# Patient Record
Sex: FEMALE | Race: WHITE | NOT HISPANIC OR LATINO | URBAN - METROPOLITAN AREA
[De-identification: names, ages, dates, MRNs, and addresses within clinical notes are randomized per-mention and may not be internally consistent; named-entity substitution may affect disease eponyms.]

---

## 2017-06-05 ENCOUNTER — OUTPATIENT (OUTPATIENT)
Dept: OUTPATIENT SERVICES | Facility: HOSPITAL | Age: 62
LOS: 1 days | End: 2017-06-05
Payer: MEDICARE

## 2017-06-05 VITALS
DIASTOLIC BLOOD PRESSURE: 75 MMHG | WEIGHT: 151.02 LBS | HEIGHT: 63 IN | TEMPERATURE: 98 F | RESPIRATION RATE: 18 BRPM | OXYGEN SATURATION: 96 % | HEART RATE: 71 BPM | SYSTOLIC BLOOD PRESSURE: 137 MMHG

## 2017-06-05 DIAGNOSIS — Z90.49 ACQUIRED ABSENCE OF OTHER SPECIFIED PARTS OF DIGESTIVE TRACT: Chronic | ICD-10-CM

## 2017-06-05 DIAGNOSIS — K43.9 VENTRAL HERNIA WITHOUT OBSTRUCTION OR GANGRENE: ICD-10-CM

## 2017-06-05 DIAGNOSIS — Z01.818 ENCOUNTER FOR OTHER PREPROCEDURAL EXAMINATION: ICD-10-CM

## 2017-06-05 DIAGNOSIS — Z98.890 OTHER SPECIFIED POSTPROCEDURAL STATES: Chronic | ICD-10-CM

## 2017-06-05 DIAGNOSIS — K43.2 INCISIONAL HERNIA WITHOUT OBSTRUCTION OR GANGRENE: Chronic | ICD-10-CM

## 2017-06-05 DIAGNOSIS — S43.006A UNSPECIFIED DISLOCATION OF UNSPECIFIED SHOULDER JOINT, INITIAL ENCOUNTER: ICD-10-CM

## 2017-06-05 PROCEDURE — G0463: CPT

## 2017-06-05 PROCEDURE — 85027 COMPLETE CBC AUTOMATED: CPT

## 2017-06-05 PROCEDURE — 87640 STAPH A DNA AMP PROBE: CPT

## 2017-06-05 PROCEDURE — 87641 MR-STAPH DNA AMP PROBE: CPT

## 2017-06-05 PROCEDURE — 80048 BASIC METABOLIC PNL TOTAL CA: CPT

## 2017-06-05 RX ORDER — SODIUM CHLORIDE 9 MG/ML
3 INJECTION INTRAMUSCULAR; INTRAVENOUS; SUBCUTANEOUS EVERY 8 HOURS
Qty: 0 | Refills: 0 | Status: DISCONTINUED | OUTPATIENT
Start: 2017-06-19 | End: 2017-06-22

## 2017-06-05 RX ORDER — CEFAZOLIN SODIUM 1 G
2000 VIAL (EA) INJECTION ONCE
Qty: 0 | Refills: 0 | Status: DISCONTINUED | OUTPATIENT
Start: 2017-06-07 | End: 2017-06-22

## 2017-06-05 RX ORDER — LIDOCAINE HCL 20 MG/ML
0.2 VIAL (ML) INJECTION ONCE
Qty: 0 | Refills: 0 | Status: DISCONTINUED | OUTPATIENT
Start: 2017-06-07 | End: 2017-06-22

## 2017-06-05 NOTE — H&P PST ADULT - PMH
Anxiety    Constipation    Depression    Hepatitis C  treated.  Pt states it was successful  History of ETOH abuse  pt states last drink 20 years ago attends AA meetings  Lumbar herniated disc  s/p 2 epidural injections, last one 2-2017  Migraine  pt states last 5-04-17.  Imitrex prn  Shoulder dislocation  history of, returned by pt. Pt states she can not stretch out arms fully  Ventral hernia  current

## 2017-06-05 NOTE — H&P PST ADULT - NSANTHOSAYNRD_GEN_A_CORE
No. JAMIE screening performed.  STOP BANG Legend: 0-2 = LOW Risk; 3-4 = INTERMEDIATE Risk; 5-8 = HIGH Risk

## 2017-06-05 NOTE — H&P PST ADULT - HISTORY OF PRESENT ILLNESS
This is a 62 year old female who noticed a mass" popping" out from her stomach.  Pt was able to reduce it, but over the past 3 weeks it has become larger and pt is distended and has abdominal pain.  Now scheduled  for laparoscopic ventral hernia repair on 6-07-17

## 2017-06-05 NOTE — H&P PST ADULT - PSH
Incisional hernia  s//p surgical repair with mesh 2016  S/P cholecystectomy  laparoscopic 2015  S/P colonoscopy  2016

## 2017-06-06 LAB
ANION GAP SERPL CALC-SCNC: 12 MMOL/L — SIGNIFICANT CHANGE UP (ref 5–17)
BUN SERPL-MCNC: 12 MG/DL — SIGNIFICANT CHANGE UP (ref 7–23)
CALCIUM SERPL-MCNC: 9.4 MG/DL — SIGNIFICANT CHANGE UP (ref 8.4–10.5)
CHLORIDE SERPL-SCNC: 106 MMOL/L — SIGNIFICANT CHANGE UP (ref 96–108)
CO2 SERPL-SCNC: 25 MMOL/L — SIGNIFICANT CHANGE UP (ref 22–31)
CREAT SERPL-MCNC: 0.67 MG/DL — SIGNIFICANT CHANGE UP (ref 0.5–1.3)
GLUCOSE SERPL-MCNC: 88 MG/DL — SIGNIFICANT CHANGE UP (ref 70–99)
HCT VFR BLD CALC: 35.4 % — SIGNIFICANT CHANGE UP (ref 34.5–45)
HGB BLD-MCNC: 11.7 G/DL — SIGNIFICANT CHANGE UP (ref 11.5–15.5)
MCHC RBC-ENTMCNC: 29.9 PG — SIGNIFICANT CHANGE UP (ref 27–34)
MCHC RBC-ENTMCNC: 33.1 GM/DL — SIGNIFICANT CHANGE UP (ref 32–36)
MCV RBC AUTO: 90.5 FL — SIGNIFICANT CHANGE UP (ref 80–100)
MRSA PCR RESULT.: SIGNIFICANT CHANGE UP
PLATELET # BLD AUTO: 82 K/UL — LOW (ref 150–400)
POTASSIUM SERPL-MCNC: 4 MMOL/L — SIGNIFICANT CHANGE UP (ref 3.5–5.3)
POTASSIUM SERPL-SCNC: 4 MMOL/L — SIGNIFICANT CHANGE UP (ref 3.5–5.3)
RBC # BLD: 3.91 M/UL — SIGNIFICANT CHANGE UP (ref 3.8–5.2)
RBC # FLD: 14.1 % — SIGNIFICANT CHANGE UP (ref 10.3–14.5)
S AUREUS DNA NOSE QL NAA+PROBE: SIGNIFICANT CHANGE UP
SODIUM SERPL-SCNC: 143 MMOL/L — SIGNIFICANT CHANGE UP (ref 135–145)
WBC # BLD: 4.21 K/UL — SIGNIFICANT CHANGE UP (ref 3.8–10.5)
WBC # FLD AUTO: 4.21 K/UL — SIGNIFICANT CHANGE UP (ref 3.8–10.5)

## 2017-06-07 ENCOUNTER — RESULT REVIEW (OUTPATIENT)
Age: 62
End: 2017-06-07

## 2017-06-07 ENCOUNTER — OUTPATIENT (OUTPATIENT)
Dept: OUTPATIENT SERVICES | Facility: HOSPITAL | Age: 62
LOS: 1 days | End: 2017-06-07
Payer: MEDICARE

## 2017-06-07 VITALS
DIASTOLIC BLOOD PRESSURE: 82 MMHG | OXYGEN SATURATION: 99 % | RESPIRATION RATE: 20 BRPM | SYSTOLIC BLOOD PRESSURE: 148 MMHG | WEIGHT: 151.02 LBS | HEART RATE: 71 BPM | HEIGHT: 63 IN | TEMPERATURE: 98 F

## 2017-06-07 VITALS
DIASTOLIC BLOOD PRESSURE: 86 MMHG | RESPIRATION RATE: 15 BRPM | OXYGEN SATURATION: 96 % | SYSTOLIC BLOOD PRESSURE: 152 MMHG | HEART RATE: 85 BPM | TEMPERATURE: 99 F

## 2017-06-07 DIAGNOSIS — K43.2 INCISIONAL HERNIA WITHOUT OBSTRUCTION OR GANGRENE: Chronic | ICD-10-CM

## 2017-06-07 DIAGNOSIS — Z98.890 OTHER SPECIFIED POSTPROCEDURAL STATES: Chronic | ICD-10-CM

## 2017-06-07 DIAGNOSIS — Z01.818 ENCOUNTER FOR OTHER PREPROCEDURAL EXAMINATION: ICD-10-CM

## 2017-06-07 DIAGNOSIS — K43.9 VENTRAL HERNIA WITHOUT OBSTRUCTION OR GANGRENE: ICD-10-CM

## 2017-06-07 DIAGNOSIS — Z90.49 ACQUIRED ABSENCE OF OTHER SPECIFIED PARTS OF DIGESTIVE TRACT: Chronic | ICD-10-CM

## 2017-06-07 PROCEDURE — 88313 SPECIAL STAINS GROUP 2: CPT

## 2017-06-07 PROCEDURE — 85049 AUTOMATED PLATELET COUNT: CPT

## 2017-06-07 PROCEDURE — 88313 SPECIAL STAINS GROUP 2: CPT | Mod: 26

## 2017-06-07 PROCEDURE — 47379 UNLISTED LAPS PX LIVER: CPT

## 2017-06-07 PROCEDURE — C1781: CPT

## 2017-06-07 PROCEDURE — 88307 TISSUE EXAM BY PATHOLOGIST: CPT | Mod: 26

## 2017-06-07 PROCEDURE — 49653: CPT

## 2017-06-07 PROCEDURE — 88307 TISSUE EXAM BY PATHOLOGIST: CPT

## 2017-06-07 RX ORDER — SODIUM CHLORIDE 9 MG/ML
1000 INJECTION, SOLUTION INTRAVENOUS
Qty: 0 | Refills: 0 | Status: DISCONTINUED | OUTPATIENT
Start: 2017-06-07 | End: 2017-06-22

## 2017-06-07 RX ORDER — ONDANSETRON 8 MG/1
4 TABLET, FILM COATED ORAL ONCE
Qty: 0 | Refills: 0 | Status: COMPLETED | OUTPATIENT
Start: 2017-06-07 | End: 2017-06-07

## 2017-06-07 RX ORDER — ACETAMINOPHEN 500 MG
975 TABLET ORAL ONCE
Qty: 0 | Refills: 0 | Status: COMPLETED | OUTPATIENT
Start: 2017-06-07 | End: 2017-06-07

## 2017-06-07 RX ORDER — OXYCODONE HYDROCHLORIDE 5 MG/1
5 TABLET ORAL ONCE
Qty: 0 | Refills: 0 | Status: DISCONTINUED | OUTPATIENT
Start: 2017-06-07 | End: 2017-06-07

## 2017-06-07 RX ORDER — CHOLECALCIFEROL (VITAMIN D3) 125 MCG
1 CAPSULE ORAL
Qty: 0 | Refills: 0 | COMMUNITY

## 2017-06-07 RX ORDER — CELECOXIB 200 MG/1
200 CAPSULE ORAL ONCE
Qty: 0 | Refills: 0 | Status: COMPLETED | OUTPATIENT
Start: 2017-06-07 | End: 2017-06-07

## 2017-06-07 RX ORDER — OXYCODONE HYDROCHLORIDE 5 MG/1
1 TABLET ORAL
Qty: 0 | Refills: 0 | COMMUNITY
Start: 2017-06-07

## 2017-06-07 RX ORDER — SERTRALINE 25 MG/1
1 TABLET, FILM COATED ORAL
Qty: 0 | Refills: 0 | COMMUNITY

## 2017-06-07 RX ORDER — SUMATRIPTAN SUCCINATE 4 MG/.5ML
1 INJECTION, SOLUTION SUBCUTANEOUS
Qty: 0 | Refills: 0 | COMMUNITY

## 2017-06-07 RX ORDER — ONDANSETRON 8 MG/1
4 TABLET, FILM COATED ORAL ONCE
Qty: 0 | Refills: 0 | Status: DISCONTINUED | OUTPATIENT
Start: 2017-06-07 | End: 2017-06-22

## 2017-06-07 RX ORDER — TRAZODONE HCL 50 MG
1 TABLET ORAL
Qty: 0 | Refills: 0 | COMMUNITY

## 2017-06-07 RX ORDER — FENTANYL CITRATE 50 UG/ML
25 INJECTION INTRAVENOUS
Qty: 0 | Refills: 0 | Status: DISCONTINUED | OUTPATIENT
Start: 2017-06-07 | End: 2017-06-07

## 2017-06-07 RX ORDER — OXYCODONE HYDROCHLORIDE 5 MG/1
1 TABLET ORAL
Qty: 0 | Refills: 0 | COMMUNITY

## 2017-06-07 RX ORDER — FAMOTIDINE 10 MG/ML
1 INJECTION INTRAVENOUS
Qty: 0 | Refills: 0 | COMMUNITY

## 2017-06-07 RX ORDER — HYDROMORPHONE HYDROCHLORIDE 2 MG/ML
0.5 INJECTION INTRAMUSCULAR; INTRAVENOUS; SUBCUTANEOUS
Qty: 0 | Refills: 0 | Status: DISCONTINUED | OUTPATIENT
Start: 2017-06-07 | End: 2017-06-07

## 2017-06-07 RX ADMIN — ONDANSETRON 4 MILLIGRAM(S): 8 TABLET, FILM COATED ORAL at 13:15

## 2017-06-07 RX ADMIN — HYDROMORPHONE HYDROCHLORIDE 0.5 MILLIGRAM(S): 2 INJECTION INTRAMUSCULAR; INTRAVENOUS; SUBCUTANEOUS at 13:40

## 2017-06-07 RX ADMIN — OXYCODONE HYDROCHLORIDE 5 MILLIGRAM(S): 5 TABLET ORAL at 13:15

## 2017-06-07 RX ADMIN — CELECOXIB 200 MILLIGRAM(S): 200 CAPSULE ORAL at 13:15

## 2017-06-07 NOTE — ASU PREOP CHECKLIST - TO WHOM
OR SARAH Cross ([id number])/ OR SARAH Cross ([id number])/Per surgeon, pt may go to OR without PLT results being ready. He will check results post-op OR SARAH Cross ([id number])/Per surgeon, pt may go to OR without PLT results being ready. He will check results post-op/report received from GIGI Obregon RN

## 2017-06-08 ENCOUNTER — TRANSCRIPTION ENCOUNTER (OUTPATIENT)
Age: 62
End: 2017-06-08

## 2017-06-13 LAB — SURGICAL PATHOLOGY STUDY: SIGNIFICANT CHANGE UP

## 2017-11-08 ENCOUNTER — APPOINTMENT (OUTPATIENT)
Dept: GASTROENTEROLOGY | Facility: CLINIC | Age: 62
End: 2017-11-08

## 2018-01-30 ENCOUNTER — APPOINTMENT (OUTPATIENT)
Dept: GASTROENTEROLOGY | Facility: CLINIC | Age: 63
End: 2018-01-30
Payer: MEDICARE

## 2018-01-30 ENCOUNTER — LABORATORY RESULT (OUTPATIENT)
Age: 63
End: 2018-01-30

## 2018-01-30 VITALS
RESPIRATION RATE: 16 BRPM | SYSTOLIC BLOOD PRESSURE: 122 MMHG | BODY MASS INDEX: 23.39 KG/M2 | TEMPERATURE: 97.5 F | WEIGHT: 137 LBS | HEIGHT: 64 IN | OXYGEN SATURATION: 96 % | DIASTOLIC BLOOD PRESSURE: 64 MMHG | HEART RATE: 87 BPM

## 2018-01-30 DIAGNOSIS — K43.9 VENTRAL HERNIA W/OUT OBSTRUCTION OR GANGRENE: ICD-10-CM

## 2018-01-30 PROCEDURE — 99215 OFFICE O/P EST HI 40 MIN: CPT | Mod: 25

## 2018-01-30 PROCEDURE — 36415 COLL VENOUS BLD VENIPUNCTURE: CPT

## 2018-02-01 LAB
AFP-TM SERPL-MCNC: 4.5 NG/ML
ALBUMIN SERPL ELPH-MCNC: 3.8 G/DL
ALP BLD-CCNC: 127 U/L
ALT SERPL-CCNC: 26 U/L
ANION GAP SERPL CALC-SCNC: 14 MMOL/L
AST SERPL-CCNC: 46 U/L
BASOPHILS # BLD AUTO: 0.02 K/UL
BASOPHILS NFR BLD AUTO: 0.4 %
BILIRUB SERPL-MCNC: 0.9 MG/DL
BUN SERPL-MCNC: 12 MG/DL
CALCIUM SERPL-MCNC: 9.9 MG/DL
CHLORIDE SERPL-SCNC: 105 MMOL/L
CO2 SERPL-SCNC: 24 MMOL/L
CREAT SERPL-MCNC: 0.81 MG/DL
EOSINOPHIL # BLD AUTO: 0.14 K/UL
EOSINOPHIL NFR BLD AUTO: 3.1 %
GLUCOSE SERPL-MCNC: 90 MG/DL
HCT VFR BLD CALC: 37.5 %
HCV RNA SERPL NAA DL=5-ACNC: NOT DETECTED
HCV RNA SERPL NAA+PROBE-LOG IU: NOT DETECTED LOGIU/ML
HGB BLD-MCNC: 12.6 G/DL
IMM GRANULOCYTES NFR BLD AUTO: 0.2 %
INR PPP: 1.11 RATIO
LYMPHOCYTES # BLD AUTO: 0.91 K/UL
LYMPHOCYTES NFR BLD AUTO: 20 %
MAN DIFF?: NORMAL
MCHC RBC-ENTMCNC: 30.7 PG
MCHC RBC-ENTMCNC: 33.6 GM/DL
MCV RBC AUTO: 91.5 FL
MONOCYTES # BLD AUTO: 0.36 K/UL
MONOCYTES NFR BLD AUTO: 7.9 %
NEUTROPHILS # BLD AUTO: 3.1 K/UL
NEUTROPHILS NFR BLD AUTO: 68.4 %
PLATELET # BLD AUTO: 68 K/UL
POTASSIUM SERPL-SCNC: 3.9 MMOL/L
PROT SERPL-MCNC: 7.7 G/DL
PT BLD: 12.6 SEC
RBC # BLD: 4.1 M/UL
RBC # FLD: 14.5 %
SODIUM SERPL-SCNC: 143 MMOL/L
WBC # FLD AUTO: 4.54 K/UL

## 2018-07-19 PROBLEM — F41.9 ANXIETY DISORDER, UNSPECIFIED: Chronic | Status: ACTIVE | Noted: 2017-06-05

## 2018-07-19 PROBLEM — Z87.898 PERSONAL HISTORY OF OTHER SPECIFIED CONDITIONS: Chronic | Status: ACTIVE | Noted: 2017-06-05

## 2018-07-19 PROBLEM — S43.006A UNSPECIFIED DISLOCATION OF UNSPECIFIED SHOULDER JOINT, INITIAL ENCOUNTER: Chronic | Status: ACTIVE | Noted: 2017-06-05

## 2018-07-19 PROBLEM — M51.26 OTHER INTERVERTEBRAL DISC DISPLACEMENT, LUMBAR REGION: Chronic | Status: ACTIVE | Noted: 2017-06-05

## 2018-07-19 PROBLEM — G43.909 MIGRAINE, UNSPECIFIED, NOT INTRACTABLE, WITHOUT STATUS MIGRAINOSUS: Chronic | Status: ACTIVE | Noted: 2017-06-05

## 2018-07-19 PROBLEM — B19.20 UNSPECIFIED VIRAL HEPATITIS C WITHOUT HEPATIC COMA: Chronic | Status: ACTIVE | Noted: 2017-06-05

## 2018-07-19 PROBLEM — K59.00 CONSTIPATION, UNSPECIFIED: Chronic | Status: ACTIVE | Noted: 2017-06-05

## 2018-07-19 PROBLEM — F32.9 MAJOR DEPRESSIVE DISORDER, SINGLE EPISODE, UNSPECIFIED: Chronic | Status: ACTIVE | Noted: 2017-06-05

## 2018-07-19 PROBLEM — K43.9 VENTRAL HERNIA WITHOUT OBSTRUCTION OR GANGRENE: Chronic | Status: ACTIVE | Noted: 2017-06-05

## 2018-09-05 ENCOUNTER — APPOINTMENT (OUTPATIENT)
Dept: GASTROENTEROLOGY | Facility: CLINIC | Age: 63
End: 2018-09-05
Payer: MEDICARE

## 2018-09-05 ENCOUNTER — LABORATORY RESULT (OUTPATIENT)
Age: 63
End: 2018-09-05

## 2018-09-05 VITALS
WEIGHT: 135 LBS | BODY MASS INDEX: 23.05 KG/M2 | RESPIRATION RATE: 15 BRPM | HEIGHT: 64 IN | SYSTOLIC BLOOD PRESSURE: 120 MMHG | DIASTOLIC BLOOD PRESSURE: 70 MMHG | TEMPERATURE: 98 F | HEART RATE: 70 BPM | OXYGEN SATURATION: 98 %

## 2018-09-05 PROCEDURE — 99214 OFFICE O/P EST MOD 30 MIN: CPT | Mod: 25

## 2018-09-05 PROCEDURE — 36415 COLL VENOUS BLD VENIPUNCTURE: CPT

## 2018-09-09 LAB
AFP-TM SERPL-MCNC: 3.7 NG/ML
ALBUMIN SERPL ELPH-MCNC: 3.7 G/DL
ALP BLD-CCNC: 118 U/L
ALT SERPL-CCNC: 15 U/L
AST SERPL-CCNC: 35 U/L
BASOPHILS # BLD AUTO: 0.01 K/UL
BASOPHILS NFR BLD AUTO: 0.2 %
BILIRUB DIRECT SERPL-MCNC: 0.3 MG/DL
BILIRUB INDIRECT SERPL-MCNC: 0.7 MG/DL
BILIRUB SERPL-MCNC: 1 MG/DL
CREAT SERPL-MCNC: 0.56 MG/DL
EOSINOPHIL # BLD AUTO: 0.16 K/UL
EOSINOPHIL NFR BLD AUTO: 3.1 %
HCT VFR BLD CALC: 35.3 %
HCV RNA SERPL NAA DL=5-ACNC: NOT DETECTED IU/ML
HCV RNA SERPL NAA+PROBE-LOG IU: NOT DETECTED LOGIU/ML
HGB BLD-MCNC: 11.9 G/DL
IMM GRANULOCYTES NFR BLD AUTO: 0.2 %
INR PPP: 1.14 RATIO
LYMPHOCYTES # BLD AUTO: 0.9 K/UL
LYMPHOCYTES NFR BLD AUTO: 17.4 %
MAN DIFF?: NORMAL
MCHC RBC-ENTMCNC: 31.2 PG
MCHC RBC-ENTMCNC: 33.7 GM/DL
MCV RBC AUTO: 92.7 FL
MONOCYTES # BLD AUTO: 0.42 K/UL
MONOCYTES NFR BLD AUTO: 8.1 %
NEUTROPHILS # BLD AUTO: 3.67 K/UL
NEUTROPHILS NFR BLD AUTO: 71 %
PLATELET # BLD AUTO: 63 K/UL
PROT SERPL-MCNC: 6.9 G/DL
PT BLD: 12.9 SEC
RBC # BLD: 3.81 M/UL
RBC # FLD: 14.6 %
SODIUM SERPL-SCNC: 143 MMOL/L
WBC # FLD AUTO: 5.17 K/UL

## 2018-09-10 ENCOUNTER — OTHER (OUTPATIENT)
Age: 63
End: 2018-09-10

## 2018-09-10 DIAGNOSIS — Z12.11 ENCOUNTER FOR SCREENING FOR MALIGNANT NEOPLASM OF COLON: ICD-10-CM

## 2018-09-10 RX ORDER — POLYETHYLENE GLYCOL 3350, SODIUM SULFATE, SODIUM CHLORIDE, POTASSIUM CHLORIDE, ASCORBIC ACID, SODIUM ASCORBATE 7.5-2.691G
100 KIT ORAL
Qty: 1 | Refills: 0 | Status: ACTIVE | COMMUNITY
Start: 2018-09-10 | End: 1900-01-01

## 2018-09-18 ENCOUNTER — APPOINTMENT (OUTPATIENT)
Dept: ULTRASOUND IMAGING | Facility: IMAGING CENTER | Age: 63
End: 2018-09-18
Payer: MEDICARE

## 2018-09-18 ENCOUNTER — OUTPATIENT (OUTPATIENT)
Dept: OUTPATIENT SERVICES | Facility: HOSPITAL | Age: 63
LOS: 1 days | End: 2018-09-18
Payer: MEDICARE

## 2018-09-18 DIAGNOSIS — K74.60 UNSPECIFIED CIRRHOSIS OF LIVER: ICD-10-CM

## 2018-09-18 DIAGNOSIS — K43.2 INCISIONAL HERNIA WITHOUT OBSTRUCTION OR GANGRENE: Chronic | ICD-10-CM

## 2018-09-18 DIAGNOSIS — Z90.49 ACQUIRED ABSENCE OF OTHER SPECIFIED PARTS OF DIGESTIVE TRACT: Chronic | ICD-10-CM

## 2018-09-18 DIAGNOSIS — Z98.890 OTHER SPECIFIED POSTPROCEDURAL STATES: Chronic | ICD-10-CM

## 2018-09-18 PROCEDURE — 76700 US EXAM ABDOM COMPLETE: CPT | Mod: 26

## 2018-09-18 PROCEDURE — 76700 US EXAM ABDOM COMPLETE: CPT

## 2018-11-29 ENCOUNTER — APPOINTMENT (OUTPATIENT)
Dept: GASTROENTEROLOGY | Facility: HOSPITAL | Age: 63
End: 2018-11-29

## 2018-11-29 ENCOUNTER — OUTPATIENT (OUTPATIENT)
Dept: OUTPATIENT SERVICES | Facility: HOSPITAL | Age: 63
LOS: 1 days | Discharge: HOME | End: 2018-11-29
Payer: MEDICARE

## 2018-11-29 DIAGNOSIS — K43.2 INCISIONAL HERNIA WITHOUT OBSTRUCTION OR GANGRENE: Chronic | ICD-10-CM

## 2018-11-29 DIAGNOSIS — Z98.890 OTHER SPECIFIED POSTPROCEDURAL STATES: Chronic | ICD-10-CM

## 2018-11-29 DIAGNOSIS — Z90.49 ACQUIRED ABSENCE OF OTHER SPECIFIED PARTS OF DIGESTIVE TRACT: Chronic | ICD-10-CM

## 2018-11-29 DIAGNOSIS — K74.60 UNSPECIFIED CIRRHOSIS OF LIVER: ICD-10-CM

## 2018-11-29 PROCEDURE — 45330 DIAGNOSTIC SIGMOIDOSCOPY: CPT | Mod: GC

## 2019-01-25 ENCOUNTER — MESSAGE (OUTPATIENT)
Age: 64
End: 2019-01-25

## 2019-07-24 ENCOUNTER — APPOINTMENT (OUTPATIENT)
Dept: GASTROENTEROLOGY | Facility: CLINIC | Age: 64
End: 2019-07-24

## 2019-07-30 ENCOUNTER — APPOINTMENT (OUTPATIENT)
Dept: GASTROENTEROLOGY | Facility: CLINIC | Age: 64
End: 2019-07-30
Payer: MEDICARE

## 2019-07-30 VITALS
WEIGHT: 128 LBS | SYSTOLIC BLOOD PRESSURE: 122 MMHG | RESPIRATION RATE: 16 BRPM | TEMPERATURE: 98.4 F | DIASTOLIC BLOOD PRESSURE: 70 MMHG | BODY MASS INDEX: 22.68 KG/M2 | HEIGHT: 63 IN | OXYGEN SATURATION: 96 % | HEART RATE: 87 BPM

## 2019-07-30 PROCEDURE — 99214 OFFICE O/P EST MOD 30 MIN: CPT

## 2019-07-30 NOTE — HISTORY OF PRESENT ILLNESS
[FreeTextEntry1] : Office revisit on 7/30/19.\par \par The patient presents for followup regarding history of cirrhosis attributed to chronic hepatitis C and alcohol use. \par \par PREVIOUS HISTORY:\par \par ORV 5/26/15:    -Patient commenced therapy with Sovaldi and Ribavirin approximately 17 weeks ago (initiated Jan 19th). \par \par The patient is a 59-year-old woman with a history of chronic hepatitis C genotype 3a. The patient has previous history of parenterall drug use and alcohol use. She has been known to have chronic hepatitis C for many years. The patient had previously been evaluated by me in the past at which time she had declined antiviral therapy. \par \par The patient underwent a laparoscopic cholecystectomy on 3/18/14. Her liver appeared cirrhotic at the time of the cholecystectomy. A biopsy revealed features of cirrhosis. Of note, a CT scan on 3/17/14 revealed a fatty liver. The liver was not described as being cirrhotic although splenomegaly was noted and varices at the splenic hilum was reported. A 7 mm nodule was noted at the right lung base. In addition, on 3/8/14 the platelet count was 73,000.\par \par The patient is currently asymptomatic from a gastrointestinal standpoint. She has one formed bowel movement daily without any complaints of diarrhea, constipation, change in bowel habit or rectal bleeding. Her appetite and weight are stable. She reports no complaints of dysphagia, heartburn, regurgitation, nausea, vomiting or abdominal pain. \par \par As noted, the patient has a prior history of significant alcohol intake as well as drug intake. However, since 1999 she has been totally abstinent from alcohol and drugs.\par \par The patient indicates that she underwent a colonoscopy 2 years ago. She reports that a small benign polyp was removed.\par \par Testing for hepatitis A and hepatitis B revealed immunity.\par \par The patient underwent an upper endoscopy on 7/24/14 and esophageal varices were not detected.\par \par  When assessed on 4/14/15 the ALT was 26 and AST was 41. The HCV RNA by PCR assay was detectable but < 12.\par \par CURRENT HISTORY:\par \par ORV 1/30/18:    -Patient presents for followup regarding plans for surgical repair of recurrent ventral hernia. History per patient as well as a friend was present during the interview and examination.\par              -History of established cirrhosis. Patient has history of alcohol abuse. History of chronic hepatitis C genotype 3 which was treated in 2015 with a 6 month regimen of ribavirin 1000 mg in conjunction with Solvadi 400 mg. Treatment completed early 9/2015. Laboratory assessment 12/2015 noted for negative HCV RNA by PCR assay consistent with SVR. Liver enzymes were normal including ALT 19. Thrombocytopenia was noted with platelet count 76,000 consistent with component of portal hypertension.\par                -Records provided by patient were reviewed. Patient had undergone repair of an incarcerated umbilical hernia by Dr. Friedman on 8/16/16. Reoperation for recurrent ventral incisional hernia was performed on 6/7/17. Postop diagnosis was listed as incarcerated recurrent ventral incisional hernia. Cirrhosis was also noted. Operative note indicates a 6.6 cm Ventralex mesh was inserted. Liver biopsy was performed confirming cirrhosis.\par               -The patient indicates experiencing chronic symptoms of upper abdominal discomfort attributed to a recurrent hernia. Patient is vague as to the duration of the symptoms. However, approximately 10 days ago the patient experienced a more severe sharp throbbing pain at the site of the recurrent hernia in association with vomiting prompting a 2 day hospitalization at [facility]. The patient was evaluated by Dr. William Blackmon of surgery. Elective surgical repair is being planned. The patient indicates that she had a CT scan at that hospitalization-I await this report for review. The patient indicates that the vomiting prompting hospitalization has resolved.\par                 -The patient continues to experience abdominal discomfort attributed to the recurrent hernia. She describes a constant throbbing discomfort and pain. Coughing exacerbates this. She is able to reduce the hernia. At present, the patient denies any fever. Appetite and weight are stable. Reports no complaints of dysphagia or any recurrence of vomiting since her hospitalization. Can experience some nausea. His daily bowel movement without any complaints of diarrhea, constipation or rectal bleeding. Patient utilizes Advil or naproxen p.r.n. for pain approximately 3-4 times weekly. On occasion utilize oxycodone for pain.\par \par ORV 9/5/18:    -Presents for routine followup regarding history of cirrhosis and previous history of chronic hepatitis C. Previous history of alcohol abuse reported but has been abstinent for past 20 years.\par                  -Patient underwent complex abdominal hernia repair 2/2018. Hospitalized for 6 days. Reports 15 pound weight loss at time of surgery but since stable.\par                   -Denies complaints of dysphagia, heartburn, nausea, vomiting or abdominal pain. Bowel movements are irregular and she is prone to constipation. Patient not willing to have bowel movements at work. No complaint of diarrhea, change in bowel habit or rectal bleeding. She can experience some complaints of gas.\par                  -Patient has chronic back pain. Reports history of spinal stenosis, scoliosis and arthritis. Gets epidural injections but not effective. Complains of migraine. Currently on regimen of Zoloft, trazodone and p.r.n. oxycodone but reports only infrequently utilizing oxycodone.\par \par PARTIAL COLONOSCOPY 11/29/18:     -Patient underwent screening colonoscopy on 11/29/18. Patient is average risk. Despite propofol sedation the patient could not be adequately sedated. Throughout the exam she was restless, tense and with apparent myoclonus. A few sigmoid diverticula were noted. The colonoscopy examination was incomplete. Examination was performed to the sigmoid colon.\par                                      -Limited colonoscopy results discussed. Alternative colon cancer screening measures reviewed. Options of CT colonography versus Cologuard were discussed. The patient is leaning towards the Cologuard  test. She wishes to think about this and will contact me as to how she wishes to proceed. \par \par \par ORV 7/30/19:    -Patient presents for follow up regarding history of cirrhosis attributed to chronic hepatitis C. Patient last evaluated by me on 9/5/18. Of note, the patient indicates that several months ago she had a prolonged hospitalization at [facility]. She indicates that she went to the emergency room with complaint of back pain. She reports being diagnosed with pneumonia. She describes being "in a coma" for 2 weeks. She was hospitalized at [facility] for 5 weeks and subsequently was in rehabilitation for one month. Of note, this is per the patient. No records provided for review. Patient very vague about hospitalization. She may have required upper endoscopy for upper GI bleeding based on patient's vague description.\par                 -Current main complaint is back pain. The patient has chronic back pain. This continues and she experiences ongoing back pain requiring epidural treatments.\par                   -Denies fever. Appetite is stable. Indicates no significant weight loss. Denies any current complaints of dysphagia, nausea, vomiting, abdominal pain or abdominal distention. Has daily formed bowel movements without any current complaints of diarrhea, constipation, change in bowel habit or rectal bleeding.

## 2019-07-30 NOTE — PHYSICAL EXAM
[General Appearance - Alert] : alert [General Appearance - In No Acute Distress] : in no acute distress [General Appearance - Well Nourished] : well nourished [General Appearance - Well Developed] : well developed [General Appearance - Well-Appearing] : healthy appearing [Sclera] : the sclera and conjunctiva were normal [Oropharynx] : the oropharynx was normal [Neck Appearance] : the appearance of the neck was normal [Neck Cervical Mass (___cm)] : no neck mass was observed [Respiration, Rhythm And Depth] : normal respiratory rhythm and effort [Exaggerated Use Of Accessory Muscles For Inspiration] : no accessory muscle use [Auscultation Breath Sounds / Voice Sounds] : lungs were clear to auscultation bilaterally [Heart Rate And Rhythm] : heart rate was normal and rhythm regular [Heart Sounds] : normal S1 and S2 [Heart Sounds Gallop] : no gallops [Heart Sounds Pericardial Friction Rub] : no pericardial rub [Arterial Pulses Femoral] : femoral pulses were normal without bruits [Edema] : there was no peripheral edema [Abdomen Soft] : soft [] : no hepato-splenomegaly [Abdomen Tenderness] : non-tender [Abdomen Mass (___ Cm)] : no abdominal mass palpated [Abdomen Hernia] : no hernia was discovered [Cervical Lymph Nodes Enlarged Posterior Bilaterally] : posterior cervical [Supraclavicular Lymph Nodes Enlarged Bilaterally] : supraclavicular [Cervical Lymph Nodes Enlarged Anterior Bilaterally] : anterior cervical [Abnormal Walk] : normal gait [No CVA Tenderness] : no ~M costovertebral angle tenderness [Nail Clubbing] : no clubbing  or cyanosis of the fingernails [Skin Color & Pigmentation] : normal skin color and pigmentation [Oriented To Time, Place, And Person] : oriented to person, place, and time [Impaired Insight] : insight and judgment were intact [Affect] : the affect was normal [Mood] : the mood was normal [FreeTextEntry1] : The abdomen is soft, nontender, nondistended and without mass. The left hepatic lobe is somewhat prominent. The spleen is not palpated. There is no ascites.

## 2019-07-30 NOTE — REASON FOR VISIT
[Follow-Up: _____] : a [unfilled] follow-up visit [FreeTextEntry1] : for followup of history of cirrhosis and chronic hepatitis C.

## 2019-07-30 NOTE — ASSESSMENT
[FreeTextEntry1] : Impression:\par \par #1 cirrhosis-  cirrhosis attributed to history of chronic hepatitis C and alcohol intake. Stable and compensated.\par \par #2 history of chronic hepatitis C-genotype 3a. Status post antiviral therapy with 6 months of ribavirin 1000 mg and Solvadi 400 mg (completed 9/2015)-achieved SVR.\par \par #3 recurrent ventral/incisional hernia-status post umbilical hernia repair 2016 and subsequent laparoscopic repair of recurrent ventral/incisional hernia 2017 with mesh insertion. Status post repair of recurrent ventral hernia 2/2018.\par \par #4 status post laparoscopic cholecystectomy 3/18/14.\par \par #5 chronic anxiety disorder.\par \par #6 migraine headaches.\par \par #7 chronic back pain-lumbar disc herniation.\par \par #8 thrombocytopenia-consistent with cirrhosis, portal hypertension and hypersplenism.\par \par #9 pneumonia-describes a prolonged hospitalization for severe pneumonia.

## 2019-07-30 NOTE — CONSULT LETTER
[Dear  ___] : Dear  [unfilled], [Please see my note below.] : Please see my note below. [Consult Closing:] : Thank you very much for allowing me to participate in the care of this patient.  If you have any questions, please do not hesitate to contact me. [Sincerely,] : Sincerely, [DrConnie  ___] : Dr. LEACH [Hardeep Cain M.D.] : Hardeep Cain M.D. [Division of Gastroenterology] : Division of Gastroenterology [[facility]] : [facility] [address] : [address] [[place] [address]] : [place] [address] [FreeTextEntry2] : Dr. Mcbride [FreeTextEntry3] : Hardeep Cain M.D.

## 2019-08-05 ENCOUNTER — APPOINTMENT (OUTPATIENT)
Dept: PULMONOLOGY | Facility: CLINIC | Age: 64
End: 2019-08-05
Payer: MEDICARE

## 2019-08-05 VITALS
SYSTOLIC BLOOD PRESSURE: 132 MMHG | WEIGHT: 128 LBS | RESPIRATION RATE: 12 BRPM | BODY MASS INDEX: 22.68 KG/M2 | HEART RATE: 76 BPM | DIASTOLIC BLOOD PRESSURE: 71 MMHG | OXYGEN SATURATION: 90 % | HEIGHT: 63 IN

## 2019-08-05 LAB
AFP-TM SERPL-MCNC: 2.9 NG/ML
ALBUMIN SERPL ELPH-MCNC: 2.9 G/DL
ALP BLD-CCNC: 109 U/L
ALT SERPL-CCNC: 15 U/L
ANION GAP SERPL CALC-SCNC: 8 MMOL/L
AST SERPL-CCNC: 32 U/L
BASOPHILS # BLD AUTO: 0.03 K/UL
BASOPHILS NFR BLD AUTO: 0.7 %
BILIRUB SERPL-MCNC: 1.3 MG/DL
BUN SERPL-MCNC: 9 MG/DL
CALCIUM SERPL-MCNC: 8.9 MG/DL
CHLORIDE SERPL-SCNC: 109 MMOL/L
CO2 SERPL-SCNC: 24 MMOL/L
CREAT SERPL-MCNC: 0.39 MG/DL
EOSINOPHIL # BLD AUTO: 0.48 K/UL
EOSINOPHIL NFR BLD AUTO: 10.8 %
GLUCOSE SERPL-MCNC: 93 MG/DL
HCT VFR BLD CALC: 30.8 %
HCV RNA SERPL NAA DL=5-ACNC: NOT DETECTED
HCV RNA SERPL NAA+PROBE-LOG IU: NOT DETECTED LOGIU/ML
HGB BLD-MCNC: 9.6 G/DL
IMM GRANULOCYTES NFR BLD AUTO: 0.2 %
INR PPP: 1.24 RATIO
LYMPHOCYTES # BLD AUTO: 0.92 K/UL
LYMPHOCYTES NFR BLD AUTO: 20.6 %
MAN DIFF?: NORMAL
MCHC RBC-ENTMCNC: 31.2 GM/DL
MCHC RBC-ENTMCNC: 32.5 PG
MCV RBC AUTO: 104.4 FL
MONOCYTES # BLD AUTO: 0.36 K/UL
MONOCYTES NFR BLD AUTO: 8.1 %
NEUTROPHILS # BLD AUTO: 2.66 K/UL
NEUTROPHILS NFR BLD AUTO: 59.6 %
PLATELET # BLD AUTO: 63 K/UL
POTASSIUM SERPL-SCNC: 4.2 MMOL/L
PROT SERPL-MCNC: 6.7 G/DL
PT BLD: 14.3 SEC
RBC # BLD: 2.95 M/UL
RBC # FLD: 16.2 %
SODIUM SERPL-SCNC: 141 MMOL/L
WBC # FLD AUTO: 4.46 K/UL

## 2019-08-05 PROCEDURE — 94060 EVALUATION OF WHEEZING: CPT

## 2019-08-05 PROCEDURE — 99204 OFFICE O/P NEW MOD 45 MIN: CPT | Mod: 25

## 2019-08-05 PROCEDURE — 94727 GAS DIL/WSHOT DETER LNG VOL: CPT

## 2019-08-05 PROCEDURE — 71046 X-RAY EXAM CHEST 2 VIEWS: CPT

## 2019-08-05 PROCEDURE — 88738 HGB QUANT TRANSCUTANEOUS: CPT

## 2019-08-05 PROCEDURE — 94729 DIFFUSING CAPACITY: CPT

## 2019-08-05 NOTE — ASSESSMENT
[FreeTextEntry1] : I am wondering a noncontrast chest CT scan to further evaluate her underlying lung parenchyma. I advised her to return to office for followup visit after chest CT scan has been performed

## 2019-08-05 NOTE — DISCUSSION/SUMMARY
[FreeTextEntry1] : Seda is a patient with accentuated bilateral upper lobe bronchial markings on chest x-ray, rule out bronchiectasis. Secondly, she is a patient with history of hepatitis C/cirrhosis

## 2019-08-05 NOTE — HISTORY OF PRESENT ILLNESS
[FreeTextEntry1] : Seda is a pleasant 64-year-old female with history of hepatitis C  infection, cirrhosis from history of ETOH abuse, depression/anxiety, lumbar radiculopathy, she reports that she was recently admitted to [facility] for severe sepsis/pneumonia, requiring intubation, she received IV antibiotics, and was later extubated, and sent to a rehabilitation facility. She came in for pulmonary evaluation today, she currently appears comfortable, has no chest pain, shortness breath, cough, fever or chills

## 2019-08-05 NOTE — PROCEDURE
[FreeTextEntry1] : Chest x-ray PA and lateral views performed in my office today showed accentuated bilateral upper lobe bronchial markings, suggestive of bronchiectasis,, no evidence of infiltrates or pleural effusions.\par \par Pulmonary Function Test: Lung Volume: Within normal limits; Spirometry: Within normal limits with no improvement post bronchodilator; Diffusion: moderate impairment.\par \par \par

## 2019-08-05 NOTE — PHYSICAL EXAM
[General Appearance - Well Developed] : well developed [Normal Appearance] : normal appearance [Well Groomed] : well groomed [No Deformities] : no deformities [General Appearance - Well Nourished] : well nourished [General Appearance - In No Acute Distress] : no acute distress [Normal Oropharynx] : normal oropharynx [Heart Rate And Rhythm] : heart rate and rhythm were normal [Murmurs] : no murmurs present [Heart Sounds] : normal S1 and S2 [Respiration, Rhythm And Depth] : normal respiratory rhythm and effort [Exaggerated Use Of Accessory Muscles For Inspiration] : no accessory muscle use [Auscultation Breath Sounds / Voice Sounds] : lungs were clear to auscultation bilaterally [Abdomen Tenderness] : non-tender [Abdomen Soft] : soft [Abdomen Mass (___ Cm)] : no abdominal mass palpated [Nail Clubbing] : no clubbing of the fingernails [Cyanosis, Localized] : no localized cyanosis [Petechial Hemorrhages (___cm)] : no petechial hemorrhages [] : no ischemic changes

## 2019-08-06 ENCOUNTER — FORM ENCOUNTER (OUTPATIENT)
Age: 64
End: 2019-08-06

## 2019-08-07 ENCOUNTER — APPOINTMENT (OUTPATIENT)
Dept: CT IMAGING | Facility: CLINIC | Age: 64
End: 2019-08-07
Payer: MEDICARE

## 2019-08-07 ENCOUNTER — OUTPATIENT (OUTPATIENT)
Dept: OUTPATIENT SERVICES | Facility: HOSPITAL | Age: 64
LOS: 1 days | End: 2019-08-07
Payer: MEDICARE

## 2019-08-07 DIAGNOSIS — J18.9 PNEUMONIA, UNSPECIFIED ORGANISM: ICD-10-CM

## 2019-08-07 DIAGNOSIS — K43.2 INCISIONAL HERNIA WITHOUT OBSTRUCTION OR GANGRENE: Chronic | ICD-10-CM

## 2019-08-07 DIAGNOSIS — Z98.890 OTHER SPECIFIED POSTPROCEDURAL STATES: Chronic | ICD-10-CM

## 2019-08-07 DIAGNOSIS — Z90.49 ACQUIRED ABSENCE OF OTHER SPECIFIED PARTS OF DIGESTIVE TRACT: Chronic | ICD-10-CM

## 2019-08-07 PROCEDURE — 71250 CT THORAX DX C-: CPT | Mod: 26

## 2019-08-07 PROCEDURE — 71250 CT THORAX DX C-: CPT

## 2019-08-12 ENCOUNTER — APPOINTMENT (OUTPATIENT)
Dept: PULMONOLOGY | Facility: CLINIC | Age: 64
End: 2019-08-12
Payer: MEDICARE

## 2019-08-14 ENCOUNTER — LABORATORY RESULT (OUTPATIENT)
Age: 64
End: 2019-08-14

## 2019-08-14 ENCOUNTER — APPOINTMENT (OUTPATIENT)
Dept: PULMONOLOGY | Facility: CLINIC | Age: 64
End: 2019-08-14
Payer: MEDICARE

## 2019-08-14 VITALS
WEIGHT: 128 LBS | OXYGEN SATURATION: 93 % | BODY MASS INDEX: 22.68 KG/M2 | HEIGHT: 63 IN | RESPIRATION RATE: 14 BRPM | HEART RATE: 81 BPM | SYSTOLIC BLOOD PRESSURE: 122 MMHG | DIASTOLIC BLOOD PRESSURE: 70 MMHG | TEMPERATURE: 98.5 F

## 2019-08-14 PROCEDURE — 99213 OFFICE O/P EST LOW 20 MIN: CPT | Mod: 25

## 2019-08-14 PROCEDURE — 36415 COLL VENOUS BLD VENIPUNCTURE: CPT

## 2019-08-15 LAB — DEPRECATED D DIMER PPP IA-ACNC: 328 NG/ML DDU

## 2019-08-15 NOTE — DISCUSSION/SUMMARY
[FreeTextEntry1] : Mild bilateral internal lobular septal thickening possibly secondary to CHF, rule out MI/PE

## 2019-08-15 NOTE — PHYSICAL EXAM
[General Appearance - Well Developed] : well developed [Well Groomed] : well groomed [Normal Appearance] : normal appearance [General Appearance - Well Nourished] : well nourished [No Deformities] : no deformities [Normal Oropharynx] : normal oropharynx [General Appearance - In No Acute Distress] : no acute distress [Heart Sounds] : normal S1 and S2 [Heart Rate And Rhythm] : heart rate and rhythm were normal [Murmurs] : no murmurs present [Respiration, Rhythm And Depth] : normal respiratory rhythm and effort [Exaggerated Use Of Accessory Muscles For Inspiration] : no accessory muscle use [Auscultation Breath Sounds / Voice Sounds] : lungs were clear to auscultation bilaterally [Abdomen Soft] : soft [Abdomen Tenderness] : non-tender [Nail Clubbing] : no clubbing of the fingernails [Abdomen Mass (___ Cm)] : no abdominal mass palpated [Cyanosis, Localized] : no localized cyanosis [Petechial Hemorrhages (___cm)] : no petechial hemorrhages [] : no ischemic changes

## 2019-08-15 NOTE — PROCEDURE
[FreeTextEntry1] : Chest CT scan on August 7, 2019 reviewed at length with patient in the office today

## 2019-08-16 ENCOUNTER — FORM ENCOUNTER (OUTPATIENT)
Age: 64
End: 2019-08-16

## 2019-08-16 LAB
ALBUMIN SERPL ELPH-MCNC: 3.1 G/DL
ALP BLD-CCNC: 119 U/L
ALT SERPL-CCNC: 17 U/L
ANION GAP SERPL CALC-SCNC: 12 MMOL/L
AST SERPL-CCNC: 35 U/L
BASOPHILS # BLD AUTO: 0.02 K/UL
BASOPHILS NFR BLD AUTO: 0.5 %
BILIRUB DIRECT SERPL-MCNC: 0.5 MG/DL
BILIRUB INDIRECT SERPL-MCNC: 0.8 MG/DL
BILIRUB SERPL-MCNC: 1.3 MG/DL
BUN SERPL-MCNC: 8 MG/DL
CALCIUM SERPL-MCNC: 8.7 MG/DL
CHLORIDE SERPL-SCNC: 109 MMOL/L
CK MB BLD-MCNC: 2 NG/ML
CK SERPL-CCNC: 52 U/L
CO2 SERPL-SCNC: 20 MMOL/L
CREAT SERPL-MCNC: 0.44 MG/DL
EOSINOPHIL # BLD AUTO: 0.25 K/UL
EOSINOPHIL NFR BLD AUTO: 6.2 %
ERYTHROCYTE [SEDIMENTATION RATE] IN BLOOD BY WESTERGREN METHOD: 24 MM/HR
GLUCOSE SERPL-MCNC: 82 MG/DL
HCT VFR BLD CALC: 31.7 %
HGB BLD-MCNC: 10.1 G/DL
IMM GRANULOCYTES NFR BLD AUTO: 0.2 %
LYMPHOCYTES # BLD AUTO: 0.93 K/UL
LYMPHOCYTES NFR BLD AUTO: 23 %
MAN DIFF?: NORMAL
MCHC RBC-ENTMCNC: 31.9 GM/DL
MCHC RBC-ENTMCNC: 32.3 PG
MCV RBC AUTO: 101.3 FL
MONOCYTES # BLD AUTO: 0.35 K/UL
MONOCYTES NFR BLD AUTO: 8.7 %
NEUTROPHILS # BLD AUTO: 2.48 K/UL
NEUTROPHILS NFR BLD AUTO: 61.4 %
NT-PROBNP SERPL-MCNC: 139 PG/ML
PLATELET # BLD AUTO: 54 K/UL
POTASSIUM SERPL-SCNC: 3.8 MMOL/L
PROT SERPL-MCNC: 6.7 G/DL
RBC # BLD: 3.13 M/UL
RBC # FLD: 15.2 %
SODIUM SERPL-SCNC: 141 MMOL/L
TROPONIN I SERPL-MCNC: 0.01 NG/ML
WBC # FLD AUTO: 4.04 K/UL

## 2019-08-17 ENCOUNTER — OUTPATIENT (OUTPATIENT)
Dept: OUTPATIENT SERVICES | Facility: HOSPITAL | Age: 64
LOS: 1 days | End: 2019-08-17
Payer: MEDICARE

## 2019-08-17 ENCOUNTER — APPOINTMENT (OUTPATIENT)
Dept: CT IMAGING | Facility: IMAGING CENTER | Age: 64
End: 2019-08-17
Payer: MEDICARE

## 2019-08-17 DIAGNOSIS — K43.2 INCISIONAL HERNIA WITHOUT OBSTRUCTION OR GANGRENE: Chronic | ICD-10-CM

## 2019-08-17 DIAGNOSIS — Z90.49 ACQUIRED ABSENCE OF OTHER SPECIFIED PARTS OF DIGESTIVE TRACT: Chronic | ICD-10-CM

## 2019-08-17 DIAGNOSIS — J18.9 PNEUMONIA, UNSPECIFIED ORGANISM: ICD-10-CM

## 2019-08-17 DIAGNOSIS — Z98.890 OTHER SPECIFIED POSTPROCEDURAL STATES: Chronic | ICD-10-CM

## 2019-08-17 PROCEDURE — 71275 CT ANGIOGRAPHY CHEST: CPT | Mod: 26

## 2019-08-17 PROCEDURE — 71275 CT ANGIOGRAPHY CHEST: CPT

## 2019-09-04 RX ORDER — ALBUTEROL SULFATE 90 UG/1
108 (90 BASE) AEROSOL, METERED RESPIRATORY (INHALATION) EVERY 6 HOURS
Qty: 1 | Refills: 2 | Status: ACTIVE | COMMUNITY
Start: 2019-09-04 | End: 1900-01-01

## 2019-09-11 ENCOUNTER — APPOINTMENT (OUTPATIENT)
Dept: PULMONOLOGY | Facility: CLINIC | Age: 64
End: 2019-09-11
Payer: MEDICARE

## 2019-09-11 VITALS — OXYGEN SATURATION: 93 % | DIASTOLIC BLOOD PRESSURE: 74 MMHG | HEART RATE: 69 BPM | SYSTOLIC BLOOD PRESSURE: 119 MMHG

## 2019-09-11 DIAGNOSIS — Z23 ENCOUNTER FOR IMMUNIZATION: ICD-10-CM

## 2019-09-11 DIAGNOSIS — J18.9 PNEUMONIA, UNSPECIFIED ORGANISM: ICD-10-CM

## 2019-09-11 PROCEDURE — G0009: CPT

## 2019-09-11 PROCEDURE — 99214 OFFICE O/P EST MOD 30 MIN: CPT | Mod: 25

## 2019-09-11 PROCEDURE — 90732 PPSV23 VACC 2 YRS+ SUBQ/IM: CPT

## 2019-09-13 PROBLEM — Z23 ENCOUNTER FOR VACCINATION: Status: ACTIVE | Noted: 2019-09-13

## 2019-09-13 NOTE — PHYSICAL EXAM
[General Appearance - Well Developed] : well developed [Normal Appearance] : normal appearance [Well Groomed] : well groomed [General Appearance - Well Nourished] : well nourished [No Deformities] : no deformities [General Appearance - In No Acute Distress] : no acute distress [Heart Rate And Rhythm] : heart rate and rhythm were normal [Normal Oropharynx] : normal oropharynx [Heart Sounds] : normal S1 and S2 [Murmurs] : no murmurs present [Exaggerated Use Of Accessory Muscles For Inspiration] : no accessory muscle use [Respiration, Rhythm And Depth] : normal respiratory rhythm and effort [Auscultation Breath Sounds / Voice Sounds] : lungs were clear to auscultation bilaterally [Abdomen Tenderness] : non-tender [Abdomen Soft] : soft [Abdomen Mass (___ Cm)] : no abdominal mass palpated [Nail Clubbing] : no clubbing of the fingernails [Cyanosis, Localized] : no localized cyanosis [Petechial Hemorrhages (___cm)] : no petechial hemorrhages [] : no ischemic changes

## 2019-11-06 ENCOUNTER — APPOINTMENT (OUTPATIENT)
Dept: PULMONOLOGY | Facility: CLINIC | Age: 64
End: 2019-11-06
Payer: MEDICARE

## 2019-11-06 VITALS
OXYGEN SATURATION: 89 % | RESPIRATION RATE: 16 BRPM | HEART RATE: 68 BPM | TEMPERATURE: 97.7 F | SYSTOLIC BLOOD PRESSURE: 135 MMHG | DIASTOLIC BLOOD PRESSURE: 76 MMHG

## 2019-11-06 PROCEDURE — 99213 OFFICE O/P EST LOW 20 MIN: CPT | Mod: 25

## 2019-11-06 PROCEDURE — 94060 EVALUATION OF WHEEZING: CPT

## 2019-11-06 RX ORDER — AZITHROMYCIN 250 MG/1
250 TABLET, FILM COATED ORAL
Qty: 6 | Refills: 0 | Status: DISCONTINUED | COMMUNITY
Start: 2019-08-30 | End: 2019-11-06

## 2019-11-06 NOTE — PHYSICAL EXAM
[General Appearance - Well Developed] : well developed [Normal Appearance] : normal appearance [Well Groomed] : well groomed [General Appearance - Well Nourished] : well nourished [No Deformities] : no deformities [General Appearance - In No Acute Distress] : no acute distress [Normal Conjunctiva] : the conjunctiva exhibited no abnormalities [Eyelids - No Xanthelasma] : the eyelids demonstrated no xanthelasmas [Normal Oropharynx] : normal oropharynx [Neck Appearance] : the appearance of the neck was normal [Neck Cervical Mass (___cm)] : no neck mass was observed [Jugular Venous Distention Increased] : there was no jugular-venous distention [Thyroid Diffuse Enlargement] : the thyroid was not enlarged [Thyroid Nodule] : there were no palpable thyroid nodules [Respiration, Rhythm And Depth] : normal respiratory rhythm and effort [Exaggerated Use Of Accessory Muscles For Inspiration] : no accessory muscle use [Auscultation Breath Sounds / Voice Sounds] : lungs were clear to auscultation bilaterally [Abnormal Walk] : normal gait [Gait - Sufficient For Exercise Testing] : the gait was sufficient for exercise testing [Nail Clubbing] : no clubbing of the fingernails [Cyanosis, Localized] : no localized cyanosis [Petechial Hemorrhages (___cm)] : no petechial hemorrhages [Skin Color & Pigmentation] : normal skin color and pigmentation [] : no rash [No Venous Stasis] : no venous stasis [Skin Lesions] : no skin lesions [No Skin Ulcers] : no skin ulcer [No Xanthoma] : no  xanthoma was observed [Deep Tendon Reflexes (DTR)] : deep tendon reflexes were 2+ and symmetric [Sensation] : the sensory exam was normal to light touch and pinprick [No Focal Deficits] : no focal deficits [Oriented To Time, Place, And Person] : oriented to person, place, and time [Impaired Insight] : insight and judgment were intact [Affect] : the affect was normal

## 2019-11-07 NOTE — ASSESSMENT
[FreeTextEntry1] : Start singular 10 mg p.o. q.h.s.\par Return to the office for followup visit in one month

## 2019-11-07 NOTE — HISTORY OF PRESENT ILLNESS
[FreeTextEntry1] : States she had the flu 3 weeks ago while in rehab, was treated with abx and feeling better. Still with dry hacking cough. Denies any SOB at this time.

## 2019-11-18 ENCOUNTER — RX RENEWAL (OUTPATIENT)
Age: 64
End: 2019-11-18

## 2019-11-18 RX ORDER — ALBUTEROL SULFATE 90 UG/1
108 (90 BASE) AEROSOL, METERED RESPIRATORY (INHALATION)
Qty: 1 | Refills: 2 | Status: ACTIVE | COMMUNITY
Start: 2019-11-18 | End: 1900-01-01

## 2019-12-03 RX ORDER — MONTELUKAST 10 MG/1
10 TABLET, FILM COATED ORAL
Qty: 90 | Refills: 2 | Status: ACTIVE | COMMUNITY
Start: 2019-11-06 | End: 1900-01-01

## 2019-12-04 ENCOUNTER — APPOINTMENT (OUTPATIENT)
Dept: PULMONOLOGY | Facility: CLINIC | Age: 64
End: 2019-12-04

## 2019-12-12 ENCOUNTER — APPOINTMENT (OUTPATIENT)
Dept: PULMONOLOGY | Facility: CLINIC | Age: 64
End: 2019-12-12
Payer: MEDICARE

## 2019-12-12 VITALS
BODY MASS INDEX: 22.68 KG/M2 | SYSTOLIC BLOOD PRESSURE: 134 MMHG | HEIGHT: 63 IN | HEART RATE: 64 BPM | TEMPERATURE: 98.3 F | DIASTOLIC BLOOD PRESSURE: 81 MMHG | OXYGEN SATURATION: 90 % | WEIGHT: 128 LBS | RESPIRATION RATE: 16 BRPM

## 2019-12-12 DIAGNOSIS — F41.9 ANXIETY DISORDER, UNSPECIFIED: ICD-10-CM

## 2019-12-12 PROCEDURE — 94060 EVALUATION OF WHEEZING: CPT

## 2019-12-12 PROCEDURE — 99214 OFFICE O/P EST MOD 30 MIN: CPT | Mod: 25

## 2019-12-12 RX ORDER — ALBUTEROL SULFATE 90 UG/1
108 (90 BASE) AEROSOL, METERED RESPIRATORY (INHALATION) EVERY 6 HOURS
Qty: 1 | Refills: 2 | Status: ACTIVE | COMMUNITY
Start: 2019-12-12 | End: 1900-01-01

## 2019-12-13 PROBLEM — F41.9 CHRONIC ANXIETY: Status: ACTIVE | Noted: 2018-01-30

## 2019-12-13 NOTE — PHYSICAL EXAM
[Normal Appearance] : normal appearance [General Appearance - Well Developed] : well developed [No Deformities] : no deformities [Well Groomed] : well groomed [General Appearance - Well Nourished] : well nourished [General Appearance - In No Acute Distress] : no acute distress [Normal Oropharynx] : normal oropharynx [Heart Rate And Rhythm] : heart rate and rhythm were normal [Murmurs] : no murmurs present [Heart Sounds] : normal S1 and S2 [Respiration, Rhythm And Depth] : normal respiratory rhythm and effort [Exaggerated Use Of Accessory Muscles For Inspiration] : no accessory muscle use [Abdomen Tenderness] : non-tender [Auscultation Breath Sounds / Voice Sounds] : lungs were clear to auscultation bilaterally [Abdomen Soft] : soft [Abdomen Mass (___ Cm)] : no abdominal mass palpated [Nail Clubbing] : no clubbing of the fingernails [Petechial Hemorrhages (___cm)] : no petechial hemorrhages [Cyanosis, Localized] : no localized cyanosis [] : no ischemic changes

## 2020-01-29 ENCOUNTER — APPOINTMENT (OUTPATIENT)
Dept: GASTROENTEROLOGY | Facility: CLINIC | Age: 65
End: 2020-01-29
Payer: MEDICARE

## 2020-01-29 VITALS
BODY MASS INDEX: 22.68 KG/M2 | WEIGHT: 128 LBS | SYSTOLIC BLOOD PRESSURE: 130 MMHG | DIASTOLIC BLOOD PRESSURE: 78 MMHG | HEIGHT: 63 IN | OXYGEN SATURATION: 93 % | HEART RATE: 68 BPM

## 2020-01-29 PROCEDURE — 36415 COLL VENOUS BLD VENIPUNCTURE: CPT

## 2020-01-29 PROCEDURE — 99213 OFFICE O/P EST LOW 20 MIN: CPT | Mod: 25

## 2020-01-29 NOTE — PHYSICAL EXAM
[General Appearance - Alert] : alert [General Appearance - In No Acute Distress] : in no acute distress [General Appearance - Well Nourished] : well nourished [General Appearance - Well Developed] : well developed [General Appearance - Well-Appearing] : healthy appearing [Sclera] : the sclera and conjunctiva were normal [Oropharynx] : the oropharynx was normal [Neck Appearance] : the appearance of the neck was normal [Neck Cervical Mass (___cm)] : no neck mass was observed [] : no respiratory distress [Respiration, Rhythm And Depth] : normal respiratory rhythm and effort [Exaggerated Use Of Accessory Muscles For Inspiration] : no accessory muscle use [Auscultation Breath Sounds / Voice Sounds] : lungs were clear to auscultation bilaterally [Heart Sounds] : normal S1 and S2 [Heart Rate And Rhythm] : heart rate was normal and rhythm regular [Heart Sounds Gallop] : no gallops [Heart Sounds Pericardial Friction Rub] : no pericardial rub [Arterial Pulses Femoral] : femoral pulses were normal without bruits [Edema] : there was no peripheral edema [Abdomen Soft] : soft [Bowel Sounds] : normal bowel sounds [Abdomen Tenderness] : non-tender [Abdomen Mass (___ Cm)] : no abdominal mass palpated [Cervical Lymph Nodes Enlarged Posterior Bilaterally] : posterior cervical [Abdomen Hernia] : no hernia was discovered [Cervical Lymph Nodes Enlarged Anterior Bilaterally] : anterior cervical [Supraclavicular Lymph Nodes Enlarged Bilaterally] : supraclavicular [Abnormal Walk] : normal gait [No CVA Tenderness] : no ~M costovertebral angle tenderness [Skin Color & Pigmentation] : normal skin color and pigmentation [Nail Clubbing] : no clubbing  or cyanosis of the fingernails [Oriented To Time, Place, And Person] : oriented to person, place, and time [Impaired Insight] : insight and judgment were intact [Mood] : the mood was normal [Affect] : the affect was normal [FreeTextEntry1] : The abdomen is soft, nontender, nondistended and without mass. The left hepatic lobe is somewhat prominent. The spleen tip is palpated. There is no ascites.

## 2020-01-29 NOTE — CONSULT LETTER
[Please see my note below.] : Please see my note below. [Dear  ___] : Dear  [unfilled], [Consult Closing:] : Thank you very much for allowing me to participate in the care of this patient.  If you have any questions, please do not hesitate to contact me. [Sincerely,] : Sincerely, [FreeTextEntry2] : Dr. Mcbride [FreeTextEntry3] : Hardeep Cain M.D.

## 2020-01-29 NOTE — ASSESSMENT
[FreeTextEntry1] : Impression:\par \par #1 cirrhosis-  cirrhosis attributed to history of chronic hepatitis C and alcohol intake. Stable and compensated.\par \par #2 history of chronic hepatitis C-genotype 3a. Status post antiviral therapy with 6 months of ribavirin 1000 mg and Solvadi 400 mg (completed 9/2015)-achieved SVR.\par \par #3 recurrent ventral/incisional hernia-status post umbilical hernia repair 2016 and subsequent laparoscopic repair of recurrent ventral/incisional hernia 2017 with mesh insertion. Status post repair of recurrent ventral hernia 2/2018.\par \par #4 status post laparoscopic cholecystectomy 3/18/14.\par \par #5 chronic anxiety disorder.\par \par #6 migraine headaches.\par \par #7 chronic back pain-lumbar disc herniation.\par \par #8 thrombocytopenia-consistent with cirrhosis, portal hypertension and hypersplenism.\par \par #9 pneumonia-describes a prolonged hospitalization for severe pneumonia.\par \par #10 normocytic anemia.

## 2020-01-29 NOTE — HISTORY OF PRESENT ILLNESS
[FreeTextEntry1] : Office revisit on 1/29/2020.\par \par The patient presents for followup regarding history of cirrhosis attributed to chronic hepatitis C and alcohol use. \par \par PREVIOUS HISTORY:\par \par ORV 5/26/15:    -Patient commenced therapy with Sovaldi and Ribavirin approximately 17 weeks ago (initiated Jan 19th). \par \par The patient is a 59-year-old woman with a history of chronic hepatitis C genotype 3a. The patient has previous history of parenterall drug use and alcohol use. She has been known to have chronic hepatitis C for many years. The patient had previously been evaluated by me in the past at which time she had declined antiviral therapy. \par \par The patient underwent a laparoscopic cholecystectomy on 3/18/14. Her liver appeared cirrhotic at the time of the cholecystectomy. A biopsy revealed features of cirrhosis. Of note, a CT scan on 3/17/14 revealed a fatty liver. The liver was not described as being cirrhotic although splenomegaly was noted and varices at the splenic hilum was reported. A 7 mm nodule was noted at the right lung base. In addition, on 3/8/14 the platelet count was 73,000.\par \par The patient is currently asymptomatic from a gastrointestinal standpoint. She has one formed bowel movement daily without any complaints of diarrhea, constipation, change in bowel habit or rectal bleeding. Her appetite and weight are stable. She reports no complaints of dysphagia, heartburn, regurgitation, nausea, vomiting or abdominal pain. \par \par As noted, the patient has a prior history of significant alcohol intake as well as drug intake. However, since 1999 she has been totally abstinent from alcohol and drugs.\par \par The patient indicates that she underwent a colonoscopy 2 years ago. She reports that a small benign polyp was removed.\par \par Testing for hepatitis A and hepatitis B revealed immunity.\par \par The patient underwent an upper endoscopy on 7/24/14 and esophageal varices were not detected.\par \par  When assessed on 4/14/15 the ALT was 26 and AST was 41. The HCV RNA by PCR assay was detectable but < 12.\par \par CURRENT HISTORY:\par \par ORV 1/30/18:    -Patient presents for followup regarding plans for surgical repair of recurrent ventral hernia. History per patient as well as a friend was present during the interview and examination.\par              -History of established cirrhosis. Patient has history of alcohol abuse. History of chronic hepatitis C genotype 3 which was treated in 2015 with a 6 month regimen of ribavirin 1000 mg in conjunction with Solvadi 400 mg. Treatment completed early 9/2015. Laboratory assessment 12/2015 noted for negative HCV RNA by PCR assay consistent with SVR. Liver enzymes were normal including ALT 19. Thrombocytopenia was noted with platelet count 76,000 consistent with component of portal hypertension.\par                -Records provided by patient were reviewed. Patient had undergone repair of an incarcerated umbilical hernia by Dr. Friedman on 8/16/16. Reoperation for recurrent ventral incisional hernia was performed on 6/7/17. Postop diagnosis was listed as incarcerated recurrent ventral incisional hernia. Cirrhosis was also noted. Operative note indicates a 6.6 cm Ventralex mesh was inserted. Liver biopsy was performed confirming cirrhosis.\par               -The patient indicates experiencing chronic symptoms of upper abdominal discomfort attributed to a recurrent hernia. Patient is vague as to the duration of the symptoms. However, approximately 10 days ago the patient experienced a more severe sharp throbbing pain at the site of the recurrent hernia in association with vomiting prompting a 2 day hospitalization at [facility]. The patient was evaluated by Dr. William Blackmon of surgery. Elective surgical repair is being planned. The patient indicates that she had a CT scan at that hospitalization-I await this report for review. The patient indicates that the vomiting prompting hospitalization has resolved.\par                 -The patient continues to experience abdominal discomfort attributed to the recurrent hernia. She describes a constant throbbing discomfort and pain. Coughing exacerbates this. She is able to reduce the hernia. At present, the patient denies any fever. Appetite and weight are stable. Reports no complaints of dysphagia or any recurrence of vomiting since her hospitalization. Can experience some nausea. His daily bowel movement without any complaints of diarrhea, constipation or rectal bleeding. Patient utilizes Advil or naproxen p.r.n. for pain approximately 3-4 times weekly. On occasion utilize oxycodone for pain.\par \par ORV 9/5/18:    -Presents for routine followup regarding history of cirrhosis and previous history of chronic hepatitis C. Previous history of alcohol abuse reported but has been abstinent for past 20 years.\par                  -Patient underwent complex abdominal hernia repair 2/2018. Hospitalized for 6 days. Reports 15 pound weight loss at time of surgery but since stable.\par                   -Denies complaints of dysphagia, heartburn, nausea, vomiting or abdominal pain. Bowel movements are irregular and she is prone to constipation. Patient not willing to have bowel movements at work. No complaint of diarrhea, change in bowel habit or rectal bleeding. She can experience some complaints of gas.\par                  -Patient has chronic back pain. Reports history of spinal stenosis, scoliosis and arthritis. Gets epidural injections but not effective. Complains of migraine. Currently on regimen of Zoloft, trazodone and p.r.n. oxycodone but reports only infrequently utilizing oxycodone.\par \par PARTIAL COLONOSCOPY 11/29/18:     -Patient underwent screening colonoscopy on 11/29/18. Patient is average risk. Despite propofol sedation the patient could not be adequately sedated. Throughout the exam she was restless, tense and with apparent myoclonus. A few sigmoid diverticula were noted. The colonoscopy examination was incomplete. Examination was performed to the sigmoid colon.\par                                      -Limited colonoscopy results discussed. Alternative colon cancer screening measures reviewed. Options of CT colonography versus Cologuard were discussed. The patient is leaning towards the Cologuard  test. She wishes to think about this and will contact me as to how she wishes to proceed. \par \par \par ORV 7/30/19:    -Patient presents for follow up regarding history of cirrhosis attributed to chronic hepatitis C. Patient last evaluated by me on 9/5/18. Of note, the patient indicates that several months ago she had a prolonged hospitalization at [facility]. She indicates that she went to the emergency room with complaint of back pain. She reports being diagnosed with pneumonia. She describes being "in a coma" for 2 weeks. She was hospitalized at [facility] for 5 weeks and subsequently was in rehabilitation for one month. Of note, this is per the patient. No records provided for review. Patient very vague about hospitalization. She may have required upper endoscopy for upper GI bleeding based on patient's vague description.\par                 -Current main complaint is back pain. The patient has chronic back pain. This continues and she experiences ongoing back pain requiring epidural treatments.\par                   -Denies fever. Appetite is stable. Indicates no significant weight loss. Denies any current complaints of dysphagia, nausea, vomiting, abdominal pain or abdominal distention. Has daily formed bowel movements without any current complaints of diarrhea, constipation, change in bowel habit or rectal bleeding.\par \par ORV 1/29/2020:     -Presents for followup regarding history of chronic hepatitis C and cirrhosis. Patient feels well at current time. Appetite and weight are stable. Reports no fever. Denies dysphagia, nausea, vomiting or abdominal pain. Has regular bowel movements without report of diarrhea, constipation or rectal bleeding.\par                   -Followed by hematology regarding anemia. Reports recent improvement. Indicate extensive laboratory testing 2 weeks ago. Await receipt of reports for review.\par                    -Last assessment on 7/30/19 was noted to have hemoglobin 9.6, hematocrit 30.8 , .4 and with platelet count of 63,000. Liver enzymes and alpha-fetoprotein were normal. HCV RNA by PCR assay was not detected. MELD score was 9.

## 2020-02-02 LAB
AFP-TM SERPL-MCNC: 3.3 NG/ML
ALBUMIN SERPL ELPH-MCNC: 3.6 G/DL
ALP BLD-CCNC: 119 U/L
ALT SERPL-CCNC: 21 U/L
ANION GAP SERPL CALC-SCNC: 11 MMOL/L
AST SERPL-CCNC: 43 U/L
BASOPHILS # BLD AUTO: 0.03 K/UL
BASOPHILS NFR BLD AUTO: 0.8 %
BILIRUB SERPL-MCNC: 1.3 MG/DL
BUN SERPL-MCNC: 8 MG/DL
CALCIUM SERPL-MCNC: 9.2 MG/DL
CHLORIDE SERPL-SCNC: 109 MMOL/L
CO2 SERPL-SCNC: 25 MMOL/L
CREAT SERPL-MCNC: 0.55 MG/DL
EOSINOPHIL # BLD AUTO: 0.2 K/UL
EOSINOPHIL NFR BLD AUTO: 5.4 %
HCT VFR BLD CALC: 32.5 %
HCV RNA SERPL NAA DL=5-ACNC: NOT DETECTED IU/ML
HCV RNA SERPL NAA+PROBE-LOG IU: NOT DETECTED LOG10IU/ML
HGB BLD-MCNC: 10.5 G/DL
IMM GRANULOCYTES NFR BLD AUTO: 0.3 %
INR PPP: 1.19 RATIO
LYMPHOCYTES # BLD AUTO: 0.9 K/UL
LYMPHOCYTES NFR BLD AUTO: 24.4 %
MAN DIFF?: NORMAL
MCHC RBC-ENTMCNC: 31.4 PG
MCHC RBC-ENTMCNC: 32.3 GM/DL
MCV RBC AUTO: 97.3 FL
MONOCYTES # BLD AUTO: 0.24 K/UL
MONOCYTES NFR BLD AUTO: 6.5 %
NEUTROPHILS # BLD AUTO: 2.31 K/UL
NEUTROPHILS NFR BLD AUTO: 62.6 %
PLATELET # BLD AUTO: 47 K/UL
POTASSIUM SERPL-SCNC: 3.7 MMOL/L
PROT SERPL-MCNC: 6.6 G/DL
PT BLD: 13.8 SEC
RBC # BLD: 3.34 M/UL
RBC # FLD: 15.1 %
SODIUM SERPL-SCNC: 145 MMOL/L
WBC # FLD AUTO: 3.69 K/UL

## 2020-02-02 RX ORDER — POLYETHYLENE GLYCOL 3350, SODIUM SULFATE, SODIUM CHLORIDE, POTASSIUM CHLORIDE, ASCORBIC ACID, SODIUM ASCORBATE 7.5-2.691G
100 KIT ORAL
Qty: 1 | Refills: 0 | Status: ACTIVE | COMMUNITY
Start: 2020-02-02 | End: 1900-01-01

## 2020-02-12 ENCOUNTER — APPOINTMENT (OUTPATIENT)
Dept: PULMONOLOGY | Facility: CLINIC | Age: 65
End: 2020-02-12

## 2020-02-13 ENCOUNTER — OUTPATIENT (OUTPATIENT)
Dept: OUTPATIENT SERVICES | Facility: HOSPITAL | Age: 65
LOS: 1 days | End: 2020-02-13
Payer: MEDICARE

## 2020-02-13 ENCOUNTER — APPOINTMENT (OUTPATIENT)
Dept: ULTRASOUND IMAGING | Facility: IMAGING CENTER | Age: 65
End: 2020-02-13
Payer: MEDICARE

## 2020-02-13 DIAGNOSIS — Z90.49 ACQUIRED ABSENCE OF OTHER SPECIFIED PARTS OF DIGESTIVE TRACT: Chronic | ICD-10-CM

## 2020-02-13 DIAGNOSIS — K74.60 UNSPECIFIED CIRRHOSIS OF LIVER: ICD-10-CM

## 2020-02-13 DIAGNOSIS — K43.2 INCISIONAL HERNIA WITHOUT OBSTRUCTION OR GANGRENE: Chronic | ICD-10-CM

## 2020-02-13 DIAGNOSIS — Z98.890 OTHER SPECIFIED POSTPROCEDURAL STATES: Chronic | ICD-10-CM

## 2020-02-13 PROCEDURE — 76700 US EXAM ABDOM COMPLETE: CPT

## 2020-02-14 PROCEDURE — 76700 US EXAM ABDOM COMPLETE: CPT | Mod: 26

## 2020-03-03 RX ORDER — SODIUM SULFATE, POTASSIUM SULFATE, MAGNESIUM SULFATE 17.5; 3.13; 1.6 G/ML; G/ML; G/ML
17.5-3.13-1.6 SOLUTION, CONCENTRATE ORAL
Qty: 1 | Refills: 0 | Status: ACTIVE | COMMUNITY
Start: 2020-03-03 | End: 1900-01-01

## 2020-03-20 RX ORDER — FLUTICASONE PROPIONATE 50 UG/1
50 SPRAY, METERED NASAL
Qty: 1 | Refills: 3 | Status: ACTIVE | COMMUNITY
Start: 2020-03-20 | End: 1900-01-01

## 2020-03-20 RX ORDER — ALBUTEROL SULFATE 90 UG/1
108 (90 BASE) INHALANT RESPIRATORY (INHALATION)
Qty: 1 | Refills: 1 | Status: ACTIVE | COMMUNITY
Start: 2020-03-20 | End: 1900-01-01

## 2020-03-26 ENCOUNTER — APPOINTMENT (OUTPATIENT)
Dept: GASTROENTEROLOGY | Facility: HOSPITAL | Age: 65
End: 2020-03-26

## 2020-07-21 ENCOUNTER — APPOINTMENT (OUTPATIENT)
Dept: GASTROENTEROLOGY | Facility: CLINIC | Age: 65
End: 2020-07-21

## 2020-08-03 NOTE — H&P PST ADULT - MUSCULOSKELETAL
Assessment/Plan:       Diagnoses and all orders for this visit:    Hypothyroidism due to Hashimoto's thyroiditis  -     Comprehensive metabolic panel; Future  -     TSH, 3rd generation; Future  -     Thyroid Antibodies Panel; Future  -     T3, free; Future  -     Vitamin D 25 hydroxy; Future    Heart palpitations  -     Vitamin D 25 hydroxy; Future  -     XR chest pa & lateral; Future    Other chest pain  -     Vitamin D 25 hydroxy; Future  -     XR chest pa & lateral; Future    Abnormal bleeding in menstrual cycle  -     Estrogens, total; Future  -     Progesterone; Future  -     Prolactin; Future  -     Vitamin D 25 hydroxy; Future    Morbid obesity with BMI of 50 0-59 9, adult (HCC)  -     Vitamin D 25 hydroxy; Future  -     Ambulatory referral to Weight Management; Future  -     Ambulatory referral to Plastic Surgery; Future    Lipid screening  -     Lipid panel; Future  -     Vitamin D 25 hydroxy; Future    Iron deficiency anemia secondary to inadequate dietary iron intake  -     CBC and differential; Future  -     Iron Panel (Includes Ferritin, Iron Sat%, Iron, and TIBC); Future  -     Vitamin D 25 hydroxy; Future    H/O fungal skin infection    Pannus, abdominal  -     Ambulatory referral to Plastic Surgery; Future        No problem-specific Assessment & Plan notes found for this encounter  Subjective:      Patient ID: Pérez Grier is a 22 y o  female  Patient is here to Establish care  She is with her mother  Hypothyroid-dx at age 12  This past October, medication was to be increased  This was managed by Dr Henrry Villegas  She was dx with anemia, iron def- she was to take supplements but she did not do this  She also follows vegan diet  Exercise- no exercise  For the past two months, periods are heavy and prolonged  She is currently bleeding for three weeks  She does follow with GYN  She denies cramps  Heart palpitations-a few days ago, she woke up and felt like her heart beat was irregular   She has felt chest pain, at rest  Pain is anterior left chest and radiates to her back  Pain is sharp  Rash- frequent yeats infections under abdominal folds          The following portions of the patient's history were reviewed and updated as appropriate:   She has a past medical history of Anxiety and Disease of thyroid gland  ,  does not have any pertinent problems on file  ,   has a past surgical history that includes PILONIDAL CYST EXCISION  ,  family history includes Breast cancer in her maternal grandmother; Depression in her brother and father; Liver cancer in her maternal grandmother; No Known Problems in her mother; Thyroid disease in her father  ,   reports that she has never smoked  She has never used smokeless tobacco  She reports current alcohol use  She reports current drug use  Drug: Marijuana  ,  is allergic to metformin and related     Current Outpatient Medications   Medication Sig Dispense Refill    levothyroxine 125 mcg tablet Take by mouth       No current facility-administered medications for this visit  Review of Systems   Constitutional: Negative  Negative for fatigue and fever  HENT: Negative  Negative for congestion  Eyes: Negative  Negative for visual disturbance  Respiratory: Negative for cough, chest tightness, shortness of breath and wheezing  Cardiovascular: Positive for chest pain and palpitations  Negative for leg swelling  Gastrointestinal: Negative  Negative for abdominal pain, blood in stool, diarrhea and nausea  Endocrine: Negative for polydipsia, polyphagia and polyuria  Genitourinary: Positive for vaginal bleeding  Negative for difficulty urinating and flank pain  Musculoskeletal: Negative  Negative for arthralgias, back pain and myalgias  Skin: Positive for rash  Negative for color change and pallor  Frequent yeast infections within skin folds   Allergic/Immunologic: Negative for immunocompromised state  Neurological: Negative    Negative for dizziness, weakness, light-headedness, numbness and headaches  Hematological: Negative for adenopathy  Does not bruise/bleed easily  Psychiatric/Behavioral: Negative for confusion, decreased concentration and sleep disturbance  The patient is nervous/anxious  All other systems reviewed and are negative  Objective:  Vitals:    08/04/20 1524   BP: 112/90   BP Location: Left arm   Patient Position: Sitting   Pulse: 67   Resp: 18   Temp: (!) 97 2 °F (36 2 °C)   SpO2: 97%   Weight: (!) 159 kg (351 lb)   Height: 5' 5"     Body mass index is 58 41 kg/m²  Physical Exam   Constitutional: She is oriented to person, place, and time  She appears well-developed  Non-toxic appearance  She does not appear ill  No distress  HENT:   Head: Normocephalic and atraumatic  Right Ear: Tympanic membrane, external ear and ear canal normal    Left Ear: Tympanic membrane, external ear and ear canal normal    Nose: Nose normal  No rhinorrhea or congestion  Mouth/Throat: No oropharyngeal exudate or posterior oropharyngeal erythema  Eyes: Pupils are equal, round, and reactive to light  Conjunctivae are normal  No scleral icterus  Neck: Normal range of motion  Neck supple  No JVD present  No muscular tenderness present  Carotid bruit is not present  No neck rigidity  Cardiovascular: Normal rate, regular rhythm, normal heart sounds and normal pulses  Exam reveals no gallop and no friction rub  No murmur heard  Pulmonary/Chest: Effort normal and breath sounds normal  No stridor  No respiratory distress  She has no wheezes  She has no rhonchi  She has no rales  Abdominal: Soft  Normal appearance and bowel sounds are normal  She exhibits no distension  There is no abdominal tenderness  Large pannus   Musculoskeletal: Normal range of motion  General: No swelling, tenderness or deformity  Right lower leg: No edema  Left lower leg: No edema  Lymphadenopathy:     She has no cervical adenopathy  Neurological: She is alert and oriented to person, place, and time  No cranial nerve deficit or sensory deficit  Coordination and gait normal    Skin: Skin is warm and dry  Capillary refill takes less than 2 seconds  No bruising and no rash noted  She is not diaphoretic  No jaundice  Psychiatric: Her behavior is normal  Mood, judgment and thought content normal    Nursing note and vitals reviewed  BMI Counseling: Body mass index is 58 41 kg/m²  The BMI is above normal  Nutrition recommendations include reducing portion sizes, decreasing overall calorie intake, 3-5 servings of fruits/vegetables daily, reducing fast food intake, consuming healthier snacks, decreasing soda and/or juice intake, moderation in carbohydrate intake, increasing intake of lean protein, reducing intake of saturated fat and trans fat and reducing intake of cholesterol  Exercise recommendations include exercising 3-5 times per week and strength training exercises  details… detailed exam decreased ROM due to pain

## 2020-08-11 NOTE — H&P PST ADULT - LAST ECHOCARDIOGRAM
According to [facility] note from 7/22/2020, Ms. Woods is still on amlodipine. Refill sent. Patient notified. 2014

## 2020-11-04 ENCOUNTER — APPOINTMENT (OUTPATIENT)
Dept: GASTROENTEROLOGY | Facility: CLINIC | Age: 65
End: 2020-11-04
Payer: MEDICARE

## 2020-11-04 VITALS
HEIGHT: 62 IN | WEIGHT: 132 LBS | SYSTOLIC BLOOD PRESSURE: 140 MMHG | DIASTOLIC BLOOD PRESSURE: 80 MMHG | BODY MASS INDEX: 24.29 KG/M2 | HEART RATE: 74 BPM | OXYGEN SATURATION: 93 % | TEMPERATURE: 97.8 F

## 2020-11-04 PROCEDURE — 99214 OFFICE O/P EST MOD 30 MIN: CPT | Mod: 25

## 2020-11-04 PROCEDURE — 36415 COLL VENOUS BLD VENIPUNCTURE: CPT

## 2020-11-04 NOTE — ASSESSMENT
[FreeTextEntry1] : Impression:\par \par #1 cirrhosis-  cirrhosis attributed to history of chronic hepatitis C and alcohol intake. Stable and compensated.\par \par #2 history of chronic hepatitis C-genotype 3a. Status post antiviral therapy with 6 months of ribavirin 1000 mg and Solvadi 400 mg (completed 9/2015)-achieved SVR.\par \par #3 recurrent ventral/incisional hernia-status post umbilical hernia repair 2016 and subsequent laparoscopic repair of recurrent ventral/incisional hernia 2017 with mesh insertion. Status post repair of recurrent ventral hernia 2/2018.\par \par #4 status post laparoscopic cholecystectomy 3/18/14.\par \par #5 colon cancer screening-patient reports negative Cologuard.\par \par General medical problems:\par \par # chronic anxiety disorder.\par \par # migraine headaches.\par \par # chronic back pain-lumbar disc herniation.\par \par # thrombocytopenia-consistent with cirrhosis, portal hypertension and hypersplenism.\par \par # pneumonia-describes a prolonged hospitalization for severe pneumonia.\par \par # normocytic anemia.

## 2020-11-04 NOTE — HISTORY OF PRESENT ILLNESS
[FreeTextEntry1] : Office revisit on 11/4/2020.\par \par The patient presents for followup regarding history of cirrhosis attributed to chronic hepatitis C and alcohol use. \par \par PREVIOUS HISTORY:\par \par ORV 5/26/15:    -Patient commenced therapy with Sovaldi and Ribavirin approximately 17 weeks ago (initiated Jan 19th). \par \par The patient is a 59-year-old woman with a history of chronic hepatitis C genotype 3a. The patient has previous history of parenterall drug use and alcohol use. She has been known to have chronic hepatitis C for many years. The patient had previously been evaluated by me in the past at which time she had declined antiviral therapy. \par \par The patient underwent a laparoscopic cholecystectomy on 3/18/14. Her liver appeared cirrhotic at the time of the cholecystectomy. A biopsy revealed features of cirrhosis. Of note, a CT scan on 3/17/14 revealed a fatty liver. The liver was not described as being cirrhotic although splenomegaly was noted and varices at the splenic hilum was reported. A 7 mm nodule was noted at the right lung base. In addition, on 3/8/14 the platelet count was 73,000.\par \par The patient is currently asymptomatic from a gastrointestinal standpoint. She has one formed bowel movement daily without any complaints of diarrhea, constipation, change in bowel habit or rectal bleeding. Her appetite and weight are stable. She reports no complaints of dysphagia, heartburn, regurgitation, nausea, vomiting or abdominal pain. \par \par As noted, the patient has a prior history of significant alcohol intake as well as drug intake. However, since 1999 she has been totally abstinent from alcohol and drugs.\par \par The patient indicates that she underwent a colonoscopy 2 years ago. She reports that a small benign polyp was removed.\par \par Testing for hepatitis A and hepatitis B revealed immunity.\par \par The patient underwent an upper endoscopy on 7/24/14 and esophageal varices were not detected.\par \par  When assessed on 4/14/15 the ALT was 26 and AST was 41. The HCV RNA by PCR assay was detectable but < 12.\par \par CURRENT HISTORY:\par \par ORV 1/30/18:    -Patient presents for followup regarding plans for surgical repair of recurrent ventral hernia. History per patient as well as a friend was present during the interview and examination.\par              -History of established cirrhosis. Patient has history of alcohol abuse. History of chronic hepatitis C genotype 3 which was treated in 2015 with a 6 month regimen of ribavirin 1000 mg in conjunction with Solvadi 400 mg. Treatment completed early 9/2015. Laboratory assessment 12/2015 noted for negative HCV RNA by PCR assay consistent with SVR. Liver enzymes were normal including ALT 19. Thrombocytopenia was noted with platelet count 76,000 consistent with component of portal hypertension.\par                -Records provided by patient were reviewed. Patient had undergone repair of an incarcerated umbilical hernia by Dr. Friedman on 8/16/16. Reoperation for recurrent ventral incisional hernia was performed on 6/7/17. Postop diagnosis was listed as incarcerated recurrent ventral incisional hernia. Cirrhosis was also noted. Operative note indicates a 6.6 cm Ventralex mesh was inserted. Liver biopsy was performed confirming cirrhosis.\par               -The patient indicates experiencing chronic symptoms of upper abdominal discomfort attributed to a recurrent hernia. Patient is vague as to the duration of the symptoms. However, approximately 10 days ago the patient experienced a more severe sharp throbbing pain at the site of the recurrent hernia in association with vomiting prompting a 2 day hospitalization at [facility]. The patient was evaluated by Dr. William Blackmon of surgery. Elective surgical repair is being planned. The patient indicates that she had a CT scan at that hospitalization-I await this report for review. The patient indicates that the vomiting prompting hospitalization has resolved.\par                 -The patient continues to experience abdominal discomfort attributed to the recurrent hernia. She describes a constant throbbing discomfort and pain. Coughing exacerbates this. She is able to reduce the hernia. At present, the patient denies any fever. Appetite and weight are stable. Reports no complaints of dysphagia or any recurrence of vomiting since her hospitalization. Can experience some nausea. His daily bowel movement without any complaints of diarrhea, constipation or rectal bleeding. Patient utilizes Advil or naproxen p.r.n. for pain approximately 3-4 times weekly. On occasion utilize oxycodone for pain.\par \par ORV 9/5/18:    -Presents for routine followup regarding history of cirrhosis and previous history of chronic hepatitis C. Previous history of alcohol abuse reported but has been abstinent for past 20 years.\par                  -Patient underwent complex abdominal hernia repair 2/2018. Hospitalized for 6 days. Reports 15 pound weight loss at time of surgery but since stable.\par                   -Denies complaints of dysphagia, heartburn, nausea, vomiting or abdominal pain. Bowel movements are irregular and she is prone to constipation. Patient not willing to have bowel movements at work. No complaint of diarrhea, change in bowel habit or rectal bleeding. She can experience some complaints of gas.\par                  -Patient has chronic back pain. Reports history of spinal stenosis, scoliosis and arthritis. Gets epidural injections but not effective. Complains of migraine. Currently on regimen of Zoloft, trazodone and p.r.n. oxycodone but reports only infrequently utilizing oxycodone.\par \par PARTIAL COLONOSCOPY 11/29/18:     -Patient underwent screening colonoscopy on 11/29/18. Patient is average risk. Despite propofol sedation the patient could not be adequately sedated. Throughout the exam she was restless, tense and with apparent myoclonus. A few sigmoid diverticula were noted. The colonoscopy examination was incomplete. Examination was performed to the sigmoid colon.\par                                      -Limited colonoscopy results discussed. Alternative colon cancer screening measures reviewed. Options of CT colonography versus Cologuard were discussed. The patient is leaning towards the Cologuard  test. She wishes to think about this and will contact me as to how she wishes to proceed. \par \par \par ORV 7/30/19:    -Patient presents for follow up regarding history of cirrhosis attributed to chronic hepatitis C. Patient last evaluated by me on 9/5/18. Of note, the patient indicates that several months ago she had a prolonged hospitalization at [facility]. She indicates that she went to the emergency room with complaint of back pain. She reports being diagnosed with pneumonia. She describes being "in a coma" for 2 weeks. She was hospitalized at [facility] for 5 weeks and subsequently was in rehabilitation for one month. Of note, this is per the patient. No records provided for review. Patient very vague about hospitalization. She may have required upper endoscopy for upper GI bleeding based on patient's vague description.\par                 -Current main complaint is back pain. The patient has chronic back pain. This continues and she experiences ongoing back pain requiring epidural treatments.\par                   -Denies fever. Appetite is stable. Indicates no significant weight loss. Denies any current complaints of dysphagia, nausea, vomiting, abdominal pain or abdominal distention. Has daily formed bowel movements without any current complaints of diarrhea, constipation, change in bowel habit or rectal bleeding.\par \par ORV 1/29/2020:     -Presents for followup regarding history of chronic hepatitis C and cirrhosis. Patient feels well at current time. Appetite and weight are stable. Reports no fever. Denies dysphagia, nausea, vomiting or abdominal pain. Has regular bowel movements without report of diarrhea, constipation or rectal bleeding.\par                   -Followed by hematology regarding anemia. Reports recent improvement. Indicate extensive laboratory testing 2 weeks ago. Await receipt of reports for review.\par                    -Last assessment on 7/30/19 was noted to have hemoglobin 9.6, hematocrit 30.8 , .4 and with platelet count of 63,000. Liver enzymes and alpha-fetoprotein were normal. HCV RNA by PCR assay was not detected. MELD score was 9.\par \par ORV 11/4/2020:     -Patient presents with followup regarding history of cirrhosis attributed to chronic hepatitis C. Currently feels well. Asymptomatic from a GI standpoint. Appetite is stable and patient reports a 6 pound weight increase. Denies any complaints of dysphagia, heartburn, nausea, vomiting or abdominal pain. Patient has one formed bowel movement daily without any complaints of diarrhea, constipation or rectal bleeding.\par                -Screening measures were discussed with patient including upper endoscopy for surveillance assessment regarding esophageal varices. In addition, screening colonoscopy was discussed. Patient has been reluctant to proceed. Of note, patient indicates that she had negative Cologuard testing from her PMD.

## 2020-11-04 NOTE — PHYSICAL EXAM
[General Appearance - Alert] : alert [General Appearance - In No Acute Distress] : in no acute distress [General Appearance - Well Nourished] : well nourished [General Appearance - Well Developed] : well developed [General Appearance - Well-Appearing] : healthy appearing [Sclera] : the sclera and conjunctiva were normal [Neck Appearance] : the appearance of the neck was normal [Neck Cervical Mass (___cm)] : no neck mass was observed [] : no respiratory distress [Respiration, Rhythm And Depth] : normal respiratory rhythm and effort [Exaggerated Use Of Accessory Muscles For Inspiration] : no accessory muscle use [Auscultation Breath Sounds / Voice Sounds] : lungs were clear to auscultation bilaterally [Heart Rate And Rhythm] : heart rate was normal and rhythm regular [Heart Sounds] : normal S1 and S2 [Heart Sounds Gallop] : no gallops [Heart Sounds Pericardial Friction Rub] : no pericardial rub [Arterial Pulses Femoral] : femoral pulses were normal without bruits [Edema] : there was no peripheral edema [Bowel Sounds] : normal bowel sounds [Abdomen Soft] : soft [Abdomen Tenderness] : non-tender [Abdomen Mass (___ Cm)] : no abdominal mass palpated [Abdomen Hernia] : no hernia was discovered [Cervical Lymph Nodes Enlarged Posterior Bilaterally] : posterior cervical [Cervical Lymph Nodes Enlarged Anterior Bilaterally] : anterior cervical [Supraclavicular Lymph Nodes Enlarged Bilaterally] : supraclavicular [No CVA Tenderness] : no ~M costovertebral angle tenderness [Abnormal Walk] : normal gait [Nail Clubbing] : no clubbing  or cyanosis of the fingernails [Skin Color & Pigmentation] : normal skin color and pigmentation [Oriented To Time, Place, And Person] : oriented to person, place, and time [Impaired Insight] : insight and judgment were intact [Affect] : the affect was normal [Mood] : the mood was normal [FreeTextEntry1] : The abdomen is soft, nontender, nondistended and without mass. The left hepatic lobe is somewhat prominent. The spleen tip is palpated. There is no ascites.

## 2020-11-04 NOTE — CONSULT LETTER
[Please see my note below.] : Please see my note below. [Consult Closing:] : Thank you very much for allowing me to participate in the care of this patient.  If you have any questions, please do not hesitate to contact me. [Sincerely,] : Sincerely, [Dear  ___] : Dear  [unfilled], [Consult Letter:] : I had the pleasure of evaluating your patient, [unfilled]. [FreeTextEntry2] : Dr. Kathleen Gallegos [FreeTextEntry3] : Hardeep Cain M.D.

## 2020-11-05 ENCOUNTER — NON-APPOINTMENT (OUTPATIENT)
Age: 65
End: 2020-11-05

## 2020-11-05 LAB
AFP-TM SERPL-MCNC: 4 NG/ML
ALBUMIN SERPL ELPH-MCNC: 3.9 G/DL
ALP BLD-CCNC: 123 U/L
ALT SERPL-CCNC: 23 U/L
ANION GAP SERPL CALC-SCNC: 13 MMOL/L
AST SERPL-CCNC: 45 U/L
BASOPHILS # BLD AUTO: 0.03 K/UL
BASOPHILS NFR BLD AUTO: 0.6 %
BILIRUB SERPL-MCNC: 1.4 MG/DL
BUN SERPL-MCNC: 10 MG/DL
CALCIUM SERPL-MCNC: 9.3 MG/DL
CHLORIDE SERPL-SCNC: 109 MMOL/L
CO2 SERPL-SCNC: 21 MMOL/L
CREAT SERPL-MCNC: 0.56 MG/DL
EOSINOPHIL # BLD AUTO: 0.21 K/UL
EOSINOPHIL NFR BLD AUTO: 4.4 %
HCT VFR BLD CALC: 38.6 %
HGB BLD-MCNC: 12.6 G/DL
IMM GRANULOCYTES NFR BLD AUTO: 0.2 %
LYMPHOCYTES # BLD AUTO: 1.32 K/UL
LYMPHOCYTES NFR BLD AUTO: 27.8 %
MAN DIFF?: NORMAL
MCHC RBC-ENTMCNC: 30.9 PG
MCHC RBC-ENTMCNC: 32.6 GM/DL
MCV RBC AUTO: 94.6 FL
MONOCYTES # BLD AUTO: 0.3 K/UL
MONOCYTES NFR BLD AUTO: 6.3 %
NEUTROPHILS # BLD AUTO: 2.88 K/UL
NEUTROPHILS NFR BLD AUTO: 60.7 %
PLATELET # BLD AUTO: 54 K/UL
POTASSIUM SERPL-SCNC: 4.3 MMOL/L
PROT SERPL-MCNC: 6.8 G/DL
RBC # BLD: 4.08 M/UL
RBC # FLD: 14.6 %
SODIUM SERPL-SCNC: 143 MMOL/L
WBC # FLD AUTO: 4.75 K/UL

## 2020-11-06 ENCOUNTER — NON-APPOINTMENT (OUTPATIENT)
Age: 65
End: 2020-11-06

## 2020-11-08 ENCOUNTER — NON-APPOINTMENT (OUTPATIENT)
Age: 65
End: 2020-11-08

## 2020-11-13 ENCOUNTER — NON-APPOINTMENT (OUTPATIENT)
Age: 65
End: 2020-11-13

## 2020-11-24 ENCOUNTER — NON-APPOINTMENT (OUTPATIENT)
Age: 65
End: 2020-11-24

## 2020-12-03 ENCOUNTER — APPOINTMENT (OUTPATIENT)
Dept: MAMMOGRAPHY | Facility: IMAGING CENTER | Age: 65
End: 2020-12-03

## 2020-12-15 ENCOUNTER — APPOINTMENT (OUTPATIENT)
Dept: GASTROENTEROLOGY | Facility: CLINIC | Age: 65
End: 2020-12-15

## 2020-12-16 PROBLEM — Z12.11 ENCOUNTER FOR SCREENING COLONOSCOPY: Status: RESOLVED | Noted: 2018-09-10 | Resolved: 2020-12-16

## 2021-01-12 ENCOUNTER — NON-APPOINTMENT (OUTPATIENT)
Age: 66
End: 2021-01-12

## 2021-01-28 ENCOUNTER — APPOINTMENT (OUTPATIENT)
Dept: PULMONOLOGY | Facility: CLINIC | Age: 66
End: 2021-01-28
Payer: COMMERCIAL

## 2021-01-28 VITALS
SYSTOLIC BLOOD PRESSURE: 134 MMHG | TEMPERATURE: 97.2 F | OXYGEN SATURATION: 94 % | BODY MASS INDEX: 24.11 KG/M2 | HEIGHT: 62 IN | HEART RATE: 66 BPM | DIASTOLIC BLOOD PRESSURE: 79 MMHG | WEIGHT: 131 LBS

## 2021-01-28 DIAGNOSIS — R09.82 POSTNASAL DRIP: ICD-10-CM

## 2021-01-28 PROCEDURE — 99072 ADDL SUPL MATRL&STAF TM PHE: CPT

## 2021-01-28 PROCEDURE — 99214 OFFICE O/P EST MOD 30 MIN: CPT

## 2021-01-28 RX ORDER — LEVOCETIRIZINE DIHYDROCHLORIDE 5 MG/1
5 TABLET ORAL DAILY
Qty: 90 | Refills: 3 | Status: ACTIVE | COMMUNITY
Start: 2019-12-12 | End: 1900-01-01

## 2021-03-18 ENCOUNTER — NON-APPOINTMENT (OUTPATIENT)
Age: 66
End: 2021-03-18

## 2021-05-04 ENCOUNTER — NON-APPOINTMENT (OUTPATIENT)
Age: 66
End: 2021-05-04

## 2021-05-04 ENCOUNTER — APPOINTMENT (OUTPATIENT)
Dept: GASTROENTEROLOGY | Facility: CLINIC | Age: 66
End: 2021-05-04

## 2021-06-08 ENCOUNTER — APPOINTMENT (OUTPATIENT)
Dept: GASTROENTEROLOGY | Facility: CLINIC | Age: 66
End: 2021-06-08

## 2021-06-18 ENCOUNTER — NON-APPOINTMENT (OUTPATIENT)
Age: 66
End: 2021-06-18

## 2021-06-23 ENCOUNTER — APPOINTMENT (OUTPATIENT)
Dept: GASTROENTEROLOGY | Facility: CLINIC | Age: 66
End: 2021-06-23
Payer: MEDICARE

## 2021-06-23 VITALS
SYSTOLIC BLOOD PRESSURE: 120 MMHG | OXYGEN SATURATION: 89 % | HEIGHT: 63 IN | DIASTOLIC BLOOD PRESSURE: 85 MMHG | RESPIRATION RATE: 16 BRPM | TEMPERATURE: 98.5 F | WEIGHT: 135 LBS | HEART RATE: 76 BPM | BODY MASS INDEX: 23.92 KG/M2

## 2021-06-23 PROCEDURE — 99213 OFFICE O/P EST LOW 20 MIN: CPT

## 2021-06-23 NOTE — PHYSICAL EXAM
[General Appearance - Alert] : alert [General Appearance - In No Acute Distress] : in no acute distress [General Appearance - Well Nourished] : well nourished [General Appearance - Well Developed] : well developed [General Appearance - Well-Appearing] : healthy appearing [Sclera] : the sclera and conjunctiva were normal [Neck Appearance] : the appearance of the neck was normal [Neck Cervical Mass (___cm)] : no neck mass was observed [Respiration, Rhythm And Depth] : normal respiratory rhythm and effort [] : no respiratory distress [Exaggerated Use Of Accessory Muscles For Inspiration] : no accessory muscle use [Auscultation Breath Sounds / Voice Sounds] : lungs were clear to auscultation bilaterally [Heart Rate And Rhythm] : heart rate was normal and rhythm regular [Heart Sounds] : normal S1 and S2 [Heart Sounds Gallop] : no gallops [Heart Sounds Pericardial Friction Rub] : no pericardial rub [Arterial Pulses Femoral] : femoral pulses were normal without bruits [Edema] : there was no peripheral edema [Bowel Sounds] : normal bowel sounds [Abdomen Soft] : soft [Abdomen Tenderness] : non-tender [Abdomen Mass (___ Cm)] : no abdominal mass palpated [Abdomen Hernia] : no hernia was discovered [Cervical Lymph Nodes Enlarged Posterior Bilaterally] : posterior cervical [Cervical Lymph Nodes Enlarged Anterior Bilaterally] : anterior cervical [Supraclavicular Lymph Nodes Enlarged Bilaterally] : supraclavicular [No CVA Tenderness] : no ~M costovertebral angle tenderness [Abnormal Walk] : normal gait [Nail Clubbing] : no clubbing  or cyanosis of the fingernails [Skin Color & Pigmentation] : normal skin color and pigmentation [Oriented To Time, Place, And Person] : oriented to person, place, and time [Impaired Insight] : insight and judgment were intact [Affect] : the affect was normal [Mood] : the mood was normal [FreeTextEntry1] : The abdomen is soft, nontender, nondistended and without mass. The left hepatic lobe is somewhat prominent. There is no ascites.

## 2021-06-23 NOTE — ASSESSMENT
[FreeTextEntry1] : Impression:\par \par #1 cirrhosis-  cirrhosis attributed to history of chronic hepatitis C and alcohol intake. Stable and compensated.  Thrombocytopenia noted attributed to cirrhosis, portal hypertension and hypersplenism.  Compensated–no history of hepatic encephalopathy, ascites, variceal bleeding, HCC.\par \par #2 history of chronic hepatitis C-genotype 3a. Status post antiviral therapy with 6 months of ribavirin 1000 mg and Solvadi 400 mg (completed 9/2015)-achieved SVR.\par \par #3 recurrent ventral/incisional hernia-status post umbilical hernia repair 2016 and subsequent laparoscopic repair of recurrent ventral/incisional hernia 2017 with mesh insertion. Status post repair of recurrent ventral hernia 2/2018.  No indication of recurrent hernia.\par \par #4 status post laparoscopic cholecystectomy 3/18/14.\par \par #5 colon cancer screening-attempted Cologuard X 2 but not able to be processed.\par \par General medical problems:\par \par # chronic anxiety disorder.\par \par # migraine headaches.\par \par # chronic back pain-lumbar disc herniation.\par \par # thrombocytopenia-consistent with cirrhosis, portal hypertension and hypersplenism.\par \par # pneumonia-describes a prolonged hospitalization for severe pneumonia.\par \par # normocytic anemia.

## 2021-06-23 NOTE — HISTORY OF PRESENT ILLNESS
[FreeTextEntry1] : Office revisit on 6/23/2021.\par \par The patient presents for followup regarding history of cirrhosis attributed to chronic hepatitis C and alcohol use. \par \par PREVIOUS HISTORY:\par \par ORV 5/26/15:    -Patient commenced therapy with Sovaldi and Ribavirin approximately 17 weeks ago (initiated Jan 19th). \par \par The patient is a 59-year-old woman with a history of chronic hepatitis C genotype 3a. The patient has previous history of parenterall drug use and alcohol use. She has been known to have chronic hepatitis C for many years. The patient had previously been evaluated by me in the past at which time she had declined antiviral therapy. \par \par The patient underwent a laparoscopic cholecystectomy on 3/18/14. Her liver appeared cirrhotic at the time of the cholecystectomy. A biopsy revealed features of cirrhosis. Of note, a CT scan on 3/17/14 revealed a fatty liver. The liver was not described as being cirrhotic although splenomegaly was noted and varices at the splenic hilum was reported. A 7 mm nodule was noted at the right lung base. In addition, on 3/8/14 the platelet count was 73,000.\par \par The patient is currently asymptomatic from a gastrointestinal standpoint. She has one formed bowel movement daily without any complaints of diarrhea, constipation, change in bowel habit or rectal bleeding. Her appetite and weight are stable. She reports no complaints of dysphagia, heartburn, regurgitation, nausea, vomiting or abdominal pain. \par \par As noted, the patient has a prior history of significant alcohol intake as well as drug intake. However, since 1999 she has been totally abstinent from alcohol and drugs.\par \par The patient indicates that she underwent a colonoscopy 2 years ago. She reports that a small benign polyp was removed.\par \par Testing for hepatitis A and hepatitis B revealed immunity.\par \par The patient underwent an upper endoscopy on 7/24/14 and esophageal varices were not detected.\par \par  When assessed on 4/14/15 the ALT was 26 and AST was 41. The HCV RNA by PCR assay was detectable but < 12.\par \par CURRENT HISTORY:\par \par ORV 1/30/18:    -Patient presents for followup regarding plans for surgical repair of recurrent ventral hernia. History per patient as well as a friend was present during the interview and examination.\par              -History of established cirrhosis. Patient has history of alcohol abuse. History of chronic hepatitis C genotype 3 which was treated in 2015 with a 6 month regimen of ribavirin 1000 mg in conjunction with Solvadi 400 mg. Treatment completed early 9/2015. Laboratory assessment 12/2015 noted for negative HCV RNA by PCR assay consistent with SVR. Liver enzymes were normal including ALT 19. Thrombocytopenia was noted with platelet count 76,000 consistent with component of portal hypertension.\par                -Records provided by patient were reviewed. Patient had undergone repair of an incarcerated umbilical hernia by Dr. Friedman on 8/16/16. Reoperation for recurrent ventral incisional hernia was performed on 6/7/17. Postop diagnosis was listed as incarcerated recurrent ventral incisional hernia. Cirrhosis was also noted. Operative note indicates a 6.6 cm Ventralex mesh was inserted. Liver biopsy was performed confirming cirrhosis.\par               -The patient indicates experiencing chronic symptoms of upper abdominal discomfort attributed to a recurrent hernia. Patient is vague as to the duration of the symptoms. However, approximately 10 days ago the patient experienced a more severe sharp throbbing pain at the site of the recurrent hernia in association with vomiting prompting a 2 day hospitalization at [facility]. The patient was evaluated by Dr. William Blackmon of surgery. Elective surgical repair is being planned. The patient indicates that she had a CT scan at that hospitalization-I await this report for review. The patient indicates that the vomiting prompting hospitalization has resolved.\par                 -The patient continues to experience abdominal discomfort attributed to the recurrent hernia. She describes a constant throbbing discomfort and pain. Coughing exacerbates this. She is able to reduce the hernia. At present, the patient denies any fever. Appetite and weight are stable. Reports no complaints of dysphagia or any recurrence of vomiting since her hospitalization. Can experience some nausea. His daily bowel movement without any complaints of diarrhea, constipation or rectal bleeding. Patient utilizes Advil or naproxen p.r.n. for pain approximately 3-4 times weekly. On occasion utilize oxycodone for pain.\par \par ORV 9/5/18:    -Presents for routine followup regarding history of cirrhosis and previous history of chronic hepatitis C. Previous history of alcohol abuse reported but has been abstinent for past 20 years.\par                  -Patient underwent complex abdominal hernia repair 2/2018. Hospitalized for 6 days. Reports 15 pound weight loss at time of surgery but since stable.\par                   -Denies complaints of dysphagia, heartburn, nausea, vomiting or abdominal pain. Bowel movements are irregular and she is prone to constipation. Patient not willing to have bowel movements at work. No complaint of diarrhea, change in bowel habit or rectal bleeding. She can experience some complaints of gas.\par                  -Patient has chronic back pain. Reports history of spinal stenosis, scoliosis and arthritis. Gets epidural injections but not effective. Complains of migraine. Currently on regimen of Zoloft, trazodone and p.r.n. oxycodone but reports only infrequently utilizing oxycodone.\par \par PARTIAL COLONOSCOPY 11/29/18:     -Patient underwent screening colonoscopy on 11/29/18. Patient is average risk. Despite propofol sedation the patient could not be adequately sedated. Throughout the exam she was restless, tense and with apparent myoclonus. A few sigmoid diverticula were noted. The colonoscopy examination was incomplete. Examination was performed to the sigmoid colon.\par                                      -Limited colonoscopy results discussed. Alternative colon cancer screening measures reviewed. Options of CT colonography versus Cologuard were discussed. The patient is leaning towards the Cologuard  test. She wishes to think about this and will contact me as to how she wishes to proceed. \par \par \par ORV 7/30/19:    -Patient presents for follow up regarding history of cirrhosis attributed to chronic hepatitis C. Patient last evaluated by me on 9/5/18. Of note, the patient indicates that several months ago she had a prolonged hospitalization at [facility]. She indicates that she went to the emergency room with complaint of back pain. She reports being diagnosed with pneumonia. She describes being "in a coma" for 2 weeks. She was hospitalized at [facility] for 5 weeks and subsequently was in rehabilitation for one month. Of note, this is per the patient. No records provided for review. Patient very vague about hospitalization. She may have required upper endoscopy for upper GI bleeding based on patient's vague description.\par                 -Current main complaint is back pain. The patient has chronic back pain. This continues and she experiences ongoing back pain requiring epidural treatments.\par                   -Denies fever. Appetite is stable. Indicates no significant weight loss. Denies any current complaints of dysphagia, nausea, vomiting, abdominal pain or abdominal distention. Has daily formed bowel movements without any current complaints of diarrhea, constipation, change in bowel habit or rectal bleeding.\par \par ORV 1/29/2020:     -Presents for followup regarding history of chronic hepatitis C and cirrhosis. Patient feels well at current time. Appetite and weight are stable. Reports no fever. Denies dysphagia, nausea, vomiting or abdominal pain. Has regular bowel movements without report of diarrhea, constipation or rectal bleeding.\par                   -Followed by hematology regarding anemia. Reports recent improvement. Indicate extensive laboratory testing 2 weeks ago. Await receipt of reports for review.\par                    -Last assessment on 7/30/19 was noted to have hemoglobin 9.6, hematocrit 30.8 , .4 and with platelet count of 63,000. Liver enzymes and alpha-fetoprotein were normal. HCV RNA by PCR assay was not detected. MELD score was 9.\par \par ORV 11/4/2020:     -Patient presents with followup regarding history of cirrhosis attributed to chronic hepatitis C. Currently feels well. Asymptomatic from a GI standpoint. Appetite is stable and patient reports a 6 pound weight increase. Denies any complaints of dysphagia, heartburn, nausea, vomiting or abdominal pain. Patient has one formed bowel movement daily without any complaints of diarrhea, constipation or rectal bleeding.\par                -Screening measures were discussed with patient including upper endoscopy for surveillance assessment regarding esophageal varices. In addition, screening colonoscopy was discussed. Patient has been reluctant to proceed. Of note, patient indicates that she had negative Cologuard testing from her PMD.\par \par ORV 6/23/2021:     -Patient presents for follow-up regarding history of cirrhosis attributed to prior chronic hepatitis C and previous history alcohol abuse.  Fortunately, remains abstinent from alcohol.  Previously had undergone antiviral therapy and achieved an SVR.  Overall, feels well at current time.  Reports stable appetite indicates 5 pound weight increase.  Denies complaints of dysphagia, nausea, vomiting or abdominal pain.  Has daily formed bowel movements without any complaints of diarrhea, constipation, change in bowel habit or rectal bleeding.\par                                -Has attempted to do Cologuard test on 2 occasions without success.  Received report that specimen improperly submitted.\par                                 -Currently on regimen of nasal spray and antibiotic for treatment of sinusitis.\par                                 -Recently had labs submitted by PMD on 6/9/2021 noted for creatinine 0.62 and liver tests noted for total protein 6.6, albumin 3.9, alkaline phosphatase 123, AST 39 and ALT 21.  CBC revealed WBC 4530, hemoglobin 12.4, hematocrit 35.5 and platelet count 56,000.\par                                 -Ultrasound examination performed on 6/21/2021 was noted for cirrhotic appearing liver.  Focal hepatic lesions not detected.  Portal vein was patent with normal direction of flow.  Status post cholecystectomy.  CBD measured 7 mm.  Mild splenomegaly noted measuring 13.1 cm.  Visualized pancreas was normal.  Abdominal wall defects not detected.

## 2021-06-23 NOTE — CONSULT LETTER
[Dear  ___] : Dear  [unfilled], [Consult Letter:] : I had the pleasure of evaluating your patient, [unfilled]. [Please see my note below.] : Please see my note below. [Consult Closing:] : Thank you very much for allowing me to participate in the care of this patient.  If you have any questions, please do not hesitate to contact me. [Sincerely,] : Sincerely, [FreeTextEntry2] : Dr. Kathleen Gallegos [FreeTextEntry3] : Hardeep Cain M.D.

## 2021-07-07 ENCOUNTER — APPOINTMENT (OUTPATIENT)
Dept: PULMONOLOGY | Facility: CLINIC | Age: 66
End: 2021-07-07

## 2021-07-22 ENCOUNTER — RESULT REVIEW (OUTPATIENT)
Age: 66
End: 2021-07-22

## 2021-07-22 ENCOUNTER — OUTPATIENT (OUTPATIENT)
Dept: OUTPATIENT SERVICES | Facility: HOSPITAL | Age: 66
LOS: 1 days | Discharge: HOME | End: 2021-07-22
Payer: MEDICARE

## 2021-07-22 ENCOUNTER — APPOINTMENT (OUTPATIENT)
Dept: GASTROENTEROLOGY | Facility: HOSPITAL | Age: 66
End: 2021-07-22

## 2021-07-22 VITALS
WEIGHT: 136.03 LBS | DIASTOLIC BLOOD PRESSURE: 68 MMHG | HEIGHT: 63 IN | HEART RATE: 62 BPM | SYSTOLIC BLOOD PRESSURE: 134 MMHG | RESPIRATION RATE: 17 BRPM | OXYGEN SATURATION: 93 % | TEMPERATURE: 98 F

## 2021-07-22 VITALS
OXYGEN SATURATION: 94 % | SYSTOLIC BLOOD PRESSURE: 115 MMHG | DIASTOLIC BLOOD PRESSURE: 70 MMHG | RESPIRATION RATE: 20 BRPM | HEART RATE: 69 BPM

## 2021-07-22 DIAGNOSIS — K74.60 UNSPECIFIED CIRRHOSIS OF LIVER: ICD-10-CM

## 2021-07-22 DIAGNOSIS — K43.2 INCISIONAL HERNIA WITHOUT OBSTRUCTION OR GANGRENE: Chronic | ICD-10-CM

## 2021-07-22 DIAGNOSIS — Z90.49 ACQUIRED ABSENCE OF OTHER SPECIFIED PARTS OF DIGESTIVE TRACT: Chronic | ICD-10-CM

## 2021-07-22 DIAGNOSIS — Z98.890 OTHER SPECIFIED POSTPROCEDURAL STATES: Chronic | ICD-10-CM

## 2021-07-22 PROCEDURE — 88305 TISSUE EXAM BY PATHOLOGIST: CPT | Mod: 26

## 2021-07-22 PROCEDURE — 43239 EGD BIOPSY SINGLE/MULTIPLE: CPT

## 2021-07-26 ENCOUNTER — NON-APPOINTMENT (OUTPATIENT)
Age: 66
End: 2021-07-26

## 2021-07-26 LAB — SURGICAL PATHOLOGY STUDY: SIGNIFICANT CHANGE UP

## 2021-10-08 ENCOUNTER — APPOINTMENT (OUTPATIENT)
Dept: PULMONOLOGY | Facility: CLINIC | Age: 66
End: 2021-10-08

## 2021-10-15 ENCOUNTER — APPOINTMENT (OUTPATIENT)
Dept: PULMONOLOGY | Facility: CLINIC | Age: 66
End: 2021-10-15
Payer: MEDICARE

## 2021-10-15 VITALS
TEMPERATURE: 97.3 F | DIASTOLIC BLOOD PRESSURE: 80 MMHG | WEIGHT: 139 LBS | HEIGHT: 63 IN | HEART RATE: 81 BPM | OXYGEN SATURATION: 87 % | BODY MASS INDEX: 24.63 KG/M2 | SYSTOLIC BLOOD PRESSURE: 137 MMHG

## 2021-10-15 DIAGNOSIS — J96.11 CHRONIC RESPIRATORY FAILURE WITH HYPOXIA: ICD-10-CM

## 2021-10-15 DIAGNOSIS — R05.9 COUGH, UNSPECIFIED: ICD-10-CM

## 2021-10-15 PROCEDURE — 71046 X-RAY EXAM CHEST 2 VIEWS: CPT

## 2021-10-15 PROCEDURE — 94618 PULMONARY STRESS TESTING: CPT

## 2021-10-15 PROCEDURE — 99214 OFFICE O/P EST MOD 30 MIN: CPT | Mod: 25

## 2021-10-17 LAB
ANION GAP SERPL CALC-SCNC: 11 MMOL/L
BASOPHILS # BLD AUTO: 0.03 K/UL
BASOPHILS NFR BLD AUTO: 0.6 %
BUN SERPL-MCNC: 9 MG/DL
CALCIUM SERPL-MCNC: 9.5 MG/DL
CHLORIDE SERPL-SCNC: 110 MMOL/L
CO2 SERPL-SCNC: 25 MMOL/L
CREAT SERPL-MCNC: 0.6 MG/DL
DEPRECATED D DIMER PPP IA-ACNC: 221 NG/ML DDU
EOSINOPHIL # BLD AUTO: 0.16 K/UL
EOSINOPHIL NFR BLD AUTO: 3.2 %
GLUCOSE SERPL-MCNC: 70 MG/DL
HCT VFR BLD CALC: 38.5 %
HGB BLD-MCNC: 12.7 G/DL
IMM GRANULOCYTES NFR BLD AUTO: 0.2 %
LYMPHOCYTES # BLD AUTO: 1.43 K/UL
LYMPHOCYTES NFR BLD AUTO: 28.9 %
MAN DIFF?: NORMAL
MCHC RBC-ENTMCNC: 32.5 PG
MCHC RBC-ENTMCNC: 33 GM/DL
MCV RBC AUTO: 98.5 FL
MONOCYTES # BLD AUTO: 0.42 K/UL
MONOCYTES NFR BLD AUTO: 8.5 %
NEUTROPHILS # BLD AUTO: 2.9 K/UL
NEUTROPHILS NFR BLD AUTO: 58.6 %
PLATELET # BLD AUTO: 53 K/UL
POTASSIUM SERPL-SCNC: 4.2 MMOL/L
RBC # BLD: 3.91 M/UL
RBC # FLD: 14.7 %
SODIUM SERPL-SCNC: 145 MMOL/L
WBC # FLD AUTO: 4.95 K/UL

## 2021-10-17 NOTE — ASSESSMENT
[FreeTextEntry1] : Venipuncture with labs drawn in office\par Start home O2 at 2 L/min for hypoxia, with pulse oximeter of 85% on ambulation on room air.\par Check PFT on next office visit.\par Hepatology follow-up regarding cirrhosis

## 2021-10-17 NOTE — PROCEDURE
[FreeTextEntry1] : 6-minute walk shows O2 saturation goes down to 85% on the second minute of walking.\par \par \par  Xray Chest 2 Views PA/Lat             Final\par   \par Chest x-ray PA and lateral views performed in my office today showed clear lungs, no evidence of infiltrates or pleural effusions. \par \par \par  Ordered by: ESAU FIGUEROA       Collected/Examined: 15Oct2021 04:03PM       \par Verified by: ESAU FIGUEROA 15Oct2021 06:12PM       \par  Result Communication: No patient communication needed at this time;\par Stage: Final       \par  Performed at: In Office       Performed by: ESAU FIGUEROA       Resulted: 15Oct2021 04:03PM       Last Updated: 15Oct2021 06:12PM       Accession: [id number]

## 2021-11-16 ENCOUNTER — APPOINTMENT (OUTPATIENT)
Dept: PULMONOLOGY | Facility: CLINIC | Age: 66
End: 2021-11-16

## 2022-02-07 ENCOUNTER — APPOINTMENT (OUTPATIENT)
Dept: ORTHOPEDIC SURGERY | Facility: CLINIC | Age: 67
End: 2022-02-07

## 2022-02-12 ENCOUNTER — NON-APPOINTMENT (OUTPATIENT)
Age: 67
End: 2022-02-12

## 2022-07-07 ENCOUNTER — EMERGENCY (EMERGENCY)
Facility: HOSPITAL | Age: 67
LOS: 0 days | Discharge: LEFT BEFORE DISCHARGE | End: 2022-07-07
Attending: STUDENT IN AN ORGANIZED HEALTH CARE EDUCATION/TRAINING PROGRAM

## 2022-07-07 VITALS
OXYGEN SATURATION: 94 % | WEIGHT: 138.89 LBS | HEART RATE: 79 BPM | HEIGHT: 63 IN | SYSTOLIC BLOOD PRESSURE: 167 MMHG | RESPIRATION RATE: 19 BRPM | DIASTOLIC BLOOD PRESSURE: 73 MMHG | TEMPERATURE: 101 F

## 2022-07-07 DIAGNOSIS — F10.10 ALCOHOL ABUSE, UNCOMPLICATED: ICD-10-CM

## 2022-07-07 DIAGNOSIS — Z90.49 ACQUIRED ABSENCE OF OTHER SPECIFIED PARTS OF DIGESTIVE TRACT: Chronic | ICD-10-CM

## 2022-07-07 DIAGNOSIS — Z98.890 OTHER SPECIFIED POSTPROCEDURAL STATES: Chronic | ICD-10-CM

## 2022-07-07 DIAGNOSIS — R41.0 DISORIENTATION, UNSPECIFIED: ICD-10-CM

## 2022-07-07 DIAGNOSIS — F32.A DEPRESSION, UNSPECIFIED: ICD-10-CM

## 2022-07-07 DIAGNOSIS — Z53.29 PROCEDURE AND TREATMENT NOT CARRIED OUT BECAUSE OF PATIENT'S DECISION FOR OTHER REASONS: ICD-10-CM

## 2022-07-07 DIAGNOSIS — F41.9 ANXIETY DISORDER, UNSPECIFIED: ICD-10-CM

## 2022-07-07 DIAGNOSIS — G43.909 MIGRAINE, UNSPECIFIED, NOT INTRACTABLE, WITHOUT STATUS MIGRAINOSUS: ICD-10-CM

## 2022-07-07 DIAGNOSIS — Z88.5 ALLERGY STATUS TO NARCOTIC AGENT: ICD-10-CM

## 2022-07-07 DIAGNOSIS — Z20.822 CONTACT WITH AND (SUSPECTED) EXPOSURE TO COVID-19: ICD-10-CM

## 2022-07-07 DIAGNOSIS — Z91.018 ALLERGY TO OTHER FOODS: ICD-10-CM

## 2022-07-07 DIAGNOSIS — K43.2 INCISIONAL HERNIA WITHOUT OBSTRUCTION OR GANGRENE: Chronic | ICD-10-CM

## 2022-07-07 LAB
ALBUMIN SERPL ELPH-MCNC: 3.3 G/DL — SIGNIFICANT CHANGE UP (ref 3.3–5)
ALP SERPL-CCNC: 147 U/L — HIGH (ref 40–120)
ALT FLD-CCNC: 35 U/L — SIGNIFICANT CHANGE UP (ref 12–78)
AMPHET UR-MCNC: NEGATIVE — SIGNIFICANT CHANGE UP
ANION GAP SERPL CALC-SCNC: 3 MMOL/L — LOW (ref 5–17)
APPEARANCE UR: CLEAR — SIGNIFICANT CHANGE UP
APTT BLD: 35.4 SEC — SIGNIFICANT CHANGE UP (ref 27.5–35.5)
AST SERPL-CCNC: 52 U/L — HIGH (ref 15–37)
BACTERIA # UR AUTO: ABNORMAL
BARBITURATES UR SCN-MCNC: NEGATIVE — SIGNIFICANT CHANGE UP
BASOPHILS # BLD AUTO: 0.03 K/UL — SIGNIFICANT CHANGE UP (ref 0–0.2)
BASOPHILS NFR BLD AUTO: 0.5 % — SIGNIFICANT CHANGE UP (ref 0–2)
BENZODIAZ UR-MCNC: NEGATIVE — SIGNIFICANT CHANGE UP
BILIRUB SERPL-MCNC: 1.8 MG/DL — HIGH (ref 0.2–1.2)
BILIRUB UR-MCNC: NEGATIVE — SIGNIFICANT CHANGE UP
BUN SERPL-MCNC: 7 MG/DL — SIGNIFICANT CHANGE UP (ref 7–23)
CALCIUM SERPL-MCNC: 8.9 MG/DL — SIGNIFICANT CHANGE UP (ref 8.5–10.1)
CHLORIDE SERPL-SCNC: 105 MMOL/L — SIGNIFICANT CHANGE UP (ref 96–108)
CO2 SERPL-SCNC: 30 MMOL/L — SIGNIFICANT CHANGE UP (ref 22–31)
COCAINE METAB.OTHER UR-MCNC: NEGATIVE — SIGNIFICANT CHANGE UP
COLOR SPEC: YELLOW — SIGNIFICANT CHANGE UP
CREAT SERPL-MCNC: 0.5 MG/DL — SIGNIFICANT CHANGE UP (ref 0.5–1.3)
DIFF PNL FLD: ABNORMAL
EGFR: 103 ML/MIN/1.73M2 — SIGNIFICANT CHANGE UP
EOSINOPHIL # BLD AUTO: 0.09 K/UL — SIGNIFICANT CHANGE UP (ref 0–0.5)
EOSINOPHIL NFR BLD AUTO: 1.5 % — SIGNIFICANT CHANGE UP (ref 0–6)
ETHANOL SERPL-MCNC: <10 MG/DL — SIGNIFICANT CHANGE UP (ref 0–10)
GLUCOSE BLDC GLUCOMTR-MCNC: 80 MG/DL — SIGNIFICANT CHANGE UP (ref 70–99)
GLUCOSE SERPL-MCNC: 75 MG/DL — SIGNIFICANT CHANGE UP (ref 70–99)
GLUCOSE UR QL: NEGATIVE MG/DL — SIGNIFICANT CHANGE UP
HCT VFR BLD CALC: 37.3 % — SIGNIFICANT CHANGE UP (ref 34.5–45)
HGB BLD-MCNC: 12.8 G/DL — SIGNIFICANT CHANGE UP (ref 11.5–15.5)
IMM GRANULOCYTES NFR BLD AUTO: 0.3 % — SIGNIFICANT CHANGE UP (ref 0–1.5)
INR BLD: 1.27 RATIO — HIGH (ref 0.88–1.16)
KETONES UR-MCNC: NEGATIVE — SIGNIFICANT CHANGE UP
LEUKOCYTE ESTERASE UR-ACNC: NEGATIVE — SIGNIFICANT CHANGE UP
LYMPHOCYTES # BLD AUTO: 0.77 K/UL — LOW (ref 1–3.3)
LYMPHOCYTES # BLD AUTO: 13.2 % — SIGNIFICANT CHANGE UP (ref 13–44)
MCHC RBC-ENTMCNC: 32.2 PG — SIGNIFICANT CHANGE UP (ref 27–34)
MCHC RBC-ENTMCNC: 34.3 G/DL — SIGNIFICANT CHANGE UP (ref 32–36)
MCV RBC AUTO: 93.7 FL — SIGNIFICANT CHANGE UP (ref 80–100)
METHADONE UR-MCNC: NEGATIVE — SIGNIFICANT CHANGE UP
MONOCYTES # BLD AUTO: 0.39 K/UL — SIGNIFICANT CHANGE UP (ref 0–0.9)
MONOCYTES NFR BLD AUTO: 6.7 % — SIGNIFICANT CHANGE UP (ref 2–14)
NEUTROPHILS # BLD AUTO: 4.55 K/UL — SIGNIFICANT CHANGE UP (ref 1.8–7.4)
NEUTROPHILS NFR BLD AUTO: 77.8 % — HIGH (ref 43–77)
NITRITE UR-MCNC: NEGATIVE — SIGNIFICANT CHANGE UP
NRBC # BLD: 0 /100 WBCS — SIGNIFICANT CHANGE UP (ref 0–0)
OPIATES UR-MCNC: NEGATIVE — SIGNIFICANT CHANGE UP
PCP SPEC-MCNC: SIGNIFICANT CHANGE UP
PCP UR-MCNC: NEGATIVE — SIGNIFICANT CHANGE UP
PH UR: 9 — HIGH (ref 5–8)
PLATELET # BLD AUTO: 52 K/UL — LOW (ref 150–400)
POTASSIUM SERPL-MCNC: 3.9 MMOL/L — SIGNIFICANT CHANGE UP (ref 3.5–5.3)
POTASSIUM SERPL-SCNC: 3.9 MMOL/L — SIGNIFICANT CHANGE UP (ref 3.5–5.3)
PROT SERPL-MCNC: 7.1 GM/DL — SIGNIFICANT CHANGE UP (ref 6–8.3)
PROT UR-MCNC: NEGATIVE MG/DL — SIGNIFICANT CHANGE UP
PROTHROM AB SERPL-ACNC: 15.3 SEC — HIGH (ref 10.5–13.4)
RAPID RVP RESULT: SIGNIFICANT CHANGE UP
RBC # BLD: 3.98 M/UL — SIGNIFICANT CHANGE UP (ref 3.8–5.2)
RBC # FLD: 14.1 % — SIGNIFICANT CHANGE UP (ref 10.3–14.5)
RBC CASTS # UR COMP ASSIST: SIGNIFICANT CHANGE UP /HPF (ref 0–4)
SARS-COV-2 RNA SPEC QL NAA+PROBE: SIGNIFICANT CHANGE UP
SODIUM SERPL-SCNC: 138 MMOL/L — SIGNIFICANT CHANGE UP (ref 135–145)
SP GR SPEC: 1.01 — SIGNIFICANT CHANGE UP (ref 1.01–1.02)
THC UR QL: NEGATIVE — SIGNIFICANT CHANGE UP
TROPONIN I, HIGH SENSITIVITY RESULT: 10.4 NG/L — SIGNIFICANT CHANGE UP
UROBILINOGEN FLD QL: 4 MG/DL
WBC # BLD: 5.85 K/UL — SIGNIFICANT CHANGE UP (ref 3.8–10.5)
WBC # FLD AUTO: 5.85 K/UL — SIGNIFICANT CHANGE UP (ref 3.8–10.5)
WBC UR QL: NEGATIVE — SIGNIFICANT CHANGE UP

## 2022-07-07 PROCEDURE — 99285 EMERGENCY DEPT VISIT HI MDM: CPT

## 2022-07-07 PROCEDURE — 70450 CT HEAD/BRAIN W/O DYE: CPT | Mod: 26,59,MA

## 2022-07-07 PROCEDURE — G0426: CPT | Mod: 95

## 2022-07-07 PROCEDURE — 0042T: CPT | Mod: MA

## 2022-07-07 PROCEDURE — 70496 CT ANGIOGRAPHY HEAD: CPT | Mod: 26,MA

## 2022-07-07 PROCEDURE — 70498 CT ANGIOGRAPHY NECK: CPT | Mod: 26,MA

## 2022-07-07 PROCEDURE — 93010 ELECTROCARDIOGRAM REPORT: CPT

## 2022-07-07 PROCEDURE — 71045 X-RAY EXAM CHEST 1 VIEW: CPT | Mod: 26

## 2022-07-07 RX ORDER — METOCLOPRAMIDE HCL 10 MG
10 TABLET ORAL ONCE
Refills: 0 | Status: COMPLETED | OUTPATIENT
Start: 2022-07-07 | End: 2022-07-07

## 2022-07-07 RX ORDER — SODIUM CHLORIDE 9 MG/ML
1000 INJECTION INTRAMUSCULAR; INTRAVENOUS; SUBCUTANEOUS ONCE
Refills: 0 | Status: COMPLETED | OUTPATIENT
Start: 2022-07-07 | End: 2022-07-07

## 2022-07-07 RX ADMIN — SODIUM CHLORIDE 1000 MILLILITER(S): 9 INJECTION INTRAMUSCULAR; INTRAVENOUS; SUBCUTANEOUS at 18:51

## 2022-07-07 RX ADMIN — Medication 10 MILLIGRAM(S): at 18:51

## 2022-07-07 NOTE — ED PROVIDER NOTE - NSFOLLOWUPINSTRUCTIONS_ED_ALL_ED_FT
Please follow up with neurology outpatient.    A headache is pain or discomfort felt around the head or neck area. The specific cause of a headache may not be found as there are many types including tension headaches, migraine headaches, and cluster headaches. Watch your condition for any changes. Things you can do to manage your pain include taking over the counter and prescription medications as instructed by your health care provider, lying down in a dark quiet room, limiting stress, getting regular sleep, and refraining from alcohol and tobacco products.    SEEK IMMEDIATE MEDICAL CARE IF YOU HAVE ANY OF THE FOLLOWING SYMPTOMS: fever, vomiting, stiff neck, loss of vision, problems with speech, muscle weakness, loss of balance, trouble walking, passing out, or confusion.

## 2022-07-07 NOTE — ED ADULT NURSE REASSESSMENT NOTE - NS ED NURSE REASSESS COMMENT FT1
pt stated she wants to leave and that her friend is here to pick her up, despite numerous encouragements. IV 20G R. lower arm removed. steady gait. no distress noted. safety maintained, will continue to monitor

## 2022-07-07 NOTE — ED ADULT TRIAGE NOTE - CHIEF COMPLAINT QUOTE
patient BIBA as per EMS patient had an episode of confusion , slurred speech and headache around an hour ago , patient A&Ox3 at the time of triage , moving all extremities no facial droop clear speech at this time

## 2022-07-07 NOTE — ED ADULT NURSE NOTE - OBJECTIVE STATEMENT
pt presents to the ED with c/o confusion, HA and as per those who witness slurred speech , at this time post CT no focal neuro deficits noted

## 2022-07-07 NOTE — ED ADULT NURSE NOTE - NSICDXPASTMEDICALHX_GEN_ALL_CORE_FT
PAST MEDICAL HISTORY:  Anxiety     Constipation     Depression     Hepatitis C treated.  Pt states it was successful    History of ETOH abuse pt states last drink 20 years ago attends AA meetings    Lumbar herniated disc s/p 2 epidural injections, last one 2-2017    Migraine pt states last 5-04-17.  Imitrex prn    Shoulder dislocation history of, returned by pt. Pt states she can not stretch out arms fully    Ventral hernia current

## 2022-07-07 NOTE — ED ADULT NURSE NOTE - NSICDXPASTSURGICALHX_GEN_ALL_CORE_FT
PAST SURGICAL HISTORY:  Incisional hernia s//p surgical repair with mesh 2016    S/P cholecystectomy laparoscopic 2015    S/P colonoscopy 2016

## 2022-07-07 NOTE — ED ADULT NURSE NOTE - NSIMPLEMENTINTERV_GEN_ALL_ED
Implemented All Universal Safety Interventions:  [place] to call system. Call bell, personal items and telephone within reach. Instruct patient to call for assistance. Room bathroom lighting operational. Non-slip footwear when patient is off stretcher. Physically safe environment: no spills, clutter or unnecessary equipment. Stretcher in lowest position, wheels locked, appropriate side rails in place.

## 2022-07-07 NOTE — STROKE CODE NOTE - ASSESSMENT/PLAN
67-year-old left-handed lady first evaluated by telestroke at [facility] on 7/7/2022 with a transient change in mental status.  She fractured her left arm in 11/21 and since then has had decreased range of motion.  ROS otherwise negative.  Exam.  NIHSS = 2.  Alert and attentive; left arm-some effort versus gravity (baseline); gait not tested; remainder of neurologic exam was nonfocal.  CT head (7/7/2022) to my eye was unremarkable.  CTA neck (7/7/2022) to my eye was unremarkable.  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    Impression.  She has a history of migraine with headache and nausea.  Today, at around 3:30 PM (last known normal) she was at [facility].  She had a headache and nausea identical to her usual migraine and then by report was "confused" having difficulty ordering at the counter and her speech might have been "slurred".  Her presentation may be consistent with aphasia consistent with left hemispheric dysfunction, versus perhaps some type of mild encephalopathy perhaps with slightly decreased level of consciousness, possibly suggesting brainstem dysfunction.  Etiology is uncertain but highly doubt ischemic stroke or TIA.  Suspect rather that she had migraine with an aphasic aura versus migraine with brainstem features.  Excluded from IV tPA because diagnosis is probably not stroke and she has resolved completely (baseline left arm decreased range of motion); excluded from endovascular thrombectomy for the same reasons as well as no LVO.  Suggest.  Consider MRI brain; further management as per [place] neurology.

## 2022-07-07 NOTE — ED PROVIDER NOTE - OBJECTIVE STATEMENT
66 y/o F with PMH anxiety, depression, migraine presents to the ED s/p episode of confusion. Patient was at a [facility] 1 hour PTA and was found to be acting abnormally. She had parked her car in an abnormal position and was confused when paying for her food. She was noted to have slurred speech by staff at that time. Upon EMS arrival, her speech seemed clear although she appeared to be off balance. Now patient states she is back at her baseline. She denies any CP, SOB, N/V, fever, or chills. She does state she had a slight headache prior to arrival.

## 2022-07-07 NOTE — ED PROVIDER NOTE - CLINICAL SUMMARY MEDICAL DECISION MAKING FREE TEXT BOX
66 y/o F with PMH anxiety, depression, migraine presents to the ED s/p episode of confusion. Patient back at her baseline upon arrival. Given episode of slurred speech and confusion, code stroke called upon arrival. Patient NIH scale 0. No TPA Indicated. Discussed with tele stroke, low suspicion for CVA/TIA at this time. Patient is back at her baseline. Suspect sx secondary to using oxycodone. Plan for outpatient neuro follow up in setting of negative workup.

## 2022-07-07 NOTE — ED PROVIDER NOTE - PROGRESS NOTE DETAILS
Patient eloped prior to receiving her paperwork. Observed by nursing staff to be steady and A&OX3. Patient evaluated by stroke neurology, think sx likely secondary to complex migraine. Low suspicion for stroke at this time. Patient is at her baseline currently. States she gets migraines when her "allergies act up". She is requesting discharge at this time. She is neuro intact. Will provide outpatient neurology follow up. Labs WNL. She does endorse taking oxycodone earlier today and that perhaps it was contributing to her symptoms.

## 2022-07-07 NOTE — ED PROVIDER NOTE - PHYSICAL EXAMINATION
GENERAL: Awake, alert, NAD  HEENT: NC/AT, moist mucous membranes, PERRL, EOMI  NECK: no neck pain  LUNGS: CTAB, no wheezes or crackles   CARDIAC: RRR, no m/r/g  ABDOMEN: Soft, normal BS, non tender, non distended, no rebound, no guarding  BACK: No midline spinal tenderness, no CVA tenderness  EXT: No edema, no calf tenderness, 2+ DP pulses bilaterally, no deformities.  NEURO: A&Ox3. CN 2-12 intact. 5/5 strengths all extremities. No meningismal signs.  SKIN: Warm and dry. No rash.  PSYCH: Normal affect.

## 2022-10-28 ENCOUNTER — APPOINTMENT (OUTPATIENT)
Dept: GASTROENTEROLOGY | Facility: CLINIC | Age: 67
End: 2022-10-28

## 2022-10-28 VITALS
TEMPERATURE: 97.9 F | DIASTOLIC BLOOD PRESSURE: 78 MMHG | OXYGEN SATURATION: 92 % | HEIGHT: 63 IN | HEART RATE: 67 BPM | SYSTOLIC BLOOD PRESSURE: 151 MMHG | BODY MASS INDEX: 23.21 KG/M2 | WEIGHT: 131 LBS

## 2022-10-28 PROCEDURE — 99213 OFFICE O/P EST LOW 20 MIN: CPT

## 2022-10-28 NOTE — OB HISTORY
HPI:   Jolanta Sandra is a 43year old female who presents for upper respiratory symptoms for  2  months.  Patient reports congestion, postnasal drip and rhinorrhea but describes clear watery fluid from nose vs. thick nasal discharge, denies fever, denies cough Sulfate (VENTOLIN) (2.5 MG/3ML) 0.083% Inhalation Nebu Soln Take  by nebulization every 6 (six) hours as needed for Wheezing. Disp:  Rfl:    Levonorgestrel (MIRENA IU) by Intrauterine route.  Disp:  Rfl:       Past Medical History:   Diagnosis Date   •  Abhijeet Diallo is a 43year old female who presents with Sinusitis. PLAN: Doxycycline 100mg BID x 7 days. Concern for possible CSF leak given symptoms - check beta 2 transferrin and refer to ENT for further evaluation and treatment  Saline Rinse.   Tylenol o [Currently In Menopause] : currently in menopause

## 2022-10-28 NOTE — PHYSICAL EXAM
[Alert] : alert [Normal Voice/Communication] : normal voice/communication [Healthy Appearing] : healthy appearing [No Acute Distress] : no acute distress [Sclera] : the sclera and conjunctiva were normal [Normal Appearance] : the appearance of the neck was normal [No Neck Mass] : no neck mass was observed [No Respiratory Distress] : no respiratory distress [No Acc Muscle Use] : no accessory muscle use [Respiration, Rhythm And Depth] : normal respiratory rhythm and effort [Auscultation Breath Sounds / Voice Sounds] : lungs were clear to auscultation bilaterally [Heart Rate And Rhythm] : heart rate was normal and rhythm regular [Normal S1, S2] : normal S1 and S2 [Bowel Sounds] : normal bowel sounds [Abdomen Tenderness] : non-tender [No Masses] : no abdominal mass palpated [Abdomen Soft] : soft [Cervical Lymph Nodes Enlarged Posterior Bilaterally] : no posterior cervical lymphadenopathy [Supraclavicular Lymph Nodes Enlarged Bilaterally] : no supraclavicular lymphadenopathy [Cervical Lymph Nodes Enlarged Anterior Bilaterally] : no anterior cervical lymphadenopathy [No CVA Tenderness] : no CVA  tenderness [Abnormal Walk] : normal gait [No Clubbing, Cyanosis] : no clubbing or cyanosis of the fingernails [Oriented To Time, Place, And Person] : oriented to person, place, and time [Normal Affect] : the affect was normal [Normal Insight/Judgment] : insight and judgment were intact [Normal Mood] : the mood was normal [de-identified] : Palpable spleen tip. [de-identified] : Stasis changes lower extremity.  Spider angiomas upper chest.  Palmar erythema.

## 2022-10-28 NOTE — HISTORY OF PRESENT ILLNESS
[FreeTextEntry1] : Office revisit on 10/28/2022.\par \par The patient presents for followup regarding history of cirrhosis attributed to chronic hepatitis C and alcohol use. \par \par PREVIOUS HISTORY:\par \par ORV 5/26/15:    -Patient commenced therapy with Sovaldi and Ribavirin approximately 17 weeks ago (initiated Jan 19th). \par \par The patient is a 59-year-old woman with a history of chronic hepatitis C genotype 3a. The patient has previous history of parenterall drug use and alcohol use. She has been known to have chronic hepatitis C for many years. The patient had previously been evaluated by me in the past at which time she had declined antiviral therapy. \par \par The patient underwent a laparoscopic cholecystectomy on 3/18/14. Her liver appeared cirrhotic at the time of the cholecystectomy. A biopsy revealed features of cirrhosis. Of note, a CT scan on 3/17/14 revealed a fatty liver. The liver was not described as being cirrhotic although splenomegaly was noted and varices at the splenic hilum was reported. A 7 mm nodule was noted at the right lung base. In addition, on 3/8/14 the platelet count was 73,000.\par \par The patient is currently asymptomatic from a gastrointestinal standpoint. She has one formed bowel movement daily without any complaints of diarrhea, constipation, change in bowel habit or rectal bleeding. Her appetite and weight are stable. She reports no complaints of dysphagia, heartburn, regurgitation, nausea, vomiting or abdominal pain. \par \par As noted, the patient has a prior history of significant alcohol intake as well as drug intake. However, since 1999 she has been totally abstinent from alcohol and drugs.\par \par The patient indicates that she underwent a colonoscopy 2 years ago. She reports that a small benign polyp was removed.\par \par Testing for hepatitis A and hepatitis B revealed immunity.\par \par The patient underwent an upper endoscopy on 7/24/14 and esophageal varices were not detected.\par \par  When assessed on 4/14/15 the ALT was 26 and AST was 41. The HCV RNA by PCR assay was detectable but < 12.\par \par CURRENT HISTORY:\par \par ORV 1/30/18:    -Patient presents for followup regarding plans for surgical repair of recurrent ventral hernia. History per patient as well as a friend was present during the interview and examination.\par              -History of established cirrhosis. Patient has history of alcohol abuse. History of chronic hepatitis C genotype 3 which was treated in 2015 with a 6 month regimen of ribavirin 1000 mg in conjunction with Solvadi 400 mg. Treatment completed early 9/2015. Laboratory assessment 12/2015 noted for negative HCV RNA by PCR assay consistent with SVR. Liver enzymes were normal including ALT 19. Thrombocytopenia was noted with platelet count 76,000 consistent with component of portal hypertension.\par                -Records provided by patient were reviewed. Patient had undergone repair of an incarcerated umbilical hernia by Dr. Friedman on 8/16/16. Reoperation for recurrent ventral incisional hernia was performed on 6/7/17. Postop diagnosis was listed as incarcerated recurrent ventral incisional hernia. Cirrhosis was also noted. Operative note indicates a 6.6 cm Ventralex mesh was inserted. Liver biopsy was performed confirming cirrhosis.\par               -The patient indicates experiencing chronic symptoms of upper abdominal discomfort attributed to a recurrent hernia. Patient is vague as to the duration of the symptoms. However, approximately 10 days ago the patient experienced a more severe sharp throbbing pain at the site of the recurrent hernia in association with vomiting prompting a 2 day hospitalization at [facility]. The patient was evaluated by Dr. William Blackmon of surgery. Elective surgical repair is being planned. The patient indicates that she had a CT scan at that hospitalization-I await this report for review. The patient indicates that the vomiting prompting hospitalization has resolved.\par                 -The patient continues to experience abdominal discomfort attributed to the recurrent hernia. She describes a constant throbbing discomfort and pain. Coughing exacerbates this. She is able to reduce the hernia. At present, the patient denies any fever. Appetite and weight are stable. Reports no complaints of dysphagia or any recurrence of vomiting since her hospitalization. Can experience some nausea. His daily bowel movement without any complaints of diarrhea, constipation or rectal bleeding. Patient utilizes Advil or naproxen p.r.n. for pain approximately 3-4 times weekly. On occasion utilize oxycodone for pain.\par \par ORV 9/5/18:    -Presents for routine followup regarding history of cirrhosis and previous history of chronic hepatitis C. Previous history of alcohol abuse reported but has been abstinent for past 20 years.\par                  -Patient underwent complex abdominal hernia repair 2/2018. Hospitalized for 6 days. Reports 15 pound weight loss at time of surgery but since stable.\par                   -Denies complaints of dysphagia, heartburn, nausea, vomiting or abdominal pain. Bowel movements are irregular and she is prone to constipation. Patient not willing to have bowel movements at work. No complaint of diarrhea, change in bowel habit or rectal bleeding. She can experience some complaints of gas.\par                  -Patient has chronic back pain. Reports history of spinal stenosis, scoliosis and arthritis. Gets epidural injections but not effective. Complains of migraine. Currently on regimen of Zoloft, trazodone and p.r.n. oxycodone but reports only infrequently utilizing oxycodone.\par \par PARTIAL COLONOSCOPY 11/29/18:     -Patient underwent screening colonoscopy on 11/29/18. Patient is average risk. Despite propofol sedation the patient could not be adequately sedated. Throughout the exam she was restless, tense and with apparent myoclonus. A few sigmoid diverticula were noted. The colonoscopy examination was incomplete. Examination was performed to the sigmoid colon.\par                                      -Limited colonoscopy results discussed. Alternative colon cancer screening measures reviewed. Options of CT colonography versus Cologuard were discussed. The patient is leaning towards the Cologuard  test. She wishes to think about this and will contact me as to how she wishes to proceed. \par \par \par ORV 7/30/19:    -Patient presents for follow up regarding history of cirrhosis attributed to chronic hepatitis C. Patient last evaluated by me on 9/5/18. Of note, the patient indicates that several months ago she had a prolonged hospitalization at [facility]. She indicates that she went to the emergency room with complaint of back pain. She reports being diagnosed with pneumonia. She describes being "in a coma" for 2 weeks. She was hospitalized at [facility] for 5 weeks and subsequently was in rehabilitation for one month. Of note, this is per the patient. No records provided for review. Patient very vague about hospitalization. She may have required upper endoscopy for upper GI bleeding based on patient's vague description.\par                 -Current main complaint is back pain. The patient has chronic back pain. This continues and she experiences ongoing back pain requiring epidural treatments.\par                   -Denies fever. Appetite is stable. Indicates no significant weight loss. Denies any current complaints of dysphagia, nausea, vomiting, abdominal pain or abdominal distention. Has daily formed bowel movements without any current complaints of diarrhea, constipation, change in bowel habit or rectal bleeding.\par \par ORV 1/29/2020:     -Presents for followup regarding history of chronic hepatitis C and cirrhosis. Patient feels well at current time. Appetite and weight are stable. Reports no fever. Denies dysphagia, nausea, vomiting or abdominal pain. Has regular bowel movements without report of diarrhea, constipation or rectal bleeding.\par                   -Followed by hematology regarding anemia. Reports recent improvement. Indicate extensive laboratory testing 2 weeks ago. Await receipt of reports for review.\par                    -Last assessment on 7/30/19 was noted to have hemoglobin 9.6, hematocrit 30.8 , .4 and with platelet count of 63,000. Liver enzymes and alpha-fetoprotein were normal. HCV RNA by PCR assay was not detected. MELD score was 9.\par \par ORV 11/4/2020:     -Patient presents with followup regarding history of cirrhosis attributed to chronic hepatitis C. Currently feels well. Asymptomatic from a GI standpoint. Appetite is stable and patient reports a 6 pound weight increase. Denies any complaints of dysphagia, heartburn, nausea, vomiting or abdominal pain. Patient has one formed bowel movement daily without any complaints of diarrhea, constipation or rectal bleeding.\par                -Screening measures were discussed with patient including upper endoscopy for surveillance assessment regarding esophageal varices. In addition, screening colonoscopy was discussed. Patient has been reluctant to proceed. Of note, patient indicates that she had negative Cologuard testing from her PMD.\par \par ORV 6/23/2021:     -Patient presents for follow-up regarding history of cirrhosis attributed to prior chronic hepatitis C and previous history alcohol abuse.  Fortunately, remains abstinent from alcohol.  Previously had undergone antiviral therapy and achieved an SVR.  Overall, feels well at current time.  Reports stable appetite indicates 5 pound weight increase.  Denies complaints of dysphagia, nausea, vomiting or abdominal pain.  Has daily formed bowel movements without any complaints of diarrhea, constipation, change in bowel habit or rectal bleeding.\par                                -Has attempted to do Cologuard test on 2 occasions without success.  Received report that specimen improperly submitted.\par                                 -Currently on regimen of nasal spray and antibiotic for treatment of sinusitis.\par                                 -Recently had labs submitted by PMD on 6/9/2021 noted for creatinine 0.62 and liver tests noted for total protein 6.6, albumin 3.9, alkaline phosphatase 123, AST 39 and ALT 21.  CBC revealed WBC 4530, hemoglobin 12.4, hematocrit 35.5 and platelet count 56,000.\par                                 -Ultrasound examination performed on 6/21/2021 was noted for cirrhotic appearing liver.  Focal hepatic lesions not detected.  Portal vein was patent with normal direction of flow.  Status post cholecystectomy.  CBD measured 7 mm.  Mild splenomegaly noted measuring 13.1 cm.  Visualized pancreas was normal.  Abdominal wall defects not detected.\par \par EGD 7/22/2021:     -Screening EGD was performed on 7/22/2021 for assessment regarding esophageal varices.  History of cirrhosis.  Examination of the esophagus was noted for a single small distal grade 1 varix which collapsed with insufflation.  No red color signs noted.  Exam of esophagus otherwise normal.  Moderate gastric antrum erythema noted and mild gastric body erythema detected.  Gastric varices not detected.  Gastric antrum biopsy revealed mild reactive epithelial change.  Helicobacter pylori not detected.  Gastric intestinal metaplasia not detected.  Visualized duodenum to the second portion normal.  Recommend follow-up surveillance upper endoscopy in 2 years. \par \par ORV 10/28/2022:    -Evaluated for follow-up regarding history of cirrhosis attributed to chronic hepatitis C.  Reports doing well from GI standpoint.  Appetite and weight stable.  Denies complaints of dysphagia, heartburn, nausea, vomiting or abdominal pain.  Has daily formed bowel movements without any complaints of diarrhea, constipation, change in bowel habit or rectal bleeding.  Has left shoulder pain attributed to her fracture from 1 year ago.

## 2022-10-28 NOTE — REASON FOR VISIT
[Consultation] : a consultation visit [FreeTextEntry1] : for follow-up of cirrhosis attributed to chronic hepatitis C.

## 2022-10-29 LAB
AFP-TM SERPL-MCNC: 2.7 NG/ML
ALBUMIN SERPL ELPH-MCNC: 3.5 G/DL
ALP BLD-CCNC: 126 U/L
ALT SERPL-CCNC: 27 U/L
ANION GAP SERPL CALC-SCNC: 10 MMOL/L
AST SERPL-CCNC: 47 U/L
BASOPHILS # BLD AUTO: 0.02 K/UL
BASOPHILS NFR BLD AUTO: 0.5 %
BILIRUB SERPL-MCNC: 1.8 MG/DL
BUN SERPL-MCNC: 8 MG/DL
CALCIUM SERPL-MCNC: 9.2 MG/DL
CHLORIDE SERPL-SCNC: 107 MMOL/L
CO2 SERPL-SCNC: 26 MMOL/L
CREAT SERPL-MCNC: 0.54 MG/DL
EGFR: 101 ML/MIN/1.73M2
EOSINOPHIL # BLD AUTO: 0.21 K/UL
EOSINOPHIL NFR BLD AUTO: 5.2 %
GLUCOSE SERPL-MCNC: 92 MG/DL
HCT VFR BLD CALC: 36 %
HGB BLD-MCNC: 12.2 G/DL
IMM GRANULOCYTES NFR BLD AUTO: 0.2 %
INR PPP: 1.26 RATIO
LYMPHOCYTES # BLD AUTO: 1.04 K/UL
LYMPHOCYTES NFR BLD AUTO: 25.9 %
MAN DIFF?: NORMAL
MCHC RBC-ENTMCNC: 32.5 PG
MCHC RBC-ENTMCNC: 33.9 GM/DL
MCV RBC AUTO: 96 FL
MONOCYTES # BLD AUTO: 0.31 K/UL
MONOCYTES NFR BLD AUTO: 7.7 %
NEUTROPHILS # BLD AUTO: 2.43 K/UL
NEUTROPHILS NFR BLD AUTO: 60.5 %
PLATELET # BLD AUTO: 47 K/UL
POTASSIUM SERPL-SCNC: 4.7 MMOL/L
PROT SERPL-MCNC: 6 G/DL
PT BLD: 14.6 SEC
RBC # BLD: 3.75 M/UL
RBC # FLD: 14.5 %
SODIUM SERPL-SCNC: 143 MMOL/L
WBC # FLD AUTO: 4.02 K/UL

## 2022-12-01 ENCOUNTER — OUTPATIENT (OUTPATIENT)
Dept: OUTPATIENT SERVICES | Facility: HOSPITAL | Age: 67
LOS: 1 days | End: 2022-12-01
Payer: COMMERCIAL

## 2022-12-01 ENCOUNTER — APPOINTMENT (OUTPATIENT)
Dept: ULTRASOUND IMAGING | Facility: CLINIC | Age: 67
End: 2022-12-01

## 2022-12-01 DIAGNOSIS — Z90.49 ACQUIRED ABSENCE OF OTHER SPECIFIED PARTS OF DIGESTIVE TRACT: Chronic | ICD-10-CM

## 2022-12-01 DIAGNOSIS — K43.2 INCISIONAL HERNIA WITHOUT OBSTRUCTION OR GANGRENE: Chronic | ICD-10-CM

## 2022-12-01 DIAGNOSIS — Z98.890 OTHER SPECIFIED POSTPROCEDURAL STATES: Chronic | ICD-10-CM

## 2022-12-01 DIAGNOSIS — K74.60 UNSPECIFIED CIRRHOSIS OF LIVER: ICD-10-CM

## 2022-12-01 PROCEDURE — 76700 US EXAM ABDOM COMPLETE: CPT | Mod: 26

## 2022-12-01 PROCEDURE — 76700 US EXAM ABDOM COMPLETE: CPT

## 2022-12-04 ENCOUNTER — APPOINTMENT (OUTPATIENT)
Dept: MRI IMAGING | Facility: CLINIC | Age: 67
End: 2022-12-04

## 2022-12-05 ENCOUNTER — APPOINTMENT (OUTPATIENT)
Dept: MRI IMAGING | Facility: CLINIC | Age: 67
End: 2022-12-05

## 2022-12-05 ENCOUNTER — OUTPATIENT (OUTPATIENT)
Dept: OUTPATIENT SERVICES | Facility: HOSPITAL | Age: 67
LOS: 1 days | End: 2022-12-05
Payer: COMMERCIAL

## 2022-12-05 DIAGNOSIS — M54.41 LUMBAGO WITH SCIATICA, RIGHT SIDE: ICD-10-CM

## 2022-12-05 DIAGNOSIS — M54.42 LUMBAGO WITH SCIATICA, LEFT SIDE: ICD-10-CM

## 2022-12-05 DIAGNOSIS — Z90.49 ACQUIRED ABSENCE OF OTHER SPECIFIED PARTS OF DIGESTIVE TRACT: Chronic | ICD-10-CM

## 2022-12-05 DIAGNOSIS — K43.2 INCISIONAL HERNIA WITHOUT OBSTRUCTION OR GANGRENE: Chronic | ICD-10-CM

## 2022-12-05 DIAGNOSIS — G89.29 OTHER CHRONIC PAIN: ICD-10-CM

## 2022-12-05 DIAGNOSIS — Z98.890 OTHER SPECIFIED POSTPROCEDURAL STATES: Chronic | ICD-10-CM

## 2022-12-05 PROCEDURE — 72148 MRI LUMBAR SPINE W/O DYE: CPT

## 2022-12-05 PROCEDURE — 72148 MRI LUMBAR SPINE W/O DYE: CPT | Mod: 26

## 2023-03-18 ENCOUNTER — NON-APPOINTMENT (OUTPATIENT)
Age: 68
End: 2023-03-18

## 2023-04-02 NOTE — ASU PREOP CHECKLIST - 1.
88 yo male ,[facility] resident with PMHx - COPD, DM, HTN, CAD, HLD, H/O TIA, dementia,GERD, BPH and depression sent ot ER for evaluation of left foot 1metatarsal wound ,suggestive of osteomyelitis .Patient was seen by ID consult and transfer to the hospital recommended for wound cx/bone biopsy /podiatry evaluation ,likely will require 4-6 weeks of iv abx . Patient was admitted to [facility] with b/l feet wounds and was followed by wound care team ,recently completed 7 days of doxycycline for foot wound cellulitis . (30 Mar 2023 05:21)      hypernatremia   d5w iv fluid     hypokalemia potassium chloride  10 mEq/100 mL IVPB 10 milliEquivalent(s) IV Intermittent every 1 hour    ACUTE RENAL FAILURE: sodium chloride 0.45%. 1000 milliLiter(s) (50 mL/Hr) IV Continuous  Serum creatinine is improving    There is no progression . No uremic symptoms  No evidence of anemia .  Fluid status stable.  Will continue to avoid nephrotoxic drugs.  Patient remains asymptomatic.   Continue current therapy.  hold  diuretic.        BP monitoring,continue current antihypertensive meds, low salt diet,followup with PMD in 1-2 weeks  losartan 50 milliGRAM(s) Oral daily    f/u  blood and urine cx,serial lactate levels,monitor vitals clement saenz hydration,monitor urine output and renal profile,iv abx   piperacillin/tazobactam IVPB.. 3.375 Gram(s) IV Intermittent every 8 hours 88 yo male ,[facility] resident with PMHx - COPD, DM, HTN, CAD, HLD, H/O TIA, dementia,GERD, BPH and depression sent ot ER for evaluation of left foot 1metatarsal wound ,suggestive of osteomyelitis .Patient was seen by ID consult and transfer to the hospital recommended for wound cx/bone biopsy /podiatry evaluation ,likely will require 4-6 weeks of iv abx . Patient was admitted to [facility] with b/l feet wounds and was followed by wound care team ,recently completed 7 days of doxycycline for foot wound cellulitis . (30 Mar 2023 05:21)      hypernatremia   d5w iv fluid     hypokalemia potassium chloride  10 mEq/100 mL IVPB 10 milliEquivalent(s) IV Intermittent every 1 hour    ACUTE RENAL FAILURE: sodium chloride 0.45%. 1000 milliLiter(s) (50 mL/Hr) IV Continuous  Serum creatinine is improving    There is no progression . No uremic symptoms  No evidence of anemia .  Fluid status stable.  Will continue to avoid nephrotoxic drugs.  Patient remains asymptomatic.   Continue current therapy.  hold  diuretic.        BP monitoring,continue current antihypertensive meds, low salt diet,followup with PMD in 1-2 weeks  losartan 50 milliGRAM(s) Oral daily    f/u  blood and urine cx,serial lactate levels,monitor vitals clement saenz hydration,monitor urine output and renal profile,iv abx   piperacillin/tazobactam IVPB.. 3.375 Gram(s) IV Intermittent every 8 hours 86 yo male ,[facility] resident with PMHx - COPD, DM, HTN, CAD, HLD, H/O TIA, dementia,GERD, BPH and depression sent ot ER for evaluation of left foot 1metatarsal wound ,suggestive of osteomyelitis .Patient was seen by ID consult and transfer to the hospital recommended for wound cx/bone biopsy /podiatry evaluation ,likely will require 4-6 weeks of iv abx . Patient was admitted to [facility] with b/l feet wounds and was followed by wound care team ,recently completed 7 days of doxycycline for foot wound cellulitis . (30 Mar 2023 05:21)      hypernatremia   d5w iv fluid     hypokalemia potassium chloride  10 mEq/100 mL IVPB 10 milliEquivalent(s) IV Intermittent every 1 hour    ACUTE RENAL FAILURE: sodium chloride 0.45%. 1000 milliLiter(s) (50 mL/Hr) IV Continuous  Serum creatinine is improving    There is no progression . No uremic symptoms  No evidence of anemia .  Fluid status stable.  Will continue to avoid nephrotoxic drugs.  Patient remains asymptomatic.   Continue current therapy.  hold  diuretic.        BP monitoring,continue current antihypertensive meds, low salt diet,followup with PMD in 1-2 weeks  losartan 50 milliGRAM(s) Oral daily    f/u  blood and urine cx,serial lactate levels,monitor vitals clement saenz hydration,monitor urine output and renal profile,iv abx   piperacillin/tazobactam IVPB.. 3.375 Gram(s) IV Intermittent every 8 hours STAT PLT sent at 0940 STAT PLT sent at 0940. Spoke with lab personnel "Ty". Tube should be a BLUE top tube, not leavendar.

## 2023-04-10 NOTE — REVIEW OF SYSTEMS
Rituxan Counseling:  I discussed with the patient the risks of Rituxan infusions. Side effects can include infusion reactions, severe drug rashes including mucocutaneous reactions, reactivation of latent hepatitis and other infections and rarely progressive multifocal leukoencephalopathy.  All of the patient's questions and concerns were addressed. [Cough] : cough [Negative] : Sleep Disorder fair balance

## 2023-05-24 ENCOUNTER — APPOINTMENT (OUTPATIENT)
Dept: GASTROENTEROLOGY | Facility: CLINIC | Age: 68
End: 2023-05-24
Payer: MEDICARE

## 2023-05-24 VITALS
SYSTOLIC BLOOD PRESSURE: 126 MMHG | OXYGEN SATURATION: 89 % | HEIGHT: 63 IN | DIASTOLIC BLOOD PRESSURE: 81 MMHG | TEMPERATURE: 98 F | BODY MASS INDEX: 23.04 KG/M2 | WEIGHT: 130 LBS | HEART RATE: 75 BPM

## 2023-05-24 DIAGNOSIS — B18.2 CHRONIC VIRAL HEPATITIS C: ICD-10-CM

## 2023-05-24 PROCEDURE — 99213 OFFICE O/P EST LOW 20 MIN: CPT | Mod: 25

## 2023-05-24 PROCEDURE — 36415 COLL VENOUS BLD VENIPUNCTURE: CPT

## 2023-05-24 NOTE — PHYSICAL EXAM
[Alert] : alert [Normal Voice/Communication] : normal voice/communication [Healthy Appearing] : healthy appearing [No Acute Distress] : no acute distress [Sclera] : the sclera and conjunctiva were normal [Normal Appearance] : the appearance of the neck was normal [No Neck Mass] : no neck mass was observed [No Respiratory Distress] : no respiratory distress [No Acc Muscle Use] : no accessory muscle use [Respiration, Rhythm And Depth] : normal respiratory rhythm and effort [Auscultation Breath Sounds / Voice Sounds] : lungs were clear to auscultation bilaterally [Heart Rate And Rhythm] : heart rate was normal and rhythm regular [Normal S1, S2] : normal S1 and S2 [Bowel Sounds] : normal bowel sounds [Abdomen Tenderness] : non-tender [No Masses] : no abdominal mass palpated [Abdomen Soft] : soft [Cervical Lymph Nodes Enlarged Posterior Bilaterally] : no posterior cervical lymphadenopathy [Supraclavicular Lymph Nodes Enlarged Bilaterally] : no supraclavicular lymphadenopathy [Cervical Lymph Nodes Enlarged Anterior Bilaterally] : no anterior cervical lymphadenopathy [No CVA Tenderness] : no CVA  tenderness [Abnormal Walk] : normal gait [No Clubbing, Cyanosis] : no clubbing or cyanosis of the fingernails [Oriented To Time, Place, And Person] : oriented to person, place, and time [Normal Affect] : the affect was normal [Normal Insight/Judgment] : insight and judgment were intact [Normal Mood] : the mood was normal [de-identified] : S1-S2 regular with a 2/6 systolic murmur. [de-identified] : Enlarged left hepatic lobe.  Palpable spleen tip.  No ascites. [de-identified] : Decreased range of motion of left shoulder. [de-identified] : Stasis changes lower extremity.  Spider angiomas upper chest.   [de-identified] : No asterixis.

## 2023-05-24 NOTE — REVIEW OF SYSTEMS
[As Noted in HPI] : as noted in HPI [Arthralgias (joint pain)] : arthralgias [Joint Stiffness] : joint stiffness

## 2023-05-24 NOTE — HISTORY OF PRESENT ILLNESS
[FreeTextEntry1] : Office revisit on 5/24/2023.\par \par The patient presents for followup regarding history of cirrhosis attributed to chronic hepatitis C and alcohol use. \par \par PREVIOUS HISTORY:\par \par ORV 5/26/15:    -Patient commenced therapy with Sovaldi and Ribavirin approximately 17 weeks ago (initiated Jan 19th). \par \par The patient is a 59-year-old woman with a history of chronic hepatitis C genotype 3a. The patient has previous history of parenterall drug use and alcohol use. She has been known to have chronic hepatitis C for many years. The patient had previously been evaluated by me in the past at which time she had declined antiviral therapy. \par \par The patient underwent a laparoscopic cholecystectomy on 3/18/14. Her liver appeared cirrhotic at the time of the cholecystectomy. A biopsy revealed features of cirrhosis. Of note, a CT scan on 3/17/14 revealed a fatty liver. The liver was not described as being cirrhotic although splenomegaly was noted and varices at the splenic hilum was reported. A 7 mm nodule was noted at the right lung base. In addition, on 3/8/14 the platelet count was 73,000.\par \par The patient is currently asymptomatic from a gastrointestinal standpoint. She has one formed bowel movement daily without any complaints of diarrhea, constipation, change in bowel habit or rectal bleeding. Her appetite and weight are stable. She reports no complaints of dysphagia, heartburn, regurgitation, nausea, vomiting or abdominal pain. \par \par As noted, the patient has a prior history of significant alcohol intake as well as drug intake. However, since 1999 she has been totally abstinent from alcohol and drugs.\par \par The patient indicates that she underwent a colonoscopy 2 years ago. She reports that a small benign polyp was removed.\par \par Testing for hepatitis A and hepatitis B revealed immunity.\par \par The patient underwent an upper endoscopy on 7/24/14 and esophageal varices were not detected.\par \par  When assessed on 4/14/15 the ALT was 26 and AST was 41. The HCV RNA by PCR assay was detectable but < 12.\par \par CURRENT HISTORY:\par \par ORV 1/30/18:    -Patient presents for followup regarding plans for surgical repair of recurrent ventral hernia. History per patient as well as a friend was present during the interview and examination.\par              -History of established cirrhosis. Patient has history of alcohol abuse. History of chronic hepatitis C genotype 3 which was treated in 2015 with a 6 month regimen of ribavirin 1000 mg in conjunction with Solvadi 400 mg. Treatment completed early 9/2015. Laboratory assessment 12/2015 noted for negative HCV RNA by PCR assay consistent with SVR. Liver enzymes were normal including ALT 19. Thrombocytopenia was noted with platelet count 76,000 consistent with component of portal hypertension.\par                -Records provided by patient were reviewed. Patient had undergone repair of an incarcerated umbilical hernia by Dr. Friedman on 8/16/16. Reoperation for recurrent ventral incisional hernia was performed on 6/7/17. Postop diagnosis was listed as incarcerated recurrent ventral incisional hernia. Cirrhosis was also noted. Operative note indicates a 6.6 cm Ventralex mesh was inserted. Liver biopsy was performed confirming cirrhosis.\par               -The patient indicates experiencing chronic symptoms of upper abdominal discomfort attributed to a recurrent hernia. Patient is vague as to the duration of the symptoms. However, approximately 10 days ago the patient experienced a more severe sharp throbbing pain at the site of the recurrent hernia in association with vomiting prompting a 2 day hospitalization at [facility]. The patient was evaluated by Dr. William Blackmon of surgery. Elective surgical repair is being planned. The patient indicates that she had a CT scan at that hospitalization-I await this report for review. The patient indicates that the vomiting prompting hospitalization has resolved.\par                 -The patient continues to experience abdominal discomfort attributed to the recurrent hernia. She describes a constant throbbing discomfort and pain. Coughing exacerbates this. She is able to reduce the hernia. At present, the patient denies any fever. Appetite and weight are stable. Reports no complaints of dysphagia or any recurrence of vomiting since her hospitalization. Can experience some nausea. His daily bowel movement without any complaints of diarrhea, constipation or rectal bleeding. Patient utilizes Advil or naproxen p.r.n. for pain approximately 3-4 times weekly. On occasion utilize oxycodone for pain.\par \par ORV 9/5/18:    -Presents for routine followup regarding history of cirrhosis and previous history of chronic hepatitis C. Previous history of alcohol abuse reported but has been abstinent for past 20 years.\par                  -Patient underwent complex abdominal hernia repair 2/2018. Hospitalized for 6 days. Reports 15 pound weight loss at time of surgery but since stable.\par                   -Denies complaints of dysphagia, heartburn, nausea, vomiting or abdominal pain. Bowel movements are irregular and she is prone to constipation. Patient not willing to have bowel movements at work. No complaint of diarrhea, change in bowel habit or rectal bleeding. She can experience some complaints of gas.\par                  -Patient has chronic back pain. Reports history of spinal stenosis, scoliosis and arthritis. Gets epidural injections but not effective. Complains of migraine. Currently on regimen of Zoloft, trazodone and p.r.n. oxycodone but reports only infrequently utilizing oxycodone.\par \par PARTIAL COLONOSCOPY 11/29/18:     -Patient underwent screening colonoscopy on 11/29/18. Patient is average risk. Despite propofol sedation the patient could not be adequately sedated. Throughout the exam she was restless, tense and with apparent myoclonus. A few sigmoid diverticula were noted. The colonoscopy examination was incomplete. Examination was performed to the sigmoid colon.\par                                      -Limited colonoscopy results discussed. Alternative colon cancer screening measures reviewed. Options of CT colonography versus Cologuard were discussed. The patient is leaning towards the Cologuard  test. She wishes to think about this and will contact me as to how she wishes to proceed. \par \par \par ORV 7/30/19:    -Patient presents for follow up regarding history of cirrhosis attributed to chronic hepatitis C. Patient last evaluated by me on 9/5/18. Of note, the patient indicates that several months ago she had a prolonged hospitalization at [facility]. She indicates that she went to the emergency room with complaint of back pain. She reports being diagnosed with pneumonia. She describes being "in a coma" for 2 weeks. She was hospitalized at [facility] for 5 weeks and subsequently was in rehabilitation for one month. Of note, this is per the patient. No records provided for review. Patient very vague about hospitalization. She may have required upper endoscopy for upper GI bleeding based on patient's vague description.\par                 -Current main complaint is back pain. The patient has chronic back pain. This continues and she experiences ongoing back pain requiring epidural treatments.\par                   -Denies fever. Appetite is stable. Indicates no significant weight loss. Denies any current complaints of dysphagia, nausea, vomiting, abdominal pain or abdominal distention. Has daily formed bowel movements without any current complaints of diarrhea, constipation, change in bowel habit or rectal bleeding.\par \par ORV 1/29/2020:     -Presents for followup regarding history of chronic hepatitis C and cirrhosis. Patient feels well at current time. Appetite and weight are stable. Reports no fever. Denies dysphagia, nausea, vomiting or abdominal pain. Has regular bowel movements without report of diarrhea, constipation or rectal bleeding.\par                   -Followed by hematology regarding anemia. Reports recent improvement. Indicate extensive laboratory testing 2 weeks ago. Await receipt of reports for review.\par                    -Last assessment on 7/30/19 was noted to have hemoglobin 9.6, hematocrit 30.8 , .4 and with platelet count of 63,000. Liver enzymes and alpha-fetoprotein were normal. HCV RNA by PCR assay was not detected. MELD score was 9.\par \par ORV 11/4/2020:     -Patient presents with followup regarding history of cirrhosis attributed to chronic hepatitis C. Currently feels well. Asymptomatic from a GI standpoint. Appetite is stable and patient reports a 6 pound weight increase. Denies any complaints of dysphagia, heartburn, nausea, vomiting or abdominal pain. Patient has one formed bowel movement daily without any complaints of diarrhea, constipation or rectal bleeding.\par                -Screening measures were discussed with patient including upper endoscopy for surveillance assessment regarding esophageal varices. In addition, screening colonoscopy was discussed. Patient has been reluctant to proceed. Of note, patient indicates that she had negative Cologuard testing from her PMD.\par \par ORV 6/23/2021:     -Patient presents for follow-up regarding history of cirrhosis attributed to prior chronic hepatitis C and previous history alcohol abuse.  Fortunately, remains abstinent from alcohol.  Previously had undergone antiviral therapy and achieved an SVR.  Overall, feels well at current time.  Reports stable appetite indicates 5 pound weight increase.  Denies complaints of dysphagia, nausea, vomiting or abdominal pain.  Has daily formed bowel movements without any complaints of diarrhea, constipation, change in bowel habit or rectal bleeding.\par                                -Has attempted to do Cologuard test on 2 occasions without success.  Received report that specimen improperly submitted.\par                                 -Currently on regimen of nasal spray and antibiotic for treatment of sinusitis.\par                                 -Recently had labs submitted by PMD on 6/9/2021 noted for creatinine 0.62 and liver tests noted for total protein 6.6, albumin 3.9, alkaline phosphatase 123, AST 39 and ALT 21.  CBC revealed WBC 4530, hemoglobin 12.4, hematocrit 35.5 and platelet count 56,000.\par                                 -Ultrasound examination performed on 6/21/2021 was noted for cirrhotic appearing liver.  Focal hepatic lesions not detected.  Portal vein was patent with normal direction of flow.  Status post cholecystectomy.  CBD measured 7 mm.  Mild splenomegaly noted measuring 13.1 cm.  Visualized pancreas was normal.  Abdominal wall defects not detected.\par \par EGD 7/22/2021:     -Screening EGD was performed on 7/22/2021 for assessment regarding esophageal varices.  History of cirrhosis.  Examination of the esophagus was noted for a single small distal grade 1 varix which collapsed with insufflation.  No red color signs noted.  Exam of esophagus otherwise normal.  Moderate gastric antrum erythema noted and mild gastric body erythema detected.  Gastric varices not detected.  Gastric antrum biopsy revealed mild reactive epithelial change.  Helicobacter pylori not detected.  Gastric intestinal metaplasia not detected.  Visualized duodenum to the second portion normal.  Recommend follow-up surveillance upper endoscopy in 2 years. \par \par ORV 10/28/2022:    -Evaluated for follow-up regarding history of cirrhosis attributed to chronic hepatitis C.  Reports doing well from GI standpoint.  Appetite and weight stable.  Denies complaints of dysphagia, heartburn, nausea, vomiting or abdominal pain.  Has daily formed bowel movements without any complaints of diarrhea, constipation, change in bowel habit or rectal bleeding.  Has left shoulder pain attributed to her fracture from 1 year ago.\par \par ORV 5/20/2023:    -Presents for follow-up regarding history of cirrhosis attributed to chronic hepatitis C.  Doing well from GI standpoint.  Dominant complaint is left shoulder pain and decreased range of motion of her left shoulder.  Taking as needed oxycodone 5 mg.  Appetite fair.  Indicates 5 pound weight loss (weight only 1 pound less compared to 10/28/2022 visit).  No complaints of dysphagia, nausea, vomiting or abdominal pain.  Has occasional episodes of heartburn.  Has 1-2 formed bowel movements daily without any report of diarrhea or rectal bleeding.\par                               -At assessment 10/27/2022 noted to have platelet count 47,000.  Surveillance hepatic ultrasound performed 12/1/2022 noted for features of cirrhosis without focal hepatic lesions.  Status postcholecystectomy.

## 2023-05-31 ENCOUNTER — NON-APPOINTMENT (OUTPATIENT)
Age: 68
End: 2023-05-31

## 2023-06-12 ENCOUNTER — NON-APPOINTMENT (OUTPATIENT)
Age: 68
End: 2023-06-12

## 2023-06-19 ENCOUNTER — NON-APPOINTMENT (OUTPATIENT)
Age: 68
End: 2023-06-19

## 2023-06-25 ENCOUNTER — NON-APPOINTMENT (OUTPATIENT)
Age: 68
End: 2023-06-25

## 2023-11-28 ENCOUNTER — NON-APPOINTMENT (OUTPATIENT)
Age: 68
End: 2023-11-28

## 2023-11-29 ENCOUNTER — APPOINTMENT (OUTPATIENT)
Dept: GASTROENTEROLOGY | Facility: CLINIC | Age: 68
End: 2023-11-29

## 2024-01-01 NOTE — ASU PATIENT PROFILE, ADULT - NS SC CAGE ALCOHOL GUILTY ABOUT
ED Resident Physician Note      Patient : Vania Anderson Age: 68 year old Sex: female   MRN: [id number] Encounter Date: 1/1/2024    Chief Complaint   Patient presents with    Fall       History     68 F PMH perforated diverticulitis sp hemicolectomy, gerd, anxiety/depression presents for eval after fall.  Patient states that she had a few alcoholic beverages tonight, she was sitting on a chair next and she knew she was on the floor.  She does not know how she got there.  She does not think she hit her head.  Denies LOC.  Endorsing right shoulder and collarbone pain.    MEDICATION LIST, ALLERGIES, ARE ALL COPIED BELOW AT BOTTOM OF CHART.    Physical Exam     ED Triage Vitals   BP    Pulse    Resp    Temp    SpO2        Physical Exam  Vitals reviewed.   Constitutional:       General: She is not in acute distress.  Cardiovascular:      Rate and Rhythm: Normal rate.      Comments: R radial pulse 2+  Pulmonary:      Effort: Pulmonary effort is normal.   Musculoskeletal:         General: Deformity present.      Comments: Right collarbone deformity and swelling without tenting   Neurological:      Mental Status: She is alert.      Comments: Rocky R hand,  strength 5/5         Procedures     Procedures    LABS AND RADIOLOGY ARE COPIED BELOW AT BOTTOM OF CHART.    Clinical Impression     [facility]    Patient : Vania Anderson Age: 68 year old Sex: female   MRN: [id number] Encounter Date: 1/1/2024    Ddx R clavicle frx vs dislocation vs shoulder frx or dislocation, will get XR    ED Course as of 01/01/24 0704   Mon Jan 01, 2024   0243 R clavicular fracture on XR, sling already in place. R shoulder no frx or dislocation by my interpretation [KK]   0254 Informed by RN pt now endorsing hitting her head, hematoma on exam, will get ct head/c spine [KK]      ED Course User Index  [KK] Annabel Clancy     7:04 AM  CT head shows scalp hematoma, naicp. No cspine frx or dislocation. Will dc.     Diagnosis: R clavicle fracture    Discharge  2024  6:45 AM  Felicia Anderson discharge to home/self care.      ------------------------------------------------------------------------------------------------------------------------------  There are no discharge medications for this patient.      Allergies   Allergen Reactions    Penicillins HIVES     Unknown    Bactrim Ds Other (See Comments)    Ciprofloxacin SWELLING     Swelling all over, pt states facial swelling    Codeine HIVES    Iodine   (Environmental Or Med) ERYTHEMA     Skin was flushed    Latex Other (See Comments)     Fungai, sensitive skin    Loracarbef SWELLING     LORABID    Sulfamethoxazole-Trimethoprim SWELLING     Unknown       Past Medical History:   Diagnosis Date    Actinic keratosis     Anxiety     Back pain     Colon polyps     Depressive disorder     Gastroesophageal reflux disease     Seborrheic keratosis     R lower eyelid shave       Past Surgical History:   Procedure Laterality Date     SECTION, LOW TRANSVERSE      HYSTERECTOMY      SUBTOTAL COLECTOMY         Lab Results     No results found for this visit on 24.    Radiology Results     Imaging Results              CT HEAD WO CONTRAST (Final result)  Result time 24 06:39:01      Final result                   Impression:    1.  No acute intracranial abnormality.  2.  Scalp hematoma.        Electronically signed by Yaya Naylor MD on 24 at 06:39               Narrative:      Patient: FELICIA ANDERSON  Time Out: 06:39  Exam(s): CT HEAD Without Contrast     EXAM:    CT Head Without Intravenous Contrast    CLINICAL HISTORY:     Reason for exam: fall, head trauma. Displaced fracture of shaft of   right clavicle, initial encounter for closed fracture.    TECHNIQUE:    Axial computed tomography images of the head/brain without intravenous   contrast.  Dose reduction techniques were utilized.    COMPARISON:    No relevant prior studies available.    FINDINGS:    Brain:  Unremarkable.  No acute intracranial  hemorrhage or mass effect.    Ventricles:  No hydrocephalus.    Bones/joints:  No acute abnormality.    Soft tissues:  Scalp hematoma.    Sinuses:  Unremarkable.    Mastoid air cells:  Unremarkable.                                       CT CERVICAL SPINE WO CONTRAST (Final result)  Result time 01/01/24 06:39:53      Final result                   Impression:    1.  No acute cervical spine abnormality.  2.  Right clavicle fracture.        Electronically signed by Yyaa Naylor MD on 01 01 24 at 06:39               Narrative:      Patient: FELICIA CARBAJAL  Time Out: 06:39  Exam(s): CT C SPINE     EXAM:    CT Cervical Spine Without Intravenous Contrast    CLINICAL HISTORY:     Reason for exam: fall, age >65. Displaced fracture of shaft of right   clavicle, initial encounter for closed fracture.    TECHNIQUE:    Axial computed tomography images of the cervical spine without   intravenous contrastwith coronal and sagittal reformats.  Dose reduction   techniques were utilized.    COMPARISON:    No relevant prior studies available.    FINDINGS:    Vertebrae:  Vertebrae intact, no acute fracture seen.  Degenerative   disc and facet findings.    Discs/spinal canal/neural foramina:  Multilevel foraminal stenosis.  No   high-grade canal stenosis.    Other bones/joints:  Right clavicle fracture.    Soft tissues:  Soft tissue edema around right clavicle.                                       XR SHOULDER 3 VIEWS RIGHT (Final result)  Result time 01/01/24 06:09:20      Final result                   Impression:      Clavicular midshaft fracture.        Electronically signed by Yaya Naylor MD on 01 01 24 at 06:09               Narrative:      Patient: FELICIA CARBAJAL  Time Out: 06:09  Exam(s): XR RIGHT SHOULDER     EXAM:    XR Right Shoulder Complete, 2 or More Views    CLINICAL HISTORY:     Reason for exam: Pain or Swelling.    TECHNIQUE:    Two or more views of the right shoulder.    COMPARISON:    No relevant prior studies  available.    FINDINGS:    Bones/joints:  Clavicular midshaft fracture.  Otherwise grossly intact   bones.    Soft tissues:  Edema.                                       XR CLAVICLE RIGHT (Final result)  Result time 01/01/24 06:21:34      Final result                   Impression:      Midclavicular fracture.        Electronically signed by Yaya Naylor MD on 01 01 24 at 06:21               Narrative:      Patient: FELICIA CARBAJAL  Time Out: 06:21  Exam(s): XR RIGHT CLAVICLE     EXAM:    XR Right Clavicle Complete, 2 or More Views    CLINICAL HISTORY:     Reason for exam: clavicle pain and swelling.    TECHNIQUE:    Frontal and lordotic views of the right clavicle.    COMPARISON:    No relevant prior studies available.    FINDINGS:    Bones/joints:  Midclavicular fracture.    Soft tissues:  Edema.                                      ED Medication Orders (From admission, onward)      Ordered Start     Status Ordering Provider    01/01/24 0208 01/01/24 0209  acetaminophen (TYLENOL) tablet 1,000 mg  ONCE         Last MAR action: Given PARDEEP WILKERSON    01/01/24 0026 01/01/24 0027  ibuprofen (MOTRIN) tablet 600 mg  ONCE         Last MAR action: Given PARDEEP WILKERSON            There are no discharge medications for this patient.      This note may have been written in part using dictation software. As such, there may be inadvertent spelling and grammatical errors.    Pardeep Wilkerson MD  1/1/2024 12:17 AM   Emergency Medicine Resident                   Pardeep Wilkerson  Resident  01/01/24 0704     no

## 2024-03-27 ENCOUNTER — APPOINTMENT (OUTPATIENT)
Dept: GASTROENTEROLOGY | Facility: CLINIC | Age: 69
End: 2024-03-27
Payer: MEDICARE

## 2024-03-27 VITALS
RESPIRATION RATE: 16 BRPM | WEIGHT: 130 LBS | SYSTOLIC BLOOD PRESSURE: 129 MMHG | TEMPERATURE: 98 F | HEART RATE: 61 BPM | DIASTOLIC BLOOD PRESSURE: 80 MMHG | HEIGHT: 63 IN | BODY MASS INDEX: 23.04 KG/M2

## 2024-03-27 VITALS
BODY MASS INDEX: 23.04 KG/M2 | OXYGEN SATURATION: 94 % | DIASTOLIC BLOOD PRESSURE: 80 MMHG | HEIGHT: 63 IN | WEIGHT: 130 LBS | SYSTOLIC BLOOD PRESSURE: 129 MMHG | HEART RATE: 61 BPM

## 2024-03-27 DIAGNOSIS — K74.60 UNSPECIFIED CIRRHOSIS OF LIVER: ICD-10-CM

## 2024-03-27 DIAGNOSIS — B18.2 CHRONIC VIRAL HEPATITIS C: ICD-10-CM

## 2024-03-27 PROCEDURE — 99213 OFFICE O/P EST LOW 20 MIN: CPT

## 2024-04-04 ENCOUNTER — NON-APPOINTMENT (OUTPATIENT)
Age: 69
End: 2024-04-04

## 2024-04-06 ENCOUNTER — NON-APPOINTMENT (OUTPATIENT)
Age: 69
End: 2024-04-06

## 2024-05-01 ENCOUNTER — APPOINTMENT (OUTPATIENT)
Dept: ULTRASOUND IMAGING | Facility: CLINIC | Age: 69
End: 2024-05-01

## 2024-05-22 ENCOUNTER — APPOINTMENT (OUTPATIENT)
Dept: ULTRASOUND IMAGING | Facility: CLINIC | Age: 69
End: 2024-05-22
Payer: COMMERCIAL

## 2024-05-22 ENCOUNTER — OUTPATIENT (OUTPATIENT)
Dept: OUTPATIENT SERVICES | Facility: HOSPITAL | Age: 69
LOS: 1 days | End: 2024-05-22
Payer: COMMERCIAL

## 2024-05-22 ENCOUNTER — RESULT REVIEW (OUTPATIENT)
Age: 69
End: 2024-05-22

## 2024-05-22 DIAGNOSIS — Z90.49 ACQUIRED ABSENCE OF OTHER SPECIFIED PARTS OF DIGESTIVE TRACT: Chronic | ICD-10-CM

## 2024-05-22 DIAGNOSIS — K43.2 INCISIONAL HERNIA WITHOUT OBSTRUCTION OR GANGRENE: Chronic | ICD-10-CM

## 2024-05-22 DIAGNOSIS — K74.60 UNSPECIFIED CIRRHOSIS OF LIVER: ICD-10-CM

## 2024-05-22 DIAGNOSIS — Z98.890 OTHER SPECIFIED POSTPROCEDURAL STATES: Chronic | ICD-10-CM

## 2024-05-22 PROCEDURE — 76705 ECHO EXAM OF ABDOMEN: CPT | Mod: 26

## 2024-05-22 PROCEDURE — 76705 ECHO EXAM OF ABDOMEN: CPT

## 2024-05-24 ENCOUNTER — NON-APPOINTMENT (OUTPATIENT)
Age: 69
End: 2024-05-24

## 2024-05-24 DIAGNOSIS — R93.2 ABNORMAL FINDINGS ON DIAGNOSTIC IMAGING OF LIVER AND BILIARY TRACT: ICD-10-CM

## 2024-07-27 ENCOUNTER — APPOINTMENT (OUTPATIENT)
Dept: CT IMAGING | Facility: CLINIC | Age: 69
End: 2024-07-27

## 2024-08-16 ENCOUNTER — APPOINTMENT (OUTPATIENT)
Dept: CT IMAGING | Facility: CLINIC | Age: 69
End: 2024-08-16

## 2024-08-16 PROCEDURE — 74177 CT ABD & PELVIS W/CONTRAST: CPT | Mod: 26

## 2024-08-22 ENCOUNTER — NON-APPOINTMENT (OUTPATIENT)
Age: 69
End: 2024-08-22

## 2024-09-04 ENCOUNTER — APPOINTMENT (OUTPATIENT)
Dept: GASTROENTEROLOGY | Facility: CLINIC | Age: 69
End: 2024-09-04

## 2024-09-04 ENCOUNTER — NON-APPOINTMENT (OUTPATIENT)
Age: 69
End: 2024-09-04

## 2024-12-09 ENCOUNTER — NON-APPOINTMENT (OUTPATIENT)
Age: 69
End: 2024-12-09

## 2024-12-09 ENCOUNTER — APPOINTMENT (OUTPATIENT)
Dept: GASTROENTEROLOGY | Facility: CLINIC | Age: 69
End: 2024-12-09

## 2024-12-18 ENCOUNTER — APPOINTMENT (OUTPATIENT)
Dept: GASTROENTEROLOGY | Facility: CLINIC | Age: 69
End: 2024-12-18
Payer: MEDICARE

## 2024-12-18 ENCOUNTER — NON-APPOINTMENT (OUTPATIENT)
Age: 69
End: 2024-12-18

## 2024-12-18 VITALS
WEIGHT: 130 LBS | OXYGEN SATURATION: 91 % | BODY MASS INDEX: 23.04 KG/M2 | SYSTOLIC BLOOD PRESSURE: 160 MMHG | DIASTOLIC BLOOD PRESSURE: 67 MMHG | HEIGHT: 63 IN | HEART RATE: 82 BPM

## 2024-12-18 DIAGNOSIS — K74.60 UNSPECIFIED CIRRHOSIS OF LIVER: ICD-10-CM

## 2024-12-18 DIAGNOSIS — Z12.11 ENCOUNTER FOR SCREENING FOR MALIGNANT NEOPLASM OF COLON: ICD-10-CM

## 2024-12-18 DIAGNOSIS — B18.2 CHRONIC VIRAL HEPATITIS C: ICD-10-CM

## 2024-12-18 PROCEDURE — 99214 OFFICE O/P EST MOD 30 MIN: CPT

## 2024-12-18 PROCEDURE — 36415 COLL VENOUS BLD VENIPUNCTURE: CPT

## 2024-12-18 RX ORDER — PEG-3350, SODIUM SULFATE, SODIUM CHLORIDE, POTASSIUM CHLORIDE, SODIUM ASCORBATE AND ASCORBIC ACID 7.5-2.691G
100 KIT ORAL
Qty: 1 | Refills: 0 | Status: ACTIVE | COMMUNITY
Start: 2024-12-18 | End: 1900-01-01

## 2024-12-19 LAB
AFP-TM SERPL-MCNC: 2.7 NG/ML
ALBUMIN SERPL ELPH-MCNC: 3.1 G/DL
ALP BLD-CCNC: 169 U/L
ALT SERPL-CCNC: 49 U/L
ANION GAP SERPL CALC-SCNC: 8 MMOL/L
AST SERPL-CCNC: 62 U/L
BILIRUB SERPL-MCNC: 2.2 MG/DL
BUN SERPL-MCNC: 8 MG/DL
CALCIUM SERPL-MCNC: 8.6 MG/DL
CHLORIDE SERPL-SCNC: 112 MMOL/L
CO2 SERPL-SCNC: 26 MMOL/L
CREAT SERPL-MCNC: 0.5 MG/DL
EGFR: 101 ML/MIN/1.73M2
GLUCOSE SERPL-MCNC: 83 MG/DL
HCT VFR BLD CALC: 35.7 %
HCV RNA SERPL NAA+PROBE-LOG IU: NOT DETECTED LOGIU/ML
HEPC RNA INTERP: NOT DETECTED
HGB BLD-MCNC: 11.7 G/DL
INR PPP: 1.44 RATIO
MCHC RBC-ENTMCNC: 32.3 PG
MCHC RBC-ENTMCNC: 32.8 G/DL
MCV RBC AUTO: 98.6 FL
PLATELET # BLD AUTO: 53 K/UL
POTASSIUM SERPL-SCNC: 4.2 MMOL/L
PROT SERPL-MCNC: 6 G/DL
PT BLD: 16.9 SEC
RBC # BLD: 3.62 M/UL
RBC # FLD: 14.7 %
SODIUM SERPL-SCNC: 146 MMOL/L
WBC # FLD AUTO: 4.54 K/UL

## 2025-01-04 RX ORDER — AZITHROMYCIN MONOHYDRATE 200 MG/5ML
5 POWDER, FOR SUSPENSION ORAL
Refills: 0 | DISCHARGE
Start: 2025-01-04

## 2025-01-07 ENCOUNTER — INPATIENT (INPATIENT)
Facility: HOSPITAL | Age: 70
LOS: 0 days | Discharge: LEFT BEFORE DISCHARGE | DRG: 206 | End: 2025-01-07
Attending: INTERNAL MEDICINE | Admitting: INTERNAL MEDICINE
Payer: MEDICARE

## 2025-01-07 VITALS
TEMPERATURE: 100 F | HEIGHT: 63 IN | SYSTOLIC BLOOD PRESSURE: 162 MMHG | RESPIRATION RATE: 19 BRPM | HEART RATE: 78 BPM | WEIGHT: 132.94 LBS | DIASTOLIC BLOOD PRESSURE: 94 MMHG | OXYGEN SATURATION: 94 %

## 2025-01-07 VITALS — RESPIRATION RATE: 20 BRPM | OXYGEN SATURATION: 94 %

## 2025-01-07 DIAGNOSIS — R09.02 HYPOXEMIA: ICD-10-CM

## 2025-01-07 DIAGNOSIS — K43.2 INCISIONAL HERNIA WITHOUT OBSTRUCTION OR GANGRENE: Chronic | ICD-10-CM

## 2025-01-07 DIAGNOSIS — Z90.49 ACQUIRED ABSENCE OF OTHER SPECIFIED PARTS OF DIGESTIVE TRACT: Chronic | ICD-10-CM

## 2025-01-07 DIAGNOSIS — Z98.890 OTHER SPECIFIED POSTPROCEDURAL STATES: Chronic | ICD-10-CM

## 2025-01-07 LAB
ALBUMIN SERPL ELPH-MCNC: 2.7 G/DL — LOW (ref 3.3–5)
ALP SERPL-CCNC: 128 U/L — HIGH (ref 40–120)
ALT FLD-CCNC: 40 U/L — SIGNIFICANT CHANGE UP (ref 10–45)
AMMONIA BLD-MCNC: 16 UMOL/L — SIGNIFICANT CHANGE UP (ref 11–55)
ANION GAP SERPL CALC-SCNC: 11 MMOL/L — SIGNIFICANT CHANGE UP (ref 5–17)
APTT BLD: 34.3 SEC — SIGNIFICANT CHANGE UP (ref 24.5–35.6)
AST SERPL-CCNC: 63 U/L — HIGH (ref 10–40)
BASOPHILS # BLD AUTO: 0.02 K/UL — SIGNIFICANT CHANGE UP (ref 0–0.2)
BASOPHILS NFR BLD AUTO: 0.5 % — SIGNIFICANT CHANGE UP (ref 0–2)
BILIRUB SERPL-MCNC: 2 MG/DL — HIGH (ref 0.2–1.2)
BUN SERPL-MCNC: 8 MG/DL — SIGNIFICANT CHANGE UP (ref 7–23)
CALCIUM SERPL-MCNC: 8.3 MG/DL — LOW (ref 8.4–10.5)
CHLORIDE SERPL-SCNC: 108 MMOL/L — SIGNIFICANT CHANGE UP (ref 96–108)
CO2 SERPL-SCNC: 20 MMOL/L — LOW (ref 22–31)
CREAT SERPL-MCNC: 0.45 MG/DL — LOW (ref 0.5–1.3)
EGFR: 104 ML/MIN/1.73M2 — SIGNIFICANT CHANGE UP
EOSINOPHIL # BLD AUTO: 0.06 K/UL — SIGNIFICANT CHANGE UP (ref 0–0.5)
EOSINOPHIL NFR BLD AUTO: 1.6 % — SIGNIFICANT CHANGE UP (ref 0–6)
FLUAV AG NPH QL: DETECTED
FLUBV AG NPH QL: SIGNIFICANT CHANGE UP
GAS PNL BLDV: SIGNIFICANT CHANGE UP
GLUCOSE SERPL-MCNC: 89 MG/DL — SIGNIFICANT CHANGE UP (ref 70–99)
HCT VFR BLD CALC: 36.8 % — SIGNIFICANT CHANGE UP (ref 34.5–45)
HGB BLD-MCNC: 11.8 G/DL — SIGNIFICANT CHANGE UP (ref 11.5–15.5)
IMM GRANULOCYTES NFR BLD AUTO: 0.5 % — SIGNIFICANT CHANGE UP (ref 0–0.9)
INR BLD: 1.48 RATIO — HIGH (ref 0.85–1.16)
LYMPHOCYTES # BLD AUTO: 0.89 K/UL — LOW (ref 1–3.3)
LYMPHOCYTES # BLD AUTO: 23.2 % — SIGNIFICANT CHANGE UP (ref 13–44)
MCHC RBC-ENTMCNC: 32.1 G/DL — SIGNIFICANT CHANGE UP (ref 32–36)
MCHC RBC-ENTMCNC: 32.2 PG — SIGNIFICANT CHANGE UP (ref 27–34)
MCV RBC AUTO: 100.3 FL — HIGH (ref 80–100)
MONOCYTES # BLD AUTO: 0.26 K/UL — SIGNIFICANT CHANGE UP (ref 0–0.9)
MONOCYTES NFR BLD AUTO: 6.8 % — SIGNIFICANT CHANGE UP (ref 2–14)
NEUTROPHILS # BLD AUTO: 2.59 K/UL — SIGNIFICANT CHANGE UP (ref 1.8–7.4)
NEUTROPHILS NFR BLD AUTO: 67.4 % — SIGNIFICANT CHANGE UP (ref 43–77)
NRBC # BLD: 0 /100 WBCS — SIGNIFICANT CHANGE UP (ref 0–0)
PLATELET # BLD AUTO: 44 K/UL — LOW (ref 150–400)
POTASSIUM SERPL-MCNC: 4 MMOL/L — SIGNIFICANT CHANGE UP (ref 3.5–5.3)
POTASSIUM SERPL-SCNC: 4 MMOL/L — SIGNIFICANT CHANGE UP (ref 3.5–5.3)
PROT SERPL-MCNC: 6 G/DL — SIGNIFICANT CHANGE UP (ref 6–8.3)
PROTHROM AB SERPL-ACNC: 16.9 SEC — HIGH (ref 9.9–13.4)
RBC # BLD: 3.67 M/UL — LOW (ref 3.8–5.2)
RBC # FLD: 15.1 % — HIGH (ref 10.3–14.5)
RSV RNA NPH QL NAA+NON-PROBE: SIGNIFICANT CHANGE UP
SARS-COV-2 RNA SPEC QL NAA+PROBE: SIGNIFICANT CHANGE UP
SODIUM SERPL-SCNC: 139 MMOL/L — SIGNIFICANT CHANGE UP (ref 135–145)
WBC # BLD: 3.84 K/UL — SIGNIFICANT CHANGE UP (ref 3.8–10.5)
WBC # FLD AUTO: 3.84 K/UL — SIGNIFICANT CHANGE UP (ref 3.8–10.5)

## 2025-01-07 PROCEDURE — 94640 AIRWAY INHALATION TREATMENT: CPT

## 2025-01-07 PROCEDURE — 93971 EXTREMITY STUDY: CPT | Mod: 26,RT

## 2025-01-07 PROCEDURE — 82947 ASSAY GLUCOSE BLOOD QUANT: CPT

## 2025-01-07 PROCEDURE — 82435 ASSAY OF BLOOD CHLORIDE: CPT

## 2025-01-07 PROCEDURE — 87040 BLOOD CULTURE FOR BACTERIA: CPT

## 2025-01-07 PROCEDURE — 84295 ASSAY OF SERUM SODIUM: CPT

## 2025-01-07 PROCEDURE — 85610 PROTHROMBIN TIME: CPT

## 2025-01-07 PROCEDURE — 96375 TX/PRO/DX INJ NEW DRUG ADDON: CPT

## 2025-01-07 PROCEDURE — 0241U: CPT

## 2025-01-07 PROCEDURE — 70450 CT HEAD/BRAIN W/O DYE: CPT | Mod: MC

## 2025-01-07 PROCEDURE — 82803 BLOOD GASES ANY COMBINATION: CPT

## 2025-01-07 PROCEDURE — 93971 EXTREMITY STUDY: CPT

## 2025-01-07 PROCEDURE — 85730 THROMBOPLASTIN TIME PARTIAL: CPT

## 2025-01-07 PROCEDURE — 96374 THER/PROPH/DIAG INJ IV PUSH: CPT

## 2025-01-07 PROCEDURE — 84132 ASSAY OF SERUM POTASSIUM: CPT

## 2025-01-07 PROCEDURE — 70450 CT HEAD/BRAIN W/O DYE: CPT | Mod: 26

## 2025-01-07 PROCEDURE — 82140 ASSAY OF AMMONIA: CPT

## 2025-01-07 PROCEDURE — 83605 ASSAY OF LACTIC ACID: CPT

## 2025-01-07 PROCEDURE — 85018 HEMOGLOBIN: CPT

## 2025-01-07 PROCEDURE — 36415 COLL VENOUS BLD VENIPUNCTURE: CPT

## 2025-01-07 PROCEDURE — 80053 COMPREHEN METABOLIC PANEL: CPT

## 2025-01-07 PROCEDURE — 99285 EMERGENCY DEPT VISIT HI MDM: CPT | Mod: 25

## 2025-01-07 PROCEDURE — 87077 CULTURE AEROBIC IDENTIFY: CPT

## 2025-01-07 PROCEDURE — 87637 SARSCOV2&INF A&B&RSV AMP PRB: CPT

## 2025-01-07 PROCEDURE — 71046 X-RAY EXAM CHEST 2 VIEWS: CPT

## 2025-01-07 PROCEDURE — 99285 EMERGENCY DEPT VISIT HI MDM: CPT

## 2025-01-07 PROCEDURE — 85014 HEMATOCRIT: CPT

## 2025-01-07 PROCEDURE — 71275 CT ANGIOGRAPHY CHEST: CPT | Mod: MC

## 2025-01-07 PROCEDURE — 85025 COMPLETE CBC W/AUTO DIFF WBC: CPT

## 2025-01-07 PROCEDURE — 71046 X-RAY EXAM CHEST 2 VIEWS: CPT | Mod: 26

## 2025-01-07 PROCEDURE — 87150 DNA/RNA AMPLIFIED PROBE: CPT

## 2025-01-07 PROCEDURE — 71275 CT ANGIOGRAPHY CHEST: CPT | Mod: 26

## 2025-01-07 PROCEDURE — 82330 ASSAY OF CALCIUM: CPT

## 2025-01-07 RX ORDER — TRAZODONE HYDROCHLORIDE 150 MG/1
50 TABLET ORAL AT BEDTIME
Refills: 0 | Status: DISCONTINUED | OUTPATIENT
Start: 2025-01-07 | End: 2025-01-07

## 2025-01-07 RX ORDER — THIAMINE HYDROCHLORIDE 100 MG/ML
100 INJECTION, SOLUTION INTRAMUSCULAR; INTRAVENOUS ONCE
Refills: 0 | Status: COMPLETED | OUTPATIENT
Start: 2025-01-07 | End: 2025-01-07

## 2025-01-07 RX ORDER — CEFTRIAXONE SODIUM 1 G/1
1000 INJECTION, POWDER, FOR SOLUTION INTRAMUSCULAR; INTRAVENOUS ONCE
Refills: 0 | Status: COMPLETED | OUTPATIENT
Start: 2025-01-07 | End: 2025-01-07

## 2025-01-07 RX ORDER — SERTRALINE HYDROCHLORIDE 25 MG/1
1 TABLET ORAL
Refills: 0 | DISCHARGE

## 2025-01-07 RX ORDER — TRAZODONE HYDROCHLORIDE 150 MG/1
0 TABLET ORAL
Refills: 0 | DISCHARGE

## 2025-01-07 RX ORDER — ACETAMINOPHEN 80 MG/.8ML
1000 SOLUTION/ DROPS ORAL ONCE
Refills: 0 | Status: COMPLETED | OUTPATIENT
Start: 2025-01-07 | End: 2025-01-07

## 2025-01-07 RX ORDER — OSELTAMIVIR 75 MG/1
1 CAPSULE ORAL
Qty: 10 | Refills: 0
Start: 2025-01-07 | End: 2025-01-11

## 2025-01-07 RX ORDER — ALBUTEROL SULFATE 90 UG/1
2 INHALANT RESPIRATORY (INHALATION)
Refills: 0 | DISCHARGE

## 2025-01-07 RX ORDER — PROMETHAZINE/DEXTROMETHORPHAN
10 SYRUP ORAL
Refills: 0 | DISCHARGE

## 2025-01-07 RX ORDER — SERTRALINE HYDROCHLORIDE 25 MG/1
50 TABLET ORAL DAILY
Refills: 0 | Status: DISCONTINUED | OUTPATIENT
Start: 2025-01-07 | End: 2025-01-07

## 2025-01-07 RX ORDER — OXYCODONE HCL 15 MG
1 TABLET ORAL
Refills: 0 | DISCHARGE

## 2025-01-07 RX ORDER — OSELTAMIVIR 75 MG/1
75 CAPSULE ORAL
Refills: 0 | Status: DISCONTINUED | OUTPATIENT
Start: 2025-01-07 | End: 2025-01-07

## 2025-01-07 RX ORDER — IPRATROPIUM BROMIDE AND ALBUTEROL SULFATE .5; 2.5 MG/3ML; MG/3ML
3 SOLUTION RESPIRATORY (INHALATION) ONCE
Refills: 0 | Status: COMPLETED | OUTPATIENT
Start: 2025-01-07 | End: 2025-01-07

## 2025-01-07 RX ORDER — OSELTAMIVIR 75 MG/1
75 CAPSULE ORAL
Qty: 0 | Refills: 0 | DISCHARGE
Start: 2025-01-07

## 2025-01-07 RX ADMIN — CEFTRIAXONE SODIUM 100 MILLIGRAM(S): 1 INJECTION, POWDER, FOR SOLUTION INTRAMUSCULAR; INTRAVENOUS at 13:53

## 2025-01-07 RX ADMIN — OSELTAMIVIR 75 MILLIGRAM(S): 75 CAPSULE ORAL at 17:51

## 2025-01-07 RX ADMIN — ACETAMINOPHEN 400 MILLIGRAM(S): 80 SOLUTION/ DROPS ORAL at 13:53

## 2025-01-07 RX ADMIN — THIAMINE HYDROCHLORIDE 100 MILLIGRAM(S): 100 INJECTION, SOLUTION INTRAMUSCULAR; INTRAVENOUS at 16:47

## 2025-01-07 RX ADMIN — IPRATROPIUM BROMIDE AND ALBUTEROL SULFATE 3 MILLILITER(S): .5; 2.5 SOLUTION RESPIRATORY (INHALATION) at 13:50

## 2025-01-07 NOTE — ED ADULT TRIAGE NOTE - PAIN: PRESENCE, MLM
[facility]  History and Physical Update    Pt Name: Buffy Shah  MRN: [id number]  YOB: 1946  Date of evaluation: 10/13/2023    I have examined the patient and reviewed the H&P/Consult and there are no changes to the patient or plans.       Sander Guevara MD  Electronically signed 10/13/2023 at 8:35 AM
denies pain/discomfort (Rating = 0)

## 2025-01-07 NOTE — ED PROVIDER NOTE - OBJECTIVE STATEMENT
69F with hx of asthma, migraines, depression, cirrhosis, presents to the ED complaining of chest congestion x 5 days. Associated fever 4 days ago, Tmax 102F, and cough. Denies sputum production or hemoptysis. She was prescribed Azithromycin by urgent care, today is day 4. She states symptoms were improving with the  medication. She went to her PCP today (Dr. Gallegos) and was advised to go to the ED for further evaluation. Denies hx of smoking. States she has not been using her inhaler because she ran out of the prescription. Patient is accompanied by her cousin who reports patient has hx of pneumonia and was once on home O2, however not any longer. She states she noticed the patient is more unsteady - 2 falls in the last month with + head strike. No A/C use. Patient denies chest pain, but states she becomes short of breath when going up the stairs. Also endorsing R>L leg swelling over the past few days. No hx of PE/DVT. Denies abdominal pain, n/v/d, or urinary symptoms.

## 2025-01-07 NOTE — DISCHARGE NOTE PROVIDER - HOSPITAL COURSE
69F with hx of asthma, migraines, depression, cirrhosis, presents to the ED complaining of chest congestion x 5 days. Associated fever 4 days ago, Tmax 102F, and cough. Denies sputum production or hemoptysis. She was prescribed Azithromycin by urgent care, today is day 4. She states symptoms were improving with the  medication. She went to her PCP today (Dr. Gallegos) and was advised to go to the ED for further evaluation.   Patient admitted with Hypoxemia- + Flu A started on Tamiflu. On 4L NC oxygen, sating well on O2, no notable wheezing. CTA chest- -without  PE, but noted with mild bilateral opacities likely infectious in etiology, and received Ceftriaxone once in ED, pending admitting orders.  However, patient now adamant on leaving AMA, alert and oriented x 3, spoke to her at bedside of risk of hypoxemia including cardiac rest and death. Pt verbalizes she take full responsibility.    Patient confirms she has an oxygen tank at home, but verbalized she will need oxygen to go home from the ED, but still refuses to stay in ED or understand the risk.   Pt also with bleeding to lip, gave bacitracin ointment, said she bit her lips by accident. Pt also refuses to call her roommate or family member to take her home.  Pt signed AMA, DR. Talbot aware. Will send script for Tamiflu to her pharmacy, pt advised to call 911 if she notices her O2 drop on her home pulse oximeter or get dizziness or SOB.

## 2025-01-07 NOTE — DISCHARGE NOTE PROVIDER - NSDCCPCAREPLAN_GEN_ALL_CORE_FT
PRINCIPAL DISCHARGE DIAGNOSIS  Diagnosis: Hypoxia  Assessment and Plan of Treatment: see a doctor immediately if you suddently beocme shor of breath or have difficulty breathing, develop sever shortness of breath with rapid heartbeat, cough, fluid retention.   make appointment to see a doctor if you are short of breath at rest or after slight activity, if SOB doesn't get better quickly after activity.  aovid smokeing an secondhand Informed pt of the various negative side effects of smoking including risk of COPD, Lung Ca etc  Strongly recommended that pt stops smoking and pt given various options of smoking cessasion tools such as NRT's and other pharmacotherapies.      SECONDARY DISCHARGE DIAGNOSES  Diagnosis: Influenza A  Assessment and Plan of Treatment: Please take Tamiflu as prescribed for 5 days total twice daily. Drink  liquids, pain reliever for fever and/ or headaches, and get rest. Use decongetants and cough medicine as needed for syptoms.    Diagnosis: Pneumonia  Assessment and Plan of Treatment:

## 2025-01-07 NOTE — DISCHARGE NOTE PROVIDER - CARE PROVIDER_API CALL
Kathleen Gallegos E  Internal Medicine  [address] [place] [address]  Phone: [phone]  Fax: [phone]  Follow Up Time:

## 2025-01-07 NOTE — ED PROVIDER NOTE - CARE PLAN
1 Principal Discharge DX:	Hypoxia   Principal Discharge DX:	Hypoxia  Secondary Diagnosis:	Influenza A  Secondary Diagnosis:	Pneumonia

## 2025-01-07 NOTE — ED PROVIDER NOTE - PHYSICAL EXAMINATION
GEN: NAD, awake, eyes open spontaneously  EYES: normal conjunctiva, perrl  ENT: NCAT, dry MM, Trachea midline  CHEST/LUNGS: Diffuse wheezing bilaterally with L>R expiratory rhonchi.   CARDIAC: Non-tachycardic, normal perfusion  ABDOMEN: Soft, NTND, No rebound/guarding  MSK: Bilateral LE edema, R>L. No gross deformity of extremities  SKIN: Chronic skin changes to b/l LE.   NEURO: CN grossly intact, normal coordination, no focal motor or sensory deficits  PSYCH: Alert, appropriate, cooperative, with capacity and insight

## 2025-01-07 NOTE — ED ADULT NURSE REASSESSMENT NOTE - NS ED NURSE REASSESS COMMENT FT1
pt signed out AMA with ACP and RN at bedside. pt stated she has prn o2 at home and does not need to stay. both RN and ACP explained how going home can be dangerous. pt repeatedly refused to stay for any reason.

## 2025-01-07 NOTE — ED PROVIDER NOTE - SECONDARY DIAGNOSIS.
Encouraged patient to keep appointment this afternoon but she declined. She wished to stay home. Home care advise discussed. Will call back if her symptoms worsen .  Rescheduled 3 month follow up Pneumonia Influenza A

## 2025-01-07 NOTE — ED ADULT NURSE NOTE - OBJECTIVE STATEMENT
Pt presents to ED from home c/o SOB w/ chest congestion x5 days that has gotten progressively worse in which pt went to UC- started on Z Pack, however recommended to come to ED by PCP. Pt denies current chest pain, chest palpitations, n/v/d, fever/chills, difficulty urinating/having a BM. Pt presents at baseline mental status, AAOx4- answering all questions correctly. Pt placed on 2L NC sating at 98%. PMH of ETOH Abuse, Hepatitis C, Anxiety, Migraine. VS as documented. Plan of care and monitoring discussed with pt. Bed locked and lowered for safety. Safety and comfort maintained.

## 2025-01-07 NOTE — DISCHARGE NOTE PROVIDER - NSDCMRMEDTOKEN_GEN_ALL_CORE_FT
sertraline 50 mg oral tablet: 1 tab(s) orally once a day (at bedtime)  Tamiflu 75 mg oral capsule: 1 cap(s) orally 2 times a day  traZODone 50 mg oral tablet: orally once a day (at bedtime)   oseltamivir 75 mg oral capsule: 75 milligram(s) orally 2 times a day  sertraline 50 mg oral tablet: 1 tab(s) orally once a day (at bedtime)  Tamiflu 75 mg oral capsule: 1 cap(s) orally 2 times a day  traZODone 50 mg oral tablet: orally once a day (at bedtime)

## 2025-01-07 NOTE — ED PROVIDER NOTE - CLINICAL SUMMARY MEDICAL DECISION MAKING FREE TEXT BOX
69F with hx cirrhosis, depression, asthma presents with congestion and cough x 5 days with fever. Sent by PCP for concern for functional decline. Patient hypoxic to 84% on my evaluation, sustained for a few minutes. Placed on 3L NC, will continue to observe. Lungs with wheezing bilaterally and rhonchi R>L. Vitals otherwise notable for low grade temp 99.9 orally. Will obtain septic w/u to eval for infectious etiology including RVP, blood cultures, CXR. Will also obtain CTA chest to eval for PNA vs. PE given hypoxia, LE doppler to eval for DVT. CT head to eval for traumatic injury due to recent falls. Pt will require admission for new O2 requirement.

## 2025-01-07 NOTE — CHART NOTE - NSCHARTNOTEFT_GEN_A_CORE
69F with hx of asthma, migraines, depression, cirrhosis, presents to the ED complaining of chest congestion x 5 days. Associated fever 4 days ago, Tmax 102F, and cough. Denies sputum production or hemoptysis. She was prescribed Azithromycin by urgent care, today is day 4. She states symptoms were improving with the  medication. She went to her PCP today (Dr. Gallegos) and was advised to go to the ED for further evaluation.   Patient admitted with Hypoxemia- + Flu A started on Tamiflu. On 4L NC oxygen, sating well on O2, no notable wheezing. CTA chest- -without  PE, but noted with mild bilateral opacities likely infectious in etiology, and received Ceftriaxone once in ED, pending admitting orders.  However, patient now adamant on leaving AMA, alert and oriented x 3, spoke to her at bedside of risk of hypoxemia including cardiac rest and death. Pt verbalizes she take full responsibility.    Patient confirms she has an oxygen tank at home, but verbalized she will need oxygen to go home from the ED, but still refuses to stay in ED or understand the risk.   Pt also with bleeding to lip, gave bacitracin ointment, said she bit her lips by accident. Pt also refuses to call her roommate or family member to take her home.  Pt signed AMALTON, DR. Dahiana hollis. Script for Tamiflu sent to her pharmacy, pt advised to call 911 if she notices her O2 drop on her home pulse oximeter or gets dizziness or SOB.

## 2025-01-07 NOTE — ED PROVIDER NOTE - RAPID ASSESSMENT
69 year old F with PMH of migraines, depression, presents for 5 days of chest and nasal congestion. pt was evaluated at urgent care at onset of symptoms, given z-jose miguel and finished course for suspected sinusitis. also taking albuterol PRN. pt seen by PCP today for follow up and prompted to come to ER for further eval. pt reports increased fatigued since symptoms, continues to have dry cough and nasal congestion. no recent travel or known sick contacts. Denies fever, chills, chest pain, shortness of breath, sore throat, rash, abdominal pain, n/v/d.  ex smoker    I, Dr. Espinosa, saw patient as a rapid assessment initially, rest of care performed by another attending, and rapid assessment documented by scribe in my presence.

## 2025-01-07 NOTE — ED PROVIDER NOTE - PROGRESS NOTE DETAILS
Jes Walls DO (PGY-2): Spoke with patient's PCP - she presented to the office today with diffuse wheezing and was more disoriented than her baseline. States family member was worried due to functional decline. Jes Walls DO (PGY-2): CTA showing b/l opacities, no PE. Stable on 2-3L nasal cannula. Flu A positive. Ceftriaxone and Tamiflu ordered. Admitted to medicine.

## 2025-01-07 NOTE — ED PROVIDER NOTE - ATTENDING CONTRIBUTION TO CARE
69 F w/ hx of migraines, asthma, depression presents to the Er w/ congestion and coughw / fever, pt went to  started on aztirhomcyin today is day 4/5, pt w/ persistent sx, went to PCP Dr. Edwards referred to the ER for c/f cognitive decline, pt accompanied by cousin at bedside. pt w/ troubl breathing found to have ra sat of 83 percent, she has coarse breathsounds w/ expiratory wehezing, worse on the L compared to the R, pt w/ soft abdomen, no lower leg edema, 5/5 upper and lower ext strength, pt placed on oxygen, she will receive rocphein for community acqueired pna coverage, found to be flu positive started on tamiflu, pe study negative, TBA for hypoxia and flu/pna

## 2025-01-09 LAB
-  STAPHYLOCOCCUS EPIDERMIDIS: SIGNIFICANT CHANGE UP
GRAM STN FLD: ABNORMAL
METHOD TYPE: SIGNIFICANT CHANGE UP

## 2025-01-09 NOTE — ED POST DISCHARGE NOTE - ADDITIONAL DOCUMENTATION
1/9/25: Call from [facility] about lab result, patient was admitted, call transferred back to [facility]. -Gracie Cruz PA-C

## 2025-01-10 LAB
CULTURE RESULTS: ABNORMAL
ORGANISM # SPEC MICROSCOPIC CNT: ABNORMAL
ORGANISM # SPEC MICROSCOPIC CNT: ABNORMAL
SPECIMEN SOURCE: SIGNIFICANT CHANGE UP

## 2025-01-12 LAB
CULTURE RESULTS: SIGNIFICANT CHANGE UP
SPECIMEN SOURCE: SIGNIFICANT CHANGE UP

## 2025-05-11 ENCOUNTER — EMERGENCY (EMERGENCY)
Facility: HOSPITAL | Age: 70
LOS: 1 days | End: 2025-05-11
Attending: STUDENT IN AN ORGANIZED HEALTH CARE EDUCATION/TRAINING PROGRAM | Admitting: STUDENT IN AN ORGANIZED HEALTH CARE EDUCATION/TRAINING PROGRAM
Payer: MEDICARE

## 2025-05-11 VITALS
DIASTOLIC BLOOD PRESSURE: 54 MMHG | RESPIRATION RATE: 19 BRPM | HEART RATE: 65 BPM | OXYGEN SATURATION: 99 % | WEIGHT: 125 LBS | SYSTOLIC BLOOD PRESSURE: 120 MMHG | TEMPERATURE: 98 F

## 2025-05-11 DIAGNOSIS — Z98.890 OTHER SPECIFIED POSTPROCEDURAL STATES: Chronic | ICD-10-CM

## 2025-05-11 DIAGNOSIS — K43.2 INCISIONAL HERNIA WITHOUT OBSTRUCTION OR GANGRENE: Chronic | ICD-10-CM

## 2025-05-11 DIAGNOSIS — Z90.49 ACQUIRED ABSENCE OF OTHER SPECIFIED PARTS OF DIGESTIVE TRACT: Chronic | ICD-10-CM

## 2025-05-11 LAB
ALBUMIN SERPL ELPH-MCNC: 3.1 G/DL — LOW (ref 3.3–5)
ALP SERPL-CCNC: 124 U/L — HIGH (ref 40–120)
ALT FLD-CCNC: 26 U/L — SIGNIFICANT CHANGE UP (ref 4–33)
ANION GAP SERPL CALC-SCNC: 8 MMOL/L — SIGNIFICANT CHANGE UP (ref 7–14)
AST SERPL-CCNC: 59 U/L — HIGH (ref 4–32)
BASOPHILS # BLD AUTO: 0.02 K/UL — SIGNIFICANT CHANGE UP (ref 0–0.2)
BASOPHILS NFR BLD AUTO: 0.5 % — SIGNIFICANT CHANGE UP (ref 0–2)
BILIRUB SERPL-MCNC: 2.2 MG/DL — HIGH (ref 0.2–1.2)
BUN SERPL-MCNC: 9 MG/DL — SIGNIFICANT CHANGE UP (ref 7–23)
CALCIUM SERPL-MCNC: 9 MG/DL — SIGNIFICANT CHANGE UP (ref 8.4–10.5)
CHLORIDE SERPL-SCNC: 110 MMOL/L — HIGH (ref 98–107)
CO2 SERPL-SCNC: 22 MMOL/L — SIGNIFICANT CHANGE UP (ref 22–31)
CREAT SERPL-MCNC: 0.6 MG/DL — SIGNIFICANT CHANGE UP (ref 0.5–1.3)
EGFR: 97 ML/MIN/1.73M2 — SIGNIFICANT CHANGE UP
EGFR: 97 ML/MIN/1.73M2 — SIGNIFICANT CHANGE UP
EOSINOPHIL # BLD AUTO: 0.11 K/UL — SIGNIFICANT CHANGE UP (ref 0–0.5)
EOSINOPHIL NFR BLD AUTO: 2.8 % — SIGNIFICANT CHANGE UP (ref 0–6)
ETHANOL SERPL-MCNC: <10 MG/DL — SIGNIFICANT CHANGE UP
GLUCOSE SERPL-MCNC: 72 MG/DL — SIGNIFICANT CHANGE UP (ref 70–99)
HCT VFR BLD CALC: 29.3 % — LOW (ref 34.5–45)
HGB BLD-MCNC: 9.5 G/DL — LOW (ref 11.5–15.5)
IANC: 2.42 K/UL — SIGNIFICANT CHANGE UP (ref 1.8–7.4)
IMM GRANULOCYTES NFR BLD AUTO: 1 % — HIGH (ref 0–0.9)
LYMPHOCYTES # BLD AUTO: 1 K/UL — SIGNIFICANT CHANGE UP (ref 1–3.3)
LYMPHOCYTES # BLD AUTO: 25.4 % — SIGNIFICANT CHANGE UP (ref 13–44)
MCHC RBC-ENTMCNC: 29.3 PG — SIGNIFICANT CHANGE UP (ref 27–34)
MCHC RBC-ENTMCNC: 32.4 G/DL — SIGNIFICANT CHANGE UP (ref 32–36)
MCV RBC AUTO: 90.4 FL — SIGNIFICANT CHANGE UP (ref 80–100)
MONOCYTES # BLD AUTO: 0.34 K/UL — SIGNIFICANT CHANGE UP (ref 0–0.9)
MONOCYTES NFR BLD AUTO: 8.7 % — SIGNIFICANT CHANGE UP (ref 2–14)
NEUTROPHILS # BLD AUTO: 2.42 K/UL — SIGNIFICANT CHANGE UP (ref 1.8–7.4)
NEUTROPHILS NFR BLD AUTO: 61.6 % — SIGNIFICANT CHANGE UP (ref 43–77)
NRBC # BLD AUTO: 0 K/UL — SIGNIFICANT CHANGE UP (ref 0–0)
NRBC # FLD: 0 K/UL — SIGNIFICANT CHANGE UP (ref 0–0)
NRBC BLD AUTO-RTO: 0 /100 WBCS — SIGNIFICANT CHANGE UP (ref 0–0)
PLATELET # BLD AUTO: 47 K/UL — LOW (ref 150–400)
POTASSIUM SERPL-MCNC: 4.3 MMOL/L — SIGNIFICANT CHANGE UP (ref 3.5–5.3)
POTASSIUM SERPL-SCNC: 4.3 MMOL/L — SIGNIFICANT CHANGE UP (ref 3.5–5.3)
PROT SERPL-MCNC: 6.2 G/DL — SIGNIFICANT CHANGE UP (ref 6–8.3)
RBC # BLD: 3.24 M/UL — LOW (ref 3.8–5.2)
RBC # FLD: 14.6 % — HIGH (ref 10.3–14.5)
SODIUM SERPL-SCNC: 140 MMOL/L — SIGNIFICANT CHANGE UP (ref 135–145)
WBC # BLD: 3.93 K/UL — SIGNIFICANT CHANGE UP (ref 3.8–10.5)
WBC # FLD AUTO: 3.93 K/UL — SIGNIFICANT CHANGE UP (ref 3.8–10.5)

## 2025-05-11 PROCEDURE — 73080 X-RAY EXAM OF ELBOW: CPT | Mod: 26,LT

## 2025-05-11 PROCEDURE — 70450 CT HEAD/BRAIN W/O DYE: CPT | Mod: 26

## 2025-05-11 PROCEDURE — 93010 ELECTROCARDIOGRAM REPORT: CPT

## 2025-05-11 PROCEDURE — 70486 CT MAXILLOFACIAL W/O DYE: CPT | Mod: 26

## 2025-05-11 PROCEDURE — 73090 X-RAY EXAM OF FOREARM: CPT | Mod: 26,LT

## 2025-05-11 PROCEDURE — 72125 CT NECK SPINE W/O DYE: CPT | Mod: 26

## 2025-05-11 PROCEDURE — 73130 X-RAY EXAM OF HAND: CPT | Mod: 26,LT

## 2025-05-11 PROCEDURE — 99284 EMERGENCY DEPT VISIT MOD MDM: CPT

## 2025-05-11 PROCEDURE — 70490 CT SOFT TISSUE NECK W/O DYE: CPT | Mod: 26

## 2025-05-11 PROCEDURE — 73110 X-RAY EXAM OF WRIST: CPT | Mod: 26,LT

## 2025-05-11 NOTE — ED PROVIDER NOTE - OBJECTIVE STATEMENT
This is a 69-year-old female with a past medical history of migraines, depression, anxiety history of alcohol abuse for the past 20 years however has not taken a drink since she has a history of venous stasis as well on Lasix.  Presented to the ED after she felt lightheaded and had a episode where she fell yesterday after [employer].   She states that she was outside at [employer] when she started feeling lightheaded and then fell down.  She states that she is unsure if she lost consciousness however did hit her head she denies having any fever or chills.  She denies having any blurry vision or change in vision she denies having any chest pain shortness of breath or exertional dyspnea.  She denies having any dysuria hematuria urinary urgency or frequency she denies taking any blood thinners.  She denies having any dysuria hematuria urinary urgency or frequency.

## 2025-05-11 NOTE — ED PROVIDER NOTE - NSFOLLOWUPINSTRUCTIONS_ED_ALL_ED_FT
Follow up with orthopedist and pcp within the week. Return to the ED immediately for any worsening symptoms or new concerns.    Wrist Fracture in Adults    WHAT YOU NEED TO KNOW:    What is a wrist fracture? A wrist fracture is a break in one or more of the bones in your wrist.  Wrist Fracture    What are the signs and symptoms of a wrist fracture?    Pain, swelling, and bruising of your injured wrist    Wrist pain that is worse when you hold something or put pressure on your wrist    Weakness, numbness, or tingling in your injured hand or wrist    Trouble moving your wrist, hand, or fingers    A change in the shape of your wrist  How is a wrist fracture diagnosed? Your healthcare provider will examine you. You may need an x-ray, CT scan, or MRI. You may be given contrast liquid to help your wrist bones show up better in pictures. Tell the healthcare provider if you have ever had an allergic reaction to contrast liquid. Do not enter the MRI room with anything metal. Metal can cause serious injury. Tell the healthcare provider if you have any metal in or on your body.    How is a wrist fracture treated? Treatment will depend on which wrist bone was broken and the kind of fracture you have. You may need any of the following:    Medicine may be given to decrease pain and swelling. You may need antibiotic medicine or a tetanus shot if there is a break in your skin.    A cast, splint, or brace may be placed on your wrist to decrease movement. These devices will help hold the bones in place while they heal.    Traction may be needed if your bone broke into 2 pieces. Traction pulls on the bone pieces to pull them back into place. A pin may be put in your bone or cast and hooked to ropes and a pulley. Weight is hung on the rope to help pull on the bones so they will heal correctly.    A closed reduction is a procedure to put your bones into the correct position without surgery.    Surgery may be needed to put your bones back into the correct position. Wires, pins, plates or screws may be used to help hold the bones in place.  How can I manage my symptoms?    Rest as much as possible. Do not play contact sports until the healthcare provider says it is okay.    Apply ice on your wrist for 15 to 20 minutes every hour or as directed. Use an ice pack, or put crushed ice in a plastic bag. Cover it with a towel before you place it on your skin. Ice helps prevent tissue damage and decreases swelling and pain.    Elevate your wrist above the level of your heart as often as possible. This will help decrease swelling and pain. Prop your wrist on pillows or blankets to keep it elevated comfortably.        Go to physical therapy as directed. You may need physical therapy after your wrist heals and the cast is removed. A physical therapist can teach you exercises to help improve movement and strength and to decrease pain.  When should I seek immediate care?    Your pain gets worse or does not get better after you take pain medicine.    Your cast or splint breaks, gets wet, or is damaged.    Your hand or fingers feel numb or cold.    Your hand or fingers turn white or blue.    Your splint or cast feels too tight.    You have more pain or swelling after the cast or splint is put on.  When should I call my doctor?    You have a fever.    There is a foul smell or blood coming from under the cast.    You have questions or concerns about your condition or care.  CARE AGREEMENT:    You have the right to help plan your care. Learn about your health condition and how it may be treated. Discuss treatment options with your healthcare providers to decide what care you want to receive. You always have the right to refuse treatment.

## 2025-05-11 NOTE — ED ADULT NURSE NOTE - OBJECTIVE STATEMENT
BREAK RN. Pt received to rm 17  , awake and alert, A&OX4, ambulatory.   Phx ETOH abuse (last drink 25 years ago), Hep C, Depression   Pt had unwitnessed fall yesterday, Pt denies passing out and says that didn't hit her head but she has large abrasion and left facial swelling/ ecchymosis. Left wrist is swollen, bruised and painful to touch. Pt says she see a neurologist for worsening gait instability over the past few months. Pt states she did not ht her head, denies blood thinners. Pt denies any pain at this time. Pt in NAD.  Respirations even and unlabored. Denies CP, SOB, N/V, HA, dizziness, palpitations, blurry vision.   . Bed in lowest position, call bell within reach. Safety maintained. Plan of care ongoing.

## 2025-05-11 NOTE — ED PROVIDER NOTE - EXTREMITY EXAM
full ROM in all extremities, swelling noted of the left distral wrist pulses intact. ambulating with steady gait, no tenderness in the extremities./no deformity, pain or tenderness, no restriction of movement

## 2025-05-11 NOTE — ED ADULT NURSE NOTE - NSFALLRISKINTERV_ED_ALL_ED

## 2025-05-11 NOTE — ED PROVIDER NOTE - PROGRESS NOTE DETAILS
MARTHA Silva: Patient signed out to me pending lab results. Lab results with liver enzymes consistent with known liver disease. Rest of labs wnl. Patient splinted for wrist fracture. Patient comfortable and stable for discharge with pcp and ortho follow up. Instructed to return to the ED immediately for any worsening symptoms or new concerns.

## 2025-05-11 NOTE — ED PROVIDER NOTE - ATTENDING APP SHARED VISIT CONTRIBUTION OF CARE
I have personally performed a face to face medical and diagnostic evaluation of the patient. I have discussed with and reviewed the GUNNER's note and agree with the History, ROS, Physical Exam and MDM unless otherwise indicated. A brief summary of my personal evaluation and impression can be found below. I actively participated in the comanagement of this patient with the GUNNER. I have personally reviewed all orders, study/imaging results, medication orders. I discussed indications for consultant evaluation and consultant recommendations with the GUNNER when applicable, and have discussed the disposition plan with the GUNNER.    Noemi DEL REAL: 69F hx of migraines, depression, anxiety history of alcohol abuse for the past 20 years however has not taken a drink since she has a history of venous stasis as well on Lasix, presents with a cc of LUE injury in setting of [place] [employer] yesterday felt lightheaded and dizzy does not recall hitting head no LOC recalls the entire event but has significant facial bruising. No other injuries no other complaints today was fine per pt no CP SOB or PRICE no fever no abdominal pain no urinary or bowel complaints no headache no changes in vision can move and feel everything.     All other ROS negative, except as above and as per HPI and ROS section.    VITALS: Initial triage and subsequent vitals have been reviewed by me.  GEN APPEARANCE: Alert, non-toxic, well-appearing, NAD.  HEAD: diffuse L facial area bruising abrasions to chin   EYES: PERRLa, EOMI, vision grossly intact.   NECK: Supple  CV: RRR, S1S2, no c/r/m/g. Cap refill <2 seconds. No bruits.   LUNGS: CTAB. No abnormal breath sounds.  ABDOMEN: Soft, NTND. No guarding or rebound.   MSK/EXT: LUE wrist area w diffuse swelling ttp over wrist DNVI +2 radial pulse compartments soft, No spinal or extremity point tenderness. No CVA ttp. Pelvis stable. No peripheral edema.  NEURO: Alert, follows commands. Weight bearing normal. Speech normal. Sensation and motor normal x4 extremities.   SKIN:  as above otherwise Warm, dry and intact. No rash.  PSYCH: Appropriate    Plan/MDM: exam vss non toxic w PE as above ddx c/f traumatic injury in setting of fall, possible vagal? pt cannot recall hitting head though w obvious head trauma, consider possible concussion? ich? LUE appears fx'd clinically - pt fully undressed no obvious signs of trauma elsewhere and pt ambulatory will check ekg basics labs, ct head mf c spine, xr of LUE give meds as needed, reassess, dispo accordingly after study results.

## 2025-05-11 NOTE — ED ADULT NURSE NOTE - NSSUHOSCREENINGYN_ED_ALL_ED
Patient revisited  Reexamined no subcu air right base with minimal crackles left without change  States walked in the halls with much less dyspnea than on presentation and states he felt near immediate improvement in respiratory status with draining of the fluid.  Unfortunately pH was not sent other studies are pending.  Okay to home from pulmonary standpoint with plans for close pulmonary follow-up    Discussed at length with the patient and his fiancée at bedside   Yes - the patient is able to be screened

## 2025-05-11 NOTE — ED PROVIDER NOTE - CLINICAL SUMMARY MEDICAL DECISION MAKING FREE TEXT BOX
This is a 69-year-old female with a past medical history of migraines, depression, anxiety history of alcohol abuse for the past 20 years however has not taken a drink since she has a history of venous stasis as well on Lasix presented after she fell. Fall- ct facial, labs, xray of the extremities.

## 2025-05-11 NOTE — ED PROVIDER NOTE - PATIENT PORTAL LINK FT
You can access the FollowMyHealth Patient Portal offered by [facility] by registering at the following website: [email or web address]. By joining [facility]’s FollowMyHealth portal, you will also be able to view your health information using other applications (apps) compatible with our system.

## 2025-05-11 NOTE — ED ADULT TRIAGE NOTE - CHIEF COMPLAINT QUOTE
Pt had unwitnessed fall yesterday, denies passing out and says that didn't hit her head but she has large abrasion and left facial swelling/ ecchymosis. Left wrist is swollen, bruised, itchy and painful to touch. Pt has involuntary movements to head and neck, keeps spacing out when being asked questions. Pt says she see a neurologist for worsening gait instability over the past few months. Phx ETOH abuse (last drink 25 years ago), Hep C, Depression

## 2025-05-11 NOTE — ED ADULT NURSE REASSESSMENT NOTE - NS ED NURSE REASSESS COMMENT FT1
Pt received alert and awake able to make needs known.  Pt noted with color good  with respirations and non-labored. No s/s of pain or discomfort. Pt pending Dispo. No s/s of distress noted.

## 2025-05-20 ENCOUNTER — APPOINTMENT (OUTPATIENT)
Dept: ORTHOPEDIC SURGERY | Facility: CLINIC | Age: 70
End: 2025-05-20

## 2025-05-23 ENCOUNTER — APPOINTMENT (OUTPATIENT)
Dept: ORTHOPEDIC SURGERY | Facility: CLINIC | Age: 70
End: 2025-05-23
Payer: MEDICARE

## 2025-05-23 ENCOUNTER — NON-APPOINTMENT (OUTPATIENT)
Age: 70
End: 2025-05-23

## 2025-05-23 VITALS — HEIGHT: 63 IN | WEIGHT: 124 LBS | BODY MASS INDEX: 21.97 KG/M2

## 2025-05-23 DIAGNOSIS — S52.502A UNSPECIFIED FRACTURE OF THE LOWER END OF LEFT RADIUS, INITIAL ENCOUNTER FOR CLOSED FRACTURE: ICD-10-CM

## 2025-05-23 PROCEDURE — 99203 OFFICE O/P NEW LOW 30 MIN: CPT

## 2025-05-23 PROCEDURE — 73110 X-RAY EXAM OF WRIST: CPT | Mod: LT

## 2025-06-04 ENCOUNTER — INPATIENT (INPATIENT)
Facility: HOSPITAL | Age: 70
LOS: 0 days | Discharge: LEFT BEFORE DISCHARGE | End: 2025-06-05
Attending: STUDENT IN AN ORGANIZED HEALTH CARE EDUCATION/TRAINING PROGRAM | Admitting: STUDENT IN AN ORGANIZED HEALTH CARE EDUCATION/TRAINING PROGRAM
Payer: MEDICARE

## 2025-06-04 VITALS
OXYGEN SATURATION: 93 % | HEART RATE: 74 BPM | DIASTOLIC BLOOD PRESSURE: 69 MMHG | TEMPERATURE: 99 F | RESPIRATION RATE: 16 BRPM | SYSTOLIC BLOOD PRESSURE: 125 MMHG

## 2025-06-04 DIAGNOSIS — Z98.890 OTHER SPECIFIED POSTPROCEDURAL STATES: Chronic | ICD-10-CM

## 2025-06-04 DIAGNOSIS — Z90.49 ACQUIRED ABSENCE OF OTHER SPECIFIED PARTS OF DIGESTIVE TRACT: Chronic | ICD-10-CM

## 2025-06-04 DIAGNOSIS — K43.2 INCISIONAL HERNIA WITHOUT OBSTRUCTION OR GANGRENE: Chronic | ICD-10-CM

## 2025-06-04 LAB
ALBUMIN SERPL ELPH-MCNC: 3.1 G/DL — LOW (ref 3.3–5)
ALP SERPL-CCNC: 136 U/L — HIGH (ref 40–120)
ALT FLD-CCNC: 18 U/L — SIGNIFICANT CHANGE UP (ref 4–33)
ANION GAP SERPL CALC-SCNC: 9 MMOL/L — SIGNIFICANT CHANGE UP (ref 7–14)
ANISOCYTOSIS BLD QL: SLIGHT — SIGNIFICANT CHANGE UP
AST SERPL-CCNC: 42 U/L — HIGH (ref 4–32)
BASOPHILS # BLD AUTO: 0 K/UL — SIGNIFICANT CHANGE UP (ref 0–0.2)
BASOPHILS NFR BLD AUTO: 0 % — SIGNIFICANT CHANGE UP (ref 0–2)
BILIRUB SERPL-MCNC: 2.5 MG/DL — HIGH (ref 0.2–1.2)
BLOOD GAS VENOUS COMPREHENSIVE RESULT: SIGNIFICANT CHANGE UP
BUN SERPL-MCNC: 8 MG/DL — SIGNIFICANT CHANGE UP (ref 7–23)
CALCIUM SERPL-MCNC: 8.3 MG/DL — LOW (ref 8.4–10.5)
CHLORIDE SERPL-SCNC: 111 MMOL/L — HIGH (ref 98–107)
CO2 SERPL-SCNC: 23 MMOL/L — SIGNIFICANT CHANGE UP (ref 22–31)
CREAT SERPL-MCNC: 0.53 MG/DL — SIGNIFICANT CHANGE UP (ref 0.5–1.3)
D DIMER BLD IA.RAPID-MCNC: 391 NG/ML DDU — HIGH
DACRYOCYTES BLD QL SMEAR: SLIGHT — SIGNIFICANT CHANGE UP
EGFR: 99 ML/MIN/1.73M2 — SIGNIFICANT CHANGE UP
EGFR: 99 ML/MIN/1.73M2 — SIGNIFICANT CHANGE UP
EOSINOPHIL # BLD AUTO: 0.18 K/UL — SIGNIFICANT CHANGE UP (ref 0–0.5)
EOSINOPHIL NFR BLD AUTO: 5.3 % — SIGNIFICANT CHANGE UP (ref 0–6)
GIANT PLATELETS BLD QL SMEAR: PRESENT — SIGNIFICANT CHANGE UP
GLUCOSE SERPL-MCNC: 73 MG/DL — SIGNIFICANT CHANGE UP (ref 70–99)
HCT VFR BLD CALC: 28.9 % — LOW (ref 34.5–45)
HGB BLD-MCNC: 9.2 G/DL — LOW (ref 11.5–15.5)
HYPOCHROMIA BLD QL: SLIGHT — SIGNIFICANT CHANGE UP
IANC: 2.14 K/UL — SIGNIFICANT CHANGE UP (ref 1.8–7.4)
LYMPHOCYTES # BLD AUTO: 0.5 K/UL — LOW (ref 1–3.3)
LYMPHOCYTES # BLD AUTO: 14.9 % — SIGNIFICANT CHANGE UP (ref 13–44)
MACROCYTES BLD QL: SLIGHT — SIGNIFICANT CHANGE UP
MANUAL SMEAR VERIFICATION: SIGNIFICANT CHANGE UP
MCHC RBC-ENTMCNC: 29.2 PG — SIGNIFICANT CHANGE UP (ref 27–34)
MCHC RBC-ENTMCNC: 31.8 G/DL — LOW (ref 32–36)
MCV RBC AUTO: 91.7 FL — SIGNIFICANT CHANGE UP (ref 80–100)
METAMYELOCYTES # FLD: 0.9 % — SIGNIFICANT CHANGE UP (ref 0–1)
METAMYELOCYTES NFR BLD: 0.9 % — SIGNIFICANT CHANGE UP (ref 0–1)
MONOCYTES # BLD AUTO: 0.09 K/UL — SIGNIFICANT CHANGE UP (ref 0–0.9)
MONOCYTES NFR BLD AUTO: 2.6 % — SIGNIFICANT CHANGE UP (ref 2–14)
NEUTROPHILS # BLD AUTO: 2.58 K/UL — SIGNIFICANT CHANGE UP (ref 1.8–7.4)
NEUTROPHILS NFR BLD AUTO: 76.3 % — SIGNIFICANT CHANGE UP (ref 43–77)
OVALOCYTES BLD QL SMEAR: SLIGHT — SIGNIFICANT CHANGE UP
PLAT MORPH BLD: ABNORMAL
PLATELET # BLD AUTO: 44 K/UL — LOW (ref 150–400)
PLATELET COUNT - ESTIMATE: ABNORMAL
POIKILOCYTOSIS BLD QL AUTO: SLIGHT — SIGNIFICANT CHANGE UP
POLYCHROMASIA BLD QL SMEAR: SLIGHT — SIGNIFICANT CHANGE UP
POTASSIUM SERPL-MCNC: 3.6 MMOL/L — SIGNIFICANT CHANGE UP (ref 3.5–5.3)
POTASSIUM SERPL-SCNC: 3.6 MMOL/L — SIGNIFICANT CHANGE UP (ref 3.5–5.3)
PROT SERPL-MCNC: 6.2 G/DL — SIGNIFICANT CHANGE UP (ref 6–8.3)
RBC # BLD: 3.15 M/UL — LOW (ref 3.8–5.2)
RBC # FLD: 16.8 % — HIGH (ref 10.3–14.5)
RBC BLD AUTO: SIGNIFICANT CHANGE UP
SCHISTOCYTES BLD QL AUTO: SLIGHT — SIGNIFICANT CHANGE UP
SMUDGE CELLS # BLD: PRESENT — SIGNIFICANT CHANGE UP
SODIUM SERPL-SCNC: 143 MMOL/L — SIGNIFICANT CHANGE UP (ref 135–145)
WBC # BLD: 3.38 K/UL — LOW (ref 3.8–10.5)
WBC # FLD AUTO: 3.38 K/UL — LOW (ref 3.8–10.5)

## 2025-06-04 PROCEDURE — 99285 EMERGENCY DEPT VISIT HI MDM: CPT

## 2025-06-04 PROCEDURE — 71046 X-RAY EXAM CHEST 2 VIEWS: CPT | Mod: 26

## 2025-06-04 NOTE — ED ADULT NURSE REASSESSMENT NOTE - NS ED NURSE REASSESS COMMENT FT1
Received patient in 26A, pending X-ray results. Patient resting in stretcher, no distress noted. Patient is on cardiac monitor w/O2 sat 93 on a room air. No need to give supplemental O2 as per Dr. Garnica and MARTHA Zhou. Patient denies SOB, chest pain, fever. Patient is A&OX4, airway patent, breathing unlabored and even. Labs obtained. Side rails up and safety maintained. Fall precaution in place.

## 2025-06-04 NOTE — ED PROVIDER NOTE - OBJECTIVE STATEMENT
70-year-old female with a past medical history of migraine, depression, anxiety, prior history of alcohol use disorder currently sober for the last 20 years presents to emergency room for evaluation of mechanical fall.  Patient states she tripped over an even pavement and landed on her left knee.  She denies any head injury, LOC or neck injury.  She states she was able to stand up after the fall.  She states she has a small abrasion over the left knee however has a full range of motion of the knee.  Denies any symptoms prior to her falls such as chest pain, shortness of breath, dizziness.  Patient states she has no medical complaints and she feels well.

## 2025-06-04 NOTE — ED PROVIDER NOTE - PHYSICAL EXAMINATION
Neck is supple with FROM. no midline tenderness    left knee with small abrasion. FROM. pulses intact.

## 2025-06-04 NOTE — ED ADULT NURSE NOTE - OBJECTIVE STATEMENT
break rn, Pt received to rm   , awake and alert, A&OX4, ambulatory. C/o.      Respirations even and unlabored. Denies CP, SOB, N/V, HA, dizziness, palpitations, blurry vision.     . Bed in lowest position, call bell within reach. Safety maintained. break rn, Pt received to spot 26a   , awake and alert, A&OX4, ambulatory. C/o. trip and fall outside. Pt states ground was uneven when she tripped and fell down. Pt denies any pain or complaints. Pt states she is seeing a neurologist for losing balance. Pt denies headstrike, LOC, blood thinner use.  Pt placed on #L NC by EMS due to oxygen of 92%, pt states this is her norm. Pt now satting 96% on RA. Pt speaking full seetces, airway patent.    Respirations even and unlabored. Denies CP, SOB, N/V, HA, dizziness, palpitations, blurry vision.  . Bed in lowest position, call bell within reach. Safety maintained.

## 2025-06-04 NOTE — ED PROVIDER NOTE - PROGRESS NOTE DETAILS
MARTHA Silva: Soon after admission patient changed her mind about staying and requested to leave the hospital so that she can follow up with her private cardiologist as she has scheduled for 2 days from now. Informed patient that if she were to leave at this point rather than complete her care in the hospital as recommended than she would need to sign out AMA. Patient has decided to sign out against medical advice.  I had a detailed discussion involving the risks benefits and alternatives to signing out AMA. The patient verbalized understanding of the presenting medical illness, treatment options, the risks, benefits and alternatives, to treatment, admission and further workup, including death or worsening of current condition causing permanent lifestyle changes and has decided to sign out AMA.  I advised the patient to return anytime for worsening symptoms and encouraged follow up with primary care doctor as soon as possible.  Patient is alert and oriented to person, place and time and does not meet criteria for involuntary commitment. Instructed to return to the ED immediately for any worsening symptoms or new concerns.

## 2025-06-04 NOTE — ED PROVIDER NOTE - CLINICAL SUMMARY MEDICAL DECISION MAKING FREE TEXT BOX
71 yo female s/p mechanical fall. pt has no complaint and wishes to go home. NO LOC or neck injury. steady gait.   will dc home with PMD follow up 69 yo female s/p mechanical fall. pt has no complaint and wishes to go home. NO LOC or neck injury. steady gait.   pt found to be having low O2 saturation to low 90s. no medical complaints.   will obtain labs, xray and reassess 69 yo female s/p mechanical fall. pt has no complaint and wishes to go home. NO LOC or neck injury. steady gait.   pt found to be having low O2 saturation to low 90s. no medical complaints.   will obtain labs, xray and reassess.  Labs with d-dimer 391. Rest of labs wnl. CTA chest ordered.  CTA shows "No pulmonary embolus. Mild interlobular septal thickening which may represent mild edema. Mediastinal lymphadenopathy, stable and of uncertain etiology. Correlate clinically. Cirrhotic morphology of the liver. Splenomegaly. Diffuse esophageal wall thickening which can be seen with esophagitis. Correlate clinically." Treated with some IV lasix. Plan is to admit to the hospitalist service for further care and management.

## 2025-06-04 NOTE — ED PROVIDER NOTE - NEUROLOGICAL, MLM
You can access the FollowMyHealth Patient Portal offered by [facility] by registering at the following website: [email or web address]. By joining [employer]’s FollowMyHealth portal, you will also be able to view your health information using other applications (apps) compatible with our system.
Alert and oriented, no focal deficits, no motor or sensory deficits.

## 2025-06-04 NOTE — ED ADULT NURSE NOTE - CHIEF COMPLAINT
Ambulatory Care Coordination Note  2024    Patient Current Location:  Home: [address] [place] [id number]     ACM contacted the patient by telephone. Verified name and  with patient as identifiers. Provided introduction to self, and explanation of the ACM role.     Challenges to be reviewed by the provider   Additional needs identified to be addressed with provider: No  none               Method of communication with provider: chart routing.    ACM: Christine Martel RN     ACM completed follow up call with patient who said she is doing well at this time. Patient said she is having weakness that has been ongoing and is hopeful therapy will work. Patient has no other concerns.     Plan:    F/U call 3 weeks  PT progression      Offered patient enrollment in the Remote Patient Monitoring (RPM) program for in-home monitoring: Yes, patient enrolled; current status is activated and monitoring.    Lab Results       None            Care Coordination Interventions    Referral from Primary Care Provider: No  Suggested Interventions and Community Resources  Meals on Wheels: Completed (Comment: Active with Meals on Wheels)  Medication Assistance Program: Completed (Comment: [employer] PAP form to mail out)  Social Work: Completed (Comment: SDOH needs)  Specialty Services Referral: Completed (Comment: PRO-seniors information given)          Goals Addressed    None         Future Appointments   Date Time Provider Department Center   7/10/2024 11:30 AM [place], [place], PT [facility]   2024 12:30 PM [place], [place], PT [facility]   2024 11:45 AM Raisa Espitia MD [facility]  [place] - [facility]   2024  2:00 PM [place], [place], PT [facility]   2024 11:00 AM [place], [place], PT [facility]   2024  2:00 PM [place], [place], PT [facility]   2024 11:30 AM [place], [place], PT [facility]   2024 11:30 AM [place], [place], PT [facility] 
The patient is a 70y Female complaining of fall.

## 2025-06-04 NOTE — ED PROVIDER NOTE - NSFOLLOWUPINSTRUCTIONS_ED_ALL_ED_FT
You were found to have low oxygen levels during your ED visit and were recommended admission to the hospital for further treatment and monitoring. You decided to sign out against medical advice rather than stay to complete treatment here. You were told about the risks of leaving with low oxygen levels including possibility of passing out or dying. You are encouraged to return to the hospital at any time for worsening symptoms or new concerns.

## 2025-06-04 NOTE — ED PROVIDER NOTE - PATIENT PORTAL LINK FT
You can access the FollowMyHealth Patient Portal offered by [facility] by registering at the following website: [email or web address]. By joining [employer]’s FollowMyHealth portal, you will also be able to view your health information using other applications (apps) compatible with our system.

## 2025-06-04 NOTE — ED PROVIDER NOTE - ATTENDING APP SHARED VISIT CONTRIBUTION OF CARE
Brief HPI:  70-year-old female past medical history of migraine, depression, anxiety, prior alcohol use history presents for evaluation of mechanical fall.  Patient states that she tripped over uneven pavement landing on left knee.  She denies any symptoms.  Denies head trauma.  She is requesting discharge.  Of note, patient's room air sat low 90s.  She states that she always runs at this level.  Denies history of dvt or pe.  Denies sob, cp, leg swelling, cough.  Non-smoker.     Vitals:   Reviewed    Exam:    GEN:  Non-toxic appearing, non-distressed, speaking full sentences, non-diaphoretic, AAOx3  HEENT:  NCAT, neck supple, EOMI, PERRLA, sclera anicteric, no conjunctival pallor or injection, no stridor, normal voice, no tonsillar exudate, uvula midline  CV:  regular rhythm and rate, s1/s2 audible, no murmurs, rubs or gallops, peripheral pulses 2+ and symmetric  PULM:  non-labored respirations, lungs clear to auscultation bilaterally, no wheezes, crackles or rales  ABD:  non distended, non-tender, no rebound, no guarding, negative Newman's sign, bowel sounds normal, no cvat  MSK:  no gross deformity, non-tender extremities and joints, range of motion grossly normal appearing, no extremity edema, extremities warm and well perfused   NEURO:  AAOx3, CN II-XII intact, motor 5/5 in upper and lower extremities bilaterally, sensation grossly intact in extremities and trunk, finger to nose testing wnl, no nystagmus, negative Romberg, no pronator drift, no gait deficit  SKIN:  superficial abrasion to lateral left knee     A/P:   70-year-old female past medical history of migraine, depression, anxiety, prior alcohol use history presents for evaluation of mechanical fall.  Unclear etiology of hypoxemia.  Patient without cariopulmonary complaints.  Currently asymptomatic.  Lungs ctab.  Low c/f PE overall given lack of sx.  Will obtain labs, cxr.  Disposition pending.

## 2025-06-04 NOTE — ED ADULT NURSE NOTE - CAS DISCH TRANSFER METHOD
[employer]ohan taxi/Transportation service yohan correa arranged by EDIN Fajardo, address previousy confirmed with pt on file and with pt./Transportation service

## 2025-06-04 NOTE — ED ADULT NURSE NOTE - CAS ELECT INFOMATION PROVIDED
pt wheelchaired with taxi with confirmation from [employer] . pt reports roomates home to assist if needed./DC instructions pt wheelchair with taxi with confirmation from [employer] . pt reports roomates home to assist if needed./DC instructions

## 2025-06-04 NOTE — ED ADULT TRIAGE NOTE - CHIEF COMPLAINT QUOTE
pt fell in a parking lot, EMS called, pt denies head strike, LOC. pt reports having neurology testing done recently for feeling off balance. hx anxiety and depression, compliant with meds.

## 2025-06-05 ENCOUNTER — INPATIENT (INPATIENT)
Facility: HOSPITAL | Age: 70
LOS: 11 days | Discharge: ANOTHER HOSPITAL | End: 2025-06-17
Attending: INTERNAL MEDICINE | Admitting: INTERNAL MEDICINE
Payer: MEDICARE

## 2025-06-05 VITALS
RESPIRATION RATE: 18 BRPM | WEIGHT: 123.9 LBS | HEART RATE: 70 BPM | DIASTOLIC BLOOD PRESSURE: 58 MMHG | TEMPERATURE: 98 F | SYSTOLIC BLOOD PRESSURE: 113 MMHG | OXYGEN SATURATION: 95 %

## 2025-06-05 VITALS
OXYGEN SATURATION: 98 % | RESPIRATION RATE: 17 BRPM | HEART RATE: 66 BPM | SYSTOLIC BLOOD PRESSURE: 142 MMHG | DIASTOLIC BLOOD PRESSURE: 85 MMHG

## 2025-06-05 DIAGNOSIS — Z98.890 OTHER SPECIFIED POSTPROCEDURAL STATES: Chronic | ICD-10-CM

## 2025-06-05 DIAGNOSIS — K43.2 INCISIONAL HERNIA WITHOUT OBSTRUCTION OR GANGRENE: Chronic | ICD-10-CM

## 2025-06-05 DIAGNOSIS — Z90.49 ACQUIRED ABSENCE OF OTHER SPECIFIED PARTS OF DIGESTIVE TRACT: Chronic | ICD-10-CM

## 2025-06-05 DIAGNOSIS — R09.02 HYPOXEMIA: ICD-10-CM

## 2025-06-05 LAB
ADD ON TEST-SPECIMEN IN LAB: SIGNIFICANT CHANGE UP
APPEARANCE UR: CLEAR — SIGNIFICANT CHANGE UP
BILIRUB UR-MCNC: NEGATIVE — SIGNIFICANT CHANGE UP
COLOR SPEC: YELLOW — SIGNIFICANT CHANGE UP
DIFF PNL FLD: NEGATIVE — SIGNIFICANT CHANGE UP
GLUCOSE UR QL: NEGATIVE MG/DL — SIGNIFICANT CHANGE UP
KETONES UR QL: NEGATIVE MG/DL — SIGNIFICANT CHANGE UP
LEUKOCYTE ESTERASE UR-ACNC: NEGATIVE — SIGNIFICANT CHANGE UP
NITRITE UR-MCNC: NEGATIVE — SIGNIFICANT CHANGE UP
PH UR: 7 — SIGNIFICANT CHANGE UP (ref 5–8)
PROT UR-MCNC: SIGNIFICANT CHANGE UP MG/DL
SP GR SPEC: 1.05 — HIGH (ref 1–1.03)
UROBILINOGEN FLD QL: >=8 MG/DL (ref 0.2–1)

## 2025-06-05 PROCEDURE — 99285 EMERGENCY DEPT VISIT HI MDM: CPT

## 2025-06-05 PROCEDURE — 71275 CT ANGIOGRAPHY CHEST: CPT | Mod: 26

## 2025-06-05 PROCEDURE — 70450 CT HEAD/BRAIN W/O DYE: CPT | Mod: 26

## 2025-06-05 RX ORDER — FUROSEMIDE 10 MG/ML
20 INJECTION INTRAMUSCULAR; INTRAVENOUS ONCE
Refills: 0 | Status: DISCONTINUED | OUTPATIENT
Start: 2025-06-05 | End: 2025-06-05

## 2025-06-05 NOTE — ED ADULT TRIAGE NOTE - CHIEF COMPLAINT QUOTE
Pt c/o hypoxia and being more unbalanced and change in mental status. pt was seen here yesterday for a fall. per family they noted she was more delirious a few hrs ago and when they took her o2 it was in the mid 80s. pt returned to baseline now. No complaints of chest pain, headache, nausea, dizziness, vomiting  SOB, fever, chills verbalized..

## 2025-06-05 NOTE — ED POST DISCHARGE NOTE - DETAILS
Pt states her roommate is helping her with meals and attending appointments. Pt states she is managing at home adequately. Is scheduled for an appointment tomorrow with her cardiologist. Informed pt that she needs to follow up with her primary care physician as well. Reviewed results from yesterday with patient and discussed importance of returning to the emergency room if she is experiences any new or worsening sx.  pt verbalized understanding and agrees with plan.

## 2025-06-06 DIAGNOSIS — R41.82 ALTERED MENTAL STATUS, UNSPECIFIED: ICD-10-CM

## 2025-06-06 LAB
ALBUMIN SERPL ELPH-MCNC: 2.7 G/DL — LOW (ref 3.3–5)
ALBUMIN SERPL ELPH-MCNC: 2.8 G/DL — LOW (ref 3.3–5)
ALP SERPL-CCNC: 115 U/L — SIGNIFICANT CHANGE UP (ref 40–120)
ALP SERPL-CCNC: 98 U/L — SIGNIFICANT CHANGE UP (ref 40–120)
ALT FLD-CCNC: 20 U/L — SIGNIFICANT CHANGE UP (ref 4–33)
ALT FLD-CCNC: 22 U/L — SIGNIFICANT CHANGE UP (ref 4–33)
AMMONIA BLD-MCNC: 73 UMOL/L — HIGH (ref 11–55)
ANION GAP SERPL CALC-SCNC: 8 MMOL/L — SIGNIFICANT CHANGE UP (ref 7–14)
ANION GAP SERPL CALC-SCNC: 9 MMOL/L — SIGNIFICANT CHANGE UP (ref 7–14)
APTT BLD: 34.1 SEC — SIGNIFICANT CHANGE UP (ref 26.1–36.8)
AST SERPL-CCNC: 38 U/L — HIGH (ref 4–32)
AST SERPL-CCNC: 47 U/L — HIGH (ref 4–32)
BASOPHILS # BLD AUTO: 0.01 K/UL — SIGNIFICANT CHANGE UP (ref 0–0.2)
BASOPHILS NFR BLD AUTO: 0.3 % — SIGNIFICANT CHANGE UP (ref 0–2)
BILIRUB DIRECT SERPL-MCNC: 1.1 MG/DL — HIGH (ref 0–0.3)
BILIRUB INDIRECT FLD-MCNC: 1.8 MG/DL — HIGH (ref 0–1)
BILIRUB SERPL-MCNC: 2.3 MG/DL — HIGH (ref 0.2–1.2)
BILIRUB SERPL-MCNC: 2.9 MG/DL — HIGH (ref 0.2–1.2)
BUN SERPL-MCNC: 7 MG/DL — SIGNIFICANT CHANGE UP (ref 7–23)
BUN SERPL-MCNC: 9 MG/DL — SIGNIFICANT CHANGE UP (ref 7–23)
CALCIUM SERPL-MCNC: 8.1 MG/DL — LOW (ref 8.4–10.5)
CALCIUM SERPL-MCNC: 8.1 MG/DL — LOW (ref 8.4–10.5)
CHLORIDE SERPL-SCNC: 110 MMOL/L — HIGH (ref 98–107)
CHLORIDE SERPL-SCNC: 111 MMOL/L — HIGH (ref 98–107)
CO2 SERPL-SCNC: 22 MMOL/L — SIGNIFICANT CHANGE UP (ref 22–31)
CO2 SERPL-SCNC: 24 MMOL/L — SIGNIFICANT CHANGE UP (ref 22–31)
CREAT SERPL-MCNC: 0.49 MG/DL — LOW (ref 0.5–1.3)
CREAT SERPL-MCNC: 0.61 MG/DL — SIGNIFICANT CHANGE UP (ref 0.5–1.3)
EGFR: 101 ML/MIN/1.73M2 — SIGNIFICANT CHANGE UP
EGFR: 101 ML/MIN/1.73M2 — SIGNIFICANT CHANGE UP
EGFR: 96 ML/MIN/1.73M2 — SIGNIFICANT CHANGE UP
EGFR: 96 ML/MIN/1.73M2 — SIGNIFICANT CHANGE UP
EOSINOPHIL # BLD AUTO: 0.14 K/UL — SIGNIFICANT CHANGE UP (ref 0–0.5)
EOSINOPHIL NFR BLD AUTO: 4.4 % — SIGNIFICANT CHANGE UP (ref 0–6)
FERRITIN SERPL-MCNC: 62 NG/ML — SIGNIFICANT CHANGE UP (ref 13–330)
FLUAV AG NPH QL: SIGNIFICANT CHANGE UP
FLUBV AG NPH QL: SIGNIFICANT CHANGE UP
GLUCOSE SERPL-MCNC: 75 MG/DL — SIGNIFICANT CHANGE UP (ref 70–99)
GLUCOSE SERPL-MCNC: 91 MG/DL — SIGNIFICANT CHANGE UP (ref 70–99)
HAV IGM SER-ACNC: SIGNIFICANT CHANGE UP
HBV CORE IGM SER-ACNC: SIGNIFICANT CHANGE UP
HBV SURFACE AG SER-ACNC: SIGNIFICANT CHANGE UP
HCT VFR BLD CALC: 25.3 % — LOW (ref 34.5–45)
HCV AB S/CO SERPL IA: 6.97 S/CO — HIGH (ref 0–0.79)
HCV AB SERPL-IMP: REACTIVE
HGB BLD-MCNC: 8 G/DL — LOW (ref 11.5–15.5)
HIV 1+2 AB+HIV1 P24 AG SERPL QL IA: SIGNIFICANT CHANGE UP
IANC: 1.73 K/UL — LOW (ref 1.8–7.4)
IMM GRANULOCYTES NFR BLD AUTO: 0.3 % — SIGNIFICANT CHANGE UP (ref 0–0.9)
INR BLD: 1.57 RATIO — HIGH (ref 0.85–1.16)
IRON SATN MFR SERPL: 30 % — SIGNIFICANT CHANGE UP (ref 14–50)
IRON SATN MFR SERPL: 88 UG/DL — SIGNIFICANT CHANGE UP (ref 30–160)
LYMPHOCYTES # BLD AUTO: 0.95 K/UL — LOW (ref 1–3.3)
LYMPHOCYTES # BLD AUTO: 30.2 % — SIGNIFICANT CHANGE UP (ref 13–44)
MAGNESIUM SERPL-MCNC: 1.8 MG/DL — SIGNIFICANT CHANGE UP (ref 1.6–2.6)
MCHC RBC-ENTMCNC: 29.7 PG — SIGNIFICANT CHANGE UP (ref 27–34)
MCHC RBC-ENTMCNC: 31.6 G/DL — LOW (ref 32–36)
MCV RBC AUTO: 94.1 FL — SIGNIFICANT CHANGE UP (ref 80–100)
MONOCYTES # BLD AUTO: 0.31 K/UL — SIGNIFICANT CHANGE UP (ref 0–0.9)
MONOCYTES NFR BLD AUTO: 9.8 % — SIGNIFICANT CHANGE UP (ref 2–14)
NEUTROPHILS # BLD AUTO: 1.73 K/UL — LOW (ref 1.8–7.4)
NEUTROPHILS NFR BLD AUTO: 55 % — SIGNIFICANT CHANGE UP (ref 43–77)
NRBC # BLD AUTO: 0 K/UL — SIGNIFICANT CHANGE UP (ref 0–0)
NRBC # FLD: 0 K/UL — SIGNIFICANT CHANGE UP (ref 0–0)
NRBC BLD AUTO-RTO: 0 /100 WBCS — SIGNIFICANT CHANGE UP (ref 0–0)
NT-PROBNP SERPL-SCNC: 485 PG/ML — HIGH
PHOSPHATE SERPL-MCNC: 3.2 MG/DL — SIGNIFICANT CHANGE UP (ref 2.5–4.5)
PLATELET # BLD AUTO: 50 K/UL — LOW (ref 150–400)
POTASSIUM SERPL-MCNC: 3.5 MMOL/L — SIGNIFICANT CHANGE UP (ref 3.5–5.3)
POTASSIUM SERPL-MCNC: 3.6 MMOL/L — SIGNIFICANT CHANGE UP (ref 3.5–5.3)
POTASSIUM SERPL-SCNC: 3.5 MMOL/L — SIGNIFICANT CHANGE UP (ref 3.5–5.3)
POTASSIUM SERPL-SCNC: 3.6 MMOL/L — SIGNIFICANT CHANGE UP (ref 3.5–5.3)
PROT SERPL-MCNC: 5.3 G/DL — LOW (ref 6–8.3)
PROT SERPL-MCNC: 5.3 G/DL — LOW (ref 6–8.3)
PROTHROM AB SERPL-ACNC: 18.6 SEC — HIGH (ref 9.9–13.4)
RBC # BLD: 2.69 M/UL — LOW (ref 3.8–5.2)
RBC # FLD: 17.1 % — HIGH (ref 10.3–14.5)
RSV RNA NPH QL NAA+NON-PROBE: SIGNIFICANT CHANGE UP
SARS-COV-2 RNA SPEC QL NAA+PROBE: SIGNIFICANT CHANGE UP
SODIUM SERPL-SCNC: 141 MMOL/L — SIGNIFICANT CHANGE UP (ref 135–145)
SODIUM SERPL-SCNC: 143 MMOL/L — SIGNIFICANT CHANGE UP (ref 135–145)
SOURCE RESPIRATORY: SIGNIFICANT CHANGE UP
T PALLIDUM AB TITR SER: NEGATIVE — SIGNIFICANT CHANGE UP
TIBC SERPL-MCNC: 294 UG/DL — SIGNIFICANT CHANGE UP (ref 220–430)
TOXICOLOGY SCREEN, DRUGS OF ABUSE, SERUM RESULT: SIGNIFICANT CHANGE UP
TROPONIN T, HIGH SENSITIVITY RESULT: 23 NG/L — SIGNIFICANT CHANGE UP
TSH SERPL-MCNC: 2.99 UIU/ML — SIGNIFICANT CHANGE UP (ref 0.27–4.2)
UIBC SERPL-MCNC: 206 UG/DL — SIGNIFICANT CHANGE UP (ref 110–370)
WBC # BLD: 3.15 K/UL — LOW (ref 3.8–10.5)
WBC # FLD AUTO: 3.15 K/UL — LOW (ref 3.8–10.5)

## 2025-06-06 PROCEDURE — 99223 1ST HOSP IP/OBS HIGH 75: CPT

## 2025-06-06 RX ORDER — TRAZODONE HCL 100 MG
50 TABLET ORAL AT BEDTIME
Refills: 0 | Status: DISCONTINUED | OUTPATIENT
Start: 2025-06-06 | End: 2025-06-06

## 2025-06-06 RX ORDER — B1/B2/B3/B5/B6/B12/VIT C/FOLIC 500-0.5 MG
1 TABLET ORAL DAILY
Refills: 0 | Status: DISCONTINUED | OUTPATIENT
Start: 2025-06-06 | End: 2025-06-17

## 2025-06-06 RX ORDER — SERTRALINE 100 MG/1
50 TABLET, FILM COATED ORAL DAILY
Refills: 0 | Status: DISCONTINUED | OUTPATIENT
Start: 2025-06-06 | End: 2025-06-17

## 2025-06-06 RX ORDER — HALOPERIDOL 10 MG/1
5 TABLET ORAL ONCE
Refills: 0 | Status: COMPLETED | OUTPATIENT
Start: 2025-06-06 | End: 2025-06-06

## 2025-06-06 RX ORDER — FOLIC ACID 1 MG/1
1 TABLET ORAL ONCE
Refills: 0 | Status: COMPLETED | OUTPATIENT
Start: 2025-06-06 | End: 2025-06-06

## 2025-06-06 RX ORDER — LACTULOSE 10 G/15ML
10 SOLUTION ORAL THREE TIMES A DAY
Refills: 0 | Status: DISCONTINUED | OUTPATIENT
Start: 2025-06-06 | End: 2025-06-17

## 2025-06-06 RX ORDER — LORAZEPAM 4 MG/ML
2 VIAL (ML) INJECTION
Refills: 0 | Status: DISCONTINUED | OUTPATIENT
Start: 2025-06-06 | End: 2025-06-06

## 2025-06-06 RX ORDER — FOLIC ACID 1 MG/1
1 TABLET ORAL DAILY
Refills: 0 | Status: DISCONTINUED | OUTPATIENT
Start: 2025-06-06 | End: 2025-06-17

## 2025-06-06 RX ORDER — LORAZEPAM 4 MG/ML
2 VIAL (ML) INJECTION ONCE
Refills: 0 | Status: DISCONTINUED | OUTPATIENT
Start: 2025-06-06 | End: 2025-06-06

## 2025-06-06 RX ORDER — CARVEDILOL 3.12 MG/1
3.12 TABLET, FILM COATED ORAL EVERY 12 HOURS
Refills: 0 | Status: DISCONTINUED | OUTPATIENT
Start: 2025-06-06 | End: 2025-06-17

## 2025-06-06 RX ADMIN — Medication 1 TABLET(S): at 12:48

## 2025-06-06 RX ADMIN — SERTRALINE 50 MILLIGRAM(S): 100 TABLET, FILM COATED ORAL at 12:48

## 2025-06-06 RX ADMIN — FOLIC ACID 1 MILLIGRAM(S): 1 TABLET ORAL at 12:48

## 2025-06-06 RX ADMIN — FOLIC ACID 1 MILLIGRAM(S): 1 TABLET ORAL at 04:38

## 2025-06-06 RX ADMIN — HALOPERIDOL 5 MILLIGRAM(S): 10 TABLET ORAL at 00:30

## 2025-06-06 RX ADMIN — Medication 1 APPLICATION(S): at 19:02

## 2025-06-06 RX ADMIN — LACTULOSE 10 GRAM(S): 10 SOLUTION ORAL at 21:37

## 2025-06-06 RX ADMIN — Medication 105 MILLIGRAM(S): at 19:02

## 2025-06-06 RX ADMIN — Medication 2 MILLIGRAM(S): at 00:30

## 2025-06-06 RX ADMIN — Medication 100 MILLIGRAM(S): at 12:48

## 2025-06-06 RX ADMIN — LACTULOSE 10 GRAM(S): 10 SOLUTION ORAL at 18:29

## 2025-06-06 RX ADMIN — Medication 105 MILLIGRAM(S): at 04:18

## 2025-06-06 RX ADMIN — CARVEDILOL 3.12 MILLIGRAM(S): 3.12 TABLET, FILM COATED ORAL at 18:30

## 2025-06-06 NOTE — PATIENT PROFILE ADULT - FALL HARM RISK - HARM RISK INTERVENTIONS
Assistance with ambulation/Assistance OOB with selected safe patient handling equipment/Communicate Risk of Fall with Harm to all staff/Discuss with provider need for PT consult/Monitor for mental status changes/Monitor gait and stability/Move patient closer to nurses' station/Provide patient with walking aids - walker, cane, crutches/Reinforce activity limits and safety measures with patient and family/Reorient to person, place and time as needed/Tailored Fall Risk Interventions/Toileting schedule using arm’s reach rule for commode and bathroom/Use of alarms - bed, chair and/or voice tab/Visual Cue: Yellow wristband and red socks/Bed in lowest position, wheels locked, appropriate side rails in place/Call bell, personal items and telephone in reach/Instruct patient to call for assistance before getting out of bed or chair/Non-slip footwear when patient is out of bed/[place] to call system/Physically safe environment - no spills, clutter or unnecessary equipment/Purposeful Proactive Rounding/Room/bathroom lighting operational, light cord in reach

## 2025-06-06 NOTE — BH CONSULTATION LIAISON ASSESSMENT NOTE - SUMMARY
69yo female with PPHx depression and anxiety (on Trazodone/Zoloft) PMHx chronic pain (sees pain management), polysubstance abuse disorder(heroin, etoh-25yrs sober),  cognitive impairment (undergoing workup w neurologist), PN (coma 10days), ?Cirrhosis (seen on imaging yesterday) presenting to emergency room for low oxygen level and altered mental status at home. Patient left AMA from the ER yesterday after negative CT head and CT chest without pulmonary embolism but some signs of pulmonary edema, cirrhosis, splenomegaly.     CL psychiatry consulted for medication management. Patient received Haldol 5mg + Ativan 2mg in ED.    Patient seen, lethargic, having difficulty keeping eyes open and requires frequent verbal prompts, pt a&ox3, is unsure why she is in the hospital, denies suicidal ideation, denies auditory/visual hallucinations, denies any alcohol use, unable to answer any further questions d/t lethargy.    Collateral obtained from patient's cousin, Kaitlin who endorses patient with episodes of "totally out of it," "going down hill," cousin endorses patient with hx polysubstance abuse, cousin endorses patient could possible be abusing trazodone as well as pain meds prescribed by pain management, cousin endorses patient with high anxiety, poor sleep. Patient with no current outpatient psychiatric provider, no hx of inpatient psychiatric admissions, no hx suicidality.     CL psychiatry consulted for medication management. Patient received Haldol 5mg + Ativan 2mg in ED.    Patient seen, with frequent prompts patient able to answer some questions, a&o with lethargy r/t recent prn's, denies suicidal ideation, denies auditory/visual hallucinations, denies any alcohol use, interview limited, CL to follow.    Discussed with primary team recommendations below:    PLAN:  -defer obs status to primary team, on enhanced supervision at present  -Appreciate ongoing medical workup for organic cause for altered mental status  -Repeat EKG and if qtc prolonged please consult cardiology for manual calculation  -Toxicology screen  -Hold Trazodone 50mg hs tonight given lethargy today  -c/w Zoloft 50mg daily-monitor platelets  -Severe Agitation Only after diversional activities have been attempted: Haldol 0.5mg q6h prn, if ineffective after 30minutes can give additional 0.5mg if qtc <500  -Cannot leave AMA as further psychiatric evaluation is required given today's lethargy  -CL to follow

## 2025-06-06 NOTE — CONSULT NOTE ADULT - ASSESSMENT
70-year-old female past medical history of alcohol use disorder, cognitive impairment undergoing workup, depression, anxiety, cirrhosis presenting to emergency room for altered mental status at home.     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  #Acute hypoxic respiratory failure   MELD unable to be calculated, no INR available   Ascites: pending imaging  HE: AMS on admission  HCC screening: needs triple phase imaging   EV: prior splenorenal varices seen on CT 08/2024    Recommendations -preliminary:  -Please obtain triple phase CT abdomen with IV contrast   -Please obtain paracentesis if evidence of ascites on imaging to rule out SBP  -Please obtain daily CMP, INR, CBC  -Please obtain PETH level     -Will need to start coreg 3.125 BID for varices seen on prior imaging  70-year-old female past medical history of alcohol use disorder, cognitive impairment undergoing workup, depression, anxiety, cirrhosis presenting to emergency room for altered mental status at home.     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   #Normocytic anemia  #Encephalopathy  #Acute hypoxic respiratory failure   MELD unable to be calculated, no INR available   Ascites: pending imaging  HE: AMS on admission  HCC screening: needs triple phase imaging   EV: prior splenorenal varices seen on CT 08/2024    Recommendations :  -Please start lactulose, rifaximin now, goal 3 BMs per day   -Please obtain U tox, urine ethanol, and PETH testing   -Please obtain triple phase CT abdomen with IV contrast   -Please obtain paracentesis if evidence of ascites on imaging to rule out SBP  -Please obtain blood cultures, urine culture  -Please start high dose thiamine 300 mg 3 times daily for 3 days, followed by 250 mg once daily for an additional 3 days  -Please obtain daily CMP, INR, CBC  -Please obtain alpha 1 AT level, AMA, WATSON, anti smooth muscle Ab, iron studies   -Will need to start coreg 3.125 BID for varices seen on prior imaging   -Will need follow up scheduled with Dr. Cain (GI/HEP) on discharge    Note incomplete until finalized by attending signature/attestation.    Brittany Mark MD  GI/Hepatology Fellow, PGY-4  Long Range Pager: [phone], Short Range Pager: [phone]    MONDAY-FRIDAY 8AM-5PM:  Please message via [employer] or email [email or web address]   On Weekends/Holidays (All day) and Weekdays after 5 PM to 8 AM  For nonurgent consults please email:  Please email [email or web address]    URGENT CONSULTS:  Please contact on call GI team. See Amion schedule ([facility]), [employer] paging system ([facility]), or call hospital  ([facility])

## 2025-06-06 NOTE — BH CONSULTATION LIAISON ASSESSMENT NOTE - NSBHATTESTCOMMENTATTENDFT_PSY_A_CORE
Chart reviewed. Seen with ACP; grossly delirious, somnolent from recent meds, disoriented. Exam limited. Agree with above assessment/recs. Will continue to follow as needed

## 2025-06-06 NOTE — BH CONSULTATION LIAISON ASSESSMENT NOTE - CURRENT MEDICATION
MEDICATIONS  (STANDING):  folic acid 1 milliGRAM(s) Oral daily  multivitamin 1 Tablet(s) Oral daily  sertraline 50 milliGRAM(s) Oral daily  thiamine 100 milliGRAM(s) Oral daily  traZODone 50 milliGRAM(s) Oral at bedtime    MEDICATIONS  (PRN):  LORazepam   Injectable 2 milliGRAM(s) IV Push every 1 hour PRN CIWA 15 or greater, or seizure

## 2025-06-06 NOTE — H&P ADULT - NSHPPHYSICALEXAM_GEN_ALL_CORE
General: WN/WD NADfrail  PERRLA  Neurology: A&Ox2  Respiratory: CTA B/L  CV: S1S2+  Abdominal: Soft, NT, ND +BS, Last BM  Extremities: edema+  Skin Normal General: frail  PERRLA  Neurology: A&Ox1  Respiratory: CTA B/L  CV: S1S2+  Abdominal: Soft, NT, ND +BS, Last BM  Extremities: edema+  Skin Normal

## 2025-06-06 NOTE — CONSULT NOTE ADULT - SUBJECTIVE AND OBJECTIVE BOX
06-06-25 @ 12:17    Patient is a 70y old  Female who presents with a chief complaint of AMS (06 Jun 2025 09:59)      HPI:  70-year-old female past medical history of alcohol use disorder, cognitive impairment undergoing workup, depression, anxiety, ?Cirrhosis seen on imaging yesterday presenting to emergency room for low oxygen level and altered mental status at home.  Patient reports that her cousin wanted her to come in and dropped her off, she feels fine and has no complaints.  Collateral information was obtained from her cousin Jc at phone number [phone].  Jc helps her with all of her medical appointments and at home, patient's remain is [place].  Jc reports that she has been undergoing testing with a neurologist due to worsening cognitive impairment and early dementia including forgetting her wallet keys phone leaving the stove on being confused, and over the last couple of months she has had multiple episodes of syncope versus unresponsiveness.  On 2 occasions she has fallen to the ground, on other occasions she has appeared altered bobbing her head from side-to-side and unresponsive and will have low reading O2 sat during these episodes.  Patient left AMA from the ER yesterday after negative CT head and CT chest without pulmonary embolism but some signs of pulmonary edema, cirrhosis, splenomegaly.  Needed does not feel that the patient has capacity to leave AMA.  She reports it has been difficult for them to get cardiology appointment symptoms the schedule of many months. Pt declines active smoking, drug use, alcohol use. (06 Jun 2025 09:59)    called jc to get more information above noted: : she is on 2 L : 10 days ago " she was OK:  forgetful  sometimes she is off:  she is seeing a neurologist and one more tst is pending to conform for dementia: ;  she does not have any ling problems  has second hand smoking:  she did have oxygen three years ago :  she was on oxygen  : but she gave h er back ;   she was seeing a pulm before for breathing:  sheis using on albuterol  prn ?    never had pe or dvt before   she fell about three months ago and was bruised:  given atfer pill:  and after that better   last month she fell down three  ti mes:       ?FOLLOWING PRESENT  [x ] Hx of PE/DVT, [? ] Hx COPD, [x ] Hx of Asthma, [y ] Hx of Hospitalization, [x ]  Hx of BiPAP/CPAP use, [ x] Hx of JAMIE    Allergies    avocado (Hives)  No Known Drug Allergies    Intolerances    Codeine Phosphate-GuaiFENesin (Nausea)      PAST MEDICAL & SURGICAL HISTORY:  Ventral hernia  current      Migraine  pt states last 5-04-17.  Imitrex prn      Anxiety      Depression      Constipation      Lumbar herniated disc  s/p 2 epidural injections, last one 2-2017      Shoulder dislocation  history of, returned by pt. Pt states she can not stretch out arms fully      Hepatitis C  treated.  Pt states it was successful      History of ETOH abuse  pt states last drink 20 years ago attends AA meetings      S/P cholecystectomy  laparoscopic 2015      Incisional hernia  s//p surgical repair with mesh 2016      S/P colonoscopy  2016          FAMILY HISTORY:      Social History: [ passismoking ] TOBACCO                  [ used to drink 25 years ago  ] ETOH                                 [  x] IVDA/DRUGS    REVIEW OF SYSTEMS    cant tell me much   General:	    Skin/Breast:  	  Ophthalmologic:  	  ENMT:	    Respiratory and Thorax:  	  Cardiovascular:	    Gastrointestinal:	    Genitourinary:	    Musculoskeletal:	    Neurological:	    Psychiatric:	    Hematology/Lymphatics:	    Endocrine:	    Allergic/Immunologic:	    MEDICATIONS  (STANDING):  folic acid 1 milliGRAM(s) Oral daily  multivitamin 1 Tablet(s) Oral daily  sertraline 50 milliGRAM(s) Oral daily  thiamine 100 milliGRAM(s) Oral daily  traZODone 50 milliGRAM(s) Oral at bedtime    MEDICATIONS  (PRN):  LORazepam   Injectable 2 milliGRAM(s) IV Push every 1 hour PRN CIWA 15 or greater, or seizure       Vital Signs Last 24 Hrs  T(C): 36.4 (06 Jun 2025 10:45), Max: 37.4 (06 Jun 2025 06:31)  T(F): 97.5 (06 Jun 2025 10:45), Max: 99.3 (06 Jun 2025 06:31)  HR: 63 (06 Jun 2025 10:45) (61 - 77)  BP: 135/56 (06 Jun 2025 10:45) (113/58 - 135/56)  BP(mean): --  RR: 18 (06 Jun 2025 10:45) (17 - 20)  SpO2: 95% (06 Jun 2025 10:45) (95% - 98%)    Parameters below as of 06 Jun 2025 10:45  Patient On (Oxygen Delivery Method): nasal cannula  O2 Flow (L/min): 2  Orthostatic VS          I&O's Summary      Physical Exam:   GENERAL: NAD, well-groomed, well-developed  HEENT: JENA/   Atraumatic, Normocephalic  ENMT: No tonsillar erythema, exudates, or enlargement; Moist mucous membranes, Good dentition, No lesions  NECK: Supple, No JVD, Normal thyroid  CHEST/LUNG: Clear to auscultation bilaterally- no wheezing  CVS: Regular rate and rhythm; No murmurs, rubs, or gallops  GI: : Soft, Nontender, Nondistended; Bowel sounds present  NERVOUS SYSTEM:  Alert & Oriented X1  EXTREMITIES: -edema  LYMPH: No lymphadenopathy noted  SKIN: No rashes or lesions  ENDOCRINOLOGY: No Thyromegaly  PSYCH: confused     Labs:  0.8<43<4>>31<<7.395>>0.8<<3><<4><<5<<319>>                            8.0    3.15  )-----------( 50       ( 06 Jun 2025 01:22 )             25.3                         9.2    3.38  )-----------( 44       ( 04 Jun 2025 19:13 )             28.9     06-06    141  |  110[H]  |  9   ----------------------------<  91  3.5   |  22  |  0.61  06-04    143  |  111[H]  |  8   ----------------------------<  73  3.6   |  23  |  0.53    Ca    8.1[L]      06 Jun 2025 01:22  Ca    8.3[L]      04 Jun 2025 19:13    TPro  5.3[L]  /  Alb  2.8[L]  /  TBili  2.3[H]  /  DBili  x   /  AST  47[H]  /  ALT  22  /  AlkPhos  115  06-06  TPro  6.2  /  Alb  3.1[L]  /  TBili  2.5[H]  /  DBili  x   /  AST  42[H]  /  ALT  18  /  AlkPhos  136[H]  06-04    CAPILLARY BLOOD GLUCOSE        LIVER FUNCTIONS - ( 06 Jun 2025 01:22 )  Alb: 2.8 g/dL / Pro: 5.3 g/dL / ALK PHOS: 115 U/L / ALT: 22 U/L / AST: 47 U/L / GGT: x             Urinalysis Basic - ( 06 Jun 2025 01:22 )    Color: x / Appearance: x / SG: x / pH: x  Gluc: 91 mg/dL / Ketone: x  / Bili: x / Urobili: x   Blood: x / Protein: x / Nitrite: x   Leuk Esterase: x / RBC: x / WBC x   Sq Epi: x / Non Sq Epi: x / Bacteria: x      Urinalysis with Rflx Culture (collected 05 Jun 2025 03:53)      D DImer  D-Dimer Assay, Quantitative: 391 ng/mL DDU (06-04 @ 21:10)      Studies  Chest X-RAY  CT SCAN Chest   CT Abdomen  Venous Dopplers: LE:   Others    < from: CT Angio Chest PE Protocol w/ IV Cont (06.05.25 @ 01:45) >  IMPRESSION:  No pulmonary embolus.    Mild interlobular septal thickening which may represent mild edema.    Mediastinal lymphadenopathy, stable and of uncertain etiology. Correlate   clinically.    Cirrhotic morphology of the liver.    Splenomegaly.    Diffuse esophageal wall thickening which can be seen with esophagitis.   Correlate clinically.    --- End of Report ---    < end of copied text >

## 2025-06-06 NOTE — BH CONSULTATION LIAISON ASSESSMENT NOTE - HPI (INCLUDE ILLNESS QUALITY, SEVERITY, DURATION, TIMING, CONTEXT, MODIFYING FACTORS, ASSOCIATED SIGNS AND SYMPTOMS)
71yo female with PPHx depression and anxiety (on Trazodone/Zoloft unknown prescriber) PMHx chronic pain (sees pain management), polysubstance abuse disorder(heroin, etoh-25yrs sober),  cognitive impairment (undergoing workup w neurologist), PN (coma 10days), ?Cirrhosis (seen on imaging yesterday) presenting to emergency room for low oxygen level and altered mental status at home. Patient left AMA from the ER yesterday after negative CT head and CT chest without pulmonary embolism but some signs of pulmonary edema, cirrhosis, splenomegaly.     [facility] psychiatry consulted for medication management. Patient received Haldol 5mg + Ativan 2mg in ED.    Patient seen, lethargic, having difficulty keeping eyes open and requires frequent verbal prompts, pt a&ox3, is unsure why she is in the hospital, denies suicidal ideation, denies auditory/visual hallucinations, denies any alcohol use, unable to answer any further questions d/t lethargy.    Collateral obtained from patient's cousin, Kaitlin who endorses patient with episodes of "totally out of it," "going down hill," experiencing falls, cousin endorses patient with hx polysubstance abuse, cousin endorses patient could possible be abusing trazodone as well as pain meds prescribed by pain management, cousin endorses patient with high anxiety, poor sleep and conflicts with girlfriend, weight loss with poor appetite/excessive sweet intake. Patient with no current outpatient psychiatric provider, no hx of inpatient psychiatric admissions, no hx suicidality.

## 2025-06-06 NOTE — H&P ADULT - ASSESSMENT
70-year-old female past medical history of alcohol use disorder, cognitive impairment undergoing workup, depression, anxiety, ?Cirrhosis seen on imaging yesterday presenting to emergency room for low oxygen level and altered mental status at home.  Patient reports that her cousin wanted her to come in and dropped her off, she feels fine and has no complaints.    Kaitlin helps her with all of her medical appointments and at home, patient's remain is Leonarda.  Kaitlin reports that she has been undergoing testing with a neurologist due to worsening cognitive impairment and early dementia including forgetting her wallet keys phone leaving the stove on being confused, and over the last couple of months she has had multiple episodes of syncope versus unresponsiveness.  On 2 occasions she has fallen to the ground, on other occasions she has appeared altered bobbing her head from side-to-side and unresponsive and will have low reading O2 sat during these episodes.  Patient left AMA from the ER yesterday after negative CT head and CT chest without pulmonary embolism but some signs of pulmonary edema, cirrhosis, splenomegaly.  Needed does not feel that the patient has capacity to leave AMA.  She reports it has been difficult for them to get cardiology appointment symptoms the schedule of many months. Pt declines active smoking, drug use, alcohol use. 70-year-old female past medical history of alcohol use disorder, cognitive impairment undergoing workup, depression, anxiety, ?Cirrhosis seen on imaging yesterday presenting to emergency room for low oxygen level and altered mental status at home.  Patient reports that her cousin wanted her to come in and dropped her off, she feels fine and has no complaints.    Kaitlin helps her with all of her medical appointments and at home, patient's remain is Leonarda.  Kaitlin reports that she has been undergoing testing with a neurologist due to worsening cognitive impairment and early dementia including forgetting her wallet keys phone leaving the stove on being confused, and over the last couple of months she has had multiple episodes of syncope versus unresponsiveness.  On 2 occasions she has fallen to the ground, on other occasions she has appeared altered bobbing her head from side-to-side and unresponsive and will have low reading O2 sat during these episodes.  Patient left AMA from the ER yesterday after negative CT head and CT chest without pulmonary embolism but some signs of pulmonary edema, cirrhosis, splenomegaly.  Needed does not feel that the patient has capacity to leave AMA.  She reports it has been difficult for them to get cardiology appointment symptoms the schedule of many months. Pt declines active smoking, drug use, alcohol use.    AMS  - likely worsening dementia vs physiatry issues vs alcohol abuse disorder   - psych and neuro fu   - CIWA protocol  - fall precautions  - safety watch     Anxiety, depression  -  home meds  - psych fu     Hx of cirrhosis  - GI consult for tyson    DVT prophylaxis

## 2025-06-06 NOTE — H&P ADULT - NSHPREVIEWOFSYSTEMS_GEN_ALL_CORE
REVIEW OF SYSTEMS:    CONSTITUTIONAL: No weakness, fevers or chills  EYES/ENT: No visual changes;  No vertigo or throat pain   NECK: No pain or stiffness  RESPIRATORY: No cough, wheezing, hemoptysis; No shortness of breath  CARDIOVASCULAR: No chest pain or palpitations  GASTROINTESTINAL: No abdominal or epigastric pain. No nausea, vomiting, or hematemesis; No diarrhea or constipation. No melena or hematochezia.  GENITOURINARY: No dysuria, frequency or hematuria  NEUROLOGICAL: No numbness or weakness  SKIN: No itching, burning, rashes, or lesions   All other review of systems is negative unless indicated above. unable to provide

## 2025-06-06 NOTE — PATIENT PROFILE ADULT - FUNCTIONAL ASSESSMENT - BASIC MOBILITY 1.
3 = A little assistance Bexarotene Pregnancy And Lactation Text: This medication is Pregnancy Category X and should not be given to women who are pregnant or may become pregnant. This medication should not be used if you are breast feeding.

## 2025-06-06 NOTE — ED ADULT NURSE REASSESSMENT NOTE - NS ED NURSE REASSESS COMMENT FT1
Patient normal sinus on the monitor. Respirations equal and adequate. Patients IV patent, no signs of infiltration. Safety measures in place, call bell within reach. Patient stable upon assessment.

## 2025-06-06 NOTE — H&P ADULT - NSHPLABSRESULTS_GEN_ALL_CORE
Lab Results:  CBC  CBC Full  -  ( 06 Jun 2025 01:22 )  WBC Count : 3.15 K/uL  RBC Count : 2.69 M/uL  Hemoglobin : 8.0 g/dL  Hematocrit : 25.3 %  Platelet Count - Automated : 50 K/uL  Mean Cell Volume : 94.1 fL  Mean Cell Hemoglobin : 29.7 pg  Mean Cell Hemoglobin Concentration : 31.6 g/dL  Auto Neutrophil # : x  Auto Lymphocyte # : x  Auto Monocyte # : x  Auto Eosinophil # : x  Auto Basophil # : x  Auto Neutrophil % : x  Auto Lymphocyte % : x  Auto Monocyte % : x  Auto Eosinophil % : x  Auto Basophil % : x    .		Differential:	[] Automated		[] Manual  Chemistry                        8.0    3.15  )-----------( 50       ( 06 Jun 2025 01:22 )             25.3     06-06    141  |  110[H]  |  9   ----------------------------<  91  3.5   |  22  |  0.61    Ca    8.1[L]      06 Jun 2025 01:22    TPro  5.3[L]  /  Alb  2.8[L]  /  TBili  2.3[H]  /  DBili  x   /  AST  47[H]  /  ALT  22  /  AlkPhos  115  06-06    LIVER FUNCTIONS - ( 06 Jun 2025 01:22 )  Alb: 2.8 g/dL / Pro: 5.3 g/dL / ALK PHOS: 115 U/L / ALT: 22 U/L / AST: 47 U/L / GGT: x             Urinalysis Basic - ( 06 Jun 2025 01:22 )    Color: x / Appearance: x / SG: x / pH: x  Gluc: 91 mg/dL / Ketone: x  / Bili: x / Urobili: x   Blood: x / Protein: x / Nitrite: x   Leuk Esterase: x / RBC: x / WBC x   Sq Epi: x / Non Sq Epi: x / Bacteria: x            MICROBIOLOGY/CULTURES:      RADIOLOGY RESULTS: reviewed

## 2025-06-06 NOTE — ED ADULT NURSE REASSESSMENT NOTE - NS ED NURSE REASSESS COMMENT FT1
Breathing even and unlabored. Respirations equal and adequate. Safety measures in place, call bell within reach. Patient stable upon leaving the room.

## 2025-06-06 NOTE — ED PROVIDER NOTE - CARE PLAN
Principal Discharge DX:	Altered mental state  Secondary Diagnosis:	Hypoxia   1 Principal Discharge DX:	Altered mental state  Secondary Diagnosis:	Hypoxia  Secondary Diagnosis:	Syncope

## 2025-06-06 NOTE — CONSULT NOTE ADULT - SUBJECTIVE AND OBJECTIVE BOX
[facility] Attending Consultation Note     Patient seen and evaluated at bedside     Chief Complaint: dyspnea     HPI:    70F w/ ETOH abuse, cognitive impairment, depression, and anxiety here for hypoxia and AMS. She was in the ER earlier then left AMA but returned for concerning sxs from the family member. She is resting comfortably but lethargic appearing.     PMHx:   Ventral hernia    Migraine    Anxiety    Depression    Constipation    Lumbar herniated disc    Shoulder dislocation    Hepatitis C    History of ETOH abuse        PSHx:   S/P cholecystectomy    Incisional hernia    S/P colonoscopy        Allergies:  avocado (Hives)  Codeine Phosphate-GuaiFENesin (Nausea)  No Known Drug Allergies      Home Meds:    Current Medications:       FAMILY HISTORY:      Social History: Personally reviewed   No tobacco, EtOH or IVDU     REVIEW OF SYSTEMS:    [x ] Unable to assess ROS due to lethargy       Physical Exam:  T(F): 99.3 (06-06), Max: 99.3 (06-06)  HR: 61 (06-06) (61 - 77)  BP: 134/54 (06-06) (113/58 - 134/54)  RR: 17 (06-06)  SpO2: 98% (06-06)    Gen: lethargic   HENNT: No JVP   CV: regular rate, regular rhtyhm, no murmur   Pulm: clear to auscultation bilaterally   Abdomen: Non-tender, non-distended   Ext: no edema b/l   Neuro: unable to cooperate     Cardiovascular Diagnostic Testing:        Labs: Personally reviewed                        8.0    3.15  )-----------( 50       ( 06 Jun 2025 01:22 )             25.3     06-06    141  |  110[H]  |  9   ----------------------------<  91  3.5   |  22  |  0.61    Ca    8.1[L]      06 Jun 2025 01:22    TPro  5.3[L]  /  Alb  2.8[L]  /  TBili  2.3[H]  /  DBili  x   /  AST  47[H]  /  ALT  22  /  AlkPhos  115  06-06            Thyroid Stimulating Hormone, Serum: 2.99 uIU/mL (06-06 @ 01:22)

## 2025-06-06 NOTE — BH CONSULTATION LIAISON ASSESSMENT NOTE - NSBHCHARTREVIEWVS_PSY_A_CORE FT
Vital Signs Last 24 Hrs  T(C): 36.4 (06 Jun 2025 10:45), Max: 37.4 (06 Jun 2025 06:31)  T(F): 97.5 (06 Jun 2025 10:45), Max: 99.3 (06 Jun 2025 06:31)  HR: 63 (06 Jun 2025 10:45) (61 - 77)  BP: 135/56 (06 Jun 2025 10:45) (113/58 - 135/56)  BP(mean): --  RR: 18 (06 Jun 2025 10:45) (17 - 20)  SpO2: 95% (06 Jun 2025 10:45) (95% - 98%)    Parameters below as of 06 Jun 2025 10:45  Patient On (Oxygen Delivery Method): nasal cannula  O2 Flow (L/min): 2

## 2025-06-06 NOTE — PATIENT PROFILE ADULT - NSPRESCRALCFREQ_GEN_A_NUR
Patient lethargic from haldol and ativan given in ED and unable to answer Patient lethargic from haldol and ativan given in ED and unable to answer/Never

## 2025-06-06 NOTE — ED PROVIDER NOTE - ATTENDING CONTRIBUTION TO CARE
70-year-old female past medical history of alcohol use disorder (supposedly sober x 20 years), cognitive impairment undergoing workup, depression, anxiety, ?Cirrhosis seen on imaging yesterday presenting to emergency room for low oxygen level and altered mental status at home.  Patient reports that her cousin wanted her to come in and dropped her off, she feels fine and has no complaints.  Collateral information was obtained from her cousin Kaitlin at phone number [phone].  Kaitlin helps her with all of her medical appointments and at home, patient's roommate is Leonarda.  Kaitlin reports that she has been undergoing testing with a neurologist due to worsening cognitive impairment and early dementia including forgetting her wallet keys phone leaving the stove on being confused, and over the last couple of months she has had multiple episodes of syncope versus unresponsiveness.  On 2 occasions she has fallen to the ground, on other occasions she has appeared altered bobbing her head from side-to-side and unresponsive and will have low reading O2 sat during these episodes.  Patient left AMA from the ER yesterday after negative CT head and CT chest without pulmonary embolism but some signs of pulmonary edema, cirrhosis, splenomegaly.  Kaitlin does not feel that the patient has capacity to leave AMA.  She reports it has been difficult for them to get cardiology appointment symptoms the schedule of many months. Pt declines active smoking, drug use, alcohol use. Pt denies fever, chills, N/V/D, urinary symptoms, chest pain, SOB.     Pt appears unsteady on feet,, slurring speech at times but able to answer all questions AOx3 at this time, no gross MSK deformities, pt not allowing further exam at this time and not removing thick coat that she is wearing.     70-year-old female past medical history of alcohol use disorder (supposedly sober x 20 years), cognitive impairment undergoing workup, depression, anxiety, ?Cirrhosis that was brought in by friend for hypoxia and AMS today. Pt is not forthcoming about her medical issues and at this time appears to be AOX3 however, she does not appear to have capacity at this time as she is not able to explain to us what her current condition is and her friend/cousin Kaitlin also does not think pt has capacity as she reports that pt was altered earlier tonight prior to arrival. Pt AMA'd yesterday from ED and reportedly got into the wrong car ([employer] ) and then had to be sent back to her house in taxi. At this time, pt will require chemical sedation for her safety as she is deemed unable to sign out against medical advice. WIll then obtain labs, imaging and close cardiac monitoring. Her AMS and hypoxia will need work up in hospital. reviewed her previous chart which showed pretty extensive work up yesterday so will not need repeating of CTA to r/o PE or CT head to r/o ICH. WIll however, check Alcohol level, syphillis screen, HIV screen and basic labs.

## 2025-06-06 NOTE — CONSULT NOTE ADULT - SUBJECTIVE AND OBJECTIVE BOX
HPI:    70-year-old female past medical history of alcohol use disorder, cognitive impairment undergoing workup, depression, anxiety, cirrhosis presenting to emergency room for altered mental status at home.     Allergies:  avocado (Hives)  Codeine Phosphate-GuaiFENesin (Nausea)  No Known Drug Allergies      Home Medications:    Hospital Medications:  folic acid 1 milliGRAM(s) Oral daily  LORazepam   Injectable 2 milliGRAM(s) IV Push every 1 hour PRN  multivitamin 1 Tablet(s) Oral daily  sertraline 50 milliGRAM(s) Oral daily  thiamine 100 milliGRAM(s) Oral daily  traZODone 50 milliGRAM(s) Oral at bedtime      PMHX/PSHX:  Ventral hernia    Migraine    Anxiety    Depression    Constipation    Lumbar herniated disc    Shoulder dislocation    Hepatitis C    History of ETOH abuse    S/P cholecystectomy    Incisional hernia    S/P colonoscopy        Family history:      Denies family history of colon cancer/polyps, stomach cancer/polyps, pancreatic cancer/masses, liver cancer/disease, ovarian cancer and endometrial cancer.    Social History:   Tob: Denies  EtOH: Denies  Illicit Drugs: Denies    ROS:     General:  No wt loss, fevers, chills, night sweats, fatigue  Eyes:  Good vision, no reported pain  ENT:  No sore throat, pain, runny nose, dysphagia  CV:  No pain, palpitations, hypo/hypertension  Pulm:  No dyspnea, cough, tachypnea, wheezing  GI:  see HPI  :  No pain, bleeding, incontinence, nocturia  Muscle:  No pain, weakness  Neuro:  No weakness, tingling, memory problems  Psych:  No fatigue, insomnia, mood problems, depression  Endocrine:  No polyuria, polydipsia, cold/heat intolerance  Heme:  No petechiae, ecchymosis, easy bruisability  Skin:  No rash, tattoos, scars, edema    PHYSICAL EXAM:     GENERAL:  No acute distress  HEENT:  NCAT, no scleral icterus   CHEST:  no respiratory distress  HEART:  Regular rate and rhythm  ABDOMEN:  Soft, non-tender, non-distended, normoactive bowel sounds,  no masses  EXTREMITIES: No edema  SKIN:  No rash/erythema/ecchymoses/petechiae/wounds/abscess/warm/dry  NEURO:  Alert and oriented x 3, no asterixis    Vital Signs:  Vital Signs Last 24 Hrs  T(C): 36.4 (06 Jun 2025 10:45), Max: 37.4 (06 Jun 2025 06:31)  T(F): 97.5 (06 Jun 2025 10:45), Max: 99.3 (06 Jun 2025 06:31)  HR: 63 (06 Jun 2025 10:45) (61 - 77)  BP: 135/56 (06 Jun 2025 10:45) (113/58 - 135/56)  BP(mean): --  RR: 18 (06 Jun 2025 10:45) (17 - 20)  SpO2: 95% (06 Jun 2025 10:45) (95% - 98%)    Parameters below as of 06 Jun 2025 10:45  Patient On (Oxygen Delivery Method): nasal cannula  O2 Flow (L/min): 2    Daily     Daily     LABS:                        8.0    3.15  )-----------( 50       ( 06 Jun 2025 01:22 )             25.3     Mean Cell Volume: 94.1 fL (06-06-25 @ 01:22)    06-06    141  |  110[H]  |  9   ----------------------------<  91  3.5   |  22  |  0.61    Ca    8.1[L]      06 Jun 2025 01:22    TPro  5.3[L]  /  Alb  2.8[L]  /  TBili  2.3[H]  /  DBili  x   /  AST  47[H]  /  ALT  22  /  AlkPhos  115  06-06    LIVER FUNCTIONS - ( 06 Jun 2025 01:22 )  Alb: 2.8 g/dL / Pro: 5.3 g/dL / ALK PHOS: 115 U/L / ALT: 22 U/L / AST: 47 U/L / GGT: x             Urinalysis Basic - ( 06 Jun 2025 01:22 )    Color: x / Appearance: x / SG: x / pH: x  Gluc: 91 mg/dL / Ketone: x  / Bili: x / Urobili: x   Blood: x / Protein: x / Nitrite: x   Leuk Esterase: x / RBC: x / WBC x   Sq Epi: x / Non Sq Epi: x / Bacteria: x                              8.0    3.15  )-----------( 50       ( 06 Jun 2025 01:22 )             25.3                         9.2    3.38  )-----------( 44       ( 04 Jun 2025 19:13 )             28.9       Imaging:             HPI:    70-year-old female past medical history of alcohol use disorder, cognitive impairment undergoing workup, depression, anxiety, cirrhosis presenting to emergency room for altered mental status at home.     She is not able to participate much in conversation. She is sleeping for most of my interview. Says that she hasn't drank for many years- however used to have heavy alcohol use. She lives at home with her sister, came to the hospital because she felt dizzy. She denies any dizziness now. She denies any abdominal pain, fever, chills, cough, dypsnea, abdominal distension, peripheral edema, black stool, bloody stools. She does not take any medications. She has remained HDS afebrile since admission, however requring 3L NC.     Allergies:  avocado (Hives)  Codeine Phosphate-GuaiFENesin (Nausea)  No Known Drug Allergies      Home Medications:    Hospital Medications:  folic acid 1 milliGRAM(s) Oral daily  LORazepam   Injectable 2 milliGRAM(s) IV Push every 1 hour PRN  multivitamin 1 Tablet(s) Oral daily  sertraline 50 milliGRAM(s) Oral daily  thiamine 100 milliGRAM(s) Oral daily  traZODone 50 milliGRAM(s) Oral at bedtime      PMHX/PSHX:  Ventral hernia    Migraine    Anxiety    Depression    Constipation    Lumbar herniated disc    Shoulder dislocation    Hepatitis C    History of ETOH abuse    S/P cholecystectomy    Incisional hernia    S/P colonoscopy        Family history:      Denies family history of colon cancer/polyps, stomach cancer/polyps, pancreatic cancer/masses, liver cancer/disease, ovarian cancer and endometrial cancer.    Social History:   Tob: Denies  EtOH: Denies  Illicit Drugs: Denies    ROS:     General:  No wt loss, fevers, chills, night sweats, fatigue  Eyes:  Good vision, no reported pain  ENT:  No sore throat, pain, runny nose, dysphagia  CV:  No pain, palpitations, hypo/hypertension  Pulm:  No dyspnea, cough, tachypnea, wheezing  GI:  see HPI  :  No pain, bleeding, incontinence, nocturia  Muscle:  No pain, weakness  Neuro:  No weakness, tingling, memory problems  Psych:  No fatigue, insomnia, mood problems, depression  Endocrine:  No polyuria, polydipsia, cold/heat intolerance  Heme:  No petechiae, ecchymosis, easy bruisability  Skin:  No rash, tattoos, scars, edema    PHYSICAL EXAM:     GENERAL:  No acute distress  HEENT:  NCAT, no scleral icterus   CHEST:  no respiratory distress  HEART:  Regular rate and rhythm  ABDOMEN:  Soft, non-tender, non-distended, normoactive bowel sounds,  no masses  EXTREMITIES: No edema  SKIN:  No rash/erythema/ecchymoses/petechiae/wounds/abscess/warm/dry  NEURO:  Alert and oriented x 3, + asterixis    Vital Signs:  Vital Signs Last 24 Hrs  T(C): 36.4 (06 Jun 2025 10:45), Max: 37.4 (06 Jun 2025 06:31)  T(F): 97.5 (06 Jun 2025 10:45), Max: 99.3 (06 Jun 2025 06:31)  HR: 63 (06 Jun 2025 10:45) (61 - 77)  BP: 135/56 (06 Jun 2025 10:45) (113/58 - 135/56)  BP(mean): --  RR: 18 (06 Jun 2025 10:45) (17 - 20)  SpO2: 95% (06 Jun 2025 10:45) (95% - 98%)    Parameters below as of 06 Jun 2025 10:45  Patient On (Oxygen Delivery Method): nasal cannula  O2 Flow (L/min): 2    Daily     Daily     LABS:                        8.0    3.15  )-----------( 50       ( 06 Jun 2025 01:22 )             25.3     Mean Cell Volume: 94.1 fL (06-06-25 @ 01:22)    06-06    141  |  110[H]  |  9   ----------------------------<  91  3.5   |  22  |  0.61    Ca    8.1[L]      06 Jun 2025 01:22    TPro  5.3[L]  /  Alb  2.8[L]  /  TBili  2.3[H]  /  DBili  x   /  AST  47[H]  /  ALT  22  /  AlkPhos  115  06-06    LIVER FUNCTIONS - ( 06 Jun 2025 01:22 )  Alb: 2.8 g/dL / Pro: 5.3 g/dL / ALK PHOS: 115 U/L / ALT: 22 U/L / AST: 47 U/L / GGT: x             Urinalysis Basic - ( 06 Jun 2025 01:22 )    Color: x / Appearance: x / SG: x / pH: x  Gluc: 91 mg/dL / Ketone: x  / Bili: x / Urobili: x   Blood: x / Protein: x / Nitrite: x   Leuk Esterase: x / RBC: x / WBC x   Sq Epi: x / Non Sq Epi: x / Bacteria: x                              8.0    3.15  )-----------( 50       ( 06 Jun 2025 01:22 )             25.3                         9.2    3.38  )-----------( 44       ( 04 Jun 2025 19:13 )             28.9

## 2025-06-06 NOTE — CONSULT NOTE ADULT - ASSESSMENT
70-year-old female past medical history of alcohol use disorder, cognitive impairment undergoing workup, depression, anxiety, ?Cirrhosis seen on imaging yesterday presenting to emergency room for low oxygen level and altered mental status at home.  Patient reports that her cousin wanted her to come in and dropped her off, she feels fine and has no complaints.  Collateral information was obtained from her cousin Jc at phone number [phone].  Jc helps her with all of her medical appointments and at home, patient's remain is Leonarda.  Jc reports that she has been undergoing testing with a neurologist due to worsening cognitive impairment and early dementia including forgetting her wallet keys phone leaving the stove on being confused, and over the last couple of months she has had multiple episodes of syncope versus unresponsiveness.  On 2 occasions she has fallen to the ground, on other occasions she has appeared altered bobbing her head from side-to-side and unresponsive and will have low reading O2 sat during these episodes.  Patient left AMA from the ER yesterday after negative CT head and CT chest without pulmonary embolism but some signs of pulmonary edema, cirrhosis, splenomegaly.  Needed does not feel that the patient has capacity to leave AMA.  She reports it has been difficult for them to get cardiology appointment symptoms the schedule of many months. Pt declines active smoking, drug use, alcohol use. (06 Jun 2025 09:59)    called jc to get more information above noted: : she is on 2 L : 10 days ago " she was OK:  forgetful  sometimes she is off:  she is seeing a neurologist and one more tst is pending to conform for dementia: ;  she does not have any ling problems  has second hand smoking:  she did have oxygen three years ago :  she was on oxygen  : but she gave h er back ;   she was seeing a pulm before for breathing:  sheis using on albuterol  prn ?    never had pe or dvt before   she fell about three months ago and was bruised:  given atfer pill:  and after that better   last month she fell down three  ti mes:       S/P fall : confusion ;? developing dementia   remote alcohol abuser   Cirrhosis  Anxiety/ Depression      S/P fall : confusion ;? developing dementia   she fell down and was found to be hypoxic  per his sister:  at [facility] three years ago  :  she was on h9ome oxygen  and then she returned it:   she lives with somebody   who smokes  so has passive smoking:   CTA on admission showed: No pulmonary embolus. Mild interlobular septal thickening which may represent mild edema. Mediastinal lymphadenopathy, stable and of uncertain etiology. Correlate clinically. Cirrhotic morphology of the liver. Splenomegaly.  Diffuse esophageal wall thickening which can be seen with esophagitis.  Correlate clinically.  check echo and probnp: though initial is low   she has no pneumonia   VBG is OK;    remote alcohol abuser/ Cirrhosis  cirrhosis demonstrated on ct:   check ammonia:   she used to drin before quiet a bit     Anxiety/ Depression  she is on psych medications,  and the sister believes that she is conufsed secondary to these drugs      dvt prophylaxis      dw acp

## 2025-06-06 NOTE — H&P ADULT - HISTORY OF PRESENT ILLNESS
70-year-old female past medical history of alcohol use disorder, cognitive impairment undergoing workup, depression, anxiety, ?Cirrhosis seen on imaging yesterday presenting to emergency room for low oxygen level and altered mental status at home.  Patient reports that her cousin wanted her to come in and dropped her off, she feels fine and has no complaints.  Collateral information was obtained from her cousin Kaitlin at phone number [phone].  Kaitlin helps her with all of her medical appointments and at home, patient's remain is Leonarda.  Kaitlin reports that she has been undergoing testing with a neurologist due to worsening cognitive impairment and early dementia including forgetting her wallet keys phone leaving the stove on being confused, and over the last couple of months she has had multiple episodes of syncope versus unresponsiveness.  On 2 occasions she has fallen to the ground, on other occasions she has appeared altered bobbing her head from side-to-side and unresponsive and will have low reading O2 sat during these episodes.  Patient left AMA from the ER yesterday after negative CT head and CT chest without pulmonary embolism but some signs of pulmonary edema, cirrhosis, splenomegaly.  Needed does not feel that the patient has capacity to leave AMA.  She reports it has been difficult for them to get cardiology appointment symptoms the schedule of many months. Pt declines active smoking, drug use, alcohol use.

## 2025-06-06 NOTE — ED ADULT NURSE NOTE - TEMPERATURE IN FAHRENHEIT (DEGREES F)
I let pt know that we prefer to take the staples out as we are responsible for the incision.  He verbalized understanding and will arrange transportation.   Trang Bhatt RN  
Patient called asking if he is able to get his staples removed from a nurse at his TCU. Pls call him back at [phone].  
[phone]

## 2025-06-06 NOTE — ED PROVIDER NOTE - PROGRESS NOTE DETAILS
ANDRIY: Pt was trying to elope and pt was determined to not have capacity at this time to leave against medical advice and was deemed inappropriate to allow to elope so pt required chemical sedation given her condition at this time. possible altered mental status from alcohol intox or other drug intox vs early dementia vs infection which we will need to work up with labs which pt was refusing. Will at this time, monitor closely for sedation and will require labs, imaging and likely admit for further work up for pts frequent falls/AMS as per remy Madrigal. Adelia DEL REAL, EM/IM PGY-4: Labs nonactionable but significant for pancytopenia similar to prior, mildly elevated LFTs, serum tox negative, flu/covid Neg. Admitted to teledoc for syncope work up. Adelia DEL REAL, EM/IM PGY-4: Labs nonactionable but significant for pancytopenia similar to prior, mildly elevated LFTs, serum tox negative, flu/covid Neg. Admitted to teledoc for syncope work up..

## 2025-06-06 NOTE — ED PROVIDER NOTE - CLINICAL SUMMARY MEDICAL DECISION MAKING FREE TEXT BOX
Adelia DEL REAL, EM/IM PGY-4:   70-year-old female past medical history of alcohol use disorder, cognitive impairment undergoing workup, depression, anxiety, ?Cirrhosis seen on imaging yesterday presenting to emergency room for low oxygen level and altered mental status at home.     Vital signs on arrival significant for oxygen saturation that ranges between 88 and 95% on room air while at rest, otherwise patient is afebrile with normal blood pressure and heart rate.  Regular respiratory rate.  On physical exam patient is awake and alert, oriented to self, location, was progressively becoming more restless during her time in the ER starting sitting then walking around the hallway trying to leave.  Patient does have 1+ pitting edema to the right ankle trace pitting edema to the left soft nontender nondistended abdomen clear lungs to auscultation bilateral without wheezing rales or rhonchi speaking in full sentences but does appear out of breath when talking for long periods.  Patient appears unsteady on her feet.  She keeps repeating that she would like to leave she does not believe that she needs to stay here and that she has a follow-up with her cardiologist Riley Dale at [place] and pulmonologist uncertain of the exact date.    Blood work yesterday with mildly elevated LFTs and bilirubin, normocytic anemia thrombocytopenia, lobbying and signs of synthetic dysfunction consistent with her cirrhosis.  Urinalysis was negative.  This time patient is trying to actively leave is refusing blood work or admission for syncope workup, concerned at this point the differential includes was not limited to cardiac etiology of syncope or unresponsiveness episodes, has some mild volume overload from the cirrhosis, at this time she needs cardiac monitoring admitted for the workup of poor balance resulting in falls and progressive cognitive dysfunction, uncertain if pt is intoxicated at this time or just with baseline impairment.  At this time I do not think that she needs additional head or other imaging.  Will get repeat blood work admitted to later.  Patient requires sedation for her own safety as she does not have capacity to leave at this time.  Will give Haldol and Ativan.  Will obtain CBC CMP hepatitis panel drug screen alcohol level TSH syphilis screen BNP troponin.

## 2025-06-06 NOTE — CONSULT NOTE ADULT - ASSESSMENT
70F w/ ETOH abuse, cognitive impairment, depression, and anxiety here for hypoxia and AMS. She was in the ER earlier then left AMA but returned for concerning sxs from the family member. She is resting comfortably but lethargic appearing.     -EKG reviewed, NSR w/ nonspecific STT changes   -troponin has been negative x 2   -CTPA last ER admission neg for PE   -recommend inpt ECHO and CCTA   -monitor on telemetry and monitor electrolytes   -toxicology and neurology evaluation recommended given AMS and lethargy     Aida Hummel MD [facility]  Attending Interventional Cardiologist, J[facility]-NS/SALLY.   [employer] on [employer] Team

## 2025-06-06 NOTE — BH CONSULTATION LIAISON ASSESSMENT NOTE - NSBHATTESTAPPAMEND_PSY_A_CORE
I have personally seen and examined this patient. I fully participated in the care of this patient. I have made amendments to the documentation where appropriate and otherwise agree with the history, physical exam, and plan as documented by the GUNNER

## 2025-06-06 NOTE — PATIENT PROFILE ADULT - NSPRESCRUSEDDRG_GEN_A_NUR
Patient lethargic from haldol and ativan given in ED Patient lethargic from haldol and ativan given in ED/No

## 2025-06-06 NOTE — PATIENT PROFILE ADULT - NSPROGENBLOODRESTRICT_GEN_A_NUR
Patient lethargic from haldol and ativan given in ED Patient lethargic from haldol and ativan given in ED/none

## 2025-06-06 NOTE — ED PROVIDER NOTE - OBJECTIVE STATEMENT
70-year-old female past medical history of alcohol use disorder, cognitive impairment undergoing workup, depression, anxiety, ?Cirrhosis seen on imaging yesterday presenting to emergency room for low oxygen level and altered mental status at home.  Patient reports that her cousin wanted her to come in and dropped her off, she feels fine and has no complaints.  Collateral information was obtained from her cousin Kaitlin at phone number [phone].  Kaitlin helps her with all of her medical appointments and at home, patient's remain is Leonarda.  Kaitlin reports that she has been undergoing testing with a neurologist due to worsening cognitive impairment and early dementia including forgetting her wallet keys phone leaving the stove on being confused, and over the last couple of months she has had multiple episodes of syncope versus unresponsiveness.  On 2 occasions she has fallen to the ground, on other occasions she has appeared altered bobbing her head from side-to-side and unresponsive and will have low reading O2 sat during these episodes.  Patient left AMA from the ER yesterday after negative CT head and CT chest without pulmonary embolism but some signs of pulmonary edema, cirrhosis, splenomegaly.  Needed does not feel that the patient has capacity to leave AMA.  She reports it has been difficult for them to get cardiology appointment symptoms the schedule of many months. Pt declines active smoking, drug use, alcohol use.

## 2025-06-06 NOTE — ED ADULT NURSE NOTE - OBJECTIVE STATEMENT
Received pt in room 1, Pt is A&Ox 3 with past medical history of depression, ETOH, hernia. Pt came to the ED due to Hypoxia. Pt was here yesterday and AMA for the same reason. Pt is in the 80's oxygen saturation on room air. pt doesn't want to stay and wants to leave. Pt not being cooperative and trying to leave. Security was called and pt talked to and brought from the hallway back into her room. Pt medicated as ordered.  Pt breathing is equal and nonlabored and placed on 2L nasal cannula. Pt abdomen is soft and nondistended. Pt denies any chest pain, SOB, headache, nausea, vomiting, diarrhea, fever or chills. Pt safety maintained. Received pt in room 1, Pt is A&Ox 3 with past medical history of depression, ETOH, hernia. Pt came to the ED due to Hypoxia. Pt was here yesterday and AMA for the same reason. Pt is in the 80's oxygen saturation on room air. pt doesn't want to stay and wants to leave. Pt not being cooperative and trying to leave. Security was called and pt talked to and brought from the hallway back into her room. Pt medicated as ordered.  Pt breathing is equal and nonlabored and placed on 2L nasal cannula. Pt abdomen is soft and nondistended. Pt denies any chest pain, SOB, headache, nausea, vomiting, diarrhea, fever or chills. Pt safety maintained. Belongings across 21. Pt has a 20g IV to the left AC.

## 2025-06-06 NOTE — ED ADULT NURSE NOTE - DOES PATIENT HAVE ADVANCE DIRECTIVE
unknown Methotrexate Counseling:  Patient counseled regarding adverse effects of methotrexate including but not limited to nausea, vomiting, abnormalities in liver function tests. Patients may develop mouth sores, rash, diarrhea, and abnormalities in blood counts. The patient understands that monitoring is required including LFT's and blood counts.  There is a rare possibility of scarring of the liver and lung problems that can occur when taking methotrexate. Persistent nausea, loss of appetite, pale stools, dark urine, cough, and shortness of breath should be reported immediately. Patient advised to discontinue methotrexate treatment at least three months before attempting to become pregnant.  I discussed the need for folate supplements while taking methotrexate.  These supplements can decrease side effects during methotrexate treatment. The patient verbalized understanding of the proper use and possible adverse effects of methotrexate.  All of the patient's questions and concerns were addressed.

## 2025-06-06 NOTE — BH CONSULTATION LIAISON ASSESSMENT NOTE - RISK ASSESSMENT
Risk Factors: acute/chronic medical condition, cognitive impairment with forgetfulness, hx depression/anxiety  Protective Factors: residential stability, supportive family, denies suicidal ideation, denies auditory/visual hallucinations

## 2025-06-06 NOTE — BH CONSULTATION LIAISON ASSESSMENT NOTE - NSBHCHARTREVIEWLAB_PSY_A_CORE FT
CBC Full  -  ( 06 Jun 2025 01:22 )  WBC Count : 3.15 K/uL  RBC Count : 2.69 M/uL  Hemoglobin : 8.0 g/dL  Hematocrit : 25.3 %  Platelet Count - Automated : 50 K/uL  Mean Cell Volume : 94.1 fL  Mean Cell Hemoglobin : 29.7 pg  Mean Cell Hemoglobin Concentration : 31.6 g/dL  Auto Neutrophil # : x  Auto Lymphocyte # : x  Auto Monocyte # : x  Auto Eosinophil # : x  Auto Basophil # : x  Auto Neutrophil % : x  Auto Lymphocyte % : x  Auto Monocyte % : x  Auto Eosinophil % : x  Auto Basophil % : x  06-06    143  |  111[H]  |  7   ----------------------------<  75  3.6   |  24  |  0.49[L]    Ca    8.1[L]      06 Jun 2025 14:56  Phos  3.2     06-06  Mg     1.80     06-06    TPro  5.3[L]  /  Alb  2.7[L]  /  TBili  2.9[H]  /  DBili  1.1[H]  /  AST  38[H]  /  ALT  20  /  AlkPhos  98  06-06

## 2025-06-07 LAB
A1AT SERPL-MCNC: 134 MG/DL — SIGNIFICANT CHANGE UP (ref 90–200)
ALBUMIN SERPL ELPH-MCNC: 2.7 G/DL — LOW (ref 3.3–5)
ALP SERPL-CCNC: 95 U/L — SIGNIFICANT CHANGE UP (ref 40–120)
ALT FLD-CCNC: 21 U/L — SIGNIFICANT CHANGE UP (ref 4–33)
AMPHET UR-MCNC: NEGATIVE — SIGNIFICANT CHANGE UP
ANION GAP SERPL CALC-SCNC: 12 MMOL/L — SIGNIFICANT CHANGE UP (ref 7–14)
AST SERPL-CCNC: 38 U/L — HIGH (ref 4–32)
BARBITURATES UR SCN-MCNC: NEGATIVE — SIGNIFICANT CHANGE UP
BENZODIAZ UR-MCNC: NEGATIVE — SIGNIFICANT CHANGE UP
BILIRUB DIRECT SERPL-MCNC: 1 MG/DL — HIGH (ref 0–0.3)
BILIRUB INDIRECT FLD-MCNC: 1.5 MG/DL — HIGH (ref 0–1)
BILIRUB SERPL-MCNC: 2.5 MG/DL — HIGH (ref 0.2–1.2)
BUN SERPL-MCNC: 8 MG/DL — SIGNIFICANT CHANGE UP (ref 7–23)
CALCIUM SERPL-MCNC: 8 MG/DL — LOW (ref 8.4–10.5)
CHLORIDE SERPL-SCNC: 112 MMOL/L — HIGH (ref 98–107)
CO2 SERPL-SCNC: 21 MMOL/L — LOW (ref 22–31)
COCAINE METAB.OTHER UR-MCNC: NEGATIVE — SIGNIFICANT CHANGE UP
CREAT SERPL-MCNC: 0.53 MG/DL — SIGNIFICANT CHANGE UP (ref 0.5–1.3)
CREATININE URINE RESULT, DAU: 39 MG/DL — SIGNIFICANT CHANGE UP
EGFR: 99 ML/MIN/1.73M2 — SIGNIFICANT CHANGE UP
EGFR: 99 ML/MIN/1.73M2 — SIGNIFICANT CHANGE UP
FENTANYL UR QL SCN: NEGATIVE — SIGNIFICANT CHANGE UP
GLUCOSE SERPL-MCNC: 59 MG/DL — LOW (ref 70–99)
HCT VFR BLD CALC: 25.7 % — LOW (ref 34.5–45)
HGB BLD-MCNC: 8.4 G/DL — LOW (ref 11.5–15.5)
MAGNESIUM SERPL-MCNC: 1.8 MG/DL — SIGNIFICANT CHANGE UP (ref 1.6–2.6)
MCHC RBC-ENTMCNC: 29.7 PG — SIGNIFICANT CHANGE UP (ref 27–34)
MCHC RBC-ENTMCNC: 32.7 G/DL — SIGNIFICANT CHANGE UP (ref 32–36)
MCV RBC AUTO: 90.8 FL — SIGNIFICANT CHANGE UP (ref 80–100)
METHADONE UR-MCNC: NEGATIVE — SIGNIFICANT CHANGE UP
MRSA PCR RESULT.: SIGNIFICANT CHANGE UP
NRBC # BLD AUTO: 0 K/UL — SIGNIFICANT CHANGE UP (ref 0–0)
NRBC # FLD: 0 K/UL — SIGNIFICANT CHANGE UP (ref 0–0)
NRBC BLD AUTO-RTO: 0 /100 WBCS — SIGNIFICANT CHANGE UP (ref 0–0)
OPIATES UR-MCNC: NEGATIVE — SIGNIFICANT CHANGE UP
OXYCODONE UR-MCNC: POSITIVE
PCP SPEC-MCNC: SIGNIFICANT CHANGE UP
PCP UR-MCNC: NEGATIVE — SIGNIFICANT CHANGE UP
PHOSPHATE SERPL-MCNC: 2.9 MG/DL — SIGNIFICANT CHANGE UP (ref 2.5–4.5)
PLATELET # BLD AUTO: 44 K/UL — LOW (ref 150–400)
POTASSIUM SERPL-MCNC: 3.6 MMOL/L — SIGNIFICANT CHANGE UP (ref 3.5–5.3)
POTASSIUM SERPL-SCNC: 3.6 MMOL/L — SIGNIFICANT CHANGE UP (ref 3.5–5.3)
PROT SERPL-MCNC: 5.2 G/DL — LOW (ref 6–8.3)
RBC # BLD: 2.83 M/UL — LOW (ref 3.8–5.2)
RBC # FLD: 16.6 % — HIGH (ref 10.3–14.5)
S AUREUS DNA NOSE QL NAA+PROBE: SIGNIFICANT CHANGE UP
SODIUM SERPL-SCNC: 145 MMOL/L — SIGNIFICANT CHANGE UP (ref 135–145)
THC UR QL: NEGATIVE — SIGNIFICANT CHANGE UP
WBC # BLD: 2.71 K/UL — LOW (ref 3.8–10.5)
WBC # FLD AUTO: 2.71 K/UL — LOW (ref 3.8–10.5)

## 2025-06-07 PROCEDURE — 99232 SBSQ HOSP IP/OBS MODERATE 35: CPT

## 2025-06-07 RX ORDER — TRAZODONE HCL 100 MG
50 TABLET ORAL ONCE
Refills: 0 | Status: COMPLETED | OUTPATIENT
Start: 2025-06-07 | End: 2025-06-07

## 2025-06-07 RX ORDER — HALOPERIDOL 10 MG/1
0.5 TABLET ORAL ONCE
Refills: 0 | Status: COMPLETED | OUTPATIENT
Start: 2025-06-07 | End: 2025-06-07

## 2025-06-07 RX ORDER — TRAZODONE HCL 100 MG
50 TABLET ORAL ONCE
Refills: 0 | Status: DISCONTINUED | OUTPATIENT
Start: 2025-06-07 | End: 2025-06-07

## 2025-06-07 RX ORDER — LIDOCAINE HYDROCHLORIDE 20 MG/ML
1 JELLY TOPICAL ONCE
Refills: 0 | Status: COMPLETED | OUTPATIENT
Start: 2025-06-07 | End: 2025-06-07

## 2025-06-07 RX ORDER — ACETAMINOPHEN 500 MG/5ML
650 LIQUID (ML) ORAL EVERY 6 HOURS
Refills: 0 | Status: DISCONTINUED | OUTPATIENT
Start: 2025-06-07 | End: 2025-06-17

## 2025-06-07 RX ORDER — MELATONIN 5 MG
3 TABLET ORAL AT BEDTIME
Refills: 0 | Status: DISCONTINUED | OUTPATIENT
Start: 2025-06-07 | End: 2025-06-17

## 2025-06-07 RX ADMIN — Medication 1 TABLET(S): at 13:16

## 2025-06-07 RX ADMIN — CARVEDILOL 3.12 MILLIGRAM(S): 3.12 TABLET, FILM COATED ORAL at 17:48

## 2025-06-07 RX ADMIN — Medication 1 APPLICATION(S): at 05:43

## 2025-06-07 RX ADMIN — Medication 650 MILLIGRAM(S): at 17:48

## 2025-06-07 RX ADMIN — CARVEDILOL 3.12 MILLIGRAM(S): 3.12 TABLET, FILM COATED ORAL at 05:37

## 2025-06-07 RX ADMIN — FOLIC ACID 1 MILLIGRAM(S): 1 TABLET ORAL at 13:15

## 2025-06-07 RX ADMIN — Medication 50 MILLIGRAM(S): at 02:31

## 2025-06-07 RX ADMIN — LACTULOSE 10 GRAM(S): 10 SOLUTION ORAL at 13:16

## 2025-06-07 RX ADMIN — Medication 105 MILLIGRAM(S): at 05:35

## 2025-06-07 RX ADMIN — Medication 105 MILLIGRAM(S): at 21:51

## 2025-06-07 RX ADMIN — LIDOCAINE HYDROCHLORIDE 1 PATCH: 20 JELLY TOPICAL at 17:48

## 2025-06-07 RX ADMIN — LACTULOSE 10 GRAM(S): 10 SOLUTION ORAL at 05:36

## 2025-06-07 RX ADMIN — Medication 105 MILLIGRAM(S): at 13:15

## 2025-06-07 RX ADMIN — Medication 650 MILLIGRAM(S): at 18:30

## 2025-06-07 RX ADMIN — SERTRALINE 50 MILLIGRAM(S): 100 TABLET, FILM COATED ORAL at 13:16

## 2025-06-07 RX ADMIN — LIDOCAINE HYDROCHLORIDE 1 PATCH: 20 JELLY TOPICAL at 19:04

## 2025-06-07 RX ADMIN — LACTULOSE 10 GRAM(S): 10 SOLUTION ORAL at 21:54

## 2025-06-07 NOTE — PROGRESS NOTE ADULT - ASSESSMENT
70-year-old female past medical history of alcohol use disorder, cognitive impairment undergoing workup, depression, anxiety, ?Cirrhosis seen on imaging yesterday presenting to emergency room for low oxygen level and altered mental status at home.  Patient reports that her cousin wanted her to come in and dropped her off, she feels fine and has no complaints.    Kaitlin helps her with all of her medical appointments and at home, patient's remain is Leonarda.  Kaitlin reports that she has been undergoing testing with a neurologist due to worsening cognitive impairment and early dementia including forgetting her wallet keys phone leaving the stove on being confused, and over the last couple of months she has had multiple episodes of syncope versus unresponsiveness.  On 2 occasions she has fallen to the ground, on other occasions she has appeared altered bobbing her head from side-to-side and unresponsive and will have low reading O2 sat during these episodes.  Patient left AMA from the ER yesterday after negative CT head and CT chest without pulmonary embolism but some signs of pulmonary edema, cirrhosis, splenomegaly.  Needed does not feel that the patient has capacity to leave AMA.  She reports it has been difficult for them to get cardiology appointment symptoms the schedule of many months. Pt declines active smoking, drug use, alcohol use.    AMS  - likely worsening dementia vs physiatry issues vs alcohol abuse disorder   - psych and neuro fu   - CT head neg  - CIWA protocol  - fall precautions  - safety watch     Anxiety, depression  - cw home meds  - psych fu     Hx of cirrhosis  - cw lactulose and rifaximin  - mehta as per hepatology     DVT prophylaxis

## 2025-06-07 NOTE — PROGRESS NOTE ADULT - ASSESSMENT
70-year-old female past medical history of alcohol use disorder, cognitive impairment undergoing workup, depression, anxiety, ?Cirrhosis seen on imaging yesterday presenting to emergency room for low oxygen level and altered mental status at home.  Patient reports that her cousin wanted her to come in and dropped her off, she feels fine and has no complaints.  Collateral information was obtained from her cousin Jc at phone number [phone].  Jc helps her with all of her medical appointments and at home, patient's remain is Leonarda.  Jc reports that she has been undergoing testing with a neurologist due to worsening cognitive impairment and early dementia including forgetting her wallet keys phone leaving the stove on being confused, and over the last couple of months she has had multiple episodes of syncope versus unresponsiveness.  On 2 occasions she has fallen to the ground, on other occasions she has appeared altered bobbing her head from side-to-side and unresponsive and will have low reading O2 sat during these episodes.  Patient left AMA from the ER yesterday after negative CT head and CT chest without pulmonary embolism but some signs of pulmonary edema, cirrhosis, splenomegaly.  Needed does not feel that the patient has capacity to leave AMA.  She reports it has been difficult for them to get cardiology appointment symptoms the schedule of many months. Pt declines active smoking, drug use, alcohol use. (06 Jun 2025 09:59)    called jc to get more information above noted: : she is on 2 L : 10 days ago " she was OK:  forgetful  sometimes she is off:  she is seeing a neurologist and one more tst is pending to conform for dementia: ;  she does not have any ling problems  has second hand smoking:  she did have oxygen three years ago :  she was on oxygen  : but she gave h er back ;   she was seeing a pulm before for breathing:  sheis using on albuterol  prn ?    never had pe or dvt before   she fell about three months ago and was bruised:  given atfer pill:  and after that better   last month she fell down three  ti mes:       S/P fall : confusion ;? developing dementia   remote alcohol abuser   Cirrhosis  Anxiety/ Depression      S/P fall : confusion ;? developing dementia   she fell down and was found to be hypoxic  per his sister:  at [facility] three years ago  :  she was on h9ome oxygen  and then she returned it:   she lives with somebody   who smokes  so has passive smoking:   CTA on admission showed: No pulmonary embolus. Mild interlobular septal thickening which may represent mild edema. Mediastinal lymphadenopathy, stable and of uncertain etiology. Correlate clinically. Cirrhotic morphology of the liver. Splenomegaly.  Diffuse esophageal wall thickening which can be seen with esophagitis.  Correlate clinically.  check echo and probnp: though initial is low   she has no pneumonia   VBG is OK;    6/7:  sheis much m ore alert and awake and is more oriented   on 2 Lof oxygen  :   seems better then yesterday    her ammonia is higgh :  start  lactulose for some time   she still seems slightly confused    ? gi consult       remote alcohol abuser/ Cirrhosis  cirrhosis demonstrated on ct:   check ammonia:   she used to drink before quiet a bit     6/7: ammonia is high:   ? start lactulose: ? gi consult     Anxiety/ Depression  she is on psych medications,  and the sister believes that she is conufsed secondary to these drugs      dvt prophylaxis      dw acp   70-year-old female past medical history of alcohol use disorder, cognitive impairment undergoing workup, depression, anxiety, ?Cirrhosis seen on imaging yesterday presenting to emergency room for low oxygen level and altered mental status at home.  Patient reports that her cousin wanted her to come in and dropped her off, she feels fine and has no complaints.  Collateral information was obtained from her cousin Jc at phone number [phone].  Jc helps her with all of her medical appointments and at home, patient's remain is Leonarda.  Jc reports that she has been undergoing testing with a neurologist due to worsening cognitive impairment and early dementia including forgetting her wallet keys phone leaving the stove on being confused, and over the last couple of months she has had multiple episodes of syncope versus unresponsiveness.  On 2 occasions she has fallen to the ground, on other occasions she has appeared altered bobbing her head from side-to-side and unresponsive and will have low reading O2 sat during these episodes.  Patient left AMA from the ER yesterday after negative CT head and CT chest without pulmonary embolism but some signs of pulmonary edema, cirrhosis, splenomegaly.  Needed does not feel that the patient has capacity to leave AMA.  She reports it has been difficult for them to get cardiology appointment symptoms the schedule of many months. Pt declines active smoking, drug use, alcohol use. (06 Jun 2025 09:59)    called jc to get more information above noted: : she is on 2 L : 10 days ago " she was OK:  forgetful  sometimes she is off:  she is seeing a neurologist and one more tst is pending to conform for dementia: ;  she does not have any ling problems  has second hand smoking:  she did have oxygen three years ago :  she was on oxygen  : but she gave h er back ;   she was seeing a pulm before for breathing:  sheis using on albuterol  prn ?    never had pe or dvt before   she fell about three months ago and was bruised:  given atfer pill:  and after that better   last month she fell down three  ti mes:       S/P fall : confusion ;? developing dementia   remote alcohol abuser   Cirrhosis  Anxiety/ Depression      S/P fall : confusion ;? developing dementia   she fell down and was found to be hypoxic  per his sister:  at [facility] three years ago  :  she was on h9ome oxygen  and then she returned it:   she lives with somebody   who smokes  so has passive smoking:   CTA on admission showed: No pulmonary embolus. Mild interlobular septal thickening which may represent mild edema. Mediastinal lymphadenopathy, stable and of uncertain etiology. Correlate clinically. Cirrhotic morphology of the liver. Splenomegaly.  Diffuse esophageal wall thickening which can be seen with esophagitis.  Correlate clinically.  check echo and probnp: though initial is low   she has no pneumonia   VBG is OK;    6/7:  sheis much m ore alert and awake and is more oriented   on 2 Lof oxygen  :   seems better then yesterday    her ammonia is high :  started on   lactulose for some time   she still seems slightly confused    Hepatology following      remote alcohol abuser/ Cirrhosis  cirrhosis demonstrated on ct:   check ammonia:   she used to drink before quiet a bit     6/7: ammonia is high:   ? start lactulose: ? gi consult     Anxiety/ Depression  she is on psych medications,  and the sister believes that she is conufsed secondary to these drugs      dvt prophylaxis      dw acp

## 2025-06-07 NOTE — BH CONSULTATION LIAISON PROGRESS NOTE - ATTENDING COMMENTS
70F, history of depression/anxiety, p/w AMS in context of cirrhosis, pulm edema, prior substance abuse - patient with waxing/waning delirium low utility in most psych meds in current state though cognition improving upon yesterday, ddx wide but include hepatic encephalopathy, as plt low would keep low threshold for stopping zoloft as this may slightly increase bleeding risk though patient appears stable from that perspective currently, c/w meds as above psych will follow

## 2025-06-07 NOTE — PROGRESS NOTE ADULT - ASSESSMENT
70F w/ ETOH abuse, cognitive impairment, depression, and anxiety here for hypoxia and AMS. She was in the ER earlier then left AMA but returned for concerning sxs from the family member. She is resting comfortably but lethargic appearing.     -EKG reviewed, NSR w/ nonspecific STT changes   -troponin has been negative x 2   -CTPA last ER admission neg for PE   -recommend inpt ECHO and CCTA   -monitor on telemetry and monitor electrolytes   -toxicology and neurology evaluation recommended given AMS and lethargy     Aida Hummel MD [facility]  Attending Interventional Cardiologist, [employer]-NS/SALLY.   Avaliable on [employer] Team

## 2025-06-07 NOTE — BH CONSULTATION LIAISON PROGRESS NOTE - NSBHASSESSMENTFT_PSY_ALL_CORE
69yo female with PPHx depression and anxiety (on Trazodone/Zoloft) PMHx chronic pain (sees pain management), polysubstance abuse disorder(heroin, etoh-25yrs sober),  cognitive impairment (undergoing workup w neurologist), PN (coma 10days), ?Cirrhosis (seen on imaging yesterday) presenting to emergency room for low oxygen level and altered mental status at home. Patient left AMA from the ER yesterday after negative CT head and CT chest without pulmonary embolism but some signs of pulmonary edema, cirrhosis, splenomegaly.  psychiatry consulted for medication management. Patient received Haldol 5mg + Ativan 2mg in ED.    6/6: Patient seen, lethargic, having difficulty keeping eyes open and requires frequent verbal prompts, pt a&ox3, is unsure why she is in the hospital, denies suicidal ideation, denies auditory/visual hallucinations, denies any alcohol use, unable to answer any further questions d/t lethargy. Collateral obtained from patient's cousin, Kaitlin who endorses patient with episodes of "totally out of it," "going down hill," cousin endorses patient with hx polysubstance abuse, cousin endorses patient could possible be abusing trazodone as well as pain meds prescribed by pain management, cousin endorses patient with high anxiety, poor sleep. Patient with no current outpatient psychiatric provider, no hx of inpatient psychiatric admissions, no hx suicidality.     6/7: AOx3. Less sedated with more spontaneous speech. Reports feeling frustrated that she is at the hospital. reports appetite and sleep is fair. Reports she's had depression and anxiety and has been on zoloft and tradozone for years. Denies current depression, hopelessness. Denies SI/I/P. Denies HI/AVH. Reports feeling anxious and that the zoloft really helps with her anxiety. Very mild asterixis BL.     PLAN:  -defer obs status to primary team, on enhanced supervision at present  -Appreciate ongoing medical workup for organic cause for altered mental status  -Repeat EKG and if qtc prolonged please consult cardiology for manual calculation  -Toxicology screen  -can c/w Zoloft 50mg daily for now; however low threshold to discontinue due to high bleeding risk (plt 44, INR 1.57)  -Severe Agitation Only after diversional activities have been attempted: Haldol 0.5mg q6h prn, if ineffective after 30minutes can give additional 0.5mg if qtc <500  -Cannot leave AMA as further psychiatric evaluation is required given today's lethargy  -CL to follow

## 2025-06-07 NOTE — BH CONSULTATION LIAISON PROGRESS NOTE - NSBHFUPINTERVALHXFT_PSY_A_CORE
No acute events overnight. No PRNs for agitation. VSS 02 99% on 2L NC.     On exam, patient AOx3. Less sedated with more spontaneous speech. Reports feeling frustrated that she is at the hospital. reports appetite and sleep is fair. Reports she's had depression and anxiety and has been on zoloft and tradozone for years. Denies current depression, hopelessness. Denies SI/I/P. Denies HI/AVH. Reports feeling anxious and that the zoloft really helps with her anxiety. Very mild asterixis BL.

## 2025-06-08 LAB
ALBUMIN SERPL ELPH-MCNC: 2.6 G/DL — LOW (ref 3.3–5)
ALP SERPL-CCNC: 95 U/L — SIGNIFICANT CHANGE UP (ref 40–120)
ALT FLD-CCNC: 22 U/L — SIGNIFICANT CHANGE UP (ref 4–33)
ANA TITR SER: NEGATIVE — SIGNIFICANT CHANGE UP
ANION GAP SERPL CALC-SCNC: 12 MMOL/L — SIGNIFICANT CHANGE UP (ref 7–14)
APTT BLD: 35.6 SEC — SIGNIFICANT CHANGE UP (ref 26.1–36.8)
AST SERPL-CCNC: 41 U/L — HIGH (ref 4–32)
BILIRUB DIRECT SERPL-MCNC: 0.9 MG/DL — HIGH (ref 0–0.3)
BILIRUB INDIRECT FLD-MCNC: 1.6 MG/DL — HIGH (ref 0–1)
BILIRUB SERPL-MCNC: 2.5 MG/DL — HIGH (ref 0.2–1.2)
BUN SERPL-MCNC: 6 MG/DL — LOW (ref 7–23)
CALCIUM SERPL-MCNC: 7.9 MG/DL — LOW (ref 8.4–10.5)
CHLORIDE SERPL-SCNC: 111 MMOL/L — HIGH (ref 98–107)
CO2 SERPL-SCNC: 21 MMOL/L — LOW (ref 22–31)
CREAT SERPL-MCNC: 0.53 MG/DL — SIGNIFICANT CHANGE UP (ref 0.5–1.3)
EGFR: 99 ML/MIN/1.73M2 — SIGNIFICANT CHANGE UP
EGFR: 99 ML/MIN/1.73M2 — SIGNIFICANT CHANGE UP
GLUCOSE SERPL-MCNC: 96 MG/DL — SIGNIFICANT CHANGE UP (ref 70–99)
INR BLD: 1.64 RATIO — HIGH (ref 0.85–1.16)
MAGNESIUM SERPL-MCNC: 1.7 MG/DL — SIGNIFICANT CHANGE UP (ref 1.6–2.6)
PHOSPHATE SERPL-MCNC: 2.6 MG/DL — SIGNIFICANT CHANGE UP (ref 2.5–4.5)
POTASSIUM SERPL-MCNC: 3.5 MMOL/L — SIGNIFICANT CHANGE UP (ref 3.5–5.3)
POTASSIUM SERPL-SCNC: 3.5 MMOL/L — SIGNIFICANT CHANGE UP (ref 3.5–5.3)
PROT SERPL-MCNC: 5.2 G/DL — LOW (ref 6–8.3)
PROTHROM AB SERPL-ACNC: 19.4 SEC — HIGH (ref 9.9–13.4)
SODIUM SERPL-SCNC: 144 MMOL/L — SIGNIFICANT CHANGE UP (ref 135–145)

## 2025-06-08 PROCEDURE — 93306 TTE W/DOPPLER COMPLETE: CPT | Mod: 26

## 2025-06-08 PROCEDURE — 99232 SBSQ HOSP IP/OBS MODERATE 35: CPT

## 2025-06-08 RX ADMIN — LACTULOSE 10 GRAM(S): 10 SOLUTION ORAL at 05:46

## 2025-06-08 RX ADMIN — LIDOCAINE HYDROCHLORIDE 1 PATCH: 20 JELLY TOPICAL at 05:30

## 2025-06-08 RX ADMIN — LACTULOSE 10 GRAM(S): 10 SOLUTION ORAL at 22:14

## 2025-06-08 RX ADMIN — Medication 105 MILLIGRAM(S): at 05:52

## 2025-06-08 RX ADMIN — Medication 105 MILLIGRAM(S): at 13:12

## 2025-06-08 RX ADMIN — Medication 105 MILLIGRAM(S): at 22:12

## 2025-06-08 RX ADMIN — HALOPERIDOL 0.5 MILLIGRAM(S): 10 TABLET ORAL at 00:02

## 2025-06-08 RX ADMIN — CARVEDILOL 3.12 MILLIGRAM(S): 3.12 TABLET, FILM COATED ORAL at 16:55

## 2025-06-08 RX ADMIN — CARVEDILOL 3.12 MILLIGRAM(S): 3.12 TABLET, FILM COATED ORAL at 05:47

## 2025-06-08 RX ADMIN — LACTULOSE 10 GRAM(S): 10 SOLUTION ORAL at 13:13

## 2025-06-08 RX ADMIN — Medication 1 APPLICATION(S): at 05:46

## 2025-06-08 RX ADMIN — Medication 650 MILLIGRAM(S): at 23:21

## 2025-06-08 RX ADMIN — Medication 1 TABLET(S): at 13:10

## 2025-06-08 RX ADMIN — Medication 3 MILLIGRAM(S): at 22:14

## 2025-06-08 RX ADMIN — Medication 3 MILLIGRAM(S): at 00:03

## 2025-06-08 RX ADMIN — FOLIC ACID 1 MILLIGRAM(S): 1 TABLET ORAL at 13:10

## 2025-06-08 RX ADMIN — SERTRALINE 50 MILLIGRAM(S): 100 TABLET, FILM COATED ORAL at 13:11

## 2025-06-08 RX ADMIN — Medication 650 MILLIGRAM(S): at 23:51

## 2025-06-08 NOTE — PROGRESS NOTE ADULT - ASSESSMENT
70-year-old female past medical history of alcohol use disorder, cognitive impairment undergoing workup, depression, anxiety, ?Cirrhosis seen on imaging yesterday presenting to emergency room for low oxygen level and altered mental status at home.  Patient reports that her cousin wanted her to come in and dropped her off, she feels fine and has no complaints.    Kaitlin helps her with all of her medical appointments and at home, patient's remain is Leonarda.  Kaitlin reports that she has been undergoing testing with a neurologist due to worsening cognitive impairment and early dementia including forgetting her wallet keys phone leaving the stove on being confused, and over the last couple of months she has had multiple episodes of syncope versus unresponsiveness.  On 2 occasions she has fallen to the ground, on other occasions she has appeared altered bobbing her head from side-to-side and unresponsive and will have low reading O2 sat during these episodes.  Patient left AMA from the ER yesterday after negative CT head and CT chest without pulmonary embolism but some signs of pulmonary edema, cirrhosis, splenomegaly.  Needed does not feel that the patient has capacity to leave AMA.  She reports it has been difficult for them to get cardiology appointment symptoms the schedule of many months. Pt declines active smoking, drug use, alcohol use.    AMS  - likely worsening dementia vs physiatry issues vs alcohol abuse disorder   - psych and neuro fu   - CT head neg  - CIWA protocol  - fall precautions  - safety watch     Anxiety, depression  - cw home meds  - psych fu     Hx of cirrhosis  - cw lactulose and rifaximin  - mehta as per hepatology   - check CT abdomen     DVT prophylaxis

## 2025-06-08 NOTE — PROGRESS NOTE ADULT - ASSESSMENT
70F w/ ETOH abuse, cognitive impairment, depression, and anxiety here for hypoxia and AMS. She was in the ER earlier then left AMA but returned for concerning sxs from the family member. She is resting comfortably but lethargic appearing.     -EKG reviewed, NSR w/ nonspecific STT changes   -troponin has been negative x 2   -CTPA last ER admission neg for PE   -TTE ECHO w/ likely severe AS- OP cardiology f/u   -cont coreg 3.125 mg bid     Aida Hummel MD [facility]  Attending Interventional Cardiologist, [facility]-NS/SALLY.   Avaliable on [employer] Team

## 2025-06-08 NOTE — PROGRESS NOTE ADULT - ASSESSMENT
70-year-old female past medical history of alcohol use disorder, cognitive impairment undergoing workup, depression, anxiety, ?Cirrhosis seen on imaging yesterday presenting to emergency room for low oxygen level and altered mental status at home.  Patient reports that her cousin wanted her to come in and dropped her off, she feels fine and has no complaints.  Collateral information was obtained from her cousin Jc at phone number [phone].  Jc helps her with all of her medical appointments and at home, patient's remain is Leonarda.  Jc reports that she has been undergoing testing with a neurologist due to worsening cognitive impairment and early dementia including forgetting her wallet keys phone leaving the stove on being confused, and over the last couple of months she has had multiple episodes of syncope versus unresponsiveness.  On 2 occasions she has fallen to the ground, on other occasions she has appeared altered bobbing her head from side-to-side and unresponsive and will have low reading O2 sat during these episodes.  Patient left AMA from the ER yesterday after negative CT head and CT chest without pulmonary embolism but some signs of pulmonary edema, cirrhosis, splenomegaly.  Needed does not feel that the patient has capacity to leave AMA.  She reports it has been difficult for them to get cardiology appointment symptoms the schedule of many months. Pt declines active smoking, drug use, alcohol use. (06 Jun 2025 09:59)    called jc to get more information above noted: : she is on 2 L : 10 days ago " she was OK:  forgetful  sometimes she is off:  she is seeing a neurologist and one more tst is pending to conform for dementia: ;  she does not have any ling problems  has second hand smoking:  she did have oxygen three years ago :  she was on oxygen  : but she gave h er back ;   she was seeing a pulm before for breathing:  sheis using on albuterol  prn ?    never had pe or dvt before   she fell about three months ago and was bruised:  given atfer pill:  and after that better   last month she fell down three  ti mes:       S/P fall : confusion ;? developing dementia   remote alcohol abuser   Cirrhosis  Anxiety/ Depression      S/P fall : confusion ;? developing dementia   she fell down and was found to be hypoxic  per his sister:  at [facility] three years ago  :  she was on h9ome oxygen  and then she returned it:   she lives with somebody   who smokes  so has passive smoking:   CTA on admission showed: No pulmonary embolus. Mild interlobular septal thickening which may represent mild edema. Mediastinal lymphadenopathy, stable and of uncertain etiology. Correlate clinically. Cirrhotic morphology of the liver. Splenomegaly.  Diffuse esophageal wall thickening which can be seen with esophagitis.  Correlate clinically.  check echo and probnp: though initial is low   she has no pneumonia   VBG is OK;    6/7:  sheis much m ore alert and awake and is more oriented   on 2 Lof oxygen  :   seems better then yesterday    her ammonia is high :  started on   lactulose for some time   she still seems slightly confused    Hepatology following    6/8: seems to be alert and awke and responding to questions appropriately:  she used to take zoloft and trazadone/;l  denanding to start thme again ;       remote alcohol abuser/ Cirrhosis  cirrhosis demonstrated on ct:   check ammonia:   she used to drink before quiet a bit     6/7: ammonia is high:   ? start lactulose: ? gi consult     6/8: doing  ok ;  no sob:   cont current rx:   on lactulose:  gi following      Anxiety/ Depression  she is on psych medications,  and the sister believes that she is conufsed secondary to these drugs    6/8: restart zoloft and trazodone one by one       dvt prophylaxis      paged acp

## 2025-06-09 LAB
ALBUMIN SERPL ELPH-MCNC: 2.6 G/DL — LOW (ref 3.3–5)
ALP SERPL-CCNC: 122 U/L — HIGH (ref 40–120)
ALT FLD-CCNC: 21 U/L — SIGNIFICANT CHANGE UP (ref 4–33)
ANION GAP SERPL CALC-SCNC: 8 MMOL/L — SIGNIFICANT CHANGE UP (ref 7–14)
ANISOCYTOSIS BLD QL: SLIGHT — SIGNIFICANT CHANGE UP
APTT BLD: 34.6 SEC — SIGNIFICANT CHANGE UP (ref 26.1–36.8)
AST SERPL-CCNC: 37 U/L — HIGH (ref 4–32)
BILIRUB DIRECT SERPL-MCNC: 0.8 MG/DL — HIGH (ref 0–0.3)
BILIRUB INDIRECT FLD-MCNC: 1.2 MG/DL — HIGH (ref 0–1)
BILIRUB SERPL-MCNC: 2 MG/DL — HIGH (ref 0.2–1.2)
BUN SERPL-MCNC: 9 MG/DL — SIGNIFICANT CHANGE UP (ref 7–23)
CALCIUM SERPL-MCNC: 7.9 MG/DL — LOW (ref 8.4–10.5)
CHLORIDE SERPL-SCNC: 109 MMOL/L — HIGH (ref 98–107)
CO2 SERPL-SCNC: 24 MMOL/L — SIGNIFICANT CHANGE UP (ref 22–31)
CREAT SERPL-MCNC: 0.47 MG/DL — LOW (ref 0.5–1.3)
EGFR: 102 ML/MIN/1.73M2 — SIGNIFICANT CHANGE UP
EGFR: 102 ML/MIN/1.73M2 — SIGNIFICANT CHANGE UP
ELLIPTOCYTES BLD QL SMEAR: SIGNIFICANT CHANGE UP
GLUCOSE BLDC GLUCOMTR-MCNC: 139 MG/DL — HIGH (ref 70–99)
GLUCOSE SERPL-MCNC: 106 MG/DL — HIGH (ref 70–99)
HCT VFR BLD CALC: 24.9 % — LOW (ref 34.5–45)
HGB BLD-MCNC: 8.1 G/DL — LOW (ref 11.5–15.5)
INR BLD: 1.79 RATIO — HIGH (ref 0.85–1.16)
MAGNESIUM SERPL-MCNC: 1.6 MG/DL — SIGNIFICANT CHANGE UP (ref 1.6–2.6)
MANUAL SMEAR VERIFICATION: SIGNIFICANT CHANGE UP
MCHC RBC-ENTMCNC: 29.5 PG — SIGNIFICANT CHANGE UP (ref 27–34)
MCHC RBC-ENTMCNC: 32.5 G/DL — SIGNIFICANT CHANGE UP (ref 32–36)
MCV RBC AUTO: 90.5 FL — SIGNIFICANT CHANGE UP (ref 80–100)
MITOCHONDRIA AB SER-ACNC: SIGNIFICANT CHANGE UP
OVALOCYTES BLD QL SMEAR: SLIGHT — SIGNIFICANT CHANGE UP
PHOSPHATE SERPL-MCNC: 2.5 MG/DL — SIGNIFICANT CHANGE UP (ref 2.5–4.5)
PLAT MORPH BLD: NORMAL — SIGNIFICANT CHANGE UP
PLATELET # BLD AUTO: 45 K/UL — LOW (ref 150–400)
PLATELET COUNT - ESTIMATE: ABNORMAL
POIKILOCYTOSIS BLD QL AUTO: SIGNIFICANT CHANGE UP
POLYCHROMASIA BLD QL SMEAR: SIGNIFICANT CHANGE UP
POTASSIUM SERPL-MCNC: 3.8 MMOL/L — SIGNIFICANT CHANGE UP (ref 3.5–5.3)
POTASSIUM SERPL-SCNC: 3.8 MMOL/L — SIGNIFICANT CHANGE UP (ref 3.5–5.3)
PROT SERPL-MCNC: 5.1 G/DL — LOW (ref 6–8.3)
PROTHROM AB SERPL-ACNC: 20.7 SEC — HIGH (ref 9.9–13.4)
RBC # BLD: 2.75 M/UL — LOW (ref 3.8–5.2)
RBC # FLD: 17.1 % — HIGH (ref 10.3–14.5)
RBC BLD AUTO: ABNORMAL
SMOOTH MUSCLE AB SER-ACNC: SIGNIFICANT CHANGE UP
SODIUM SERPL-SCNC: 141 MMOL/L — SIGNIFICANT CHANGE UP (ref 135–145)
WBC # BLD: 3.7 K/UL — LOW (ref 3.8–10.5)
WBC # FLD AUTO: 3.7 K/UL — LOW (ref 3.8–10.5)

## 2025-06-09 PROCEDURE — 99232 SBSQ HOSP IP/OBS MODERATE 35: CPT | Mod: GC

## 2025-06-09 PROCEDURE — 99232 SBSQ HOSP IP/OBS MODERATE 35: CPT

## 2025-06-09 PROCEDURE — 74178 CT ABD&PLV WO CNTR FLWD CNTR: CPT | Mod: 26

## 2025-06-09 RX ORDER — LIDOCAINE HYDROCHLORIDE 20 MG/ML
1 JELLY TOPICAL ONCE
Refills: 0 | Status: COMPLETED | OUTPATIENT
Start: 2025-06-09 | End: 2025-06-09

## 2025-06-09 RX ORDER — MELATONIN 5 MG
3 TABLET ORAL ONCE
Refills: 0 | Status: COMPLETED | OUTPATIENT
Start: 2025-06-09 | End: 2025-06-09

## 2025-06-09 RX ORDER — TRAZODONE HCL 100 MG
50 TABLET ORAL AT BEDTIME
Refills: 0 | Status: DISCONTINUED | OUTPATIENT
Start: 2025-06-09 | End: 2025-06-17

## 2025-06-09 RX ORDER — OXYCODONE HYDROCHLORIDE 30 MG/1
2.5 TABLET ORAL ONCE
Refills: 0 | Status: DISCONTINUED | OUTPATIENT
Start: 2025-06-09 | End: 2025-06-09

## 2025-06-09 RX ADMIN — Medication 3 MILLIGRAM(S): at 23:26

## 2025-06-09 RX ADMIN — OXYCODONE HYDROCHLORIDE 2.5 MILLIGRAM(S): 30 TABLET ORAL at 03:43

## 2025-06-09 RX ADMIN — Medication 105 MILLIGRAM(S): at 05:48

## 2025-06-09 RX ADMIN — Medication 1 APPLICATION(S): at 06:00

## 2025-06-09 RX ADMIN — Medication 3 MILLIGRAM(S): at 05:46

## 2025-06-09 RX ADMIN — CARVEDILOL 3.12 MILLIGRAM(S): 3.12 TABLET, FILM COATED ORAL at 18:14

## 2025-06-09 RX ADMIN — Medication 105 MILLIGRAM(S): at 12:17

## 2025-06-09 RX ADMIN — OXYCODONE HYDROCHLORIDE 2.5 MILLIGRAM(S): 30 TABLET ORAL at 04:43

## 2025-06-09 RX ADMIN — Medication 1 TABLET(S): at 12:18

## 2025-06-09 RX ADMIN — FOLIC ACID 1 MILLIGRAM(S): 1 TABLET ORAL at 12:18

## 2025-06-09 RX ADMIN — CARVEDILOL 3.12 MILLIGRAM(S): 3.12 TABLET, FILM COATED ORAL at 05:47

## 2025-06-09 RX ADMIN — LIDOCAINE HYDROCHLORIDE 1 PATCH: 20 JELLY TOPICAL at 13:20

## 2025-06-09 RX ADMIN — Medication 50 MILLIGRAM(S): at 23:26

## 2025-06-09 RX ADMIN — LIDOCAINE HYDROCHLORIDE 1 PATCH: 20 JELLY TOPICAL at 06:33

## 2025-06-09 RX ADMIN — LIDOCAINE HYDROCHLORIDE 1 PATCH: 20 JELLY TOPICAL at 01:20

## 2025-06-09 RX ADMIN — SERTRALINE 50 MILLIGRAM(S): 100 TABLET, FILM COATED ORAL at 12:18

## 2025-06-09 NOTE — CONSULT NOTE ADULT - ASSESSMENT
70-year-old female past medical history of alcohol use disorder, cognitive impairment undergoing workup, depression, anxiety, ?Cirrhosis seen on imaging presented with low o2 sats and encephalopathy, left AMA now returned   Has been undergoing w./u for dementia as OP  CTh 6/5 with chronic ischemic changes  Noted to have elevated ammonia on admission, likely from her cirrhosis  started on lactulose and rifaxamin , hepatology is following  thrombocytopenia from coagulopathy  was taken off sedating meds - Trazadone and zoloft , both of which have since been restarted    Encephalopathy - hepatic?  Underlying cognitive imapriment/dementia - likely related to long standing etoh use, vascular disease   Thrombocytopenia  - has cirrhosis, monitor ammonia, use rifaximin and lactulose   - as mental status better, ok to restart trazadone as needed for sleep as long as does not impair mental status  - check folate, b12, tsh, rpr if not yet checked  - ciwa protocol per primary team   - thiamine 250mg IV daily x 3 days   - would also supplement folate, b12  - would hold off on mri and eeg as mental status appears to be improving, can do as outpatient for dementia w/u  - trazadone and sertaline were restarted, would monitor mental status  - move to window bed if able   - scds

## 2025-06-09 NOTE — BH CONSULTATION LIAISON PROGRESS NOTE - CURRENT MEDICATION
MEDICATIONS  (STANDING):  carvedilol 3.125 milliGRAM(s) Oral every 12 hours  chlorhexidine 2% Cloths 1 Application(s) Topical <User Schedule>  folic acid 1 milliGRAM(s) Oral daily  lactulose Syrup 10 Gram(s) Oral three times a day  multivitamin 1 Tablet(s) Oral daily  rifAXIMin 550 milliGRAM(s) Oral two times a day  sertraline 50 milliGRAM(s) Oral daily  thiamine IVPB 500 milliGRAM(s) IV Intermittent every 8 hours  thiamine IVPB   IV Intermittent     MEDICATIONS  (PRN):  LORazepam   Injectable 2 milliGRAM(s) IV Push every 1 hour PRN CIWA 15 or greater, or seizure  
MEDICATIONS  (STANDING):  carvedilol 3.125 milliGRAM(s) Oral every 12 hours  chlorhexidine 2% Cloths 1 Application(s) Topical <User Schedule>  folic acid 1 milliGRAM(s) Oral daily  lactulose Syrup 10 Gram(s) Oral three times a day  melatonin 3 milliGRAM(s) Oral at bedtime  multivitamin 1 Tablet(s) Oral daily  rifAXIMin 550 milliGRAM(s) Oral two times a day  sertraline 50 milliGRAM(s) Oral daily  thiamine IVPB 500 milliGRAM(s) IV Intermittent every 8 hours  thiamine IVPB 250 milliGRAM(s) IV Intermittent daily  thiamine IVPB   IV Intermittent     MEDICATIONS  (PRN):  acetaminophen     Tablet .. 650 milliGRAM(s) Oral every 6 hours PRN Mild Pain (1 - 3)  LORazepam   Injectable 2 milliGRAM(s) IV Push every 1 hour PRN CIWA 15 or greater, or seizure

## 2025-06-09 NOTE — BH CONSULTATION LIAISON PROGRESS NOTE - NSBHCHARTREVIEWVS_PSY_A_CORE FT
Vital Signs Last 24 Hrs  T(C): 36.7 (07 Jun 2025 04:15), Max: 37.2 (06 Jun 2025 20:15)  T(F): 98.1 (07 Jun 2025 04:15), Max: 98.9 (06 Jun 2025 20:15)  HR: 65 (07 Jun 2025 04:15) (60 - 76)  BP: 151/64 (07 Jun 2025 04:15) (133/64 - 151/64)  BP(mean): --  RR: 18 (07 Jun 2025 04:15) (18 - 18)  SpO2: 99% (07 Jun 2025 04:15) (96% - 100%)    Parameters below as of 07 Jun 2025 04:15  Patient On (Oxygen Delivery Method): nasal cannula  O2 Flow (L/min): 2  
Vital Signs Last 24 Hrs  T(C): 36.9 (09 Jun 2025 05:20), Max: 36.9 (09 Jun 2025 05:20)  T(F): 98.4 (09 Jun 2025 05:20), Max: 98.4 (09 Jun 2025 05:20)  HR: 70 (09 Jun 2025 05:20) (60 - 73)  BP: 143/61 (09 Jun 2025 05:20) (132/66 - 147/62)  BP(mean): --  RR: 17 (09 Jun 2025 05:20) (17 - 18)  SpO2: 95% (09 Jun 2025 05:20) (95% - 100%)    Parameters below as of 09 Jun 2025 05:20  Patient On (Oxygen Delivery Method): nasal cannula  O2 Flow (L/min): 2

## 2025-06-09 NOTE — PROGRESS NOTE ADULT - ASSESSMENT
70-year-old female past medical history of alcohol use disorder, cognitive impairment undergoing workup, depression, anxiety, cirrhosis presenting to emergency room for altered mental status at home.     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 Cain (GI/HEP) on discharge    Note incomplete until finalized by attending signature/attestation.    Brittany Mark MD  GI/Hepatology Fellow, PGY-4  Long Range Pager: [phone], Short Range Pager: [phone]    MONDAY-FRIDAY 8AM-5PM:  Please message via [employer] or email [email or web address]   On Weekends/Holidays (All day) and Weekdays after 5 PM to 8 AM  For nonurgent consults please email:  Please email [email or web address] OR [email or web address]    URGENT CONSULTS:  Please contact on call GI team. See Amion schedule ([facility]), [employer] paging system ([facility]), or call hospital  ([facility])

## 2025-06-09 NOTE — BH CONSULTATION LIAISON PROGRESS NOTE - NSBHATTESTCOMMENTATTENDFT_PSY_A_CORE
agree with above, confusion improving, not sleeping OK to restart trazodone at low dose as above. Denies suicidality/intent or plan and denies homicidality/intent or plan. Denies AH/VH, delusions or paranoia.

## 2025-06-09 NOTE — BH CONSULTATION LIAISON PROGRESS NOTE - NSBHMSEGROOM_PSY_A_CORE
Per office visit from 4/21/23  Plan:  Acute sinusitis, recurrence not specified, unspecified location:  Most likely sinus infection.  Explained there is fluid build up behind the ear drums, which is more in the left ear than right.  Recommended Amoxicillin-clavulanate (Augmentin) 875-125 mg per tablet twice a day after eating. Benefits and side effects explained.  Recommended OTC Flonase or Nasacort nasal spray if needed. Benefits explained.  Suggested chest X Ray if it does not subside.  Discussed treatment options for persistent ear effusions    Referral to ENT prepped (per patient request)  - please review and sign (unsure of diagnosis) if appropriate.   
Fair
Fair

## 2025-06-09 NOTE — BH CONSULTATION LIAISON PROGRESS NOTE - NSBHFUPINTERVALHXFT_PSY_A_CORE
Patient was seen and assessed at bedside. Patient is alert, grossly oriented. Aware of where she is, the date, president. Patient states she is looking forward to  going home and following up with her primary care provider whom she has a good relationship with. Patient aware of her medications, and states although does not relying in medicine, takes them daily.   NO SI or HI  NO psychosis.   feels she is at her cognitive baseline. Denies confusion at this time.  Patient was seen and assessed at bedside. Patient is alert, grossly oriented. Aware of where she is, the date, president. Patient states she is looking forward to  going home and following up with her primary care provider whom she has a good relationship with. Patient aware of her medications, and states although does not relying in medicine, takes them daily.   NO SI or HI  NO psychosis.   feels she is at her cognitive baseline. Denies confusion at this time. c/o insomnia without taking trazodone

## 2025-06-09 NOTE — BH CONSULTATION LIAISON PROGRESS NOTE - NSBHATTESTTYPEVISIT_PSY_A_CORE
Resident/Fellow with telephonic supervision
Attending evaluating patient with GUNNER ([id number] code)

## 2025-06-09 NOTE — PROGRESS NOTE ADULT - ASSESSMENT
70-year-old female past medical history of alcohol use disorder, cognitive impairment undergoing workup, depression, anxiety, ?Cirrhosis seen on imaging yesterday presenting to emergency room for low oxygen level and altered mental status at home.  Patient reports that her cousin wanted her to come in and dropped her off, she feels fine and has no complaints.  Collateral information was obtained from her cousin Jc at phone number [phone].  Jc helps her with all of her medical appointments and at home, patient's remain is Leonarda.  Jc reports that she has been undergoing testing with a neurologist due to worsening cognitive impairment and early dementia including forgetting her wallet keys phone leaving the stove on being confused, and over the last couple of months she has had multiple episodes of syncope versus unresponsiveness.  On 2 occasions she has fallen to the ground, on other occasions she has appeared altered bobbing her head from side-to-side and unresponsive and will have low reading O2 sat during these episodes.  Patient left AMA from the ER yesterday after negative CT head and CT chest without pulmonary embolism but some signs of pulmonary edema, cirrhosis, splenomegaly.  Needed does not feel that the patient has capacity to leave AMA.  She reports it has been difficult for them to get cardiology appointment symptoms the schedule of many months. Pt declines active smoking, drug use, alcohol use. (06 Jun 2025 09:59)    called jc to get more information above noted: : she is on 2 L : 10 days ago " she was OK:  forgetful  sometimes she is off:  she is seeing a neurologist and one more tst is pending to conform for dementia: ;  she does not have any ling problems  has second hand smoking:  she did have oxygen three years ago :  she was on oxygen  : but she gave h er back ;   she was seeing a pulm before for breathing:  sheis using on albuterol  prn ?    never had pe or dvt before   she fell about three months ago and was bruised:  given atfer pill:  and after that better   last month she fell down three  ti mes:       S/P fall : confusion ;? developing dementia   remote alcohol abuser   Cirrhosis  Anxiety/ Depression      S/P fall : confusion ;? developing dementia   she fell down and was found to be hypoxic  per his sister:  at [facility] three years ago  :  she was on h9ome oxygen  and then she returned it:   she lives with somebody   who smokes  so has passive smoking:   CTA on admission showed: No pulmonary embolus. Mild interlobular septal thickening which may represent mild edema. Mediastinal lymphadenopathy, stable and of uncertain etiology. Correlate clinically. Cirrhotic morphology of the liver. Splenomegaly.  Diffuse esophageal wall thickening which can be seen with esophagitis.  Correlate clinically.  check echo and probnp: though initial is low   she has no pneumonia   VBG is OK;    6/7:  sheis much m ore alert and awake and is more oriented   on 2 Lof oxygen  :   seems better then yesterday    her ammonia is high :  started on   lactulose for some time   she still seems slightly confused    Hepatology following    6/8: seems to be alert and awke and responding to questions appropriately:  she used to take zoloft and trazadone/;l  denanding to start thme again ;       remote alcohol abuser/ Cirrhosis  cirrhosis demonstrated on ct:   check ammonia:   she used to drink before quiet a bit     6/7: ammonia is high:   ? start lactulose: ? gi consult     6/8: doing  ok ;  no sob:   cont current rx:   on lactulose:  gi following      6/9; she seems to be doing  ok : wants zoloft and trazadone to be started;  she says shehas been taking for last thirty years;   ?psych consult:   she could ot sleel last night:  trazodone she was using forsleep      Anxiety/ Depression  she is on psych medications,  and the sister believes that she is conufsed secondary to these drugs    6/8: restart zoloft and trazodone one by one     6/9: defer to primary team       dvt prophylaxis    dw acp

## 2025-06-09 NOTE — CONSULT NOTE ADULT - SUBJECTIVE AND OBJECTIVE BOX
Neurology Consult    Reason for Consult: Patient is a 70y old  Female who presents with a chief complaint of AMS (09 Jun 2025 12:04)      HPI:  70-year-old female past medical history of alcohol use disorder, cognitive impairment undergoing workup, depression, anxiety, ?Cirrhosis seen on imaging yesterday presenting to emergency room for low oxygen level and altered mental status at home.  Patient reports that her cousin wanted her to come in and dropped her off, she feels fine and has no complaints.  Collateral information was obtained from her cousin Kaitlin at phone number [phone].  Kaitlin helps her with all of her medical appointments and at home, patient's remain is Leonarda.  Kaitlin reports that she has been undergoing testing with a neurologist due to worsening cognitive impairment and early dementia including forgetting her wallet keys phone leaving the stove on being confused, and over the last couple of months she has had multiple episodes of syncope versus unresponsiveness.  On 2 occasions she has fallen to the ground, on other occasions she has appeared altered bobbing her head from side-to-side and unresponsive and will have low reading O2 sat during these episodes.  Patient left AMA from the ER yesterday after negative CT head and CT chest without pulmonary embolism but some signs of pulmonary edema, cirrhosis, splenomegaly.  Needed does not feel that the patient has capacity to leave AMA.  She reports it has been difficult for them to get cardiology appointment symptoms the schedule of many months. Pt declines active smoking, drug use, alcohol use. (06 Jun 2025 09:59)       PAST MEDICAL & SURGICAL HISTORY:  Ventral hernia  current      Migraine  pt states last 5-04-17.  Imitrex prn      Anxiety      Depression      Constipation      Lumbar herniated disc  s/p 2 epidural injections, last one 2-2017      Shoulder dislocation  history of, returned by pt. Pt states she can not stretch out arms fully      Hepatitis C  treated.  Pt states it was successful      History of ETOH abuse  pt states last drink 20 years ago attends AA meetings      S/P cholecystectomy  laparoscopic 2015      Incisional hernia  s//p surgical repair with mesh 2016      S/P colonoscopy  2016          Allergies: Allergies    avocado (Hives)  No Known Drug Allergies    Intolerances    Codeine Phosphate-GuaiFENesin (Nausea)      Social History: Denies toxic habits including tobacco, ETOH or illicit drugs.    Family History: FAMILY HISTORY:  . No family history of strokes    Medications: MEDICATIONS  (STANDING):  carvedilol 3.125 milliGRAM(s) Oral every 12 hours  chlorhexidine 2% Cloths 1 Application(s) Topical <User Schedule>  folic acid 1 milliGRAM(s) Oral daily  lactulose Syrup 10 Gram(s) Oral three times a day  melatonin 3 milliGRAM(s) Oral at bedtime  multivitamin 1 Tablet(s) Oral daily  rifAXIMin 550 milliGRAM(s) Oral two times a day  sertraline 50 milliGRAM(s) Oral daily  thiamine IVPB 250 milliGRAM(s) IV Intermittent daily  thiamine IVPB   IV Intermittent     MEDICATIONS  (PRN):  acetaminophen     Tablet .. 650 milliGRAM(s) Oral every 6 hours PRN Mild Pain (1 - 3)  LORazepam   Injectable 2 milliGRAM(s) IV Push every 1 hour PRN CIWA 15 or greater, or seizure      Review of Systems:  CONSTITUTIONAL:  No weight loss, fever, chills, weakness or fatigue.  HEENT:  Eyes:  No visual loss, blurred vision, double vision or yellow sclera. Ears, Nose, Throat:  No hearing loss, sneezing, congestion, runny nose or sore throat.  SKIN:  No rash or itching.  CARDIOVASCULAR:  No chest pain, chest pressure or chest discomfort. No palpitations or edema.  RESPIRATORY:  No shortness of breath, cough or sputum.  GASTROINTESTINAL:  No anorexia, nausea, vomiting or diarrhea. No abdominal pain or blood.  GENITOURINARY:  No burning on urination or incontinence   NEUROLOGICAL:  No headache, dizziness, syncope, paralysis, ataxia, numbness or tingling in the extremities. No change in bowel or bladder control. no limb weakness. no vision changes.   MUSCULOSKELETAL:  No muscle, back pain, joint pain or stiffness.  HEMATOLOGIC:  No anemia, bleeding or bruising.  LYMPHATICS:  No enlarged nodes. No history of splenectomy.  PSYCHIATRIC:  No history of depression or anxiety.  ENDOCRINOLOGIC:  No reports of sweating, cold or heat intolerance. No polyuria or polydipsia.      Vitals:  Vital Signs Last 24 Hrs  T(C): 37.1 (09 Jun 2025 09:20), Max: 37.1 (09 Jun 2025 09:20)  T(F): 98.8 (09 Jun 2025 09:20), Max: 98.8 (09 Jun 2025 09:20)  HR: 71 (09 Jun 2025 09:20) (66 - 73)  BP: 130/66 (09 Jun 2025 09:20) (130/66 - 147/62)  BP(mean): --  RR: 18 (09 Jun 2025 09:20) (17 - 18)  SpO2: 100% (09 Jun 2025 09:20) (95% - 100%)    Parameters below as of 09 Jun 2025 09:20  Patient On (Oxygen Delivery Method): nasal cannula  O2 Flow (L/min): 2      General Exam:   General Appearance: Appropriately dressed and in no acute distress       Head: Normocephalic, atraumatic and no dysmorphic features  Ear, Nose, and Throat: Moist mucous membranes  CVS: S1S2+  Resp: No SOB, no wheeze or rhonchi  GI: soft NT/ND  Extremities: No edema or cyanosis  Skin: No bruises or rashes     Neurological Exam:  Mental Status: Awake, alert and oriented x 3.  Able to follow simple and complex verbal commands. Able to name and repeat. fluent speech. No obvious aphasia or dysarthria noted.   Cranial Nerves: PERRL, EOMI, VFFC, sensation V1-V3 intact,  no obvious facial asymmetry, equal elevation of palate, scm/trap 5/5, tongue is midline on protrusion. hearing is grossly intact.   Motor: Normal bulk, tone and strength throughout. Fine finger movements were intact and symmetric. no tremors or drift noted.    Sensation: Intact to light touch and pinprick throughout. no right/left confusion. no extinction to tactile on DSS.   Reflexes: 1+ throughout at biceps, brachioradialis, triceps, patellars and ankles bilaterally and equal. No clonus. R toe and L toe were both downgoing.  Coordination: No dysmetria on FNF or HKS  Gait: deferred    Data/Labs/Imaging which I personally reviewed.     Labs:     CBC Full  -  ( 09 Jun 2025 05:30 )  WBC Count : 3.70 K/uL  RBC Count : 2.75 M/uL  Hemoglobin : 8.1 g/dL  Hematocrit : 24.9 %  Platelet Count - Automated : 45 K/uL  Mean Cell Volume : 90.5 fL  Mean Cell Hemoglobin : 29.5 pg  Mean Cell Hemoglobin Concentration : 32.5 g/dL  Auto Neutrophil # : x  Auto Lymphocyte # : x  Auto Monocyte # : x  Auto Eosinophil # : x  Auto Basophil # : x  Auto Neutrophil % : x  Auto Lymphocyte % : x  Auto Monocyte % : x  Auto Eosinophil % : x  Auto Basophil % : x    06-09    141  |  109[H]  |  9   ----------------------------<  106[H]  3.8   |  24  |  0.47[L]    Ca    7.9[L]      09 Jun 2025 05:30  Phos  2.5     06-09  Mg     1.60     06-09    TPro  5.1[L]  /  Alb  2.6[L]  /  TBili  2.0[H]  /  DBili  0.8[H]  /  AST  37[H]  /  ALT  21  /  AlkPhos  122[H]  06-09    LIVER FUNCTIONS - ( 09 Jun 2025 05:30 )  Alb: 2.6 g/dL / Pro: 5.1 g/dL / ALK PHOS: 122 U/L / ALT: 21 U/L / AST: 37 U/L / GGT: x           PT/INR - ( 09 Jun 2025 05:30 )   PT: 20.7 sec;   INR: 1.79 ratio         PTT - ( 09 Jun 2025 05:30 )  PTT:34.6 sec  Urinalysis Basic - ( 09 Jun 2025 05:30 )    Color: x / Appearance: x / SG: x / pH: x  Gluc: 106 mg/dL / Ketone: x  / Bili: x / Urobili: x   Blood: x / Protein: x / Nitrite: x   Leuk Esterase: x / RBC: x / WBC x   Sq Epi: x / Non Sq Epi: x / Bacteria: x           Neurology Consult    Reason for Consult: Patient is a 70y old  Female who presents with a chief complaint of AMS (09 Jun 2025 12:04)      HPI:  70-year-old female past medical history of alcohol use disorder, cognitive impairment undergoing workup, depression, anxiety, ?Cirrhosis seen on imaging yesterday presenting to emergency room for low oxygen level and altered mental status at home.  Patient reports that her cousin wanted her to come in and dropped her off, she feels fine and has no complaints.  Collateral information was obtained from her cousin Kaitlin at phone number [phone].  aKitlin helps her with all of her medical appointments and at home, patient's remain is Leonarda.  Kaitlin reports that she has been undergoing testing with a neurologist due to worsening cognitive impairment and early dementia including forgetting her wallet keys phone leaving the stove on being confused, and over the last couple of months she has had multiple episodes of syncope versus unresponsiveness.  On 2 occasions she has fallen to the ground, on other occasions she has appeared altered bobbing her head from side-to-side and unresponsive and will have low reading O2 sat during these episodes.  Patient left AMA from the ER yesterday after negative CT head and CT chest without pulmonary embolism but some signs of pulmonary edema, cirrhosis, splenomegaly.  Needed does not feel that the patient has capacity to leave AMA.  She reports it has been difficult for them to get cardiology appointment symptoms the schedule of many months. Pt declines active smoking, drug use, alcohol use.      PAST MEDICAL & SURGICAL HISTORY:  Ventral hernia  current      Migraine  pt states last 5-04-17.  Imitrex prn      Anxiety      Depression      Constipation      Lumbar herniated disc  s/p 2 epidural injections, last one 2-2017      Shoulder dislocation  history of, returned by pt. Pt states she can not stretch out arms fully      Hepatitis C  treated.  Pt states it was successful      History of ETOH abuse  pt states last drink 20 years ago attends AA meetings      S/P cholecystectomy  laparoscopic 2015      Incisional hernia  s//p surgical repair with mesh 2016      S/P colonoscopy  2016          Allergies: Allergies    avocado (Hives)  No Known Drug Allergies    Intolerances    Codeine Phosphate-GuaiFENesin (Nausea)      Social History: Denies toxic habits including tobacco, ETOH or illicit drugs.    Family History: FAMILY HISTORY:  . No family history of strokes    Medications: MEDICATIONS  (STANDING):  carvedilol 3.125 milliGRAM(s) Oral every 12 hours  chlorhexidine 2% Cloths 1 Application(s) Topical <User Schedule>  folic acid 1 milliGRAM(s) Oral daily  lactulose Syrup 10 Gram(s) Oral three times a day  melatonin 3 milliGRAM(s) Oral at bedtime  multivitamin 1 Tablet(s) Oral daily  rifAXIMin 550 milliGRAM(s) Oral two times a day  sertraline 50 milliGRAM(s) Oral daily  thiamine IVPB 250 milliGRAM(s) IV Intermittent daily  thiamine IVPB   IV Intermittent     MEDICATIONS  (PRN):  acetaminophen     Tablet .. 650 milliGRAM(s) Oral every 6 hours PRN Mild Pain (1 - 3)  LORazepam   Injectable 2 milliGRAM(s) IV Push every 1 hour PRN CIWA 15 or greater, or seizure      Review of Systems:  CONSTITUTIONAL:  No weight loss, fever, chills, weakness or fatigue.  HEENT:  Eyes:  No visual loss, blurred vision, double vision or yellow sclera. Ears, Nose, Throat:  No hearing loss, sneezing, congestion, runny nose or sore throat.  SKIN:  No rash or itching.  CARDIOVASCULAR:  No chest pain, chest pressure or chest discomfort. No palpitations or edema.  RESPIRATORY:  No shortness of breath, cough or sputum.  GASTROINTESTINAL:  No anorexia, nausea, vomiting or diarrhea. No abdominal pain or blood.  GENITOURINARY:  No burning on urination or incontinence   NEUROLOGICAL:  No headache, dizziness, syncope, paralysis, ataxia, numbness or tingling in the extremities. No change in bowel or bladder control. no limb weakness. no vision changes.   MUSCULOSKELETAL:  No muscle, back pain, joint pain or stiffness.  HEMATOLOGIC:  No anemia, bleeding or bruising.  LYMPHATICS:  No enlarged nodes. No history of splenectomy.  PSYCHIATRIC:  No history of depression or anxiety.  ENDOCRINOLOGIC:  No reports of sweating, cold or heat intolerance. No polyuria or polydipsia.      Vitals:  Vital Signs Last 24 Hrs  T(C): 37.1 (09 Jun 2025 09:20), Max: 37.1 (09 Jun 2025 09:20)  T(F): 98.8 (09 Jun 2025 09:20), Max: 98.8 (09 Jun 2025 09:20)  HR: 71 (09 Jun 2025 09:20) (66 - 73)  BP: 130/66 (09 Jun 2025 09:20) (130/66 - 147/62)  BP(mean): --  RR: 18 (09 Jun 2025 09:20) (17 - 18)  SpO2: 100% (09 Jun 2025 09:20) (95% - 100%)    Parameters below as of 09 Jun 2025 09:20  Patient On (Oxygen Delivery Method): nasal cannula  O2 Flow (L/min): 2      General Exam:   General Appearance: Appropriately dressed and in no acute distress       Head: Normocephalic, atraumatic and no dysmorphic features  Ear, Nose, and Throat: Moist mucous membranes  CVS: S1S2+  Resp: No SOB, no wheeze or rhonchi  GI: soft NT/ND  Extremities: No edema or cyanosis  Skin: No bruises or rashes     Neurological Exam:  Mental Status: Awake, alert and oriented x 3., knows president  Able to follow simple commands. Able to name and repeat. fluent speech. No obvious aphasia or dysarthria noted.   Cranial Nerves: PERRL, EOMI, VFFC, sensation V1-V3 intact,  no obvious facial asymmetry, equal elevation of palate, scm/trap 5/5, tongue is midline on protrusion. hearing is grossly intact.   Motor: Normal bulk, tone and strength throughout. Fine finger movements were intact and symmetric. no tremors or drift noted.    Sensation: Intact to light touch and pinprick throughout. no right/left confusion. no extinction to tactile on DSS.   Reflexes: 1+ throughout at biceps, brachioradialis, triceps, patellars and ankles bilaterally and equal. No clonus. R toe and L toe were both downgoing.  Coordination: No dysmetria on FNF   Gait: deferred    Data/Labs/Imaging which I personally reviewed.     Labs:     CBC Full  -  ( 09 Jun 2025 05:30 )  WBC Count : 3.70 K/uL  RBC Count : 2.75 M/uL  Hemoglobin : 8.1 g/dL  Hematocrit : 24.9 %  Platelet Count - Automated : 45 K/uL  Mean Cell Volume : 90.5 fL  Mean Cell Hemoglobin : 29.5 pg  Mean Cell Hemoglobin Concentration : 32.5 g/dL  Auto Neutrophil # : x  Auto Lymphocyte # : x  Auto Monocyte # : x  Auto Eosinophil # : x  Auto Basophil # : x  Auto Neutrophil % : x  Auto Lymphocyte % : x  Auto Monocyte % : x  Auto Eosinophil % : x  Auto Basophil % : x    06-09    141  |  109[H]  |  9   ----------------------------<  106[H]  3.8   |  24  |  0.47[L]    Ca    7.9[L]      09 Jun 2025 05:30  Phos  2.5     06-09  Mg     1.60     06-09    TPro  5.1[L]  /  Alb  2.6[L]  /  TBili  2.0[H]  /  DBili  0.8[H]  /  AST  37[H]  /  ALT  21  /  AlkPhos  122[H]  06-09    LIVER FUNCTIONS - ( 09 Jun 2025 05:30 )  Alb: 2.6 g/dL / Pro: 5.1 g/dL / ALK PHOS: 122 U/L / ALT: 21 U/L / AST: 37 U/L / GGT: x           PT/INR - ( 09 Jun 2025 05:30 )   PT: 20.7 sec;   INR: 1.79 ratio         PTT - ( 09 Jun 2025 05:30 )  PTT:34.6 sec  Urinalysis Basic - ( 09 Jun 2025 05:30 )    Color: x / Appearance: x / SG: x / pH: x  Gluc: 106 mg/dL / Ketone: x  / Bili: x / Urobili: x   Blood: x / Protein: x / Nitrite: x   Leuk Esterase: x / RBC: x / WBC x   Sq Epi: x / Non Sq Epi: x / Bacteria: x      < from: CT Head No Cont (06.05.25 @ 01:43) >    ACC: [id number] EXAM:  CT BRAIN   ORDERED BY: MONSTER LOERA     PROCEDURE DATE:  06/05/2025          INTERPRETATION:  CLINICAL INFORMATION: Concussion    COMPARISON: CT head 5/11/2025.    CONTRAST:  IV Contrast: NONE  .    TECHNIQUE:  Serial axial images were obtained from the skull base to the   vertex using multi-slice helical technique. Sagittal and coronal   reformats were obtained.    FINDINGS:    VENTRICLES AND SULCI: Age appropriate involutional changes.  INTRA-AXIAL: No mass effect, acute hemorrhage, or midline shift.  There   are periventricular and subcortical white matter hypodensities,   consistent with microvascular type changes.  EXTRA-AXIAL: No mass or fluid collection. Basal cisterns are normal in   appearance.    VISUALIZED SINUSES:  Clear.  TYMPANOMASTOID CAVITIES:  Clear.  VISUALIZED ORBITS: Normal.  CALVARIUM: Irregularity of the right nasal bone is likely related to old   trauma. This is similar compared to previous exam..    MISCELLANEOUS: None.      IMPRESSION:  No acute intracranial hemorrhage, mass effect, or midline shift.        --- End of Report ---          FADUMO CORONA MD; Resident Radiologist  This document has been electronically signed.  ALANA GALO MD; Attending Radiologist  This document has been electronically signed. Jun 5 2025  2:54AM    < end of copied text >

## 2025-06-09 NOTE — PROGRESS NOTE ADULT - ATTENDING COMMENTS
70F, alcohol use disorder, cognitive impairment undergoing workup, depression, anxiety, cirrhosis (Dr. Cain), adm. b/o altered mental status    - hepatic encephalopathy and poss. Wernicke encephalopathy - now A&Ox3, mild asterixis, EOMI  - cirrhosis likely 2/2 alcohol toxicity; frail with sarcopenia, recent fall.   Plan: titrate lactulose, continue IV thiamin, hypoxia treatment per primary team

## 2025-06-09 NOTE — BH CONSULTATION LIAISON PROGRESS NOTE - NSBHCHARTREVIEWLAB_PSY_A_CORE FT
CBC Full  -  ( 06 Jun 2025 01:22 )  WBC Count : 3.15 K/uL  RBC Count : 2.69 M/uL  Hemoglobin : 8.0 g/dL  Hematocrit : 25.3 %  Platelet Count - Automated : 50 K/uL  Mean Cell Volume : 94.1 fL  Mean Cell Hemoglobin : 29.7 pg  Mean Cell Hemoglobin Concentration : 31.6 g/dL  Auto Neutrophil # : x  Auto Lymphocyte # : x  Auto Monocyte # : x  Auto Eosinophil # : x  Auto Basophil # : x  Auto Neutrophil % : x  Auto Lymphocyte % : x  Auto Monocyte % : x  Auto Eosinophil % : x  Auto Basophil % : x  06-06    143  |  111[H]  |  7   ----------------------------<  75  3.6   |  24  |  0.49[L]    Ca    8.1[L]      06 Jun 2025 14:56  Phos  3.2     06-06  Mg     1.80     06-06    TPro  5.3[L]  /  Alb  2.7[L]  /  TBili  2.9[H]  /  DBili  1.1[H]  /  AST  38[H]  /  ALT  20  /  AlkPhos  98  06-06  
                      8.1    3.70  )-----------( 45       ( 09 Jun 2025 05:30 )             24.9   06-09    141  |  109[H]  |  9   ----------------------------<  106[H]  3.8   |  24  |  0.47[L]    Ca    7.9[L]      09 Jun 2025 05:30  Phos  2.5     06-09  Mg     1.60     06-09    TPro  5.1[L]  /  Alb  2.6[L]  /  TBili  2.0[H]  /  DBili  0.8[H]  /  AST  37[H]  /  ALT  21  /  AlkPhos  122[H]  06-09

## 2025-06-09 NOTE — PROGRESS NOTE ADULT - ASSESSMENT
70F w/ ETOH abuse, cognitive impairment, depression, and anxiety here for hypoxia and AMS. She was in the ER earlier then left AMA but returned for concerning sxs from the family member. She is resting comfortably but lethargic appearing.     -EKG reviewed, NSR w/ nonspecific STT changes   -troponin has been negative x 2   -CTPA last ER admission neg for PE   -TTE ECHO w/ likely severe AS- OP cardiology f/u   -cont coreg 3.125 mg bid     Aida Hummel MD [facility]  Attending Interventional Cardiologist, [facility].   Avaliable on [email or web address] Team

## 2025-06-09 NOTE — BH CONSULTATION LIAISON PROGRESS NOTE - NSBHASSESSMENTFT_PSY_ALL_CORE
69yo female with PPHx depression and anxiety (on Trazodone/Zoloft) PMHx chronic pain (sees pain management), polysubstance abuse disorder(heroin, etoh-25yrs sober),  cognitive impairment (undergoing workup w neurologist), PN (coma 10days), ?Cirrhosis (seen on imaging yesterday) presenting to emergency room for low oxygen level and altered mental status at home. Patient left AMA from the ER yesterday after negative CT head and CT chest without pulmonary embolism but some signs of pulmonary edema, cirrhosis, splenomegaly. [facility] psychiatry consulted for medication management. Patient received Haldol 5mg + Ativan 2mg in ED.    6/6: Patient seen, lethargic, having difficulty keeping eyes open and requires frequent verbal prompts, pt a&ox3, is unsure why she is in the hospital, denies suicidal ideation, denies auditory/visual hallucinations, denies any alcohol use, unable to answer any further questions d/t lethargy. Collateral obtained from patient's cousin, Kaitlin who endorses patient with episodes of "totally out of it," "going down hill," cousin endorses patient with hx polysubstance abuse, cousin endorses patient could possible be abusing trazodone as well as pain meds prescribed by pain management, cousin endorses patient with high anxiety, poor sleep. Patient with no current outpatient psychiatric provider, no hx of inpatient psychiatric admissions, no hx suicidality.     6/7: AOx3. Less sedated with more spontaneous speech. Reports feeling frustrated that she is at the hospital. reports appetite and sleep is fair. Reports she's had depression and anxiety and has been on zoloft and tradozone for years. Denies current depression, hopelessness. Denies SI/I/P. Denies HI/AVH. Reports feeling anxious and that the zoloft really helps with her anxiety. Very mild asterixis BL.     6/9: calm, cooperative, reports feeling at cognitive baseline. linear, logical. no SI or HI    PLAN:  -defer obs status to primary team, on enhanced supervision at present  -Appreciate ongoing medical workup for organic cause for altered mental status  -Repeat EKG and if qtc prolonged please consult cardiology for manual calculation  -Toxicology screen  -can c/w Zoloft 50mg daily for now; however low threshold to discontinue due to high bleeding risk (plt 44, INR 1.57)  -re-start home trazodone 50mg qhs  -Severe Agitation Only after diversional activities have been attempted: Haldol 0.5mg q6h prn, if ineffective after 30minutes can give additional 0.5mg if qtc <500       71yo female with PPHx depression and anxiety (on Trazodone/Zoloft) PMHx chronic pain (sees pain management), polysubstance abuse disorder(heroin, etoh-25yrs sober),  cognitive impairment (undergoing workup w neurologist), PN (coma 10days), ?Cirrhosis (seen on imaging yesterday) presenting to emergency room for low oxygen level and altered mental status at home. Patient left AMA from the ER yesterday after negative CT head and CT chest without pulmonary embolism but some signs of pulmonary edema, cirrhosis, splenomegaly. [facility] psychiatry consulted for medication management. Patient received Haldol 5mg + Ativan 2mg in ED.    6/6: Patient seen, lethargic, having difficulty keeping eyes open and requires frequent verbal prompts, pt a&ox3, is unsure why she is in the hospital, denies suicidal ideation, denies auditory/visual hallucinations, denies any alcohol use, unable to answer any further questions d/t lethargy. Collateral obtained from patient's cousin, Kaitlin who endorses patient with episodes of "totally out of it," "going down hill," cousin endorses patient with hx polysubstance abuse, cousin endorses patient could possible be abusing trazodone as well as pain meds prescribed by pain management, cousin endorses patient with high anxiety, poor sleep. Patient with no current outpatient psychiatric provider, no hx of inpatient psychiatric admissions, no hx suicidality.     6/7: AOx3. Less sedated with more spontaneous speech. Reports feeling frustrated that she is at the hospital. reports appetite and sleep is fair. Reports she's had depression and anxiety and has been on zoloft and tradozone for years. Denies current depression, hopelessness. Denies SI/I/P. Denies HI/AVH. Reports feeling anxious and that the zoloft really helps with her anxiety. Very mild asterixis BL.     6/9: calm, cooperative, reports feeling at cognitive baseline. linear, logical. no SI or HI    PLAN:  -defer obs status to primary team, on enhanced supervision at present  -Appreciate ongoing medical workup for organic cause for altered mental status  -Repeat EKG and if qtc prolonged please consult cardiology for manual calculation  -Toxicology screen  -can c/w Zoloft 50mg daily for now; however low threshold to discontinue due to high bleeding risk (plt 44, INR 1.57)  -re-start home trazodone 50mg qhs, continue to monitor LFTS , being followed by hepatology on lactulose   -Severe Agitation Only after diversional activities have been attempted: Haldol 0.5mg q6h prn, if ineffective after 30minutes can give additional 0.5mg if qtc <500       69yo female with PPHx depression and anxiety (on Trazodone/Zoloft) PMHx chronic pain (sees pain management), polysubstance abuse disorder(heroin, etoh-25yrs sober),  cognitive impairment (undergoing workup w neurologist), PN (coma 10days), ?Cirrhosis (seen on imaging yesterday) presenting to emergency room for low oxygen level and altered mental status at home. Patient left AMA from the ER yesterday after negative CT head and CT chest without pulmonary embolism but some signs of pulmonary edema, cirrhosis, splenomegaly. [facility] psychiatry consulted for medication management. Patient received Haldol 5mg + Ativan 2mg in ED.    6/6: Patient seen, lethargic, having difficulty keeping eyes open and requires frequent verbal prompts, pt a&ox3, is unsure why she is in the hospital, denies suicidal ideation, denies auditory/visual hallucinations, denies any alcohol use, unable to answer any further questions d/t lethargy. Collateral obtained from patient's cousin, Kaitlin who endorses patient with episodes of "totally out of it," "going down hill," cousin endorses patient with hx polysubstance abuse, cousin endorses patient could possible be abusing trazodone as well as pain meds prescribed by pain management, cousin endorses patient with high anxiety, poor sleep. Patient with no current outpatient psychiatric provider, no hx of inpatient psychiatric admissions, no hx suicidality.     6/7: AOx3. Less sedated with more spontaneous speech. Reports feeling frustrated that she is at the hospital. reports appetite and sleep is fair. Reports she's had depression and anxiety and has been on zoloft and tradozone for years. Denies current depression, hopelessness. Denies SI/I/P. Denies HI/AVH. Reports feeling anxious and that the zoloft really helps with her anxiety. Very mild asterixis BL.     6/9: calm, cooperative, reports feeling at cognitive baseline. linear, logical. no SI or HI    PLAN:  -defer obs status to primary team, on video monitoring at present - no psychiatric indication for increased level of observation  -Appreciate ongoing medical workup for organic cause for altered mental status  -Repeat EKG and if qtc prolonged please consult cardiology for manual calculation  -Toxicology screen  -can c/w Zoloft 50mg daily for now; however low threshold to discontinue due to high bleeding risk (plt 44, INR 1.57)  -re-start home trazodone 50mg qhs, continue to monitor LFTS , being followed by hepatology on lactulose   -Severe Agitation Only after diversional activities have been attempted: Haldol 0.5mg q6h prn, if ineffective after 30minutes can give additional 0.5mg if qtc <500

## 2025-06-10 LAB
ALBUMIN SERPL ELPH-MCNC: 2.7 G/DL — LOW (ref 3.3–5)
ALP SERPL-CCNC: 117 U/L — SIGNIFICANT CHANGE UP (ref 40–120)
ALT FLD-CCNC: 20 U/L — SIGNIFICANT CHANGE UP (ref 4–33)
ANION GAP SERPL CALC-SCNC: 9 MMOL/L — SIGNIFICANT CHANGE UP (ref 7–14)
APTT BLD: 34.5 SEC — SIGNIFICANT CHANGE UP (ref 26.1–36.8)
AST SERPL-CCNC: 33 U/L — HIGH (ref 4–32)
BILIRUB SERPL-MCNC: 1.8 MG/DL — HIGH (ref 0.2–1.2)
BUN SERPL-MCNC: 9 MG/DL — SIGNIFICANT CHANGE UP (ref 7–23)
CALCIUM SERPL-MCNC: 8.1 MG/DL — LOW (ref 8.4–10.5)
CHLORIDE SERPL-SCNC: 111 MMOL/L — HIGH (ref 98–107)
CO2 SERPL-SCNC: 24 MMOL/L — SIGNIFICANT CHANGE UP (ref 22–31)
CREAT SERPL-MCNC: 0.54 MG/DL — SIGNIFICANT CHANGE UP (ref 0.5–1.3)
EGFR: 99 ML/MIN/1.73M2 — SIGNIFICANT CHANGE UP
EGFR: 99 ML/MIN/1.73M2 — SIGNIFICANT CHANGE UP
GLUCOSE SERPL-MCNC: 87 MG/DL — SIGNIFICANT CHANGE UP (ref 70–99)
HCT VFR BLD CALC: 26.3 % — LOW (ref 34.5–45)
HGB BLD-MCNC: 8.3 G/DL — LOW (ref 11.5–15.5)
INR BLD: 1.7 RATIO — HIGH (ref 0.85–1.16)
MAGNESIUM SERPL-MCNC: 1.7 MG/DL — SIGNIFICANT CHANGE UP (ref 1.6–2.6)
MCHC RBC-ENTMCNC: 29.1 PG — SIGNIFICANT CHANGE UP (ref 27–34)
MCHC RBC-ENTMCNC: 31.6 G/DL — LOW (ref 32–36)
MCV RBC AUTO: 92.3 FL — SIGNIFICANT CHANGE UP (ref 80–100)
NRBC # BLD AUTO: 0 K/UL — SIGNIFICANT CHANGE UP (ref 0–0)
NRBC # FLD: 0 K/UL — SIGNIFICANT CHANGE UP (ref 0–0)
NRBC BLD AUTO-RTO: 0 /100 WBCS — SIGNIFICANT CHANGE UP (ref 0–0)
PHOSPHATE SERPL-MCNC: 3.2 MG/DL — SIGNIFICANT CHANGE UP (ref 2.5–4.5)
PLATELET # BLD AUTO: 38 K/UL — LOW (ref 150–400)
POTASSIUM SERPL-MCNC: 4.1 MMOL/L — SIGNIFICANT CHANGE UP (ref 3.5–5.3)
POTASSIUM SERPL-SCNC: 4.1 MMOL/L — SIGNIFICANT CHANGE UP (ref 3.5–5.3)
PROT SERPL-MCNC: 5.2 G/DL — LOW (ref 6–8.3)
PROTHROM AB SERPL-ACNC: 20.1 SEC — HIGH (ref 9.9–13.4)
RBC # BLD: 2.85 M/UL — LOW (ref 3.8–5.2)
RBC # FLD: 17.3 % — HIGH (ref 10.3–14.5)
SODIUM SERPL-SCNC: 144 MMOL/L — SIGNIFICANT CHANGE UP (ref 135–145)
WBC # BLD: 2.93 K/UL — LOW (ref 3.8–10.5)
WBC # FLD AUTO: 2.93 K/UL — LOW (ref 3.8–10.5)

## 2025-06-10 RX ADMIN — Medication 1 TABLET(S): at 11:15

## 2025-06-10 RX ADMIN — Medication 102.5 MILLIGRAM(S): at 12:57

## 2025-06-10 RX ADMIN — Medication 50 MILLIGRAM(S): at 21:28

## 2025-06-10 RX ADMIN — CARVEDILOL 3.12 MILLIGRAM(S): 3.12 TABLET, FILM COATED ORAL at 06:06

## 2025-06-10 RX ADMIN — CARVEDILOL 3.12 MILLIGRAM(S): 3.12 TABLET, FILM COATED ORAL at 17:37

## 2025-06-10 RX ADMIN — SERTRALINE 50 MILLIGRAM(S): 100 TABLET, FILM COATED ORAL at 11:14

## 2025-06-10 RX ADMIN — Medication 1 APPLICATION(S): at 06:05

## 2025-06-10 RX ADMIN — LACTULOSE 10 GRAM(S): 10 SOLUTION ORAL at 21:28

## 2025-06-10 RX ADMIN — Medication 3 MILLIGRAM(S): at 21:28

## 2025-06-10 RX ADMIN — FOLIC ACID 1 MILLIGRAM(S): 1 TABLET ORAL at 11:15

## 2025-06-10 NOTE — PROGRESS NOTE ADULT - ASSESSMENT
70-year-old female past medical history of alcohol use disorder, cognitive impairment undergoing workup, depression, anxiety, ?Cirrhosis seen on imaging yesterday presenting to emergency room for low oxygen level and altered mental status at home.  Patient reports that her cousin wanted her to come in and dropped her off, she feels fine and has no complaints.  Collateral information was obtained from her cousin Jc at phone number [phone].  Jc helps her with all of her medical appointments and at home, patient's remain is Leonarda.  Jc reports that she has been undergoing testing with a neurologist due to worsening cognitive impairment and early dementia including forgetting her wallet keys phone leaving the stove on being confused, and over the last couple of months she has had multiple episodes of syncope versus unresponsiveness.  On 2 occasions she has fallen to the ground, on other occasions she has appeared altered bobbing her head from side-to-side and unresponsive and will have low reading O2 sat during these episodes.  Patient left AMA from the ER yesterday after negative CT head and CT chest without pulmonary embolism but some signs of pulmonary edema, cirrhosis, splenomegaly.  Needed does not feel that the patient has capacity to leave AMA.  She reports it has been difficult for them to get cardiology appointment symptoms the schedule of many months. Pt declines active smoking, drug use, alcohol use. (06 Jun 2025 09:59)    called jc to get more information above noted: : she is on 2 L : 10 days ago " she was OK:  forgetful  sometimes she is off:  she is seeing a neurologist and one more tst is pending to conform for dementia: ;  she does not have any ling problems  has second hand smoking:  she did have oxygen three years ago :  she was on oxygen  : but she gave h er back ;   she was seeing a pulm before for breathing:  sheis using on albuterol  prn ?    never had pe or dvt before   she fell about three months ago and was bruised:  given atfer pill:  and after that better   last month she fell down three  ti mes:       S/P fall : confusion ;? developing dementia   remote alcohol abuser   Cirrhosis  Anxiety/ Depression      S/P fall : confusion ;? developing dementia   she fell down and was found to be hypoxic  per his sister:  at [facility] three years ago  :  she was on h9ome oxygen  and then she returned it:   she lives with somebody   who smokes  so has passive smoking:   CTA on admission showed: No pulmonary embolus. Mild interlobular septal thickening which may represent mild edema. Mediastinal lymphadenopathy, stable and of uncertain etiology. Correlate clinically. Cirrhotic morphology of the liver. Splenomegaly.  Diffuse esophageal wall thickening which can be seen with esophagitis.  Correlate clinically.  check echo and probnp: though initial is low   she has no pneumonia   VBG is OK;    6/7:  sheis much m ore alert and awake and is more oriented   on 2 Lof oxygen  :   seems better then yesterday    her ammonia is high :  started on   lactulose for some time   she still seems slightly confused    Hepatology following    6/8: seems to be alert and awake and responding to questions appropriately:  she used to take zoloft and trazadone/;l  denanding to start them again ;     6/10; seems to be doing  ok ;  no sob:  she is n 2 L of oxygen ; can try to wean off the oxygen     she is on rifaxiin and lactulose       remote alcohol abuser/ Cirrhosis  cirrhosis demonstrated on ct:   check ammonia:   she used to drink before quiet a bit     6/7: ammonia is high:   ? start lactulose: ? gi consult     6/8: doing  ok ;  no sob:   cont current rx:   on lactulose:  gi following      6/9; she seems to be doing  ok : wants zoloft and trazadone to be started;  she says shehas been taking for last thirty years;   ?psych consult:   she could ot sleel last night:  trazodone she was using forsleep      6/10: she was able to sleep last night: fels OK;     Anxiety/ Depression  she is on psych medications,  and the sister believes that she is conufsed secondary to these drugs    6/8: restart zoloft and trazodone one by one     6/9: defer to primary team       dvt prophylaxis    dw acp

## 2025-06-11 ENCOUNTER — RESULT REVIEW (OUTPATIENT)
Age: 70
End: 2025-06-11

## 2025-06-11 LAB
ALBUMIN SERPL ELPH-MCNC: 2.6 G/DL — LOW (ref 3.3–5)
ALP SERPL-CCNC: 142 U/L — HIGH (ref 40–120)
ALT FLD-CCNC: 20 U/L — SIGNIFICANT CHANGE UP (ref 4–33)
ANION GAP SERPL CALC-SCNC: 9 MMOL/L — SIGNIFICANT CHANGE UP (ref 7–14)
AST SERPL-CCNC: 35 U/L — HIGH (ref 4–32)
BILIRUB SERPL-MCNC: 1.4 MG/DL — HIGH (ref 0.2–1.2)
BUN SERPL-MCNC: 10 MG/DL — SIGNIFICANT CHANGE UP (ref 7–23)
CALCIUM SERPL-MCNC: 8.1 MG/DL — LOW (ref 8.4–10.5)
CHLORIDE SERPL-SCNC: 110 MMOL/L — HIGH (ref 98–107)
CO2 SERPL-SCNC: 23 MMOL/L — SIGNIFICANT CHANGE UP (ref 22–31)
CREAT SERPL-MCNC: 0.46 MG/DL — LOW (ref 0.5–1.3)
EGFR: 103 ML/MIN/1.73M2 — SIGNIFICANT CHANGE UP
EGFR: 103 ML/MIN/1.73M2 — SIGNIFICANT CHANGE UP
GLUCOSE SERPL-MCNC: 96 MG/DL — SIGNIFICANT CHANGE UP (ref 70–99)
HCT VFR BLD CALC: 26.9 % — LOW (ref 34.5–45)
HGB BLD-MCNC: 8.6 G/DL — LOW (ref 11.5–15.5)
INR BLD: 1.65 RATIO — HIGH (ref 0.85–1.16)
MAGNESIUM SERPL-MCNC: 1.8 MG/DL — SIGNIFICANT CHANGE UP (ref 1.6–2.6)
MCHC RBC-ENTMCNC: 29.7 PG — SIGNIFICANT CHANGE UP (ref 27–34)
MCHC RBC-ENTMCNC: 32 G/DL — SIGNIFICANT CHANGE UP (ref 32–36)
MCV RBC AUTO: 92.8 FL — SIGNIFICANT CHANGE UP (ref 80–100)
NRBC # BLD AUTO: 0 K/UL — SIGNIFICANT CHANGE UP (ref 0–0)
NRBC # FLD: 0 K/UL — SIGNIFICANT CHANGE UP (ref 0–0)
NRBC BLD AUTO-RTO: 0 /100 WBCS — SIGNIFICANT CHANGE UP (ref 0–0)
PETH 16:0/18:1: NEGATIVE NG/ML — SIGNIFICANT CHANGE UP
PETH 16:0/18:2: NEGATIVE NG/ML — SIGNIFICANT CHANGE UP
PETH COMMENTS: SIGNIFICANT CHANGE UP
PHOSPHATE SERPL-MCNC: 3.4 MG/DL — SIGNIFICANT CHANGE UP (ref 2.5–4.5)
PLATELET # BLD AUTO: 48 K/UL — LOW (ref 150–400)
POTASSIUM SERPL-MCNC: 3.8 MMOL/L — SIGNIFICANT CHANGE UP (ref 3.5–5.3)
POTASSIUM SERPL-SCNC: 3.8 MMOL/L — SIGNIFICANT CHANGE UP (ref 3.5–5.3)
PROT SERPL-MCNC: 5.2 G/DL — LOW (ref 6–8.3)
PROTHROM AB SERPL-ACNC: 19 SEC — HIGH (ref 9.9–13.4)
RBC # BLD: 2.9 M/UL — LOW (ref 3.8–5.2)
RBC # FLD: 17.5 % — HIGH (ref 10.3–14.5)
SODIUM SERPL-SCNC: 142 MMOL/L — SIGNIFICANT CHANGE UP (ref 135–145)
WBC # BLD: 3.58 K/UL — LOW (ref 3.8–10.5)
WBC # FLD AUTO: 3.58 K/UL — LOW (ref 3.8–10.5)

## 2025-06-11 PROCEDURE — 71045 X-RAY EXAM CHEST 1 VIEW: CPT | Mod: 26

## 2025-06-11 PROCEDURE — 93321 DOPPLER ECHO F-UP/LMTD STD: CPT | Mod: 26

## 2025-06-11 PROCEDURE — 93308 TTE F-UP OR LMTD: CPT | Mod: 26,GC

## 2025-06-11 RX ADMIN — LACTULOSE 10 GRAM(S): 10 SOLUTION ORAL at 06:03

## 2025-06-11 RX ADMIN — LACTULOSE 10 GRAM(S): 10 SOLUTION ORAL at 18:01

## 2025-06-11 RX ADMIN — CARVEDILOL 3.12 MILLIGRAM(S): 3.12 TABLET, FILM COATED ORAL at 18:00

## 2025-06-11 RX ADMIN — Medication 3 MILLIGRAM(S): at 21:11

## 2025-06-11 RX ADMIN — FOLIC ACID 1 MILLIGRAM(S): 1 TABLET ORAL at 12:50

## 2025-06-11 RX ADMIN — LACTULOSE 10 GRAM(S): 10 SOLUTION ORAL at 21:10

## 2025-06-11 RX ADMIN — SERTRALINE 50 MILLIGRAM(S): 100 TABLET, FILM COATED ORAL at 12:50

## 2025-06-11 RX ADMIN — Medication 102.5 MILLIGRAM(S): at 18:07

## 2025-06-11 RX ADMIN — CARVEDILOL 3.12 MILLIGRAM(S): 3.12 TABLET, FILM COATED ORAL at 06:03

## 2025-06-11 RX ADMIN — Medication 50 MILLIGRAM(S): at 21:11

## 2025-06-11 RX ADMIN — Medication 1 APPLICATION(S): at 06:03

## 2025-06-11 RX ADMIN — Medication 1 TABLET(S): at 12:50

## 2025-06-11 NOTE — PHYSICAL THERAPY INITIAL EVALUATION ADULT - PERTINENT HX OF CURRENT PROBLEM, REHAB EVAL
70-year-old female past medical history of alcohol use disorder, cognitive impairment undergoing workup, depression, anxiety, ?Cirrhosis seen on imaging yesterday presenting to emergency room for low oxygen level and altered mental status at home. Patient reports that her cousin wanted her to come in and dropped her off, she feels fine and has no complaints. Kaitlin helps her with all of her medical appointments and at home, patient's remain is Leonarda.  Kaitlin reports that she has been undergoing testing with a neurologist due to worsening cognitive impairment and early dementia including forgetting her wallet keys phone leaving the stove on being confused, and over the last couple of months she has had multiple episodes of syncope versus unresponsiveness. On 2 occasions she has fallen to the ground, on other occasions she has appeared altered bobbing her head from side-to-side and unresponsive and will have low reading oxygen sat during these episodes. Patient left AMA from the ER yesterday after negative CT head and CT chest without pulmonary embolism but some signs of pulmonary edema, cirrhosis, splenomegaly. Needed does not feel that the patient has capacity to leave AMA. She reports it has been difficult for them to get cardiology appointment symptoms the schedule of many months. Pt declines active smoking, drug use, alcohol use.

## 2025-06-11 NOTE — PHYSICAL THERAPY INITIAL EVALUATION ADULT - ADDITIONAL COMMENTS
Pt lives in an apartment with partner, +1 flight to negotiate, was independent with all functional mobility and ADL performance without use of an assistive device. Pt denies use of home oxygen.    Pt left seated in chair at bedside, all lines intact, all needs in reach, in NAD. Chair alarm set, Heart rate 60 beats per minute.

## 2025-06-11 NOTE — PHYSICAL THERAPY INITIAL EVALUATION ADULT - WEIGHT-BEARING RESTRICTIONS: STAND/SIT, REHAB EVAL
Not actively prescribed, no lipid on file.   
Patient is calling again asking for atorvastatin refill, ran out last night. Asking for call when it is sent.  
Patient needs a refill for prescription atorvastatin (LIPITOR) 20 MG tablet  
Pt notified to come in for labs.  
Refill sent.  
She needs to have a cholesterol level done- there is none on record here.  I entered the order  She will need to come in fasting  please let the patient know  
full weight-bearing

## 2025-06-11 NOTE — PROGRESS NOTE ADULT - ASSESSMENT
70-year-old female past medical history of alcohol use disorder, cognitive impairment undergoing workup, depression, anxiety, ?Cirrhosis seen on imaging yesterday presenting to emergency room for low oxygen level and altered mental status at home.  Patient reports that her cousin wanted her to come in and dropped her off, she feels fine and has no complaints.  Collateral information was obtained from her cousin Jc at phone number [phone].  Jc helps her with all of her medical appointments and at home, patient's remain is Leonarda.  Jc reports that she has been undergoing testing with a neurologist due to worsening cognitive impairment and early dementia including forgetting her wallet keys phone leaving the stove on being confused, and over the last couple of months she has had multiple episodes of syncope versus unresponsiveness.  On 2 occasions she has fallen to the ground, on other occasions she has appeared altered bobbing her head from side-to-side and unresponsive and will have low reading O2 sat during these episodes.  Patient left AMA from the ER yesterday after negative CT head and CT chest without pulmonary embolism but some signs of pulmonary edema, cirrhosis, splenomegaly.  Needed does not feel that the patient has capacity to leave AMA.  She reports it has been difficult for them to get cardiology appointment symptoms the schedule of many months. Pt declines active smoking, drug use, alcohol use. (06 Jun 2025 09:59)    called jc to get more information above noted: : she is on 2 L : 10 days ago " she was OK:  forgetful  sometimes she is off:  she is seeing a neurologist and one more tst is pending to conform for dementia: ;  she does not have any ling problems  has second hand smoking:  she did have oxygen three years ago :  she was on oxygen  : but she gave h er back ;   she was seeing a pulm before for breathing:  sheis using on albuterol  prn ?    never had pe or dvt before   she fell about three months ago and was bruised:  given atfer pill:  and after that better   last month she fell down three  ti mes:       S/P fall : confusion ;? developing dementia   remote alcohol abuser   Cirrhosis  Anxiety/ Depression      S/P fall : confusion ;? developing dementia   she fell down and was found to be hypoxic  per his sister:  at [facility] three years ago  :  she was on h9ome oxygen  and then she returned it:   she lives with somebody   who smokes  so has passive smoking:   CTA on admission showed: No pulmonary embolus. Mild interlobular septal thickening which may represent mild edema. Mediastinal lymphadenopathy, stable and of uncertain etiology. Correlate clinically. Cirrhotic morphology of the liver. Splenomegaly.  Diffuse esophageal wall thickening which can be seen with esophagitis.  Correlate clinically.  check echo and probnp: though initial is low   she has no pneumonia   VBG is OK;    6/7:  sheis much m ore alert and awake and is more oriented   on 2 Lof oxygen  :   seems better then yesterday    her ammonia is high :  started on   lactulose for some time   she still seems slightly confused    Hepatology following    6/8: seems to be alert and awake and responding to questions appropriately:  she used to take zoloft and trazadone/;l  denanding to start them again ;     6/10; seems to be doing  ok ;  no sob:  she is n 2 L of oxygen ; can try to wean off the oxygen     she is on rifaxiin and lactulose     6/11; she seems OK;  but she desats on room air:  need to have limiteed echo for AVM as she has cirrhosis       remote alcohol abuser/ Cirrhosis  cirrhosis demonstrated on ct:   check ammonia:   she used to drink before quiet a bit     6/7: ammonia is high:   ? start lactulose: ? gi consult     6/8: doing  ok ;  no sob:   cont current rx:   on lactulose:  gi following      6/9; she seems to be doing  ok : wants zoloft and trazadone to be started;  she says shehas been taking for last thirty years;   ?psych consult:   she could ot sleel last night:  trazodone she was using forsleep      6/10: she was able to sleep last night: fels OK;     6/11: cont current treatment     Anxiety/ Depression  she is on psych medications,  and the sister believes that she is conufsed secondary to these drugs    6/8: restart zoloft and trazodone one by one     6/9: defer to primary team       dvt prophylaxis    dw acp

## 2025-06-11 NOTE — PHYSICAL THERAPY INITIAL EVALUATION ADULT - GENERAL OBSERVATIONS, REHAB EVAL
Pt left seated in chair at bedside, +nasal cannula, +telemetry, all lines intact, in NAD. Pt agreeable to PT evaluation

## 2025-06-11 NOTE — PROGRESS NOTE ADULT - ASSESSMENT
70-year-old female past medical history of alcohol use disorder, cognitive impairment undergoing workup, depression, anxiety, ?Cirrhosis seen on imaging presented with low o2 sats and encephalopathy, left AMA now returned   Has been undergoing w./u for dementia as OP  CTh 6/5 with chronic ischemic changes  Noted to have elevated ammonia on admission, likely from her cirrhosis  started on lactulose and rifaxamin , hepatology is following  thrombocytopenia from coagulopathy  was taken off sedating meds - Trazadone and zoloft , both of which have since been restarted  o2 status better, some episodes of desat on room air    Encephalopathy - hepatic?  Underlying cognitive imapriment/dementia - likely related to long standing etoh use, vascular disease   Thrombocytopenia    - has cirrhosis, monitor ammonia, use rifaximin and lactulose   - as mental status better, ok to restart trazadone as needed for sleep as long as does not impair mental status  - check folate, b12, tsh, rpr if not yet checked  - ciwa protocol per primary team   - s/p IV thiamine  - would also supplement folate, b12  - would hold off on mri and eeg as mental status appears to be improving, can do as outpatient for dementia w/u  - trazadone and sertaline were restarted, would monitor mental status- sleep seems to be better  - move to window bed if able   - scds

## 2025-06-12 LAB
ALBUMIN SERPL ELPH-MCNC: 2.5 G/DL — LOW (ref 3.3–5)
ALP SERPL-CCNC: 125 U/L — HIGH (ref 40–120)
ALT FLD-CCNC: 21 U/L — SIGNIFICANT CHANGE UP (ref 4–33)
ANION GAP SERPL CALC-SCNC: 7 MMOL/L — SIGNIFICANT CHANGE UP (ref 7–14)
AST SERPL-CCNC: 36 U/L — HIGH (ref 4–32)
BILIRUB SERPL-MCNC: 1.6 MG/DL — HIGH (ref 0.2–1.2)
BUN SERPL-MCNC: 9 MG/DL — SIGNIFICANT CHANGE UP (ref 7–23)
CALCIUM SERPL-MCNC: 8.3 MG/DL — LOW (ref 8.4–10.5)
CHLORIDE SERPL-SCNC: 110 MMOL/L — HIGH (ref 98–107)
CO2 SERPL-SCNC: 23 MMOL/L — SIGNIFICANT CHANGE UP (ref 22–31)
CREAT SERPL-MCNC: 0.46 MG/DL — LOW (ref 0.5–1.3)
CULTURE RESULTS: SIGNIFICANT CHANGE UP
EGFR: 103 ML/MIN/1.73M2 — SIGNIFICANT CHANGE UP
EGFR: 103 ML/MIN/1.73M2 — SIGNIFICANT CHANGE UP
GLUCOSE SERPL-MCNC: 83 MG/DL — SIGNIFICANT CHANGE UP (ref 70–99)
HCT VFR BLD CALC: 26.1 % — LOW (ref 34.5–45)
HGB BLD-MCNC: 8.3 G/DL — LOW (ref 11.5–15.5)
MAGNESIUM SERPL-MCNC: 1.7 MG/DL — SIGNIFICANT CHANGE UP (ref 1.6–2.6)
MCHC RBC-ENTMCNC: 30 PG — SIGNIFICANT CHANGE UP (ref 27–34)
MCHC RBC-ENTMCNC: 31.8 G/DL — LOW (ref 32–36)
MCV RBC AUTO: 94.2 FL — SIGNIFICANT CHANGE UP (ref 80–100)
NRBC # BLD AUTO: 0 K/UL — SIGNIFICANT CHANGE UP (ref 0–0)
NRBC # FLD: 0 K/UL — SIGNIFICANT CHANGE UP (ref 0–0)
NRBC BLD AUTO-RTO: 0 /100 WBCS — SIGNIFICANT CHANGE UP (ref 0–0)
PHOSPHATE SERPL-MCNC: 3.7 MG/DL — SIGNIFICANT CHANGE UP (ref 2.5–4.5)
PLATELET # BLD AUTO: 42 K/UL — LOW (ref 150–400)
POTASSIUM SERPL-MCNC: 4.4 MMOL/L — SIGNIFICANT CHANGE UP (ref 3.5–5.3)
POTASSIUM SERPL-SCNC: 4.4 MMOL/L — SIGNIFICANT CHANGE UP (ref 3.5–5.3)
PROT SERPL-MCNC: 5.2 G/DL — LOW (ref 6–8.3)
RBC # BLD: 2.77 M/UL — LOW (ref 3.8–5.2)
RBC # FLD: 17.5 % — HIGH (ref 10.3–14.5)
SODIUM SERPL-SCNC: 140 MMOL/L — SIGNIFICANT CHANGE UP (ref 135–145)
SPECIMEN SOURCE: SIGNIFICANT CHANGE UP
WBC # BLD: 3.69 K/UL — LOW (ref 3.8–10.5)
WBC # FLD AUTO: 3.69 K/UL — LOW (ref 3.8–10.5)

## 2025-06-12 PROCEDURE — 99233 SBSQ HOSP IP/OBS HIGH 50: CPT

## 2025-06-12 RX ORDER — BENZONATATE 100 MG
100 CAPSULE ORAL THREE TIMES A DAY
Refills: 0 | Status: DISCONTINUED | OUTPATIENT
Start: 2025-06-12 | End: 2025-06-17

## 2025-06-12 RX ADMIN — Medication 100 MILLIGRAM(S): at 20:40

## 2025-06-12 RX ADMIN — Medication 1 TABLET(S): at 12:37

## 2025-06-12 RX ADMIN — FOLIC ACID 1 MILLIGRAM(S): 1 TABLET ORAL at 12:37

## 2025-06-12 RX ADMIN — LACTULOSE 10 GRAM(S): 10 SOLUTION ORAL at 21:17

## 2025-06-12 RX ADMIN — SERTRALINE 50 MILLIGRAM(S): 100 TABLET, FILM COATED ORAL at 12:37

## 2025-06-12 RX ADMIN — CARVEDILOL 3.12 MILLIGRAM(S): 3.12 TABLET, FILM COATED ORAL at 17:31

## 2025-06-12 RX ADMIN — CARVEDILOL 3.12 MILLIGRAM(S): 3.12 TABLET, FILM COATED ORAL at 05:05

## 2025-06-12 RX ADMIN — Medication 3 MILLIGRAM(S): at 21:17

## 2025-06-12 RX ADMIN — Medication 50 MILLIGRAM(S): at 21:18

## 2025-06-12 RX ADMIN — Medication 102.5 MILLIGRAM(S): at 13:06

## 2025-06-12 RX ADMIN — LACTULOSE 10 GRAM(S): 10 SOLUTION ORAL at 05:05

## 2025-06-12 NOTE — PROGRESS NOTE ADULT - ASSESSMENT
70-year-old female past medical history of alcohol use disorder, cognitive impairment undergoing workup, depression, anxiety, ?Cirrhosis seen on imaging yesterday presenting to emergency room for low oxygen level and altered mental status at home.  Patient reports that her cousin wanted her to come in and dropped her off, she feels fine and has no complaints.  Collateral information was obtained from her cousin Jc at phone number [phone].  Jc helps her with all of her medical appointments and at home, patient's remain is Leonarda.  Jc reports that she has been undergoing testing with a neurologist due to worsening cognitive impairment and early dementia including forgetting her wallet keys phone leaving the stove on being confused, and over the last couple of months she has had multiple episodes of syncope versus unresponsiveness.  On 2 occasions she has fallen to the ground, on other occasions she has appeared altered bobbing her head from side-to-side and unresponsive and will have low reading O2 sat during these episodes.  Patient left AMA from the ER yesterday after negative CT head and CT chest without pulmonary embolism but some signs of pulmonary edema, cirrhosis, splenomegaly.  Needed does not feel that the patient has capacity to leave AMA.  She reports it has been difficult for them to get cardiology appointment symptoms the schedule of many months. Pt declines active smoking, drug use, alcohol use. (06 Jun 2025 09:59)    called jc to get more information above noted: : she is on 2 L : 10 days ago " she was OK:  forgetful  sometimes she is off:  she is seeing a neurologist and one more tst is pending to conform for dementia: ;  she does not have any ling problems  has second hand smoking:  she did have oxygen three years ago :  she was on oxygen  : but she gave h er back ;   she was seeing a pulm before for breathing:  sheis using on albuterol  prn ?    never had pe or dvt before   she fell about three months ago and was bruised:  given atfer pill:  and after that better   last month she fell down three  ti mes:       S/P fall : confusion ;? developing dementia   remote alcohol abuser   Cirrhosis  Anxiety/ Depression      S/P fall : confusion ;? developing dementia   she fell down and was found to be hypoxic  per his sister:  at [facility] three years ago  :  she was on h9ome oxygen  and then she returned it:   she lives with somebody   who smokes  so has passive smoking:   CTA on admission showed: No pulmonary embolus. Mild interlobular septal thickening which may represent mild edema. Mediastinal lymphadenopathy, stable and of uncertain etiology. Correlate clinically. Cirrhotic morphology of the liver. Splenomegaly.  Diffuse esophageal wall thickening which can be seen with esophagitis.  Correlate clinically.  check echo and probnp: though initial is low   she has no pneumonia   VBG is OK;    6/7:  sheis much m ore alert and awake and is more oriented   on 2 Lof oxygen  :   seems better then yesterday    her ammonia is high :  started on   lactulose for some time   she still seems slightly confused    Hepatology following    6/8: seems to be alert and awake and responding to questions appropriately:  she used to take zoloft and trazadone/;l  denanding to start them again ;     6/10; seems to be doing  ok ;  no sob:  she is n 2 L of oxygen ; can try to wean off the oxygen     she is on rifaxiin and lactulose     6/11; she seems OK;  but she desats on room air:  need to have limited echo for AVM as she has cirrhosis     6/12: SHE HAS PULM Avm : NEED PULM ANGIOGRAPHY ; NEED TO GET INTERVENTIONAL CARDIOLOGIST TO follow  if they can block  the AVM ; she will need home oxygen        remote alcohol abuser/ Cirrhosis  cirrhosis demonstrated on ct:   check ammonia:   she used to drink before quiet a bit     6/7: ammonia is high:   ? start lactulose: ? gi consult     6/8: doing  ok ;  no sob:   cont current rx:   on lactulose:  gi following      6/9; she seems to be doing  ok : wants zoloft and trazadone to be started;  she says shehas been taking for last thirty years;   ?psych consult:   she could ot sleel last night:  trazodone she was using forsleep      6/10: she was able to sleep last night: fEels OK;     6/11: cont current treatment   6/12: as above     Anxiety/ Depression  she is on psych medications,  and the sister believes that she is conufsed secondary to these drugs    6/8: restart zoloft and trazodone one by one     6/9: defer to primary team       dvt prophylaxis    dw acp  70-year-old female past medical history of alcohol use disorder, cognitive impairment undergoing workup, depression, anxiety, ?Cirrhosis seen on imaging yesterday presenting to emergency room for low oxygen level and altered mental status at home.  Patient reports that her cousin wanted her to come in and dropped her off, she feels fine and has no complaints.  Collateral information was obtained from her cousin Jc at phone number [phone].  Jc helps her with all of her medical appointments and at home, patient's remain is Leonarda.  Jc reports that she has been undergoing testing with a neurologist due to worsening cognitive impairment and early dementia including forgetting her wallet keys phone leaving the stove on being confused, and over the last couple of months she has had multiple episodes of syncope versus unresponsiveness.  On 2 occasions she has fallen to the ground, on other occasions she has appeared altered bobbing her head from side-to-side and unresponsive and will have low reading O2 sat during these episodes.  Patient left AMA from the ER yesterday after negative CT head and CT chest without pulmonary embolism but some signs of pulmonary edema, cirrhosis, splenomegaly.  Needed does not feel that the patient has capacity to leave AMA.  She reports it has been difficult for them to get cardiology appointment symptoms the schedule of many months. Pt declines active smoking, drug use, alcohol use. (06 Jun 2025 09:59)    called jc to get more information above noted: : she is on 2 L : 10 days ago " she was OK:  forgetful  sometimes she is off:  she is seeing a neurologist and one more tst is pending to conform for dementia: ;  she does not have any ling problems  has second hand smoking:  she did have oxygen three years ago :  she was on oxygen  : but she gave h er back ;   she was seeing a pulm before for breathing:  sheis using on albuterol  prn ?    never had pe or dvt before   she fell about three months ago and was bruised:  given atfer pill:  and after that better   last month she fell down three  ti mes:       S/P fall : confusion ;? developing dementia   remote alcohol abuser   Cirrhosis  Anxiety/ Depression      S/P fall : confusion ;? developing dementia   she fell down and was found to be hypoxic  per his sister:  at [facility] three years ago  :  she was on h9ome oxygen  and then she returned it:   she lives with somebody   who smokes  so has passive smoking:   CTA on admission showed: No pulmonary embolus. Mild interlobular septal thickening which may represent mild edema. Mediastinal lymphadenopathy, stable and of uncertain etiology. Correlate clinically. Cirrhotic morphology of the liver. Splenomegaly.  Diffuse esophageal wall thickening which can be seen with esophagitis.  Correlate clinically.  check echo and probnp: though initial is low   she has no pneumonia   VBG is OK;    6/7:  sheis much m ore alert and awake and is more oriented   on 2 Lof oxygen  :   seems better then yesterday    her ammonia is high :  started on   lactulose for some time   she still seems slightly confused    Hepatology following    6/8: seems to be alert and awake and responding to questions appropriately:  she used to take zoloft and trazadone/;l  demanding to start them again ;     6/10; seems to be doing  ok ;  no sob:  she is n 2 L of oxygen ; can try to wean off the oxygen     she is on rifaxiin and lactulose     6/11; she seems OK;  but she desats on room air:  need to have limited echo for AVM as she has cirrhosis     6/12: SHE HAS PULM AVM : NEED PULM ANGIOGRAPHY ; NEED TO GET INTERVENTIONAL CARDIOLOGIST TO follow  if they can block  the AVM ; she will need home oxygen    DW DR LOLY Montes interventional cardiologist ; they want [facility] first:  would defer to international cardiologist         remote alcohol abuser/ Cirrhosis  cirrhosis demonstrated on ct:   check ammonia:   she used to drink before quiet a bit     6/7: ammonia is high:   ? start lactulose: ? gi consult     6/8: doing  ok ;  no sob:   cont current rx:   on lactulose:  gi following      6/9; she seems to be doing  ok : wants zoloft and trazadone to be started;  she says shehas been taking for last thirty years;   ?psych consult:   she could ot sleel last night:  trazodone she was using forsleep      6/10: she was able to sleep last night: fEels OK;     6/11: cont current treatment   6/12: as above     Anxiety/ Depression  she is on psych medications,  and the sister believes that she is conufsed secondary to these drugs    6/8: restart zoloft and trazodone one by one     6/9: defer to primary team       dvt prophylaxis    dw acp

## 2025-06-12 NOTE — PROGRESS NOTE ADULT - ASSESSMENT
70F w/ ETOH abuse, cognitive impairment, depression, and anxiety here for hypoxia and AMS. She was in the ER earlier then left AMA but returned for concerning sxs from the family member. She is resting comfortably but lethargic appearing.     -EKG reviewed, NSR w/ nonspecific STT changes   -troponin has been negative x 2   -CTPA last ER admission neg for PE   -TTE ECHO w/ likely severe AS- OP cardiology f/u   -repeat TTE reviewed- possible intra-cardiac and extra-cardiac shunt. more likely to be pulm AVM as etiology given the timing of the bubbles- recommend further imaging with pulmonary for eval of pulm AVM.   -cont coreg 3.125 mg bid     Aida Hummel MD FAC  Attending Interventional Cardiologist, [facility]ALLY.   Avaliable on [facility] Team   70F w/ ETOH abuse, cognitive impairment, depression, and anxiety here for hypoxia and AMS. She was in the ER earlier then left AMA but returned for concerning sxs from the family member. She is resting comfortably but lethargic appearing.     -EKG reviewed, NSR w/ nonspecific STT changes   -troponin has been negative x 2   -CTPA last ER admission neg for PE   -TTE ECHO w/ likely severe AS- OP cardiology f/u   -repeat TTE reviewed- possible intra-cardiac and extra-cardiac shunt. more likely to be pulm AVM as etiology given the timing of the bubbles- recommend further imaging with pulmonary for eval of pulm AVM.   -spoke w/ pulm- for ANNE and CTA pulm for further eval   -cont coreg 3.125 mg bid     Aida Hummel MD [facility]  Attending Interventional Cardiologist, [facility]ALLY.   Avaliable on [employer] Team

## 2025-06-12 NOTE — PROGRESS NOTE ADULT - ASSESSMENT
70-year-old female past medical history of alcohol use disorder, cognitive impairment undergoing workup, depression, anxiety, ?Cirrhosis seen on imaging yesterday presenting to emergency room for low oxygen level and altered mental status at home.  Patient reports that her cousin wanted her to come in and dropped her off, she feels fine and has no complaints.    Kaitlin helps her with all of her medical appointments and at home, patient's remain is Leonarda.  Kaitlin reports that she has been undergoing testing with a neurologist due to worsening cognitive impairment and early dementia including forgetting her wallet keys phone leaving the stove on being confused, and over the last couple of months she has had multiple episodes of syncope versus unresponsiveness.  On 2 occasions she has fallen to the ground, on other occasions she has appeared altered bobbing her head from side-to-side and unresponsive and will have low reading O2 sat during these episodes.  Patient left AMA from the ER yesterday after negative CT head and CT chest without pulmonary embolism but some signs of pulmonary edema, cirrhosis, splenomegaly.  Needed does not feel that the patient has capacity to leave AMA.  She reports it has been difficult for them to get cardiology appointment symptoms the schedule of many months. Pt declines active smoking, drug use, alcohol use.    AMS  - likely worsening dementia vs physiatry issues vs alcohol abuse disorder   - psych and neuro fu   - CT head neg  - CIWA protocol  - fall precautions  - safety watch     Anxiety, depression  - cw home meds  - psych fu     Hx of cirrhosis  - cw lactulose and rifaximin  - mehta as per hepatology     Pulmonary AVM  - dw pulm  - may need to transfer to [facility] for procedure     DVT prophylaxis

## 2025-06-12 NOTE — PROGRESS NOTE ADULT - ASSESSMENT
70-year-old female past medical history of alcohol use disorder, cognitive impairment undergoing workup, depression, anxiety, ?Cirrhosis seen on imaging presented with low o2 sats and encephalopathy, left AMA now returned   Has been undergoing w./u for dementia as OP  CTh 6/5 with chronic ischemic changes  Noted to have elevated ammonia on admission, likely from her cirrhosis  started on lactulose and rifaxamin , hepatology is following  thrombocytopenia from coagulopathy  was taken off sedating meds - Trazadone and zoloft , both of which have since been restarted  o2 status better, some episodes of desat on room air    Encephalopathy - hepatic?  Underlying cognitive imapriment/dementia - likely related to long standing etoh use, vascular disease   Thrombocytopenia    - pos txfr to [facility] for eval of Pulm AVM  - has cirrhosis, monitor ammonia, use rifaximin   - as mental status better, ok to restart trazadone as needed for sleep as long as does not impair mental status  - check folate, b12, tsh, rpr if not yet checked  - ciwa protocol per primary team   - s/p IV thiamine  - would also supplement folate, b12  - would hold off on mri and eeg as mental status appears to be improving, can do as outpatient for dementia w/u  - trazadone and sertaline were restarted, would monitor mental status- sleep seems to be better  - move to window bed if able   - scds

## 2025-06-13 LAB
ALBUMIN SERPL ELPH-MCNC: 2.9 G/DL — LOW (ref 3.3–5)
ALP SERPL-CCNC: 139 U/L — HIGH (ref 40–120)
ALT FLD-CCNC: 22 U/L — SIGNIFICANT CHANGE UP (ref 4–33)
ANION GAP SERPL CALC-SCNC: 9 MMOL/L — SIGNIFICANT CHANGE UP (ref 7–14)
AST SERPL-CCNC: 39 U/L — HIGH (ref 4–32)
BILIRUB SERPL-MCNC: 1.6 MG/DL — HIGH (ref 0.2–1.2)
BUN SERPL-MCNC: 9 MG/DL — SIGNIFICANT CHANGE UP (ref 7–23)
CALCIUM SERPL-MCNC: 8.5 MG/DL — SIGNIFICANT CHANGE UP (ref 8.4–10.5)
CHLORIDE SERPL-SCNC: 109 MMOL/L — HIGH (ref 98–107)
CO2 SERPL-SCNC: 23 MMOL/L — SIGNIFICANT CHANGE UP (ref 22–31)
CREAT SERPL-MCNC: 0.46 MG/DL — LOW (ref 0.5–1.3)
EGFR: 103 ML/MIN/1.73M2 — SIGNIFICANT CHANGE UP
EGFR: 103 ML/MIN/1.73M2 — SIGNIFICANT CHANGE UP
GLUCOSE SERPL-MCNC: 94 MG/DL — SIGNIFICANT CHANGE UP (ref 70–99)
HCT VFR BLD CALC: 29.1 % — LOW (ref 34.5–45)
HGB BLD-MCNC: 9 G/DL — LOW (ref 11.5–15.5)
MAGNESIUM SERPL-MCNC: 1.8 MG/DL — SIGNIFICANT CHANGE UP (ref 1.6–2.6)
MCHC RBC-ENTMCNC: 29.2 PG — SIGNIFICANT CHANGE UP (ref 27–34)
MCHC RBC-ENTMCNC: 30.9 G/DL — LOW (ref 32–36)
MCV RBC AUTO: 94.5 FL — SIGNIFICANT CHANGE UP (ref 80–100)
NRBC # BLD AUTO: 0 K/UL — SIGNIFICANT CHANGE UP (ref 0–0)
NRBC # FLD: 0 K/UL — SIGNIFICANT CHANGE UP (ref 0–0)
NRBC BLD AUTO-RTO: 0 /100 WBCS — SIGNIFICANT CHANGE UP (ref 0–0)
PHOSPHATE SERPL-MCNC: 3.8 MG/DL — SIGNIFICANT CHANGE UP (ref 2.5–4.5)
PLATELET # BLD AUTO: 51 K/UL — LOW (ref 150–400)
POTASSIUM SERPL-MCNC: 4 MMOL/L — SIGNIFICANT CHANGE UP (ref 3.5–5.3)
POTASSIUM SERPL-SCNC: 4 MMOL/L — SIGNIFICANT CHANGE UP (ref 3.5–5.3)
PROT SERPL-MCNC: 5.6 G/DL — LOW (ref 6–8.3)
RBC # BLD: 3.08 M/UL — LOW (ref 3.8–5.2)
RBC # FLD: 17.2 % — HIGH (ref 10.3–14.5)
SODIUM SERPL-SCNC: 141 MMOL/L — SIGNIFICANT CHANGE UP (ref 135–145)
WBC # BLD: 3.75 K/UL — LOW (ref 3.8–10.5)
WBC # FLD AUTO: 3.75 K/UL — LOW (ref 3.8–10.5)

## 2025-06-13 RX ADMIN — Medication 50 MILLIGRAM(S): at 21:42

## 2025-06-13 RX ADMIN — CARVEDILOL 3.12 MILLIGRAM(S): 3.12 TABLET, FILM COATED ORAL at 05:17

## 2025-06-13 RX ADMIN — Medication 1 TABLET(S): at 11:52

## 2025-06-13 RX ADMIN — Medication 100 MILLIGRAM(S): at 21:41

## 2025-06-13 RX ADMIN — Medication 1 APPLICATION(S): at 05:23

## 2025-06-13 RX ADMIN — LACTULOSE 10 GRAM(S): 10 SOLUTION ORAL at 21:42

## 2025-06-13 RX ADMIN — CARVEDILOL 3.12 MILLIGRAM(S): 3.12 TABLET, FILM COATED ORAL at 18:20

## 2025-06-13 RX ADMIN — LACTULOSE 10 GRAM(S): 10 SOLUTION ORAL at 05:17

## 2025-06-13 RX ADMIN — FOLIC ACID 1 MILLIGRAM(S): 1 TABLET ORAL at 11:53

## 2025-06-13 RX ADMIN — SERTRALINE 50 MILLIGRAM(S): 100 TABLET, FILM COATED ORAL at 11:52

## 2025-06-13 RX ADMIN — Medication 3 MILLIGRAM(S): at 21:42

## 2025-06-13 NOTE — DIETITIAN INITIAL EVALUATION ADULT - ORAL NUTRITION SUPPLEMENTS
Patient unable to understand ONS when RD offered. Recommend Might Shake 4 oz. (200 kcal, 6 gms pro) TID on meal trays

## 2025-06-13 NOTE — DIETITIAN INITIAL EVALUATION ADULT - ADD RECOMMEND
1. Recommend Might Shake 4 oz. (200 kcal, 6 gms pro) TID on meal trays   2. Encourage PO intake and honor food preferences as able.   3. Please consistently document % PO intake, weekly weights, BMs, skin integrity in RN flow sheets. RD to follow up per protocol.

## 2025-06-13 NOTE — DIETITIAN INITIAL EVALUATION ADULT - PERTINENT MEDS FT
MEDICATIONS  (STANDING):  carvedilol 3.125 milliGRAM(s) Oral every 12 hours  chlorhexidine 2% Cloths 1 Application(s) Topical <User Schedule>  folic acid 1 milliGRAM(s) Oral daily  lactulose Syrup 10 Gram(s) Oral three times a day  melatonin 3 milliGRAM(s) Oral at bedtime  multivitamin 1 Tablet(s) Oral daily  rifAXIMin 550 milliGRAM(s) Oral two times a day  sertraline 50 milliGRAM(s) Oral daily  traZODone 50 milliGRAM(s) Oral at bedtime    MEDICATIONS  (PRN):  acetaminophen     Tablet .. 650 milliGRAM(s) Oral every 6 hours PRN Mild Pain (1 - 3)  benzonatate 100 milliGRAM(s) Oral three times a day PRN Cough  LORazepam   Injectable 2 milliGRAM(s) IV Push every 1 hour PRN CIWA 15 or greater, or seizure

## 2025-06-13 NOTE — DIETITIAN NUTRITION RISK NOTIFICATION - ADDITIONAL COMMENTS/DIETITIAN RECOMMENDATIONS
Please refer to initial nutrition assessment and evaluation dated 6/13 for details in medical record section.

## 2025-06-13 NOTE — CHART NOTE - NSCHARTNOTEFT_GEN_A_CORE
No contraindication from hepatology perspective to perform ANNE, literature supports low risk of causing variceal bleeding with echo probe, and patient has no signs of active bleeding from varices. Discussed with attending hepatologist Dr. Rodríguez.     Brittany Mark MD  Gastroenterology/Hepatology PGY-4
This report was requested by: Kayla Landry | Reference #: [id number]  Practitioner Count: 1  Pharmacy Count: 1  Current Opioid Prescriptions: 0  Current Benzodiazepine Prescriptions: 0  Current Stimulant Prescriptions: 0      Patient Demographic Information (PDI)       PDI	First Name	Last Name	Birth Date	Gender	Street Address	[facility] Code  ALTON Eason	1955	Female	[address]	[id number]    Prescription Information      PDI Filter:    PDI	Current Rx	Drug Type	Rx Written	Rx Dispensed	Drug	Quantity	Days Supply	Prescriber Name	Prescriber JAMAR #	Payment Method	Dispenser  A	N	O	04/02/2025	04/16/2025	oxycodone hcl (ir) 5 mg tablet	60	15	Juwan France	[id number]	Medicare	[id number]  A	N	O	02/12/2025	02/21/2025	oxycodone hcl (ir) 5 mg tablet	60	15	Juwan France	[id number]	Medicare	[id number]  A	N	O	01/02/2025	01/19/2025	oxycodone hcl (ir) 5 mg tablet	120	30	GreenDennis MD	[id number]	Medicare	[id number]  A	N	O	12/05/2024	12/21/2024	oxycodone hcl (ir) 5 mg tablet	120	30	Green, Dennis LEUNG MD	[id number]	Medicare	[id number]  A	N	O	11/07/2024	11/23/2024	oxycodone hcl (ir) 5 mg tablet	120	30	Green, Dennis LEUNG MD	[id number]	Medicare	[id number]  A	N	O	10/11/2024	10/23/2024	oxycodone hcl (ir) 5 mg tablet	120	30	Green, Dennis LEUNG MD	[id number]	Medicare	[id number]  A	N	O	09/12/2024	09/20/2024	oxycodone hcl (ir) 5 mg tablet	120	30	GreenDennis MD	[id number]	Medicare	[id number]  A	N	O	08/15/2024	08/20/2024	oxycodone hcl (ir) 5 mg tablet	120	30	Green, Dennis LEUNG MD	[id number]	Medicare	[id number]  A	N	O	05/29/2024	06/15/2024	oxycodone hcl (ir) 5 mg tablet	120	30	Green, Dennis LEUNG MD	[id number]	Medicare	[id number]
Hepatology team to sign off, please call back if any questions arise. Patient should follow up with primary GI Dr. Cain on discharge.     Brittany Mark MD  Gastroenterology/Hepatology PGY-4

## 2025-06-13 NOTE — PROGRESS NOTE ADULT - ASSESSMENT
70-year-old female past medical history of alcohol use disorder, cognitive impairment undergoing workup, depression, anxiety, ?Cirrhosis seen on imaging yesterday presenting to emergency room for low oxygen level and altered mental status at home.  Patient reports that her cousin wanted her to come in and dropped her off, she feels fine and has no complaints.    Kaitlin helps her with all of her medical appointments and at home, patient's remain is Leonarda.  Kaitlin reports that she has been undergoing testing with a neurologist due to worsening cognitive impairment and early dementia including forgetting her wallet keys phone leaving the stove on being confused, and over the last couple of months she has had multiple episodes of syncope versus unresponsiveness.  On 2 occasions she has fallen to the ground, on other occasions she has appeared altered bobbing her head from side-to-side and unresponsive and will have low reading O2 sat during these episodes.  Patient left AMA from the ER yesterday after negative CT head and CT chest without pulmonary embolism but some signs of pulmonary edema, cirrhosis, splenomegaly.  Needed does not feel that the patient has capacity to leave AMA.  She reports it has been difficult for them to get cardiology appointment symptoms the schedule of many months. Pt declines active smoking, drug use, alcohol use.    AMS  - likely worsening dementia vs physiatry issues vs alcohol abuse disorder   - psych and neuro fu   - CT head neg  - CIWA protocol  - fall precautions  - safety watch     Anxiety, depression  - cw home meds- psych fu     Hx of cirrhosis  - cw lactulose and rifaximin  - mehta as per hepatology     Pulmonary AVM  - dw pulm  - may need to transfer to [facility] for procedure     DVT prophylaxis

## 2025-06-13 NOTE — DIETITIAN INITIAL EVALUATION ADULT - REASON FOR ADMISSION
AMS  Per chart review, patient is a 70-year-old female past medical history of alcohol use disorder, cognitive impairment undergoing workup, depression, anxiety, ?Cirrhosis seen on imaging yesterday presenting to emergency room for low oxygen level and altered mental status at home.  Patient reports that her cousin wanted her to come in and dropped her off, she feels fine and has no complaints.    Kaitlin helps her with all of her medical appointments and at home, patient's remain is Leonarda.  Kaitlin reports that she has been undergoing testing with a neurologist due to worsening cognitive impairment and early dementia including forgetting her wallet keys phone leaving the stove on being confused, and over the last couple of months she has had multiple episodes of syncope versus unresponsiveness.  On 2 occasions she has fallen to the ground, on other occasions she has appeared altered bobbing her head from side-to-side and unresponsive and will have low reading O2 sat during these episodes.  Patient left AMA from the ER yesterday after negative CT head and CT chest without pulmonary embolism but some signs of pulmonary edema, cirrhosis, splenomegaly.  Needed does not feel that the patient has capacity to leave AMA.  She reports it has been difficult for them to get cardiology appointment symptoms the schedule of many months. Pt declines active smoking, drug use, alcohol use.
---

## 2025-06-13 NOTE — DIETITIAN INITIAL EVALUATION ADULT - OTHER CALCULATIONS
lbs +/-10% based on reported height 63 inch. [facility] HIE record: 56.2lkg (5/23), 56.7kg (5/11), 60.3kg (1/7), 59kg (12/18)

## 2025-06-13 NOTE — DIETITIAN INITIAL EVALUATION ADULT - ORAL INTAKE PTA/DIET HISTORY
Patient A&Ox 2-3 at baseline. Confirmed avocado allergy, no other food intolerance. Reports having an HHA and living with a roommate who will cook and prepare meals for her. Pt had been eating mostly home cooked meals due to her roommate gave away her frozen packs. Patient reports a 19 lbs weight loss in an unknown time frame and gain back her weight loss, height reported 63 inch. Patient A&Ox 2-3 at baseline. Confirmed avocado allergy, no other food intolerance. Prefers no hot and spicy foods. Reports having an HHA and living with a roommate who will cook and prepare meals for her. Pt had been eating mostly home cooked meals due to her roommate gave away her frozen packs. Patient reports a 19 lbs weight loss in an unknown time frame and has gained back her weight loss, height reported 63 inch.

## 2025-06-13 NOTE — DIETITIAN INITIAL EVALUATION ADULT - WEIGHT (KG)
56.2 Keystone Flap Text: The defect edges were debeveled with a #15 scalpel blade.  Given the location of the defect, shape of the defect a keystone flap was deemed most appropriate.  Using a sterile surgical marker, an appropriate keystone flap was drawn incorporating the defect, outlining the appropriate donor tissue and placing the expected incisions within the relaxed skin tension lines where possible. The area thus outlined was incised deep to adipose tissue with a #15 scalpel blade.  The skin margins were undermined to an appropriate distance in all directions around the primary defect and laterally outward around the flap utilizing iris scissors.

## 2025-06-13 NOTE — DIETITIAN INITIAL EVALUATION ADULT - OTHER INFO
Patient met for nutrition consult. Noted diet order: No c/o chewing or swallowing difficulty. Reports adequate PO intake with fair/good appetite. No GI distress reported. Patient w/ bowel regimen (senna, polyethylene glycol, bisacodyl), last BM noted per RN flow sheets; noted on ondansetron PRN. Labs noted  Dosing weight .   Nutrition education provided/deferred. Patient verbalized understanding of current diet order, possible diet advancement/progression; no other nutrition related questions or concerns.  Patient met for length of stay. Noted diet order: regular. No c/o chewing or swallowing difficulty. Reports adequate PO intake with good appetite in house. Patient appeared to be happy in the hospital in setting of not worry about meals and medication during conversation, questionable intake prior to admission despite patient endorses eating well. No GI distress reported. Patient w/ bowel regimen (lactulose) in setting of hx cirrhosis, no bowel movement documented per RN flow sheets. noted on folic acid and multivitamins. Labs noted ^LFTs and bilirubin. Dosing weight 56.2kg (6/5), inpatient wt 57.6kg (6/11)-consistent with appearance. Patient visually observed severe muscle/fat loss during visit, meeting criteria for malnutrition.

## 2025-06-13 NOTE — PROGRESS NOTE ADULT - ASSESSMENT
70-year-old female past medical history of alcohol use disorder, cognitive impairment undergoing workup, depression, anxiety, ?Cirrhosis seen on imaging yesterday presenting to emergency room for low oxygen level and altered mental status at home.  Patient reports that her cousin wanted her to come in and dropped her off, she feels fine and has no complaints.  Collateral information was obtained from her cousin Jc at phone number [phone].  Jc helps her with all of her medical appointments and at home, patient's remain is Leonarda.  Jc reports that she has been undergoing testing with a neurologist due to worsening cognitive impairment and early dementia including forgetting her wallet keys phone leaving the stove on being confused, and over the last couple of months she has had multiple episodes of syncope versus unresponsiveness.  On 2 occasions she has fallen to the ground, on other occasions she has appeared altered bobbing her head from side-to-side and unresponsive and will have low reading O2 sat during these episodes.  Patient left AMA from the ER yesterday after negative CT head and CT chest without pulmonary embolism but some signs of pulmonary edema, cirrhosis, splenomegaly.  Needed does not feel that the patient has capacity to leave AMA.  She reports it has been difficult for them to get cardiology appointment symptoms the schedule of many months. Pt declines active smoking, drug use, alcohol use. (06 Jun 2025 09:59)    called jc to get more information above noted: : she is on 2 L : 10 days ago " she was OK:  forgetful  sometimes she is off:  she is seeing a neurologist and one more tst is pending to conform for dementia: ;  she does not have any ling problems  has second hand smoking:  she did have oxygen three years ago :  she was on oxygen  : but she gave h er back ;   she was seeing a pulm before for breathing:  sheis using on albuterol  prn ?    never had pe or dvt before   she fell about three months ago and was bruised:  given atfer pill:  and after that better   last month she fell down three  ti mes:       S/P fall : confusion ;? developing dementia   remote alcohol abuser   Cirrhosis  Anxiety/ Depression      S/P fall : confusion ;? developing dementia   she fell down and was found to be hypoxic  per his sister:  at [facility] three years ago  :  she was on h9ome oxygen  and then she returned it:   she lives with somebody   who smokes  so has passive smoking:   CTA on admission showed: No pulmonary embolus. Mild interlobular septal thickening which may represent mild edema. Mediastinal lymphadenopathy, stable and of uncertain etiology. Correlate clinically. Cirrhotic morphology of the liver. Splenomegaly.  Diffuse esophageal wall thickening which can be seen with esophagitis.  Correlate clinically.  check echo and probnp: though initial is low   she has no pneumonia   VBG is OK;    6/7:  sheis much m ore alert and awake and is more oriented   on 2 Lof oxygen  :   seems better then yesterday    her ammonia is high :  started on   lactulose for some time   she still seems slightly confused    Hepatology following    6/8: seems to be alert and awake and responding to questions appropriately:  she used to take zoloft and trazadone/;l  demanding to start them again ;     6/10; seems to be doing  ok ;  no sob:  she is n 2 L of oxygen ; can try to wean off the oxygen     she is on rifaxiin and lactulose     6/11; she seems OK;  but she desats on room air:  need to have limited echo for AVM as she has cirrhosis     6/12: SHE HAS PULM AVM : NEED PULM ANGIOGRAPHY ; NEED TO GET INTERVENTIONAL CARDIOLOGIST TO follow  if they can block  the AVM ; she will need home oxygen    DW DR LOLY Montes interventional cardiologist ; they want RHC first:  would defer to international cardiologist       6/13: she looks pretty good;   no sob;   moving around on floor;  has pulm avm;   cards following:  do cta ;  had discussed with cardiology in detail from [facility] too yesterday if the AVM can be closed;    ?needed to be transferred to [facility]  she would need gi clearance from gi  ;  if pt can ave ANNE and if no PFO and then RHC and pulm angiography  /RHC at the same time        remote alcohol abuser/ Cirrhosis  cirrhosis demonstrated on ct:   check ammonia:   she used to drink before quiet a bit     6/7: ammonia is high:   ? start lactulose: ? gi consult     6/8: doing  ok ;  no sob:   cont current rx:   on lactulose:  gi following      6/9; she seems to be doing  ok : wants zoloft and trazadone to be started;  she says shehas been taking for last thirty years;   ?psych consult:   she could ot sleel last night:  trazodone she was using forsleep      6/10: she was able to sleep last night: fEels OK;     6/11: cont current treatment   6/12: as above   6/13; cont current rx:     Anxiety/ Depression  she is on psych medications,  and the sister believes that she is confssed secondary to these drugs    6/8: restart zoloft and trazodone one by one     6/9: defer to primary team       dvt prophylaxis    dw acp

## 2025-06-13 NOTE — DIETITIAN INITIAL EVALUATION ADULT - PERTINENT LABORATORY DATA
06-13    141  |  109[H]  |  9   ----------------------------<  94  4.0   |  23  |  0.46[L]    Ca    8.5      13 Jun 2025 03:45  Phos  3.8     06-13  Mg     1.80     06-13    TPro  5.6[L]  /  Alb  2.9[L]  /  TBili  1.6[H]  /  DBili  x   /  AST  39[H]  /  ALT  22  /  AlkPhos  139[H]  06-13

## 2025-06-14 LAB
ALBUMIN SERPL ELPH-MCNC: 2.7 G/DL — LOW (ref 3.3–5)
ALP SERPL-CCNC: 105 U/L — SIGNIFICANT CHANGE UP (ref 40–120)
ALT FLD-CCNC: 20 U/L — SIGNIFICANT CHANGE UP (ref 4–33)
ANION GAP SERPL CALC-SCNC: 8 MMOL/L — SIGNIFICANT CHANGE UP (ref 7–14)
APTT BLD: 37.3 SEC — HIGH (ref 26.1–36.8)
AST SERPL-CCNC: 36 U/L — HIGH (ref 4–32)
BILIRUB SERPL-MCNC: 1.9 MG/DL — HIGH (ref 0.2–1.2)
BUN SERPL-MCNC: 7 MG/DL — SIGNIFICANT CHANGE UP (ref 7–23)
CALCIUM SERPL-MCNC: 8.7 MG/DL — SIGNIFICANT CHANGE UP (ref 8.4–10.5)
CHLORIDE SERPL-SCNC: 110 MMOL/L — HIGH (ref 98–107)
CO2 SERPL-SCNC: 24 MMOL/L — SIGNIFICANT CHANGE UP (ref 22–31)
CREAT SERPL-MCNC: 0.47 MG/DL — LOW (ref 0.5–1.3)
EGFR: 102 ML/MIN/1.73M2 — SIGNIFICANT CHANGE UP
EGFR: 102 ML/MIN/1.73M2 — SIGNIFICANT CHANGE UP
GLUCOSE SERPL-MCNC: 88 MG/DL — SIGNIFICANT CHANGE UP (ref 70–99)
HCT VFR BLD CALC: 27.1 % — LOW (ref 34.5–45)
HGB BLD-MCNC: 8.5 G/DL — LOW (ref 11.5–15.5)
INR BLD: 1.64 RATIO — HIGH (ref 0.85–1.16)
MAGNESIUM SERPL-MCNC: 1.8 MG/DL — SIGNIFICANT CHANGE UP (ref 1.6–2.6)
MCHC RBC-ENTMCNC: 29.4 PG — SIGNIFICANT CHANGE UP (ref 27–34)
MCHC RBC-ENTMCNC: 31.4 G/DL — LOW (ref 32–36)
MCV RBC AUTO: 93.8 FL — SIGNIFICANT CHANGE UP (ref 80–100)
NRBC # BLD AUTO: 0 K/UL — SIGNIFICANT CHANGE UP (ref 0–0)
NRBC # FLD: 0 K/UL — SIGNIFICANT CHANGE UP (ref 0–0)
NRBC BLD AUTO-RTO: 0 /100 WBCS — SIGNIFICANT CHANGE UP (ref 0–0)
PHOSPHATE SERPL-MCNC: 3.5 MG/DL — SIGNIFICANT CHANGE UP (ref 2.5–4.5)
PLATELET # BLD AUTO: 43 K/UL — LOW (ref 150–400)
POTASSIUM SERPL-MCNC: 4 MMOL/L — SIGNIFICANT CHANGE UP (ref 3.5–5.3)
POTASSIUM SERPL-SCNC: 4 MMOL/L — SIGNIFICANT CHANGE UP (ref 3.5–5.3)
PROT SERPL-MCNC: 5.3 G/DL — LOW (ref 6–8.3)
PROTHROM AB SERPL-ACNC: 19.4 SEC — HIGH (ref 9.9–13.4)
RBC # BLD: 2.89 M/UL — LOW (ref 3.8–5.2)
RBC # FLD: 17.2 % — HIGH (ref 10.3–14.5)
SODIUM SERPL-SCNC: 142 MMOL/L — SIGNIFICANT CHANGE UP (ref 135–145)
WBC # BLD: 2.54 K/UL — LOW (ref 3.8–10.5)
WBC # FLD AUTO: 2.54 K/UL — LOW (ref 3.8–10.5)

## 2025-06-14 RX ADMIN — Medication 1 TABLET(S): at 11:26

## 2025-06-14 RX ADMIN — Medication 50 MILLIGRAM(S): at 21:38

## 2025-06-14 RX ADMIN — Medication 1 APPLICATION(S): at 05:18

## 2025-06-14 RX ADMIN — SERTRALINE 50 MILLIGRAM(S): 100 TABLET, FILM COATED ORAL at 17:00

## 2025-06-14 RX ADMIN — Medication 100 MILLIGRAM(S): at 17:46

## 2025-06-14 RX ADMIN — Medication 3 MILLIGRAM(S): at 21:38

## 2025-06-14 RX ADMIN — FOLIC ACID 1 MILLIGRAM(S): 1 TABLET ORAL at 11:26

## 2025-06-14 RX ADMIN — CARVEDILOL 3.12 MILLIGRAM(S): 3.12 TABLET, FILM COATED ORAL at 17:00

## 2025-06-14 RX ADMIN — CARVEDILOL 3.12 MILLIGRAM(S): 3.12 TABLET, FILM COATED ORAL at 05:13

## 2025-06-14 NOTE — PROGRESS NOTE ADULT - ASSESSMENT
70-year-old female past medical history of alcohol use disorder, cognitive impairment undergoing workup, depression, anxiety, ?Cirrhosis seen on imaging yesterday presenting to emergency room for low oxygen level and altered mental status at home.  Patient reports that her cousin wanted her to come in and dropped her off, she feels fine and has no complaints.    Kaitlin helps her with all of her medical appointments and at home, patient's remain is Leonarda.  Kaitlin reports that she has been undergoing testing with a neurologist due to worsening cognitive impairment and early dementia including forgetting her wallet keys phone leaving the stove on being confused, and over the last couple of months she has had multiple episodes of syncope versus unresponsiveness.  On 2 occasions she has fallen to the ground, on other occasions she has appeared altered bobbing her head from side-to-side and unresponsive and will have low reading O2 sat during these episodes.  Patient left AMA from the ER yesterday after negative CT head and CT chest without pulmonary embolism but some signs of pulmonary edema, cirrhosis, splenomegaly.  Needed does not feel that the patient has capacity to leave AMA.  She reports it has been difficult for them to get cardiology appointment symptoms the schedule of many months. Pt declines active smoking, drug use, alcohol use.    AMS  - likely worsening dementia vs physiatry issues vs alcohol abuse disorder   - psych and neuro fu   - CT head neg  - CIWA protocol  - fall precautions  - safety watch     Anxiety, depression  - cw home meds  - psych fu     Hx of cirrhosis  - cw lactulose and rifaximin    Pulmonary AVM  - dw pulm  -  transfer to [facility] for procedure pending mehta   - check ANNE, pending GI clearance     DVT prophylaxis

## 2025-06-14 NOTE — PROGRESS NOTE ADULT - ASSESSMENT
70-year-old female past medical history of alcohol use disorder, cognitive impairment undergoing workup, depression, anxiety, ?Cirrhosis seen on imaging yesterday presenting to emergency room for low oxygen level and altered mental status at home.  Patient reports that her cousin wanted her to come in and dropped her off, she feels fine and has no complaints.  Collateral information was obtained from her cousin Jc at phone number [phone].  Jc helps her with all of her medical appointments and at home, patient's remain is Leonarda.  Jc reports that she has been undergoing testing with a neurologist due to worsening cognitive impairment and early dementia including forgetting her wallet keys phone leaving the stove on being confused, and over the last couple of months she has had multiple episodes of syncope versus unresponsiveness.  On 2 occasions she has fallen to the ground, on other occasions she has appeared altered bobbing her head from side-to-side and unresponsive and will have low reading O2 sat during these episodes.  Patient left AMA from the ER yesterday after negative CT head and CT chest without pulmonary embolism but some signs of pulmonary edema, cirrhosis, splenomegaly.  Needed does not feel that the patient has capacity to leave AMA.  She reports it has been difficult for them to get cardiology appointment symptoms the schedule of many months. Pt declines active smoking, drug use, alcohol use. (06 Jun 2025 09:59)    called jc to get more information above noted: : she is on 2 L : 10 days ago " she was OK:  forgetful  sometimes she is off:  she is seeing a neurologist and one more tst is pending to conform for dementia: ;  she does not have any ling problems  has second hand smoking:  she did have oxygen three years ago :  she was on oxygen  : but she gave h er back ;   she was seeing a pulm before for breathing:  sheis using on albuterol  prn ?    never had pe or dvt before   she fell about three months ago and was bruised:  given atfer pill:  and after that better   last month she fell down three  ti mes:       S/P fall : confusion ;? developing dementia   remote alcohol abuser   Cirrhosis  Anxiety/ Depression      S/P fall : confusion ;? developing dementia   she fell down and was found to be hypoxic  per his sister:  at [facility] three years ago  :  she was on h9ome oxygen  and then she returned it:   she lives with somebody   who smokes  so has passive smoking:   CTA on admission showed: No pulmonary embolus. Mild interlobular septal thickening which may represent mild edema. Mediastinal lymphadenopathy, stable and of uncertain etiology. Correlate clinically. Cirrhotic morphology of the liver. Splenomegaly.  Diffuse esophageal wall thickening which can be seen with esophagitis.  Correlate clinically.  check echo and probnp: though initial is low   she has no pneumonia   VBG is OK;    6/7:  sheis much m ore alert and awake and is more oriented   on 2 Lof oxygen  :   seems better then yesterday    her ammonia is high :  started on   lactulose for some time   she still seems slightly confused    Hepatology following    6/8: seems to be alert and awake and responding to questions appropriately:  she used to take zoloft and trazadone/;l  demanding to start them again ;     6/10; seems to be doing  ok ;  no sob:  she is n 2 L of oxygen ; can try to wean off the oxygen     she is on rifaxiin and lactulose     6/11; she seems OK;  but she desats on room air:  need to have limited echo for AVM as she has cirrhosis     6/12: SHE HAS PULM AVM : NEED PULM ANGIOGRAPHY ; NEED TO GET INTERVENTIONAL CARDIOLOGIST TO follow  if they can block  the AVM ; she will need home oxygen    DW DR LOLY Montes interventional cardiologist ; they want RHC first:  would defer to international cardiologist       6/13: she looks pretty good;   no sob;   moving around on floor;  has pulm avm;   cards following:  do cta ;  had discussed with cardiology in detail from [facility] too yesterday if the AVM can be closed;    ?needed to be transferred to [facility]  she would need gi clearance from gi  ;  if pt can ave JOIE and if no PFO and then RHC and pulm angiography  /RHC at the same time    6/14: GI clearance awaited:   oxygenation has imrpoved;   has AVM   once gi clear:  need joie:  and then furterh workup as described above         remote alcohol abuser/ Cirrhosis  cirrhosis demonstrated on ct:   check ammonia:   she used to drink before quiet a bit     6/7: ammonia is high:   ? start lactulose: ? gi consult     6/8: doing  ok ;  no sob:   cont current rx:   on lactulose:  gi following      6/9; she seems to be doing  ok : wants zoloft and trazadone to be started;  she says shehas been taking for last thirty years;   ?psych consult:   she could ot sleel last night:  trazodone she was using forsleep      6/10: she was able to sleep last night: fEels OK;     6/11: cont current treatment   6/12: as above   6/13; cont current rx:   6/14: sh eis alert and awke and oriented x  3    Anxiety/ Depression  she is on psych medications,  and the sister believes that she is confused secondary to these drugs    6/8: restart zoloft and trazodone one by one     6/9: defer to primary team   6/14; seen by neuro:  started back on trazodone;       dvt prophylaxis    dw acp

## 2025-06-15 LAB
ALBUMIN SERPL ELPH-MCNC: 2.8 G/DL — LOW (ref 3.3–5)
ALP SERPL-CCNC: 124 U/L — HIGH (ref 40–120)
ALT FLD-CCNC: 20 U/L — SIGNIFICANT CHANGE UP (ref 4–33)
ANION GAP SERPL CALC-SCNC: 7 MMOL/L — SIGNIFICANT CHANGE UP (ref 7–14)
APTT BLD: 35.3 SEC — SIGNIFICANT CHANGE UP (ref 26.1–36.8)
AST SERPL-CCNC: 39 U/L — HIGH (ref 4–32)
BILIRUB SERPL-MCNC: 1.5 MG/DL — HIGH (ref 0.2–1.2)
BUN SERPL-MCNC: 10 MG/DL — SIGNIFICANT CHANGE UP (ref 7–23)
CALCIUM SERPL-MCNC: 8.7 MG/DL — SIGNIFICANT CHANGE UP (ref 8.4–10.5)
CHLORIDE SERPL-SCNC: 110 MMOL/L — HIGH (ref 98–107)
CO2 SERPL-SCNC: 24 MMOL/L — SIGNIFICANT CHANGE UP (ref 22–31)
CREAT SERPL-MCNC: 0.51 MG/DL — SIGNIFICANT CHANGE UP (ref 0.5–1.3)
EGFR: 100 ML/MIN/1.73M2 — SIGNIFICANT CHANGE UP
EGFR: 100 ML/MIN/1.73M2 — SIGNIFICANT CHANGE UP
GLUCOSE SERPL-MCNC: 97 MG/DL — SIGNIFICANT CHANGE UP (ref 70–99)
HCT VFR BLD CALC: 27.9 % — LOW (ref 34.5–45)
HGB BLD-MCNC: 8.8 G/DL — LOW (ref 11.5–15.5)
INR BLD: 1.57 RATIO — HIGH (ref 0.85–1.16)
MAGNESIUM SERPL-MCNC: 1.8 MG/DL — SIGNIFICANT CHANGE UP (ref 1.6–2.6)
MCHC RBC-ENTMCNC: 29.5 PG — SIGNIFICANT CHANGE UP (ref 27–34)
MCHC RBC-ENTMCNC: 31.5 G/DL — LOW (ref 32–36)
MCV RBC AUTO: 93.6 FL — SIGNIFICANT CHANGE UP (ref 80–100)
NRBC # BLD AUTO: 0 K/UL — SIGNIFICANT CHANGE UP (ref 0–0)
NRBC # FLD: 0 K/UL — SIGNIFICANT CHANGE UP (ref 0–0)
NRBC BLD AUTO-RTO: 0 /100 WBCS — SIGNIFICANT CHANGE UP (ref 0–0)
PHOSPHATE SERPL-MCNC: 3.6 MG/DL — SIGNIFICANT CHANGE UP (ref 2.5–4.5)
PLATELET # BLD AUTO: 50 K/UL — LOW (ref 150–400)
POTASSIUM SERPL-MCNC: 4.4 MMOL/L — SIGNIFICANT CHANGE UP (ref 3.5–5.3)
POTASSIUM SERPL-SCNC: 4.4 MMOL/L — SIGNIFICANT CHANGE UP (ref 3.5–5.3)
PROT SERPL-MCNC: 5.6 G/DL — LOW (ref 6–8.3)
PROTHROM AB SERPL-ACNC: 18.1 SEC — HIGH (ref 9.9–13.4)
RBC # BLD: 2.98 M/UL — LOW (ref 3.8–5.2)
RBC # FLD: 17.4 % — HIGH (ref 10.3–14.5)
SODIUM SERPL-SCNC: 141 MMOL/L — SIGNIFICANT CHANGE UP (ref 135–145)
WBC # BLD: 3 K/UL — LOW (ref 3.8–10.5)
WBC # FLD AUTO: 3 K/UL — LOW (ref 3.8–10.5)

## 2025-06-15 PROCEDURE — 99233 SBSQ HOSP IP/OBS HIGH 50: CPT

## 2025-06-15 RX ADMIN — Medication 100 MILLIGRAM(S): at 13:08

## 2025-06-15 RX ADMIN — CARVEDILOL 3.12 MILLIGRAM(S): 3.12 TABLET, FILM COATED ORAL at 17:41

## 2025-06-15 RX ADMIN — Medication 50 MILLIGRAM(S): at 21:49

## 2025-06-15 RX ADMIN — SERTRALINE 50 MILLIGRAM(S): 100 TABLET, FILM COATED ORAL at 17:41

## 2025-06-15 RX ADMIN — LACTULOSE 10 GRAM(S): 10 SOLUTION ORAL at 05:50

## 2025-06-15 RX ADMIN — LACTULOSE 10 GRAM(S): 10 SOLUTION ORAL at 13:06

## 2025-06-15 RX ADMIN — Medication 1 APPLICATION(S): at 05:58

## 2025-06-15 RX ADMIN — Medication 1 TABLET(S): at 11:34

## 2025-06-15 RX ADMIN — Medication 3 MILLIGRAM(S): at 21:49

## 2025-06-15 RX ADMIN — LACTULOSE 10 GRAM(S): 10 SOLUTION ORAL at 21:49

## 2025-06-15 RX ADMIN — FOLIC ACID 1 MILLIGRAM(S): 1 TABLET ORAL at 11:35

## 2025-06-15 RX ADMIN — CARVEDILOL 3.12 MILLIGRAM(S): 3.12 TABLET, FILM COATED ORAL at 05:50

## 2025-06-15 RX ADMIN — Medication 100 MILLIGRAM(S): at 05:48

## 2025-06-15 NOTE — PROGRESS NOTE ADULT - ASSESSMENT
70-year-old female past medical history of alcohol use disorder, cognitive impairment undergoing workup, depression, anxiety, ?Cirrhosis seen on imaging yesterday presenting to emergency room for low oxygen level and altered mental status at home.  Patient reports that her cousin wanted her to come in and dropped her off, she feels fine and has no complaints.    Kaitlin helps her with all of her medical appointments and at home, patient's remain is Leonarda.  Kaitlin reports that she has been undergoing testing with a neurologist due to worsening cognitive impairment and early dementia including forgetting her wallet keys phone leaving the stove on being confused, and over the last couple of months she has had multiple episodes of syncope versus unresponsiveness.  On 2 occasions she has fallen to the ground, on other occasions she has appeared altered bobbing her head from side-to-side and unresponsive and will have low reading O2 sat during these episodes.  Patient left AMA from the ER yesterday after negative CT head and CT chest without pulmonary embolism but some signs of pulmonary edema, cirrhosis, splenomegaly.  Needed does not feel that the patient has capacity to leave AMA.  She reports it has been difficult for them to get cardiology appointment symptoms the schedule of many months. Pt declines active smoking, drug use, alcohol use.    AMS  - likely worsening dementia vs physiatry issues vs alcohol abuse disorder   - psych and neuro fu   - CT head neg  - CIWA protocol  - fall precautions  - safety watch     Anxiety, depression  - cw home meds  - psych fu     Hx of cirrhosis  - cw lactulose and rifaximin    Pulmonary AVM  - dw pulm  -  transfer to [facility] for procedure pending mehta   - check ANNE, pending GI clearance     DVT prophylaxis

## 2025-06-15 NOTE — PROGRESS NOTE ADULT - ASSESSMENT
70F w/ ETOH abuse, cognitive impairment, depression, and anxiety here for hypoxia and AMS. She was in the ER earlier then left AMA but returned for concerning sxs from the family member. She is resting comfortably but lethargic appearing.     -EKG reviewed, NSR w/ nonspecific STT changes   -troponin has been negative x 2   -CTPA last ER admission neg for PE   -TTE ECHO w/ likely severe AS- OP cardiology f/u   -repeat TTE reviewed- possible intra-cardiac and extra-cardiac shunt. pending ANNE, RHC + pulmonary angio. cleared by hepatology for ANNE. NPO past midnight   -cont coreg 3.125 mg bid     Aida Hummel MD FACC  Attending Interventional Cardiologist, [facility].   Avaliable on [email or web address] Team

## 2025-06-15 NOTE — PROGRESS NOTE ADULT - ASSESSMENT
70-year-old female past medical history of alcohol use disorder, cognitive impairment undergoing workup, depression, anxiety, ?Cirrhosis seen on imaging yesterday presenting to emergency room for low oxygen level and altered mental status at home.  Patient reports that her cousin wanted her to come in and dropped her off, she feels fine and has no complaints.  Collateral information was obtained from her cousin Jc at phone number [phone].  Jc helps her with all of her medical appointments and at home, patient's remain is Leonarda.  Jc reports that she has been undergoing testing with a neurologist due to worsening cognitive impairment and early dementia including forgetting her wallet keys phone leaving the stove on being confused, and over the last couple of months she has had multiple episodes of syncope versus unresponsiveness.  On 2 occasions she has fallen to the ground, on other occasions she has appeared altered bobbing her head from side-to-side and unresponsive and will have low reading O2 sat during these episodes.  Patient left AMA from the ER yesterday after negative CT head and CT chest without pulmonary embolism but some signs of pulmonary edema, cirrhosis, splenomegaly.  Needed does not feel that the patient has capacity to leave AMA.  She reports it has been difficult for them to get cardiology appointment symptoms the schedule of many months. Pt declines active smoking, drug use, alcohol use. (06 Jun 2025 09:59)    called jc to get more information above noted: : she is on 2 L : 10 days ago " she was OK:  forgetful  sometimes she is off:  she is seeing a neurologist and one more tst is pending to conform for dementia: ;  she does not have any ling problems  has second hand smoking:  she did have oxygen three years ago :  she was on oxygen  : but she gave h er back ;   she was seeing a pulm before for breathing:  sheis using on albuterol  prn ?    never had pe or dvt before   she fell about three months ago and was bruised:  given atfer pill:  and after that better   last month she fell down three  ti mes:       S/P fall : confusion ;? developing dementia   remote alcohol abuser   Cirrhosis  Anxiety/ Depression      S/P fall : confusion ;? developing dementia   she fell down and was found to be hypoxic  per his sister:  at [facility] three years ago  :  she was on h9ome oxygen  and then she returned it:   she lives with somebody   who smokes  so has passive smoking:   CTA on admission showed: No pulmonary embolus. Mild interlobular septal thickening which may represent mild edema. Mediastinal lymphadenopathy, stable and of uncertain etiology. Correlate clinically. Cirrhotic morphology of the liver. Splenomegaly.  Diffuse esophageal wall thickening which can be seen with esophagitis.  Correlate clinically.  check echo and probnp: though initial is low   she has no pneumonia   VBG is OK;    6/7:  sheis much m ore alert and awake and is more oriented   on 2 Lof oxygen  :   seems better then yesterday    her ammonia is high :  started on   lactulose for some time   she still seems slightly confused    Hepatology following    6/8: seems to be alert and awake and responding to questions appropriately:  she used to take zoloft and trazadone/;l  demanding to start them again ;     6/10; seems to be doing  ok ;  no sob:  she is n 2 L of oxygen ; can try to wean off the oxygen     she is on rifaxiin and lactulose     6/11; she seems OK;  but she desats on room air:  need to have limited echo for AVM as she has cirrhosis     6/12: SHE HAS PULM AVM : NEED PULM ANGIOGRAPHY ; NEED TO GET INTERVENTIONAL CARDIOLOGIST TO follow  if they can block  the AVM ; she will need home oxygen    DW DR LOLY Montes interventional cardiologist ; they want RHC first:  would defer to international cardiologist       6/13: she looks pretty good;   no sob;   moving around on floor;  has pulm avm;   cards following:  do cta ;  had discussed with cardiology in detail from [facility] too yesterday if the AVM can be closed;    ?needed to be transferred to [facility]  she would need gi clearance from gi  ;  if pt can ave JOIE and if no PFO and then RHC and pulm angiography  /RHC at the same time    6/14: GI clearance awaited:   oxygenation has imrpoved;   has AVM   once gi clear:  need joie:  and then furterh workup as described above     6/15: gi clearance is still aaited for joie:  then furterh course will be taken once joie is done         remote alcohol abuser/ Cirrhosis  cirrhosis demonstrated on ct:   check ammonia:   she used to drink before quiet a bit     6/7: ammonia is high:   ? start lactulose: ? gi consult     6/8: doing  ok ;  no sob:   cont current rx:   on lactulose:  gi following      6/9; she seems to be doing  ok : wants zoloft and trazadone to be started;  she says shehas been taking for last thirty years;   ?psych consult:   she could ot sleel last night:  trazodone she was using forsleep      6/10: she was able to sleep last night: fEels OK;     6/11: cont current treatment   6/12: as above   6/13; cont current rx:   6/14: sh eis alert and awke and oriented x  3  6/15: she seems stable;  on current regimen     Anxiety/ Depression  she is on psych medications,  and the sister believes that she is confused secondary to these drugs    6/8: restart zoloft and trazodone one by one     6/9: defer to primary team   6/14; seen by neuro:  started back on trazodone;       dvt prophylaxis    dw acp

## 2025-06-16 LAB
ALBUMIN SERPL ELPH-MCNC: 2.7 G/DL — LOW (ref 3.3–5)
ALP SERPL-CCNC: 104 U/L — SIGNIFICANT CHANGE UP (ref 40–120)
ALT FLD-CCNC: 21 U/L — SIGNIFICANT CHANGE UP (ref 4–33)
ANION GAP SERPL CALC-SCNC: 10 MMOL/L — SIGNIFICANT CHANGE UP (ref 7–14)
APTT BLD: 35.5 SEC — SIGNIFICANT CHANGE UP (ref 26.1–36.8)
AST SERPL-CCNC: 40 U/L — HIGH (ref 4–32)
BILIRUB SERPL-MCNC: 1.6 MG/DL — HIGH (ref 0.2–1.2)
BUN SERPL-MCNC: 10 MG/DL — SIGNIFICANT CHANGE UP (ref 7–23)
CALCIUM SERPL-MCNC: 8.2 MG/DL — LOW (ref 8.4–10.5)
CHLORIDE SERPL-SCNC: 109 MMOL/L — HIGH (ref 98–107)
CO2 SERPL-SCNC: 21 MMOL/L — LOW (ref 22–31)
CREAT SERPL-MCNC: 0.48 MG/DL — LOW (ref 0.5–1.3)
EGFR: 102 ML/MIN/1.73M2 — SIGNIFICANT CHANGE UP
EGFR: 102 ML/MIN/1.73M2 — SIGNIFICANT CHANGE UP
GLUCOSE SERPL-MCNC: 83 MG/DL — SIGNIFICANT CHANGE UP (ref 70–99)
HCT VFR BLD CALC: 26.1 % — LOW (ref 34.5–45)
HGB BLD-MCNC: 8.4 G/DL — LOW (ref 11.5–15.5)
INR BLD: 1.59 RATIO — HIGH (ref 0.85–1.16)
MAGNESIUM SERPL-MCNC: 1.8 MG/DL — SIGNIFICANT CHANGE UP (ref 1.6–2.6)
MCHC RBC-ENTMCNC: 29.7 PG — SIGNIFICANT CHANGE UP (ref 27–34)
MCHC RBC-ENTMCNC: 32.2 G/DL — SIGNIFICANT CHANGE UP (ref 32–36)
MCV RBC AUTO: 92.2 FL — SIGNIFICANT CHANGE UP (ref 80–100)
NRBC # BLD AUTO: 0 K/UL — SIGNIFICANT CHANGE UP (ref 0–0)
NRBC # FLD: 0 K/UL — SIGNIFICANT CHANGE UP (ref 0–0)
NRBC BLD AUTO-RTO: 0 /100 WBCS — SIGNIFICANT CHANGE UP (ref 0–0)
PHOSPHATE SERPL-MCNC: 3.2 MG/DL — SIGNIFICANT CHANGE UP (ref 2.5–4.5)
PLATELET # BLD AUTO: 41 K/UL — LOW (ref 150–400)
POTASSIUM SERPL-MCNC: 4.2 MMOL/L — SIGNIFICANT CHANGE UP (ref 3.5–5.3)
POTASSIUM SERPL-SCNC: 4.2 MMOL/L — SIGNIFICANT CHANGE UP (ref 3.5–5.3)
PROT SERPL-MCNC: 5.4 G/DL — LOW (ref 6–8.3)
PROTHROM AB SERPL-ACNC: 18.8 SEC — HIGH (ref 9.9–13.4)
RBC # BLD: 2.83 M/UL — LOW (ref 3.8–5.2)
RBC # FLD: 17.2 % — HIGH (ref 10.3–14.5)
SODIUM SERPL-SCNC: 140 MMOL/L — SIGNIFICANT CHANGE UP (ref 135–145)
WBC # BLD: 2.61 K/UL — LOW (ref 3.8–10.5)
WBC # FLD AUTO: 2.61 K/UL — LOW (ref 3.8–10.5)

## 2025-06-16 RX ADMIN — LACTULOSE 10 GRAM(S): 10 SOLUTION ORAL at 06:08

## 2025-06-16 RX ADMIN — Medication 100 MILLIGRAM(S): at 21:43

## 2025-06-16 RX ADMIN — SERTRALINE 50 MILLIGRAM(S): 100 TABLET, FILM COATED ORAL at 11:56

## 2025-06-16 RX ADMIN — Medication 1 TABLET(S): at 11:57

## 2025-06-16 RX ADMIN — Medication 3 MILLIGRAM(S): at 21:43

## 2025-06-16 RX ADMIN — LACTULOSE 10 GRAM(S): 10 SOLUTION ORAL at 21:44

## 2025-06-16 RX ADMIN — Medication 1 APPLICATION(S): at 06:08

## 2025-06-16 RX ADMIN — FOLIC ACID 1 MILLIGRAM(S): 1 TABLET ORAL at 11:55

## 2025-06-16 RX ADMIN — CARVEDILOL 3.12 MILLIGRAM(S): 3.12 TABLET, FILM COATED ORAL at 06:08

## 2025-06-16 RX ADMIN — Medication 50 MILLIGRAM(S): at 21:43

## 2025-06-16 NOTE — PROGRESS NOTE ADULT - ASSESSMENT
70-year-old female past medical history of alcohol use disorder, cognitive impairment undergoing workup, depression, anxiety, ?Cirrhosis seen on imaging yesterday presenting to emergency room for low oxygen level and altered mental status at home.  Patient reports that her cousin wanted her to come in and dropped her off, she feels fine and has no complaints.  Collateral information was obtained from her cousin Jc at phone number [phone].  Jc helps her with all of her medical appointments and at home, patient's remain is Leonarda.  Jc reports that she has been undergoing testing with a neurologist due to worsening cognitive impairment and early dementia including forgetting her wallet keys phone leaving the stove on being confused, and over the last couple of months she has had multiple episodes of syncope versus unresponsiveness.  On 2 occasions she has fallen to the ground, on other occasions she has appeared altered bobbing her head from side-to-side and unresponsive and will have low reading O2 sat during these episodes.  Patient left AMA from the ER yesterday after negative CT head and CT chest without pulmonary embolism but some signs of pulmonary edema, cirrhosis, splenomegaly.  Needed does not feel that the patient has capacity to leave AMA.  She reports it has been difficult for them to get cardiology appointment symptoms the schedule of many months. Pt declines active smoking, drug use, alcohol use. (06 Jun 2025 09:59)    called jc to get more information above noted: : she is on 2 L : 10 days ago " she was OK:  forgetful  sometimes she is off:  she is seeing a neurologist and one more tst is pending to conform for dementia: ;  she does not have any ling problems  has second hand smoking:  she did have oxygen three years ago :  she was on oxygen  : but she gave h er back ;   she was seeing a pulm before for breathing:  sheis using on albuterol  prn ?    never had pe or dvt before   she fell about three months ago and was bruised:  given atfer pill:  and after that better   last month she fell down three  ti mes:       S/P fall : confusion ;? developing dementia   remote alcohol abuser   Cirrhosis  Anxiety/ Depression      S/P fall : confusion ;? developing dementia   she fell down and was found to be hypoxic  per his sister:  at [facility] three years ago  :  she was on h9ome oxygen  and then she returned it:   she lives with somebody   who smokes  so has passive smoking:   CTA on admission showed: No pulmonary embolus. Mild interlobular septal thickening which may represent mild edema. Mediastinal lymphadenopathy, stable and of uncertain etiology. Correlate clinically. Cirrhotic morphology of the liver. Splenomegaly.  Diffuse esophageal wall thickening which can be seen with esophagitis.  Correlate clinically.  check echo and probnp: though initial is low   she has no pneumonia   VBG is OK;    6/7:  sheis much m ore alert and awake and is more oriented   on 2 Lof oxygen  :   seems better then yesterday    her ammonia is high :  started on   lactulose for some time   she still seems slightly confused    Hepatology following    6/8: seems to be alert and awake and responding to questions appropriately:  she used to take zoloft and trazadone/;l  demanding to start them again ;     6/10; seems to be doing  ok ;  no sob:  she is n 2 L of oxygen ; can try to wean off the oxygen     she is on rifaxiin and lactulose     6/11; she seems OK;  but she desats on room air:  need to have limited echo for AVM as she has cirrhosis     6/12: SHE HAS PULM AVM : NEED PULM ANGIOGRAPHY ; NEED TO GET INTERVENTIONAL CARDIOLOGIST TO follow  if they can block  the AVM ; she will need home oxygen    DW DR LOLY Montes interventional cardiologist ; they want RHC first:  would defer to international cardiologist       6/13: she looks pretty good;   no sob;   moving around on floor;  has pulm avm;   cards following:  do cta ;  had discussed with cardiology in detail from [facility] too yesterday if the AVM can be closed;    ?needed to be transferred to [facility]  she would need gi clearance from gi  ;  if pt can ave JOIE and if no PFO and then RHC and pulm angiography  /RHC at the same time    6/14: GI clearance awaited:   oxygenation has imrpoved;   has AVM   once gi clear:  need joie:  and then furterh workup as described above     6/15: gi clearance is still aawited for joie:  then further course will be taken once joie is done     6/16: gi cleared for joie:  ? will be done today         remote alcohol abuser/ Cirrhosis  cirrhosis demonstrated on ct:   check ammonia:   she used to drink before quiet a bit     6/7: ammonia is high:   ? start lactulose: ? gi consult     6/8: doing  ok ;  no sob:   cont current rx:   on lactulose:  gi following      6/9; she seems to be doing  ok : wants zoloft and trazadone to be started;  she says shehas been taking for last thirty years;   ?psych consult:   she could ot sleel last night:  trazodone she was using forsleep      6/10: she was able to sleep last night: fEels OK;     6/11: cont current treatment   6/12: as above   6/13; cont current rx:   6/14: sh eis alert and awke and oriented x  3  6/15: she seems stable;  on current regimen   6/16; she is alert and awake and responding to questions well     Anxiety/ Depression  she is on psych medications,  and the sister believes that she is confused secondary to these drugs    6/8: restart zoloft and trazodone one by one     6/9: defer to primary team   6/14; seen by neuro:  started back on trazodone;       dvt prophylaxis    dw acp

## 2025-06-16 NOTE — PROGRESS NOTE ADULT - ASSESSMENT
70-year-old female past medical history of alcohol use disorder, cognitive impairment undergoing workup, depression, anxiety, ?Cirrhosis seen on imaging presented with low o2 sats and encephalopathy, left AMA now returned   Has been undergoing w./u for dementia as OP  CTh 6/5 with chronic ischemic changes  Noted to have elevated ammonia on admission, likely from her cirrhosis  started on lactulose and rifaxamin , hepatology is following  thrombocytopenia from coagulopathy  was taken off sedating meds - Trazadone and zoloft , both of which have since been restarted  o2 status better, some episodes of desat on room air    Encephalopathy - hepatic?  Underlying cognitive imapriment/dementia - likely related to long standing etoh use, vascular disease   Thrombocytopenia    - pos txfr to [facility] for eval of Pulm AVM  - joie tomorrow to eval for PFO, prior to possible angiogram   - has cirrhosis, monitor ammonia, use rifaximin   - as mental status better, ok to restart trazadone as needed for sleep as long as does not impair mental status  - check folate, b12, tsh, rpr if not yet checked  - ciwa protocol per primary team   - s/p IV thiamine  - would also supplement folate, b12  - would hold off on mri and eeg as mental status appears to be improving, can do as outpatient for dementia w/u  - trazadone and sertaline were restarted, would monitor mental status- sleep seems to be better  - move to window bed if able   - scds

## 2025-06-16 NOTE — PROGRESS NOTE ADULT - ASSESSMENT
70-year-old female past medical history of alcohol use disorder, cognitive impairment undergoing workup, depression, anxiety, ?Cirrhosis seen on imaging yesterday presenting to emergency room for low oxygen level and altered mental status at home.  Patient reports that her cousin wanted her to come in and dropped her off, she feels fine and has no complaints.    Kaitlin helps her with all of her medical appointments and at home, patient's remain is Leonarda.  Kaitlin reports that she has been undergoing testing with a neurologist due to worsening cognitive impairment and early dementia including forgetting her wallet keys phone leaving the stove on being confused, and over the last couple of months she has had multiple episodes of syncope versus unresponsiveness.  On 2 occasions she has fallen to the ground, on other occasions she has appeared altered bobbing her head from side-to-side and unresponsive and will have low reading O2 sat during these episodes.  Patient left AMA from the ER yesterday after negative CT head and CT chest without pulmonary embolism but some signs of pulmonary edema, cirrhosis, splenomegaly.  Needed does not feel that the patient has capacity to leave AMA.  She reports it has been difficult for them to get cardiology appointment symptoms the schedule of many months. Pt declines active smoking, drug use, alcohol use.    AMS  - likely worsening dementia vs physiatry issues vs alcohol abuse disorder   - psych and neuro fu   - CT head neg  - CIWA protocol  - fall precautions  - safety watch     Anxiety, depression  - cw home meds  - psych fu   Hx of cirrhosis  - cw lactulose and rifaximin    Pulmonary AVM  - dw pulm  -  transfer to [facility] for procedure pending mehta   - ANNE today     DVT prophylaxis

## 2025-06-17 ENCOUNTER — TRANSCRIPTION ENCOUNTER (OUTPATIENT)
Age: 70
End: 2025-06-17

## 2025-06-17 ENCOUNTER — INPATIENT (INPATIENT)
Facility: HOSPITAL | Age: 70
LOS: 5 days | Discharge: HOME | DRG: 307 | End: 2025-06-23
Attending: INTERNAL MEDICINE | Admitting: INTERNAL MEDICINE
Payer: MEDICARE

## 2025-06-17 VITALS
RESPIRATION RATE: 18 BRPM | TEMPERATURE: 99 F | HEART RATE: 62 BPM | DIASTOLIC BLOOD PRESSURE: 72 MMHG | SYSTOLIC BLOOD PRESSURE: 119 MMHG | OXYGEN SATURATION: 94 %

## 2025-06-17 VITALS
SYSTOLIC BLOOD PRESSURE: 137 MMHG | RESPIRATION RATE: 18 BRPM | DIASTOLIC BLOOD PRESSURE: 73 MMHG | HEART RATE: 64 BPM | OXYGEN SATURATION: 95 % | TEMPERATURE: 99 F

## 2025-06-17 DIAGNOSIS — Z29.9 ENCOUNTER FOR PROPHYLACTIC MEASURES, UNSPECIFIED: ICD-10-CM

## 2025-06-17 DIAGNOSIS — Z86.59 PERSONAL HISTORY OF OTHER MENTAL AND BEHAVIORAL DISORDERS: ICD-10-CM

## 2025-06-17 DIAGNOSIS — K43.2 INCISIONAL HERNIA WITHOUT OBSTRUCTION OR GANGRENE: Chronic | ICD-10-CM

## 2025-06-17 DIAGNOSIS — Z98.890 OTHER SPECIFIED POSTPROCEDURAL STATES: Chronic | ICD-10-CM

## 2025-06-17 DIAGNOSIS — I28.0 ARTERIOVENOUS FISTULA OF PULMONARY VESSELS: ICD-10-CM

## 2025-06-17 DIAGNOSIS — Q25.72 CONGENITAL PULMONARY ARTERIOVENOUS MALFORMATION: ICD-10-CM

## 2025-06-17 DIAGNOSIS — Z90.49 ACQUIRED ABSENCE OF OTHER SPECIFIED PARTS OF DIGESTIVE TRACT: Chronic | ICD-10-CM

## 2025-06-17 DIAGNOSIS — K74.60 UNSPECIFIED CIRRHOSIS OF LIVER: ICD-10-CM

## 2025-06-17 DIAGNOSIS — Z75.8 OTHER PROBLEMS RELATED TO MEDICAL FACILITIES AND OTHER HEALTH CARE: ICD-10-CM

## 2025-06-17 LAB
ALBUMIN SERPL ELPH-MCNC: 2.6 G/DL — LOW (ref 3.3–5)
ALP SERPL-CCNC: 128 U/L — HIGH (ref 40–120)
ALT FLD-CCNC: 21 U/L — SIGNIFICANT CHANGE UP (ref 4–33)
ANION GAP SERPL CALC-SCNC: 9 MMOL/L — SIGNIFICANT CHANGE UP (ref 7–14)
AST SERPL-CCNC: 37 U/L — HIGH (ref 4–32)
BILIRUB SERPL-MCNC: 1.3 MG/DL — HIGH (ref 0.2–1.2)
BUN SERPL-MCNC: 12 MG/DL — SIGNIFICANT CHANGE UP (ref 7–23)
CALCIUM SERPL-MCNC: 7.9 MG/DL — LOW (ref 8.4–10.5)
CHLORIDE SERPL-SCNC: 109 MMOL/L — HIGH (ref 98–107)
CO2 SERPL-SCNC: 23 MMOL/L — SIGNIFICANT CHANGE UP (ref 22–31)
CREAT SERPL-MCNC: 0.58 MG/DL — SIGNIFICANT CHANGE UP (ref 0.5–1.3)
EGFR: 97 ML/MIN/1.73M2 — SIGNIFICANT CHANGE UP
EGFR: 97 ML/MIN/1.73M2 — SIGNIFICANT CHANGE UP
GLUCOSE SERPL-MCNC: 102 MG/DL — HIGH (ref 70–99)
HCT VFR BLD CALC: 25.7 % — LOW (ref 34.5–45)
HGB BLD-MCNC: 8.2 G/DL — LOW (ref 11.5–15.5)
INR BLD: 1.6 RATIO — HIGH (ref 0.85–1.16)
MAGNESIUM SERPL-MCNC: 1.8 MG/DL — SIGNIFICANT CHANGE UP (ref 1.6–2.6)
MCHC RBC-ENTMCNC: 29.5 PG — SIGNIFICANT CHANGE UP (ref 27–34)
MCHC RBC-ENTMCNC: 31.9 G/DL — LOW (ref 32–36)
MCV RBC AUTO: 92.4 FL — SIGNIFICANT CHANGE UP (ref 80–100)
NRBC # BLD AUTO: 0 K/UL — SIGNIFICANT CHANGE UP (ref 0–0)
NRBC # FLD: 0 K/UL — SIGNIFICANT CHANGE UP (ref 0–0)
NRBC BLD AUTO-RTO: 0 /100 WBCS — SIGNIFICANT CHANGE UP (ref 0–0)
PHOSPHATE SERPL-MCNC: 2.9 MG/DL — SIGNIFICANT CHANGE UP (ref 2.5–4.5)
PLATELET # BLD AUTO: 45 K/UL — LOW (ref 150–400)
POTASSIUM SERPL-MCNC: 4.1 MMOL/L — SIGNIFICANT CHANGE UP (ref 3.5–5.3)
POTASSIUM SERPL-SCNC: 4.1 MMOL/L — SIGNIFICANT CHANGE UP (ref 3.5–5.3)
PROT SERPL-MCNC: 5.3 G/DL — LOW (ref 6–8.3)
PROTHROM AB SERPL-ACNC: 18.5 SEC — HIGH (ref 9.9–13.4)
RBC # BLD: 2.78 M/UL — LOW (ref 3.8–5.2)
RBC # FLD: 17.2 % — HIGH (ref 10.3–14.5)
SODIUM SERPL-SCNC: 141 MMOL/L — SIGNIFICANT CHANGE UP (ref 135–145)
WBC # BLD: 3.07 K/UL — LOW (ref 3.8–10.5)
WBC # FLD AUTO: 3.07 K/UL — LOW (ref 3.8–10.5)

## 2025-06-17 PROCEDURE — 99223 1ST HOSP IP/OBS HIGH 75: CPT

## 2025-06-17 RX ORDER — MELATONIN 5 MG
3 TABLET ORAL AT BEDTIME
Refills: 0 | Status: DISCONTINUED | OUTPATIENT
Start: 2025-06-17 | End: 2025-06-23

## 2025-06-17 RX ORDER — TRAZODONE HCL 100 MG
50 TABLET ORAL AT BEDTIME
Refills: 0 | Status: DISCONTINUED | OUTPATIENT
Start: 2025-06-17 | End: 2025-06-23

## 2025-06-17 RX ORDER — B1/B2/B3/B5/B6/B12/VIT C/FOLIC 500-0.5 MG
1 TABLET ORAL DAILY
Refills: 0 | Status: DISCONTINUED | OUTPATIENT
Start: 2025-06-18 | End: 2025-06-23

## 2025-06-17 RX ORDER — FOLIC ACID 1 MG/1
1 TABLET ORAL
Qty: 0 | Refills: 0 | DISCHARGE
Start: 2025-06-17

## 2025-06-17 RX ORDER — FOLIC ACID 1 MG/1
1 TABLET ORAL DAILY
Refills: 0 | Status: DISCONTINUED | OUTPATIENT
Start: 2025-06-18 | End: 2025-06-23

## 2025-06-17 RX ORDER — TRAZODONE HCL 100 MG
1 TABLET ORAL
Qty: 0 | Refills: 0 | DISCHARGE
Start: 2025-06-17

## 2025-06-17 RX ORDER — LACTULOSE 10 G/15ML
15 SOLUTION ORAL
Qty: 0 | Refills: 0 | DISCHARGE
Start: 2025-06-17

## 2025-06-17 RX ORDER — LACTULOSE 10 G/15ML
10 SOLUTION ORAL THREE TIMES A DAY
Refills: 0 | Status: DISCONTINUED | OUTPATIENT
Start: 2025-06-17 | End: 2025-06-23

## 2025-06-17 RX ORDER — CARVEDILOL 3.12 MG/1
3.12 TABLET, FILM COATED ORAL EVERY 12 HOURS
Refills: 0 | Status: DISCONTINUED | OUTPATIENT
Start: 2025-06-18 | End: 2025-06-23

## 2025-06-17 RX ORDER — CARVEDILOL 3.12 MG/1
1 TABLET, FILM COATED ORAL
Qty: 0 | Refills: 0 | DISCHARGE
Start: 2025-06-17

## 2025-06-17 RX ORDER — SERTRALINE 100 MG/1
50 TABLET, FILM COATED ORAL DAILY
Refills: 0 | Status: DISCONTINUED | OUTPATIENT
Start: 2025-06-18 | End: 2025-06-23

## 2025-06-17 RX ORDER — B1/B2/B3/B5/B6/B12/VIT C/FOLIC 500-0.5 MG
1 TABLET ORAL
Qty: 0 | Refills: 0 | DISCHARGE
Start: 2025-06-17

## 2025-06-17 RX ORDER — MELATONIN 5 MG
1 TABLET ORAL
Qty: 0 | Refills: 0 | DISCHARGE
Start: 2025-06-17

## 2025-06-17 RX ORDER — BENZONATATE 100 MG
1 CAPSULE ORAL
Qty: 0 | Refills: 0 | DISCHARGE
Start: 2025-06-17

## 2025-06-17 RX ORDER — ACETAMINOPHEN 500 MG/5ML
2 LIQUID (ML) ORAL
Qty: 0 | Refills: 0 | DISCHARGE
Start: 2025-06-17

## 2025-06-17 RX ORDER — RIFAXIMIN 550 MG/1
1 TABLET ORAL
Qty: 0 | Refills: 0 | DISCHARGE
Start: 2025-06-17

## 2025-06-17 RX ORDER — SERTRALINE 100 MG/1
1 TABLET, FILM COATED ORAL
Qty: 0 | Refills: 0 | DISCHARGE
Start: 2025-06-17

## 2025-06-17 RX ADMIN — Medication 1 APPLICATION(S): at 05:26

## 2025-06-17 RX ADMIN — LACTULOSE 10 GRAM(S): 10 SOLUTION ORAL at 05:30

## 2025-06-17 RX ADMIN — Medication 1 TABLET(S): at 14:14

## 2025-06-17 RX ADMIN — CARVEDILOL 3.12 MILLIGRAM(S): 3.12 TABLET, FILM COATED ORAL at 05:27

## 2025-06-17 RX ADMIN — SERTRALINE 50 MILLIGRAM(S): 100 TABLET, FILM COATED ORAL at 14:14

## 2025-06-17 RX ADMIN — FOLIC ACID 1 MILLIGRAM(S): 1 TABLET ORAL at 14:15

## 2025-06-17 RX ADMIN — Medication 50 MILLIGRAM(S): at 23:59

## 2025-06-17 RX ADMIN — CARVEDILOL 3.12 MILLIGRAM(S): 3.12 TABLET, FILM COATED ORAL at 17:19

## 2025-06-17 RX ADMIN — LACTULOSE 10 GRAM(S): 10 SOLUTION ORAL at 14:14

## 2025-06-17 RX ADMIN — LACTULOSE 10 GRAM(S): 10 SOLUTION ORAL at 22:37

## 2025-06-17 NOTE — H&P ADULT - PROBLEM SELECTOR PLAN 5
Transitions of Care Status:  1.  Name of PCP:    Kathleen Gallegos MD (PCP) [phone]  2.  PCP Contacted on Admission: [ ] Y    [ x] N    3.  PCP contacted at Discharge: [ ] Y    [ ] N    [ ] N/A  4.  Post-Discharge Appointment Date and Location:  5.  Summary of Handoff given to PCP:

## 2025-06-17 NOTE — DISCHARGE NOTE NURSING/CASE MANAGEMENT/SOCIAL WORK - FINANCIAL ASSISTANCE
[facility] provides services at a reduced cost to those who are determined to be eligible through [facility]’s financial assistance program. Information regarding [facility]’s financial assistance program can be found by going to [email or web address] or by calling [phone].

## 2025-06-17 NOTE — H&P ADULT - PROBLEM SELECTOR PLAN 2
Transferred from [facility] to [place] for evaluation for procedure in the setting of patient with a history of cirrhosis, alcohol use disorder, reports abstinence since 1999 followed by Dr. Cain at [facility] Hepatology with undifferentiated diffuse esophageal wall thickening with patient started on Coreg BID for known varices, lactulose and rifaximin with improvement in apparent hepatic encephalopathy, albeit with subtle short term memory impairment,  with prior desaturation on [facility] on room air with echo r/o PFO v AVM with patient transferred for evaluation for right heart cath and possible pulmonary angiogram and possible ANNE.   Patient with chronic thrombocytopenia with no present active bleeding but further interventions to be reviewed by Primary Provider with subspecialists and with patient/patient's health care surrogate, Kaitlin Raya, patient's first cousin.   Would consider formal Cardiology and Pulmonary evaluation in the AM.

## 2025-06-17 NOTE — DISCHARGE NOTE PROVIDER - CARE PROVIDER_API CALL
Abiola Montes  Interventional Cardiology  [address], [address] [place] [id number]  Phone: [phone]  Fax: [phone]  Follow Up Time:     Hardeep Cain  Gastroenterology  [address] [place] [address], [facility] [address]  [place] [address]  Phone: [phone]  Fax: [phone]  Follow Up Time:

## 2025-06-17 NOTE — H&P ADULT - NSICDXPASTMEDICALHX_GEN_ALL_CORE_FT
PAST MEDICAL HISTORY:  Anxiety     Constipation     Depression     Hepatitis C treated.  Pt states it was successful    History of cirrhosis of liver     History of ETOH abuse pt states last drink 20 years ago attends AA meetings    Lumbar herniated disc s/p 2 epidural injections, last one 2-2017    Migraine pt states last 5-04-17.  Imitrex prn    Shoulder dislocation history of, returned by pt. Pt states she can not stretch out arms fully    Ventral hernia current

## 2025-06-17 NOTE — H&P ADULT - NSHPOUTPATIENTPROVIDERS_GEN_ALL_CORE
Kathleen Gallegos MD (PCP) [phone]  (no office records available to me at this hour).  Hardeep Cain MD (GI) [phone] Kathleen Gallegos MD (PCP) [phone]  (no office records available to me at this hour).  Hardeep Cain MD (GI) [phone]  Aida Hummel MD (Cardiology)  Jones Galvan MD (Pulmonary)

## 2025-06-17 NOTE — DISCHARGE NOTE PROVIDER - NSDCFUSCHEDAPPT_GEN_ALL_CORE_FT
[facility]  GASTRO [address]  Scheduled Appointment: 06/18/2025    [facility]  [address]STR[phone]  Scheduled Appointment: 07/24/2025

## 2025-06-17 NOTE — H&P ADULT - HISTORY OF PRESENT ILLNESS
NIGHT HOSPITALIST:    Patient UNKNOWN to  me previously, assigned to me at this point by Dr. Mitchell to accept transfer tor this 71 yo F--followed by Dr. 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, undifferentiated cognitive impairment with patient with S/P laparoscopic ventral hernia repair with mesh and liver wedge biopsy and known severe liver cirrhosis from June 2017, with patient apparently with a presumed mechanical fall Jun 4 2025 for a presumed mechanical fall with low 90s O2 saturation with CTT head negative and apparently was discharged AMA from [facility], but was called back following cirrhosis on the prior imaging of the liver, with apparently the patient's first cousin above brought patient to the ER with apparently progressively impaired cognition and confusional state at home with patient leaving her keys and wallet on the stove at home, with patient S/P CIWA protocol and high dose IV thiamine, noted elevated NH3 at [facility], with patient seen by hepatology with lactulose and rifaximin started, and Coreg 3.125 mg BID for varices known to patient.  Imaging of the CTT abdomen with diffuse esophageal thickening noted and endoscopic assessment deferred by GI due to thrombocytopenia, with echo at [facility] with R/O PFO versus AVM, with severe AS noted, with patient transferred to [place] with patient seen by Neurology, Psychiatry, Cardiology and Pulmonary for evaluation by Interventional Cardiology for possible RHC and intervention for possible pulmonary AVM and consideration for possible ANNE.    Patient now on bedtime Trazodone for insomnia, AM Zoloft and tolerating Rx with no delirium.    Patient is AxOx3 but with subtle short term memory impairment.   Responded to June 18 2025 rather than the 17th for today's date.    Presently offers no complaint.

## 2025-06-17 NOTE — DISCHARGE NOTE PROVIDER - HOSPITAL COURSE
70-year-old female past medical history of alcohol use disorder,  hx polysubstance abuse, cognitive impairment undergoing workup, depression, anxiety, ? Cirrhosis seen on imaging yesterday presenting to emergency room for low oxygen level and altered mental status at home.     AMS  - likely worsening dementia vs physiatry issues vs alcohol abuse disorder   - psych, neuro and hepatology cs appreciated  - s/p CIWA protocol  - fall precautions  - safety watch   - back to baseline     Cirrhosis, likely alcohol associated   - elevated ammonia on admission  - Hepatology consult appreciated; rec Lactulose & rifaximin w/ goal 3 BMs per day; s/p High dose IV thiamine 300mg TID for 3 days, followed by 250mg QD for 3 days.  - Coreg 3.125 BID for varices seen on prior imaging   - Will need follow up scheduled with Dr. Cain (GI/HEP) on discharge    Pulm AVM   - patient w/episode of desat to 86% when on RA on 6/10  - limited TTE 6/11 to r/o PFO v AVM showed possible intra-cardiac and extra-cardiac shunt, more likely to be pulm AVM.  - ANNE pending  - plan for transfer to [facility] for Pulm Angio with Dr. Montes   - will need home O2     Anxiety, depression  - c/w home meds  - psych consult appreciated     Syncope/Falls   - TTE 6/8: LVSF wnl, EF 65%       On _________ , discussed with Dr. Mitchell, patient is medically cleared and optimized for transfer to [facility].     70-year-old female past medical history of alcohol use disorder,  hx polysubstance abuse, cognitive impairment undergoing workup, depression, anxiety, ? Cirrhosis seen on imaging yesterday presenting to emergency room for low oxygen level and altered mental status at home.     AMS  - likely worsening dementia vs physiatry issues vs alcohol abuse disorder   - psych, neuro and hepatology cs appreciated  - s/p CIWA protocol  - fall precautions  - safety watch   - back to baseline     Cirrhosis, likely alcohol associated   - elevated ammonia on admission  - Hepatology consult appreciated; rec Lactulose & rifaximin w/ goal 3 BMs per day; s/p High dose IV thiamine 300mg TID for 3 days, followed by 250mg QD for 3 days.  - Coreg 3.125 BID for varices seen on prior imaging   - Will need follow up scheduled with Dr. Cain (GI/HEP) on discharge    Pulm AVM   - patient w/episode of desat to 86% when on RA on 6/10  - limited TTE 6/11 to r/o PFO v AVM showed possible intra-cardiac and extra-cardiac shunt, more likely to be pulm AVM.  - ANNE pending  - plan for transfer to [facility] for Pulm Angio with Dr. Montes   - will need home O2     Anxiety, depression  - c/w home meds  - psych consult appreciated     Syncope/Falls   - TTE 6/8: LVSF wnl, EF 65%       On 6/17/2025, discussed with Dr. Mitchell, patient is medically cleared and optimized for transfer to [facility].

## 2025-06-17 NOTE — PATIENT PROFILE ADULT - TRANSPORTATION
Notified patient. Patient reports she went to see Dr. Mendenhall the Chiropractor today and reports that he told her to come see her PCP along with a prescription for an anti inflammatory shot. Patient reports she has had headaches since August and decided to go to the Chiropractor.   Elisa Pop LPN   yes

## 2025-06-17 NOTE — DISCHARGE NOTE NURSING/CASE MANAGEMENT/SOCIAL WORK - PATIENT PORTAL LINK FT
You can access the FollowMyHealth Patient Portal offered by [facility] by registering at the following website: [email or web address]. By joining [facility]’s FollowMyHealth portal, you will also be able to view your health information using other applications (apps) compatible with our system.

## 2025-06-17 NOTE — DISCHARGE NOTE PROVIDER - DETAILS OF MALNUTRITION DIAGNOSIS/DIAGNOSES
This patient has been assessed with a concern for Malnutrition and was treated during this hospitalization for the following Nutrition diagnosis/diagnoses:     -  06/13/2025: Severe protein-calorie malnutrition

## 2025-06-17 NOTE — H&P ADULT - NSHPADDITIONALINFOADULT_GEN_ALL_CORE
NIGHT HOSPITALIST:    Patient/surrogate aware of transfer, course and agree with plan/care as above.   Given patient's comorbidities, patient's long term prognosis is guarded.   Emotional support provided to patient.  The patient wishes to designate her first cousin, Kaitlin, as her Health Care surrogate.   Care reviewed with covering NP/PA for endorsement to Dr. Mitchell. NIGHT HOSPITALIST:    Patient/surrogate aware of transfer, course and agree with plan/care as above.   Given patient's comorbidities, patient's long term prognosis is guarded.   Emotional support provided to patient.  The patient wishes to designate her first cousin, Kaitlin, as her Health Care surrogate.   Care reviewed with covering NP Rosalba for endorsement to Dr. Mitchell.

## 2025-06-17 NOTE — PROGRESS NOTE ADULT - ASSESSMENT
70-year-old female past medical history of alcohol use disorder, cognitive impairment undergoing workup, depression, anxiety, ?Cirrhosis seen on imaging yesterday presenting to emergency room for low oxygen level and altered mental status at home.  Patient reports that her cousin wanted her to come in and dropped her off, she feels fine and has no complaints.    Kaitlin helps her with all of her medical appointments and at home, patient's remain is Leonarda.  Kaitlin reports that she has been undergoing testing with a neurologist due to worsening cognitive impairment and early dementia including forgetting her wallet keys phone leaving the stove on being confused, and over the last couple of months she has had multiple episodes of syncope versus unresponsiveness.  On 2 occasions she has fallen to the ground, on other occasions she has appeared altered bobbing her head from side-to-side and unresponsive and will have low reading O2 sat during these episodes.  Patient left AMA from the ER yesterday after negative CT head and CT chest without pulmonary embolism but some signs of pulmonary edema, cirrhosis, splenomegaly.  Needed does not feel that the patient has capacity to leave AMA.  She reports it has been difficult for them to get cardiology appointment symptoms the schedule of many months. Pt declines active smoking, drug use, alcohol use.    AMS  - likely worsening dementia vs physiatry issues vs alcohol abuse disorder   - psych and neuro fu   - CT head neg  - CIWA protocol  - fall precautions  - safety watch     Anxiety, depression  - cw home meds  - psych fu     Hx of cirrhosis  - cw lactulose and rifaximin    Pulmonary AVM  - dw pulm  -  transfer to [facility] for procedure pending mehta   - ANNE cancelled today  sec to anesthesia availability     DVT prophylaxis

## 2025-06-17 NOTE — PROGRESS NOTE ADULT - NUTRITIONAL ASSESSMENT
This patient has been assessed with a concern for Malnutrition and has been determined to have a diagnosis/diagnoses of Severe protein-calorie malnutrition.    This patient is being managed with:   Diet NPO after Midnight-     NPO Start Date: 16-Jun-2025   NPO Start Time: 23:59  Except Medications  Entered: Jun 16 2025 10:02AM    Diet Regular-  Entered: Jun 16 2025  6:28AM  
This patient has been assessed with a concern for Malnutrition and has been determined to have a diagnosis/diagnoses of Severe protein-calorie malnutrition.    This patient is being managed with:   Diet Regular-  Entered: Jun 13 2025  9:34AM  
This patient has been assessed with a concern for Malnutrition and has been determined to have a diagnosis/diagnoses of Severe protein-calorie malnutrition.    This patient is being managed with:   Diet Regular-  Entered: Jun 13 2025  9:34AM  
This patient has been assessed with a concern for Malnutrition and has been determined to have a diagnosis/diagnoses of Severe protein-calorie malnutrition.    This patient is being managed with:   Diet Regular-  Entered: Jun 16 2025  6:28AM  
This patient has been assessed with a concern for Malnutrition and has been determined to have a diagnosis/diagnoses of Severe protein-calorie malnutrition.    This patient is being managed with:   Diet NPO after Midnight-     NPO Start Date: 15-Royal-2025   NPO Start Time: 23:59  Entered: Royal 15 2025  7:39AM    Diet Regular-  Entered: Jun 13 2025  9:34AM

## 2025-06-17 NOTE — DISCHARGE NOTE PROVIDER - NSDCCPCAREPLAN_GEN_ALL_CORE_FT
PRINCIPAL DISCHARGE DIAGNOSIS  Diagnosis: Altered mental state  Assessment and Plan of Treatment: You were seen by psychology, neurology and hepatology. Your mental status improved over time with treatment. This encephalopathy may have been hepatic in addition to underlying cognitive impairment/dementia - likely related to long standing alcohol use, vascular disease. Follow up with PCP      SECONDARY DISCHARGE DIAGNOSES  Diagnosis: AVM (arteriovenous malformation)  Assessment and Plan of Treatment: You were found to have a pulmonary AVM on echocardiogram. You need ANNE to confirm/repair it. You are being transferred to [facility] for further work up.    Diagnosis: Cirrhosis  Assessment and Plan of Treatment: You were seen by hepatology.   They recommend c/w lactulose and rifaximin for goal 3 BMs per day   continue with Coreg 3.125 BID for varices seen on prior imaging    You Will need follow up scheduled with Dr. Cain (GI/HEP) on discharge    Diagnosis: Aortic stenosis  Assessment and Plan of Treatment: Your echo showed likely severe aortic stenosis. This will require you follow up with cardiology outpatient.

## 2025-06-17 NOTE — H&P ADULT - PROBLEM SELECTOR PLAN 3
Will monitor and cautiously continue  bedtime Trazodone for insomnia and Zoloft in the AM.    Social work.

## 2025-06-17 NOTE — PROGRESS NOTE ADULT - PROVIDER SPECIALTY LIST ADULT
Cardiology
Hospitalist
Pulmonology
Cardiology
Hospitalist
Hospitalist
Neurology
Pulmonology
Cardiology
Cardiology
Hepatology
Hospitalist
Hospitalist
Cardiology
Hospitalist
Neurology
Neurology
Pulmonology
Hospitalist

## 2025-06-17 NOTE — H&P ADULT - MENTAL STATUS
AxOx3.   Speech fluent.   Cognition grossly intact with no need for prompting but with subtle short term memory impairment.

## 2025-06-17 NOTE — DISCHARGE NOTE NURSING/CASE MANAGEMENT/SOCIAL WORK - NSDCPEFALRISK_GEN_ALL_CORE
For information on Fall & Injury Prevention, visit: [email or web address] OR  [email or web address] OR  https://www.cdc.gov/steadi/patient.html Donor Site Anesthesia Type: same as repair anesthesia

## 2025-06-17 NOTE — H&P ADULT - ASSESSMENT
NIGHT HOSPITALIST:   Transferred from [facility] to [place] for evaluation for procedure in the setting of patient with a history of cirrhosis, alcohol use disorder, reports abstinence since 1999 followed by Dr. Cain at [facility] Hepatology with undifferentiated diffuse esophageal wall thickening with patient started on Coreg BID for known varices, lactulose and rifaximin with improvement in apparent hepatic encephalopathy, albeit with subtle short term memory impairment,  with prior desaturation on [facility] on room air with echo r/o PFO v AVM with patient transferred for evaluation for right heart cath and possible pulmonary angiogram and possible ANNE.   Patient with chronic thrombocytopenia with no present active bleeding but further interventions to be reviewed by Primary Provider with subspecialists and with patient/patient's health care surrogate, Kaitlin Elver, patient's first cousin.   Would consider formal Cardiology and Pulmonary evaluation in the AM.    Will continue with patient's lactulose, rifaximin, Coreg.   Would consider formal GI/Hepatology evaluation in the AM.    Will monitor and cautiously continue  bedtime Trazodone for insomnia and Zoloft in the AM.    Social work.

## 2025-06-17 NOTE — PROGRESS NOTE ADULT - ASSESSMENT
70-year-old female past medical history of alcohol use disorder, cognitive impairment undergoing workup, depression, anxiety, ?Cirrhosis seen on imaging yesterday presenting to emergency room for low oxygen level and altered mental status at home.  Patient reports that her cousin wanted her to come in and dropped her off, she feels fine and has no complaints.  Collateral information was obtained from her cousin Jc at phone number [phone].  Jc helps her with all of her medical appointments and at home, patient's remain is [place].  Jc reports that she has been undergoing testing with a neurologist due to worsening cognitive impairment and early dementia including forgetting her wallet keys phone leaving the stove on being confused, and over the last couple of months she has had multiple episodes of syncope versus unresponsiveness.  On 2 occasions she has fallen to the ground, on other occasions she has appeared altered bobbing her head from side-to-side and unresponsive and will have low reading O2 sat during these episodes.  Patient left AMA from the ER yesterday after negative CT head and CT chest without pulmonary embolism but some signs of pulmonary edema, cirrhosis, splenomegaly.  Needed does not feel that the patient has capacity to leave AMA.  She reports it has been difficult for them to get cardiology appointment symptoms the schedule of many months. Pt declines active smoking, drug use, alcohol use. (06 Jun 2025 09:59)    called jc to get more information above noted: : she is on 2 L : 10 days ago " she was OK:  forgetful  sometimes she is off:  she is seeing a neurologist and one more tst is pending to conform for dementia: ;  she does not have any ling problems  has second hand smoking:  she did have oxygen three years ago :  she was on oxygen  : but she gave h er back ;   she was seeing a pulm before for breathing:  sheis using on albuterol  prn ?    never had pe or dvt before   she fell about three months ago and was bruised:  given atfer pill:  and after that better   last month she fell down three  ti mes:       S/P fall : confusion ;? developing dementia   remote alcohol abuser   Cirrhosis  Anxiety/ Depression      S/P fall : confusion ;? developing dementia   she fell down and was found to be hypoxic  per his sister:  at [facility] three years ago  :  she was on h9ome oxygen  and then she returned it:   she lives with somebody   who smokes  so has passive smoking:   CTA on admission showed: No pulmonary embolus. Mild interlobular septal thickening which may represent mild edema. Mediastinal lymphadenopathy, stable and of uncertain etiology. Correlate clinically. Cirrhotic morphology of the liver. Splenomegaly.  Diffuse esophageal wall thickening which can be seen with esophagitis.  Correlate clinically.  check echo and probnp: though initial is low   she has no pneumonia   VBG is OK;    6/7:  sheis much m ore alert and awake and is more oriented   on 2 Lof oxygen  :   seems better then yesterday    her ammonia is high :  started on   lactulose for some time   she still seems slightly confused    Hepatology following    6/8: seems to be alert and awake and responding to questions appropriately:  she used to take zoloft and trazadone/;l  demanding to start them again ;     6/10; seems to be doing  ok ;  no sob:  she is n 2 L of oxygen ; can try to wean off the oxygen     she is on rifaxiin and lactulose     6/11; she seems OK;  but she desats on room air:  need to have limited echo for AVM as she has cirrhosis     6/12: SHE HAS PULM AVM : NEED PULM ANGIOGRAPHY ; NEED TO GET INTERVENTIONAL CARDIOLOGIST TO follow  if they can block  the AVM ; she will need home oxygen    DW DR LOLY Montes interventional cardiologist ; they want RHC first:  would defer to international cardiologist       6/13: she looks pretty good;   no sob;   moving around on floor;  has pulm avm;   cards following:  do cta ;  had discussed with cardiology in detail from [facility] too yesterday if the AVM can be closed;    ?needed to be transferred to [facility]  she would need gi clearance from gi  ;  if pt can ave JOIE and if no PFO and then RHC and pulm angiography  /RHC at the same time    6/14: GI clearance awaited:   oxygenation has imrpoved;   has AVM   once gi clear:  need joie:  and then furterh workup as described above     6/15: gi clearance is still aawited for joie:  then further course will be taken once joie is done     6/16: gi cleared for joie:  ? will be done today   6/17: te not done secondary to low plts:  today plts are less then 50/k  can we transfuse during the procedure and do the procedure?         remote alcohol abuser/ Cirrhosis  cirrhosis demonstrated on ct:   check ammonia:   she used to drink before quiet a bit     6/7: ammonia is high:   ? start lactulose: ? gi consult     6/8: doing  ok ;  no sob:   cont current rx:   on lactulose:  gi following      6/9; she seems to be doing  ok : wants zoloft and trazadone to be started;  she says shehas been taking for last thirty years;   ?psych consult:   she could ot sleel last night:  trazodone she was using forsleep      6/10: she was able to sleep last night: fEels OK;     6/11: cont current treatment   6/12: as above   6/13; cont current rx:   6/14: sh eis alert and awke and oriented x  3  6/15: she seems stable;  on current regimen   6/16; she is alert and awake and responding to questions well   6/17; she looks pretty god:  no sob; no cough ;   no phlegm   lts counts remains low;  ? can we transfuse the plts prior to JOIE     Anxiety/ Depression  she is on psych medications,  and the sister believes that she is confused secondary to these drugs    6/8: restart zoloft and trazodone one by one     6/9: defer to primary team   6/14; seen by neuro:  started back on trazodone;       dvt prophylaxis    dw acp

## 2025-06-17 NOTE — DISCHARGE NOTE PROVIDER - NSDCMRMEDTOKEN_GEN_ALL_CORE_FT
acetaminophen 325 mg oral tablet: 2 tab(s) orally every 6 hours As needed Mild Pain (1 - 3)  benzonatate 100 mg oral capsule: 1 cap(s) orally 3 times a day As needed Cough  carvedilol 3.125 mg oral tablet: 1 tab(s) orally every 12 hours  folic acid 1 mg oral tablet: 1 tab(s) orally once a day  lactulose 10 g/15 mL oral syrup: 15 milliliter(s) orally 3 times a day  melatonin 3 mg oral tablet: 1 tab(s) orally once a day (at bedtime)  Multiple Vitamins oral tablet: 1 tab(s) orally once a day  rifAXIMin 550 mg oral tablet: 1 tab(s) orally 2 times a day  sertraline 50 mg oral tablet: 1 tab(s) orally once a day  traZODone 50 mg oral tablet: 1 tab(s) orally once a day (at bedtime)

## 2025-06-17 NOTE — H&P ADULT - PROBLEM SELECTOR PLAN 1
Will continue with patient's lactulose, rifaximin, Coreg.   Would consider formal GI/Hepatology evaluation in the AM.

## 2025-06-17 NOTE — PATIENT PROFILE ADULT - FALL HARM RISK - HARM RISK INTERVENTIONS
Assistance with ambulation/Communicate Risk of Fall with Harm to all staff/Discuss with provider need for PT consult/Monitor for mental status changes/Monitor gait and stability/Reinforce activity limits and safety measures with patient and family/Tailored Fall Risk Interventions/Use of alarms - bed, chair and/or voice tab/Visual Cue: Yellow wristband and red socks/Bed in lowest position, wheels locked, appropriate side rails in place/Call bell, personal items and telephone in reach/Instruct patient to call for assistance before getting out of bed or chair/Non-slip footwear when patient is out of bed/[place] to call system/Physically safe environment - no spills, clutter or unnecessary equipment/Purposeful Proactive Rounding/Room/bathroom lighting operational, light cord in reach

## 2025-06-17 NOTE — H&P ADULT - NSHPREVIEWOFSYSTEMS_GEN_ALL_CORE
NO HA, no focal weakness.  NO chest pain/pressure.   No palpitations.  No breast symptoms.  NO abdominal pain, no red blood per rectum or melena.  NO back pain, no tearing back pain.    NO vaginal bleeding.  NO thyroid symptoms.  NO SI/HI.

## 2025-06-17 NOTE — H&P ADULT - NSHPLABSRESULTS_GEN_ALL_CORE
Labs from [facility] from 6/17/25:    WBC 3.07    Hgb 8.2    Platelets of 45K    6/7/25 U tox  with oxycodone    K+ 4.1    Random glucose of 102    Cr 0.58    Alb 2.6    Alk phos 128    NH3 73 from 6/6/25    CTT [facility] from 6/5/25 with no PE, undifferentiated mediastinal adenopathy, splenomegaly, diffuse esophageal thickening undifferentiated.    CTT head negative.

## 2025-06-17 NOTE — DISCHARGE NOTE PROVIDER - CARE PROVIDERS DIRECT ADDRESSES
[email or web address]

## 2025-06-17 NOTE — PROGRESS NOTE ADULT - SUBJECTIVE AND OBJECTIVE BOX
Date of Service: 06-11-25 @ 12:32    Patient is a 70y old  Female who presents with a chief complaint of AMS (10 Royal 2025 14:01)      Any change in ROS: she is on 2 L of oxygen :  she is fully alert and aawke and is oriented       MEDICATIONS  (STANDING):  carvedilol 3.125 milliGRAM(s) Oral every 12 hours  chlorhexidine 2% Cloths 1 Application(s) Topical <User Schedule>  folic acid 1 milliGRAM(s) Oral daily  lactulose Syrup 10 Gram(s) Oral three times a day  melatonin 3 milliGRAM(s) Oral at bedtime  multivitamin 1 Tablet(s) Oral daily  rifAXIMin 550 milliGRAM(s) Oral two times a day  sertraline 50 milliGRAM(s) Oral daily  thiamine IVPB 250 milliGRAM(s) IV Intermittent daily  thiamine IVPB   IV Intermittent   traZODone 50 milliGRAM(s) Oral at bedtime    MEDICATIONS  (PRN):  acetaminophen     Tablet .. 650 milliGRAM(s) Oral every 6 hours PRN Mild Pain (1 - 3)  LORazepam   Injectable 2 milliGRAM(s) IV Push every 1 hour PRN CIWA 15 or greater, or seizure    Vital Signs Last 24 Hrs  T(C): 36.7 (11 Jun 2025 04:00), Max: 36.7 (11 Jun 2025 04:00)  T(F): 98.1 (11 Jun 2025 04:00), Max: 98.1 (11 Jun 2025 04:00)  HR: 83 (11 Jun 2025 04:00) (62 - 83)  BP: 123/59 (11 Jun 2025 04:00) (123/57 - 133/68)  BP(mean): --  RR: 18 (11 Jun 2025 04:00) (18 - 18)  SpO2: 99% (11 Jun 2025 04:00) (86% - 99%)    Parameters below as of 11 Jun 2025 04:00  Patient On (Oxygen Delivery Method): nasal cannula  O2 Flow (L/min): 2      I&O's Summary        Physical Exam:   GENERAL: NAD, well-groomed, well-developed  HEENT: JENA/   Atraumatic, Normocephalic  ENMT: No tonsillar erythema, exudates, or enlargement; Moist mucous membranes, Good dentition, No lesions  NECK: Supple, No JVD, Normal thyroid  CHEST/LUNG: Clear to auscultaion  CVS: Regular rate and rhythm; No murmurs, rubs, or gallops  GI: : Soft, Nontender, Nondistended; Bowel sounds present  NERVOUS SYSTEM:  Alert & Oriented T3aqyci  EXTREMITIES:  - edema  LYMPH: No lymphadenopathy noted  SKIN: No rashes or lesions  ENDOCRINOLOGY: No Thyromegaly  PSYCH: Appropriate    Labs:  26                            8.6    3.58  )-----------( 48       ( 11 Jun 2025 07:30 )             26.9                         8.3    2.93  )-----------( 38       ( 10 Royal 2025 07:10 )             26.3                         8.1    3.70  )-----------( 45       ( 09 Jun 2025 05:30 )             24.9     06-11    142  |  110[H]  |  10  ----------------------------<  96  3.8   |  23  |  0.46[L]  06-10    144  |  111[H]  |  9   ----------------------------<  87  4.1   |  24  |  0.54  06-09    141  |  109[H]  |  9   ----------------------------<  106[H]  3.8   |  24  |  0.47[L]  06-08    144  |  111[H]  |  6[L]  ----------------------------<  96  3.5   |  21[L]  |  0.53    Ca    8.1[L]      11 Jun 2025 06:15  Ca    8.1[L]      10 Royal 2025 07:10  Phos  3.4     06-11  Phos  3.2     06-10  Mg     1.80     06-11  Mg     1.70     06-10    TPro  5.2[L]  /  Alb  2.6[L]  /  TBili  1.4[H]  /  DBili  x   /  AST  35[H]  /  ALT  20  /  AlkPhos  142[H]  06-11  TPro  5.2[L]  /  Alb  2.7[L]  /  TBili  1.8[H]  /  DBili  x   /  AST  33[H]  /  ALT  20  /  AlkPhos  117  06-10  TPro  5.1[L]  /  Alb  2.6[L]  /  TBili  2.0[H]  /  DBili  0.8[H]  /  AST  37[H]  /  ALT  21  /  AlkPhos  122[H]  06-09  TPro  5.2[L]  /  Alb  2.6[L]  /  TBili  2.5[H]  /  DBili  0.9[H]  /  AST  41[H]  /  ALT  22  /  AlkPhos  95  06-08    CAPILLARY BLOOD GLUCOSE          LIVER FUNCTIONS - ( 11 Jun 2025 06:15 )  Alb: 2.6 g/dL / Pro: 5.2 g/dL / ALK PHOS: 142 U/L / ALT: 20 U/L / AST: 35 U/L / GGT: x           PT/INR - ( 11 Jun 2025 07:30 )   PT: 19.0 sec;   INR: 1.65 ratio         PTT - ( 10 Royal 2025 07:10 )  PTT:34.5 sec  Urinalysis Basic - ( 11 Jun 2025 06:15 )    Color: x / Appearance: x / SG: x / pH: x  Gluc: 96 mg/dL / Ketone: x  / Bili: x / Urobili: x   Blood: x / Protein: x / Nitrite: x   Leuk Esterase: x / RBC: x / WBC x   Sq Epi: x / Non Sq Epi: x / Bacteria: x      D-Dimer Assay, Quantitative: 391 ng/mL DDU (06-04 @ 21:10)        RECENT CULTURES:  06-06 @ 18:19 Blood Blood-Peripheral                No growth at 4 days        rad< from: CT Abdomen and Pelvis w/wo IV Cont (06.09.25 @ 09:32) >  LYMPH NODES: No lymphadenopathy.  ABDOMINAL WALL: Diffuse subcutaneous edema. Ventral hernia mesh.  BONES: Degenerative changes. Scoliosis. Mild anterolisthesis of L5 on S1.    IMPRESSION:    Cirrhosis with evidence of portal hypertension. Small volume ascites.    No evidence of hepatocellular carcinoma.    Small pancreatic cystic lesions, possibly sidebranch IPMN. Recommend   abdominal MRI for further evaluation.    Trace bilateral pleural effusions.    < end of copied text >  < from: CT Angio Chest PE Protocol w/ IV Cont (06.05.25 @ 01:45) >  the left humeral head. There is a curvature of the spine.    IMPRESSION:  No pulmonary embolus.    Mild interlobular septal thickening which may represent mild edema.    Mediastinal lymphadenopathy, stable and of uncertain etiology. Correlate   clinically.    Cirrhotic morphology of the liver.    Splenomegaly.    Diffuse esophageal wall thickening which can be seen with esophagitis.   Correlate clinically.    < end of copied text >  < from: TTE W or WO Ultrasound Enhancing Agent (06.08.25 @ 09:24) >      1. Left ventricular cavity is normal in size. Left ventricular wall thickness is normal. Left ventricular systolic function is normal with an ejection fraction of65 % by El's method of disks. There are no regional wall motion abnormalities seen.   2. Normal right ventricular cavity size and normal right ventricular systolic function. Tricuspid annular plane systolic excursion (TAPSE) is 2.5 cm (normal >=1.7 cm).   3. There is mild (grade 1) left ventricular diastolic dysfunction.   4. Structurally normal mitral valve with normal leaflet excursion. There is calcification of the mitral valve annulus. There is mild mitral regurgitation.   5. The aortic valve anatomy cannot be determined. There is calcification of the aortic valve leaflets. Despite the transaortic gradients, the gross appearance of the valve is consistent with significant (possibly severe) aortic stenosis. However, unable to accurately estimate the aortic valve area. The peak transaortic velocity is 3.39 m/s, peak transaortic gradient is 46.0 mmHg and mean transaortic gradient is 26.0 mmHg with an LVOT/aortic valve VTI ratio of 0.51. There is mild to moderate aortic regurgitation.   6. The left atrium is moderately dilated with an indexed volume of 45.13 ml/m².    < end of copied text >    RESPIRATORY CULTURES:          Studies  Chest X-RAY  CT SCAN Chest   Venous Dopplers: LE:   CT Abdomen  Others              
[facility] Attending Follow-up Note     Patient seen and examined at bedside.    Overnight Events:     events noted     REVIEW OF SYSTEMS:  Constitutional:     [x ] negative [ ] fevers [ ] chills [ ] weight loss [ ] weight gain  HEENT:                  [x ] negative [ ] dry eyes [ ] eye irritation [ ] postnasal drip [ ] nasal congestion  CV:                         [ x] negative  [ ] chest pain [ ] orthopnea [ ] palpitations [ ] murmur  Resp:                     [ x] negative [ ] cough [ ] shortness of breath [ ] dyspnea [ ] wheezing [ ] sputum [ ]hemoptysis  GI:                          [ x] negative [ ] nausea [ ] vomiting [ ] diarrhea [ ] constipation [ ] abd pain [ ] dysphagia   :                        [ x] negative [ ] dysuria [ ] nocturia [ ] hematuria [ ] increased urinary frequency  Musculoskeletal: [ x] negative [ ] back pain [ ] myalgias [ ] arthralgias [ ] fracture  Skin:                       [ x] negative [ ] rash [ ] itch  Neurological:        [x ] negative [ ] headache [ ] dizziness [ ] syncope [ ] weakness [ ] numbness  Psychiatric:           [ x] negative [ ] anxiety [ ] depression  Endocrine:            [ x] negative [ ] diabetes [ ] thyroid problem  Heme/Lymph:      [ x] negative [ ] anemia [ ] bleeding problem  Allergic/Immune: [ x] negative [ ] itchy eyes [ ] nasal discharge [ ] hives [ ] angioedema    [ x] All other systems negative  [ ] Unable to assess ROS due to    Current Meds:  acetaminophen     Tablet .. 650 milliGRAM(s) Oral every 6 hours PRN  carvedilol 3.125 milliGRAM(s) Oral every 12 hours  chlorhexidine 2% Cloths 1 Application(s) Topical <User Schedule>  folic acid 1 milliGRAM(s) Oral daily  lactulose Syrup 10 Gram(s) Oral three times a day  LORazepam   Injectable 2 milliGRAM(s) IV Push every 1 hour PRN  melatonin 3 milliGRAM(s) Oral at bedtime  multivitamin 1 Tablet(s) Oral daily  rifAXIMin 550 milliGRAM(s) Oral two times a day  sertraline 50 milliGRAM(s) Oral daily  thiamine IVPB 250 milliGRAM(s) IV Intermittent daily  thiamine IVPB   IV Intermittent   traZODone 50 milliGRAM(s) Oral at bedtime      PAST MEDICAL & SURGICAL HISTORY:  Ventral hernia  current      Migraine  pt states last 5-04-17.  Imitrex prn      Anxiety      Depression      Constipation      Lumbar herniated disc  s/p 2 epidural injections, last one 2-2017      Shoulder dislocation  history of, returned by pt. Pt states she can not stretch out arms fully      Hepatitis C  treated.  Pt states it was successful      History of ETOH abuse  pt states last drink 20 years ago attends AA meetings      S/P cholecystectomy  laparoscopic 2015      Incisional hernia  s//p surgical repair with mesh 2016      S/P colonoscopy  2016          Vitals:  T(F): 98.3 (06-09), Max: 98.8 (06-09)  HR: 78 (06-09) (61 - 78)  BP: 131/50 (06-09) (123/64 - 144/78)  RR: 18 (06-09)  SpO2: 96% (06-09)  I&O's Summary      Physical Exam:  Appearance: No acute distress  HENT: No JVD   Cardiovascular: RRR, S1/S2, no murmurs  Respiratory: CTABL  Gastrointestinal: soft, NT ND, +BS  Musculoskeletal: No clubbing, no edema   Neurologic: Non-focal  Skin: No rashes, ecchymoses, or cyanosis                          8.1    3.70  )-----------( 45       ( 09 Jun 2025 05:30 )             24.9     06-09    141  |  109[H]  |  9   ----------------------------<  106[H]  3.8   |  24  |  0.47[L]    Ca    7.9[L]      09 Jun 2025 05:30  Phos  2.5     06-09  Mg     1.60     06-09    TPro  5.1[L]  /  Alb  2.6[L]  /  TBili  2.0[H]  /  DBili  0.8[H]  /  AST  37[H]  /  ALT  21  /  AlkPhos  122[H]  06-09    PT/INR - ( 09 Jun 2025 05:30 )   PT: 20.7 sec;   INR: 1.79 ratio         PTT - ( 09 Jun 2025 05:30 )  PTT:34.6 sec              Cardiovascular Testings:     
Patient is a 70y old  Female who presents with a chief complaint of AMS (06 Jun 2025 15:24)    Date of servie : 06-07-25 @ 09:04  INTERVAL HPI/OVERNIGHT EVENTS:  T(C): 36.7 (06-07-25 @ 04:15), Max: 37.2 (06-06-25 @ 20:15)  HR: 65 (06-07-25 @ 04:15) (60 - 76)  BP: 151/64 (06-07-25 @ 04:15) (133/64 - 151/64)  RR: 18 (06-07-25 @ 04:15) (18 - 18)  SpO2: 99% (06-07-25 @ 04:15) (95% - 100%)  Wt(kg): --  I&O's Summary      LABS:                        8.4    2.71  )-----------( 44       ( 07 Jun 2025 04:39 )             25.7     06-07    145  |  112[H]  |  8   ----------------------------<  59[L]  3.6   |  21[L]  |  0.53    Ca    8.0[L]      07 Jun 2025 04:39  Phos  2.9     06-07  Mg     1.80     06-07    TPro  5.2[L]  /  Alb  2.7[L]  /  TBili  2.5[H]  /  DBili  1.0[H]  /  AST  38[H]  /  ALT  21  /  AlkPhos  95  06-07    PT/INR - ( 06 Jun 2025 18:20 )   PT: 18.6 sec;   INR: 1.57 ratio         PTT - ( 06 Jun 2025 18:20 )  PTT:34.1 sec  Urinalysis Basic - ( 07 Jun 2025 04:39 )    Color: x / Appearance: x / SG: x / pH: x  Gluc: 59 mg/dL / Ketone: x  / Bili: x / Urobili: x   Blood: x / Protein: x / Nitrite: x   Leuk Esterase: x / RBC: x / WBC x   Sq Epi: x / Non Sq Epi: x / Bacteria: x      CAPILLARY BLOOD GLUCOSE            Urinalysis Basic - ( 07 Jun 2025 04:39 )    Color: x / Appearance: x / SG: x / pH: x  Gluc: 59 mg/dL / Ketone: x  / Bili: x / Urobili: x   Blood: x / Protein: x / Nitrite: x   Leuk Esterase: x / RBC: x / WBC x   Sq Epi: x / Non Sq Epi: x / Bacteria: x        MEDICATIONS  (STANDING):  carvedilol 3.125 milliGRAM(s) Oral every 12 hours  chlorhexidine 2% Cloths 1 Application(s) Topical <User Schedule>  folic acid 1 milliGRAM(s) Oral daily  lactulose Syrup 10 Gram(s) Oral three times a day  multivitamin 1 Tablet(s) Oral daily  rifAXIMin 550 milliGRAM(s) Oral two times a day  sertraline 50 milliGRAM(s) Oral daily  thiamine IVPB 500 milliGRAM(s) IV Intermittent every 8 hours  thiamine IVPB   IV Intermittent     MEDICATIONS  (PRN):  LORazepam   Injectable 2 milliGRAM(s) IV Push every 1 hour PRN CIWA 15 or greater, or seizure          PHYSICAL EXAM:  GENERAL: NAD, well-groomed, well-developed  HEAD:  Atraumatic, Normocephalic  CHEST/LUNG: Clear to percussion bilaterally; No rales, rhonchi, wheezing, or rubs  HEART: Regular rate and rhythm; No murmurs, rubs, or gallops  ABDOMEN: Soft, Nontender, Nondistended; Bowel sounds present  EXTREMITIES:  2+ Peripheral Pulses, No clubbing, cyanosis, or edema  LYMPH: No lymphadenopathy noted  SKIN: No rashes or lesions    Care Discussed with Consultants/Other Providers [ ] YES  [ ] NO
[facility] Attending Follow-up Note     Patient seen and examined at bedside.    Overnight Events:     events noted     REVIEW OF SYSTEMS:  Constitutional:     [x ] negative [ ] fevers [ ] chills [ ] weight loss [ ] weight gain  HEENT:                  [x ] negative [ ] dry eyes [ ] eye irritation [ ] postnasal drip [ ] nasal congestion  CV:                         [ x] negative  [ ] chest pain [ ] orthopnea [ ] palpitations [ ] murmur  Resp:                     [ x] negative [ ] cough [ ] shortness of breath [ ] dyspnea [ ] wheezing [ ] sputum [ ]hemoptysis  GI:                          [ x] negative [ ] nausea [ ] vomiting [ ] diarrhea [ ] constipation [ ] abd pain [ ] dysphagia   :                        [ x] negative [ ] dysuria [ ] nocturia [ ] hematuria [ ] increased urinary frequency  Musculoskeletal: [ x] negative [ ] back pain [ ] myalgias [ ] arthralgias [ ] fracture  Skin:                       [ x] negative [ ] rash [ ] itch  Neurological:        [x ] negative [ ] headache [ ] dizziness [ ] syncope [ ] weakness [ ] numbness  Psychiatric:           [ x] negative [ ] anxiety [ ] depression  Endocrine:            [ x] negative [ ] diabetes [ ] thyroid problem  Heme/Lymph:      [ x] negative [ ] anemia [ ] bleeding problem  Allergic/Immune: [ x] negative [ ] itchy eyes [ ] nasal discharge [ ] hives [ ] angioedema    [ x] All other systems negative  [ ] Unable to assess ROS due to    Current Meds:  acetaminophen     Tablet .. 650 milliGRAM(s) Oral every 6 hours PRN  carvedilol 3.125 milliGRAM(s) Oral every 12 hours  chlorhexidine 2% Cloths 1 Application(s) Topical <User Schedule>  folic acid 1 milliGRAM(s) Oral daily  lactulose Syrup 10 Gram(s) Oral three times a day  LORazepam   Injectable 2 milliGRAM(s) IV Push every 1 hour PRN  melatonin 3 milliGRAM(s) Oral at bedtime  multivitamin 1 Tablet(s) Oral daily  rifAXIMin 550 milliGRAM(s) Oral two times a day  sertraline 50 milliGRAM(s) Oral daily  thiamine IVPB 250 milliGRAM(s) IV Intermittent daily  thiamine IVPB   IV Intermittent   traZODone 50 milliGRAM(s) Oral at bedtime      PAST MEDICAL & SURGICAL HISTORY:  Ventral hernia  current      Migraine  pt states last 5-04-17.  Imitrex prn      Anxiety      Depression      Constipation      Lumbar herniated disc  s/p 2 epidural injections, last one 2-2017      Shoulder dislocation  history of, returned by pt. Pt states she can not stretch out arms fully      Hepatitis C  treated.  Pt states it was successful      History of ETOH abuse  pt states last drink 20 years ago attends AA meetings      S/P cholecystectomy  laparoscopic 2015      Incisional hernia  s//p surgical repair with mesh 2016      S/P colonoscopy  2016          Vitals:  T(F): 97.8 (06-12), Max: 99 (06-11)  HR: 70 (06-12) (60 - 78)  BP: 135/56 (06-12) (120/58 - 168/72)  RR: 18 (06-12)  SpO2: 98% (06-12)  I&O's Summary      Physical Exam:  Appearance: No acute distress  HENT: No JVD   Cardiovascular: RRR, S1/S2, no murmurs  Respiratory: CTABL  Gastrointestinal: soft, NT ND, +BS  Musculoskeletal: No clubbing, no edema   Neurologic: Non-focal  Skin: No rashes, ecchymoses, or cyanosis                          8.3    3.69  )-----------( 42       ( 12 Jun 2025 07:00 )             26.1     06-12    140  |  110[H]  |  9   ----------------------------<  83  4.4   |  23  |  0.46[L]    Ca    8.3[L]      12 Jun 2025 07:00  Phos  3.7     06-12  Mg     1.70     06-12    TPro  5.2[L]  /  Alb  2.5[L]  /  TBili  1.6[H]  /  DBili  x   /  AST  36[H]  /  ALT  21  /  AlkPhos  125[H]  06-12    PT/INR - ( 11 Jun 2025 07:30 )   PT: 19.0 sec;   INR: 1.65 ratio                       Cardiovascular Testings:     
[facility] Attending Follow-up Note     Patient seen and examined at bedside. 6/7/2025     Overnight Events:     events noted     REVIEW OF SYSTEMS:  Constitutional:     [x ] negative [ ] fevers [ ] chills [ ] weight loss [ ] weight gain  HEENT:                  [x ] negative [ ] dry eyes [ ] eye irritation [ ] postnasal drip [ ] nasal congestion  CV:                         [ x] negative  [ ] chest pain [ ] orthopnea [ ] palpitations [ ] murmur  Resp:                     [ x] negative [ ] cough [ ] shortness of breath [ ] dyspnea [ ] wheezing [ ] sputum [ ]hemoptysis  GI:                          [ x] negative [ ] nausea [ ] vomiting [ ] diarrhea [ ] constipation [ ] abd pain [ ] dysphagia   :                        [ x] negative [ ] dysuria [ ] nocturia [ ] hematuria [ ] increased urinary frequency  Musculoskeletal: [ x] negative [ ] back pain [ ] myalgias [ ] arthralgias [ ] fracture  Skin:                       [ x] negative [ ] rash [ ] itch  Neurological:        [x ] negative [ ] headache [ ] dizziness [ ] syncope [ ] weakness [ ] numbness  Psychiatric:           [ x] negative [ ] anxiety [ ] depression  Endocrine:            [ x] negative [ ] diabetes [ ] thyroid problem  Heme/Lymph:      [ x] negative [ ] anemia [ ] bleeding problem  Allergic/Immune: [ x] negative [ ] itchy eyes [ ] nasal discharge [ ] hives [ ] angioedema    [ x] All other systems negative  [ ] Unable to assess ROS due to    Current Meds:  acetaminophen     Tablet .. 650 milliGRAM(s) Oral every 6 hours PRN  carvedilol 3.125 milliGRAM(s) Oral every 12 hours  chlorhexidine 2% Cloths 1 Application(s) Topical <User Schedule>  folic acid 1 milliGRAM(s) Oral daily  lactulose Syrup 10 Gram(s) Oral three times a day  LORazepam   Injectable 2 milliGRAM(s) IV Push every 1 hour PRN  melatonin 3 milliGRAM(s) Oral at bedtime  multivitamin 1 Tablet(s) Oral daily  rifAXIMin 550 milliGRAM(s) Oral two times a day  sertraline 50 milliGRAM(s) Oral daily  thiamine IVPB 500 milliGRAM(s) IV Intermittent every 8 hours  thiamine IVPB   IV Intermittent       PAST MEDICAL & SURGICAL HISTORY:  Ventral hernia  current      Migraine  pt states last 5-04-17.  Imitrex prn      Anxiety      Depression      Constipation      Lumbar herniated disc  s/p 2 epidural injections, last one 2-2017      Shoulder dislocation  history of, returned by pt. Pt states she can not stretch out arms fully      Hepatitis C  treated.  Pt states it was successful      History of ETOH abuse  pt states last drink 20 years ago attends AA meetings      S/P cholecystectomy  laparoscopic 2015      Incisional hernia  s//p surgical repair with mesh 2016      S/P colonoscopy  2016          Vitals:  T(F): 97.7 (06-08), Max: 98.2 (06-07)  HR: 66 (06-08) (60 - 66)  BP: 147/62 (06-08) (131/57 - 147/67)  RR: 18 (06-08)  SpO2: 97% (06-08)  I&O's Summary    07 Jun 2025 07:01  -  08 Jun 2025 07:00  --------------------------------------------------------  IN: 120 mL / OUT: 120 mL / NET: 0 mL        Physical Exam:  Appearance: No acute distress  HENT: No JVD   Cardiovascular: RRR, S1/S2, no murmurs  Respiratory: CTABL  Gastrointestinal: soft, NT ND, +BS  Musculoskeletal: No clubbing, no edema   Neurologic: Non-focal  Skin: No rashes, ecchymoses, or cyanosis                          8.4    2.71  )-----------( 44       ( 07 Jun 2025 04:39 )             25.7     06-08    144  |  111[H]  |  6[L]  ----------------------------<  96  3.5   |  21[L]  |  0.53    Ca    7.9[L]      08 Jun 2025 06:45  Phos  2.6     06-08  Mg     1.70     06-08    TPro  5.2[L]  /  Alb  2.6[L]  /  TBili  2.5[H]  /  DBili  0.9[H]  /  AST  41[H]  /  ALT  22  /  AlkPhos  95  06-08    PT/INR - ( 08 Jun 2025 06:45 )   PT: 19.4 sec;   INR: 1.64 ratio         PTT - ( 08 Jun 2025 06:45 )  PTT:35.6 sec              Cardiovascular Testings:     
[facility] Attending Follow-up Note     Patient seen and examined at bedside.    Overnight Events:     more lucid   mild dry cough     REVIEW OF SYSTEMS:  Constitutional:     [x ] negative [ ] fevers [ ] chills [ ] weight loss [ ] weight gain  HEENT:                  [x ] negative [ ] dry eyes [ ] eye irritation [ ] postnasal drip [ ] nasal congestion  CV:                         [ x] negative  [ ] chest pain [ ] orthopnea [ ] palpitations [ ] murmur  Resp:                     [ x] negative [ ] cough [ ] shortness of breath [ ] dyspnea [ ] wheezing [ ] sputum [ ]hemoptysis  GI:                          [ x] negative [ ] nausea [ ] vomiting [ ] diarrhea [ ] constipation [ ] abd pain [ ] dysphagia   :                        [ x] negative [ ] dysuria [ ] nocturia [ ] hematuria [ ] increased urinary frequency  Musculoskeletal: [ x] negative [ ] back pain [ ] myalgias [ ] arthralgias [ ] fracture  Skin:                       [ x] negative [ ] rash [ ] itch  Neurological:        [x ] negative [ ] headache [ ] dizziness [ ] syncope [ ] weakness [ ] numbness  Psychiatric:           [ x] negative [ ] anxiety [ ] depression  Endocrine:            [ x] negative [ ] diabetes [ ] thyroid problem  Heme/Lymph:      [ x] negative [ ] anemia [ ] bleeding problem  Allergic/Immune: [ x] negative [ ] itchy eyes [ ] nasal discharge [ ] hives [ ] angioedema    [ x] All other systems negative  [ ] Unable to assess ROS due to    Current Meds:  acetaminophen     Tablet .. 650 milliGRAM(s) Oral every 6 hours PRN  benzonatate 100 milliGRAM(s) Oral three times a day PRN  carvedilol 3.125 milliGRAM(s) Oral every 12 hours  chlorhexidine 2% Cloths 1 Application(s) Topical <User Schedule>  folic acid 1 milliGRAM(s) Oral daily  lactulose Syrup 10 Gram(s) Oral three times a day  melatonin 3 milliGRAM(s) Oral at bedtime  multivitamin 1 Tablet(s) Oral daily  rifAXIMin 550 milliGRAM(s) Oral two times a day  sertraline 50 milliGRAM(s) Oral daily  traZODone 50 milliGRAM(s) Oral at bedtime      PAST MEDICAL & SURGICAL HISTORY:  Ventral hernia  current      Migraine  pt states last 5-04-17.  Imitrex prn      Anxiety      Depression      Constipation      Lumbar herniated disc  s/p 2 epidural injections, last one 2-2017      Shoulder dislocation  history of, returned by pt. Pt states she can not stretch out arms fully      Hepatitis C  treated.  Pt states it was successful      History of ETOH abuse  pt states last drink 20 years ago attends AA meetings      S/P cholecystectomy  laparoscopic 2015      Incisional hernia  s//p surgical repair with mesh 2016      S/P colonoscopy  2016          Vitals:  T(F): 97.8 (06-15), Max: 98.1 (06-15)  HR: 62 (06-15) (62 - 71)  BP: 148/60 (06-15) (101/77 - 148/60)  RR: 18 (06-15)  SpO2: 99% (06-15)  I&O's Summary      Physical Exam:  Appearance: No acute distress  HENT: No JVD   Cardiovascular: RRR, S1/S2, no murmurs  Respiratory: CTABL  Gastrointestinal: soft, NT ND, +BS  Musculoskeletal: No clubbing, no edema   Neurologic: Non-focal  Skin: No rashes, ecchymoses, or cyanosis                          8.8    3.00  )-----------( 50       ( 15 Royal 2025 05:50 )             27.9     06-15    141  |  110[H]  |  10  ----------------------------<  97  4.4   |  24  |  0.51    Ca    8.7      15 Royal 2025 05:50  Phos  3.6     06-15  Mg     1.80     06-15    TPro  5.6[L]  /  Alb  2.8[L]  /  TBili  1.5[H]  /  DBili  x   /  AST  39[H]  /  ALT  20  /  AlkPhos  124[H]  06-15    PT/INR - ( 15 Royal 2025 05:50 )   PT: 18.1 sec;   INR: 1.57 ratio         PTT - ( 15 Royal 2025 05:50 )  PTT:35.3 sec              Cardiovascular Testings:     
Gastroenterology/Hepatology Progress Note      Interval Events:     No acute events overnight    Allergies:  avocado (Hives)  Codeine Phosphate-GuaiFENesin (Nausea)  No Known Drug Allergies      Hospital Medications:  acetaminophen     Tablet .. 650 milliGRAM(s) Oral every 6 hours PRN  carvedilol 3.125 milliGRAM(s) Oral every 12 hours  chlorhexidine 2% Cloths 1 Application(s) Topical <User Schedule>  folic acid 1 milliGRAM(s) Oral daily  lactulose Syrup 10 Gram(s) Oral three times a day  LORazepam   Injectable 2 milliGRAM(s) IV Push every 1 hour PRN  melatonin 3 milliGRAM(s) Oral at bedtime  multivitamin 1 Tablet(s) Oral daily  rifAXIMin 550 milliGRAM(s) Oral two times a day  sertraline 50 milliGRAM(s) Oral daily  thiamine IVPB 500 milliGRAM(s) IV Intermittent every 8 hours  thiamine IVPB 250 milliGRAM(s) IV Intermittent daily  thiamine IVPB   IV Intermittent       ROS: 14 point ROS negative unless otherwise state in subjective    PHYSICAL EXAM:   Vital Signs:  Vital Signs Last 24 Hrs  T(C): 36.9 (09 Jun 2025 05:20), Max: 36.9 (09 Jun 2025 05:20)  T(F): 98.4 (09 Jun 2025 05:20), Max: 98.4 (09 Jun 2025 05:20)  HR: 70 (09 Jun 2025 05:20) (60 - 73)  BP: 143/61 (09 Jun 2025 05:20) (132/66 - 147/62)  BP(mean): --  RR: 17 (09 Jun 2025 05:20) (17 - 18)  SpO2: 95% (09 Jun 2025 05:20) (95% - 100%)    Parameters below as of 09 Jun 2025 05:20  Patient On (Oxygen Delivery Method): nasal cannula  O2 Flow (L/min): 2    Daily     Daily     PE not performed as patient was off the floor when I attempted exam.     LABS:                        8.1    3.70  )-----------( 45       ( 09 Jun 2025 05:30 )             24.9     Mean Cell Volume: 90.5 fL (06-09-25 @ 05:30)    06-09    141  |  109[H]  |  9   ----------------------------<  106[H]  3.8   |  24  |  0.47[L]    Ca    7.9[L]      09 Jun 2025 05:30  Phos  2.5     06-09  Mg     1.60     06-09    TPro  5.1[L]  /  Alb  2.6[L]  /  TBili  2.0[H]  /  DBili  0.8[H]  /  AST  37[H]  /  ALT  21  /  AlkPhos  122[H]  06-09    LIVER FUNCTIONS - ( 09 Jun 2025 05:30 )  Alb: 2.6 g/dL / Pro: 5.1 g/dL / ALK PHOS: 122 U/L / ALT: 21 U/L / AST: 37 U/L / GGT: x           PT/INR - ( 09 Jun 2025 05:30 )   PT: 20.7 sec;   INR: 1.79 ratio         PTT - ( 09 Jun 2025 05:30 )  PTT:34.6 sec  Urinalysis Basic - ( 09 Jun 2025 05:30 )    Color: x / Appearance: x / SG: x / pH: x  Gluc: 106 mg/dL / Ketone: x  / Bili: x / Urobili: x   Blood: x / Protein: x / Nitrite: x   Leuk Esterase: x / RBC: x / WBC x   Sq Epi: x / Non Sq Epi: x / Bacteria: x                      
Neurology Progress Note    S: Patient seen and examined.     Medication:  acetaminophen     Tablet .. 650 milliGRAM(s) Oral every 6 hours PRN  carvedilol 3.125 milliGRAM(s) Oral every 12 hours  chlorhexidine 2% Cloths 1 Application(s) Topical <User Schedule>  folic acid 1 milliGRAM(s) Oral daily  lactulose Syrup 10 Gram(s) Oral three times a day  LORazepam   Injectable 2 milliGRAM(s) IV Push every 1 hour PRN  melatonin 3 milliGRAM(s) Oral at bedtime  multivitamin 1 Tablet(s) Oral daily  rifAXIMin 550 milliGRAM(s) Oral two times a day  sertraline 50 milliGRAM(s) Oral daily  thiamine IVPB 250 milliGRAM(s) IV Intermittent daily  thiamine IVPB   IV Intermittent   traZODone 50 milliGRAM(s) Oral at bedtime      Vitals:  Vital Signs Last 24 Hrs  T(C): 36.3 (11 Jun 2025 11:45), Max: 36.7 (11 Jun 2025 04:00)  T(F): 97.4 (11 Jun 2025 11:45), Max: 98.1 (11 Jun 2025 04:00)  HR: 60 (11 Jun 2025 11:45) (60 - 83)  BP: 139/- (11 Jun 2025 11:45) (123/57 - 139/-)  BP(mean): 53 (11 Jun 2025 11:45) (53 - 53)  RR: 17 (11 Jun 2025 11:45) (17 - 18)  SpO2: 96% (11 Jun 2025 11:45) (96% - 99%)    Parameters below as of 11 Jun 2025 11:45  Patient On (Oxygen Delivery Method): nasal cannula  O2 Flow (L/min): 2      General Exam:   General Appearance: Appropriately dressed and in no acute distress       Head: Normocephalic, atraumatic and no dysmorphic features  Ear, Nose, and Throat: Moist mucous membranes  CVS: S1S2+  Resp: No SOB, no wheeze or rhonchi  Abd: soft NTND  Extremities: No edema, no cyanosis  Skin: No bruises, no rashes     Neurological Exam:  Mental Status: Awake, alert and oriented x 3., knows president  Able to follow simple commands. Able to name and repeat. fluent speech. No obvious aphasia or dysarthria noted.   Cranial Nerves: PERRL, EOMI, VFFC, sensation V1-V3 intact,  no obvious facial asymmetry, equal elevation of palate, scm/trap 5/5, tongue is midline on protrusion. hearing is grossly intact.   Motor: Normal bulk, tone and strength throughout. Fine finger movements were intact and symmetric. no tremors or drift noted.    Sensation: Intact to light touch and pinprick throughout. no right/left confusion. no extinction to tactile on DSS.   Reflexes: 1+ throughout at biceps, brachioradialis, triceps, patellars and ankles bilaterally and equal. No clonus. R toe and L toe were both downgoing.  Coordination: No dysmetria on FNF   Gait: deferred      I personally reviewed the below data/images/labs:      CBC Full  -  ( 11 Jun 2025 07:30 )  WBC Count : 3.58 K/uL  RBC Count : 2.90 M/uL  Hemoglobin : 8.6 g/dL  Hematocrit : 26.9 %  Platelet Count - Automated : 48 K/uL  Mean Cell Volume : 92.8 fL  Mean Cell Hemoglobin : 29.7 pg  Mean Cell Hemoglobin Concentration : 32.0 g/dL  Auto Neutrophil # : x  Auto Lymphocyte # : x  Auto Monocyte # : x  Auto Eosinophil # : x  Auto Basophil # : x  Auto Neutrophil % : x  Auto Lymphocyte % : x  Auto Monocyte % : x  Auto Eosinophil % : x  Auto Basophil % : x    06-11    142  |  110[H]  |  10  ----------------------------<  96  3.8   |  23  |  0.46[L]  nn  Ca    8.1[L]      11 Jun 2025 06:15  Phos  3.4     06-11  Mg     1.80     06-11    TPro  5.2[L]  /  Alb  2.6[L]  /  TBili  1.4[H]  /  DBili  x   /  AST  35[H]  /  ALT  20  /  AlkPhos  142[H]  06-11    LIVER FUNCTIONS - ( 11 Jun 2025 06:15 )  Alb: 2.6 g/dL / Pro: 5.2 g/dL / ALK PHOS: 142 U/L / ALT: 20 U/L / AST: 35 U/L / GGT: x           PT/INR - ( 11 Jun 2025 07:30 )   PT: 19.0 sec;   INR: 1.65 ratio         PTT - ( 10 Royal 2025 07:10 )  PTT:34.5 sec  Urinalysis Basic - ( 11 Jun 2025 06:15 )    Color: x / Appearance: x / SG: x / pH: x  Gluc: 96 mg/dL / Ketone: x  / Bili: x / Urobili: x   Blood: x / Protein: x / Nitrite: x   Leuk Esterase: x / RBC: x / WBC x   Sq Epi: x / Non Sq Epi: x / Bacteria: x      < from: CT Head No Cont (06.05.25 @ 01:43) >    ACC: [id number] EXAM:  CT BRAIN   ORDERED BY: MONSTER LOERA     PROCEDURE DATE:  06/05/2025          INTERPRETATION:  CLINICAL INFORMATION: Concussion    COMPARISON: CT head 5/11/2025.    CONTRAST:  IV Contrast: NONE  .    TECHNIQUE:  Serial axial images were obtained from the skull base to the   vertex using multi-slice helical technique. Sagittal and coronal   reformats were obtained.    FINDINGS:    VENTRICLES AND SULCI: Age appropriate involutional changes.  INTRA-AXIAL: No mass effect, acute hemorrhage, or midline shift.  There   are periventricular and subcortical white matter hypodensities,   consistent with microvascular type changes.  EXTRA-AXIAL: No mass or fluid collection. Basal cisterns are normal in   appearance.    VISUALIZED SINUSES:  Clear.  TYMPANOMASTOID CAVITIES:  Clear.  VISUALIZED ORBITS: Normal.  CALVARIUM: Irregularity of the right nasal bone is likely related to old   trauma. This is similar compared to previous exam..    MISCELLANEOUS: None.      IMPRESSION:  No acute intracranial hemorrhage, mass effect, or midline shift.        --- End of Report ---          FADUMO CORONA MD; Resident Radiologist  This document has been electronically signed.  ALANA GALO MD; Attending Radiologist  This document has been electronically signed. Jun 5 2025  2:54AM    < end of copied text >        
Patient is a 70y old  Female who presents with a chief complaint of AMS (14 Jun 2025 10:00)    Date of servie : 06-14-25 @ 17:39  INTERVAL HPI/OVERNIGHT EVENTS:  T(C): 36.7 (06-14-25 @ 17:00), Max: 37.2 (06-14-25 @ 05:21)  HR: 76 (06-14-25 @ 17:00) (64 - 78)  BP: 106/73 (06-14-25 @ 17:00) (106/73 - 161/75)  RR: 18 (06-14-25 @ 17:00) (18 - 18)  SpO2: 98% (06-14-25 @ 17:00) (95% - 98%)  Wt(kg): --  I&O's Summary      LABS:                        8.5    2.54  )-----------( 43       ( 14 Jun 2025 06:51 )             27.1     06-14    142  |  110[H]  |  7   ----------------------------<  88  4.0   |  24  |  0.47[L]    Ca    8.7      14 Jun 2025 06:51  Phos  3.5     06-14  Mg     1.80     06-14    TPro  5.3[L]  /  Alb  2.7[L]  /  TBili  1.9[H]  /  DBili  x   /  AST  36[H]  /  ALT  20  /  AlkPhos  105  06-14    PT/INR - ( 14 Jun 2025 06:51 )   PT: 19.4 sec;   INR: 1.64 ratio         PTT - ( 14 Jun 2025 06:51 )  PTT:37.3 sec  Urinalysis Basic - ( 14 Jun 2025 06:51 )    Color: x / Appearance: x / SG: x / pH: x  Gluc: 88 mg/dL / Ketone: x  / Bili: x / Urobili: x   Blood: x / Protein: x / Nitrite: x   Leuk Esterase: x / RBC: x / WBC x   Sq Epi: x / Non Sq Epi: x / Bacteria: x      CAPILLARY BLOOD GLUCOSE            Urinalysis Basic - ( 14 Jun 2025 06:51 )    Color: x / Appearance: x / SG: x / pH: x  Gluc: 88 mg/dL / Ketone: x  / Bili: x / Urobili: x   Blood: x / Protein: x / Nitrite: x   Leuk Esterase: x / RBC: x / WBC x   Sq Epi: x / Non Sq Epi: x / Bacteria: x        MEDICATIONS  (STANDING):  carvedilol 3.125 milliGRAM(s) Oral every 12 hours  chlorhexidine 2% Cloths 1 Application(s) Topical <User Schedule>  folic acid 1 milliGRAM(s) Oral daily  lactulose Syrup 10 Gram(s) Oral three times a day  melatonin 3 milliGRAM(s) Oral at bedtime  multivitamin 1 Tablet(s) Oral daily  rifAXIMin 550 milliGRAM(s) Oral two times a day  sertraline 50 milliGRAM(s) Oral daily  traZODone 50 milliGRAM(s) Oral at bedtime    MEDICATIONS  (PRN):  acetaminophen     Tablet .. 650 milliGRAM(s) Oral every 6 hours PRN Mild Pain (1 - 3)  benzonatate 100 milliGRAM(s) Oral three times a day PRN Cough          PHYSICAL EXAM:  GENERAL: NAD, well-groomed, well-developed  HEAD:  Atraumatic, Normocephalic  CHEST/LUNG: Clear to percussion bilaterally; No rales, rhonchi, wheezing, or rubs  HEART: Regular rate and rhythm; No murmurs, rubs, or gallops  ABDOMEN: Soft, Nontender, Nondistended; Bowel sounds present  EXTREMITIES:  2+ Peripheral Pulses, No clubbing, cyanosis, or edema  LYMPH: No lymphadenopathy noted  SKIN: No rashes or lesions    Care Discussed with Consultants/Other Providers [ ] YES  [ ] NO
Patient is a 70y old  Female who presents with a chief complaint of AMS (15 Royal 2025 10:43)    Date of servie : 06-15-25 @ 15:24  INTERVAL HPI/OVERNIGHT EVENTS:  T(C): 36.6 (06-15-25 @ 11:36), Max: 36.7 (06-14-25 @ 17:00)  HR: 62 (06-15-25 @ 11:36) (62 - 76)  BP: 101/77 (06-15-25 @ 11:36) (101/77 - 148/56)  RR: 18 (06-15-25 @ 11:36) (18 - 18)  SpO2: 98% (06-15-25 @ 11:36) (97% - 98%)  Wt(kg): --  I&O's Summary      LABS:                        8.8    3.00  )-----------( 50       ( 15 Royal 2025 05:50 )             27.9     06-15    141  |  110[H]  |  10  ----------------------------<  97  4.4   |  24  |  0.51    Ca    8.7      15 Royal 2025 05:50  Phos  3.6     06-15  Mg     1.80     06-15    TPro  5.6[L]  /  Alb  2.8[L]  /  TBili  1.5[H]  /  DBili  x   /  AST  39[H]  /  ALT  20  /  AlkPhos  124[H]  06-15    PT/INR - ( 15 Royal 2025 05:50 )   PT: 18.1 sec;   INR: 1.57 ratio         PTT - ( 15 Royal 2025 05:50 )  PTT:35.3 sec  Urinalysis Basic - ( 15 Royal 2025 05:50 )    Color: x / Appearance: x / SG: x / pH: x  Gluc: 97 mg/dL / Ketone: x  / Bili: x / Urobili: x   Blood: x / Protein: x / Nitrite: x   Leuk Esterase: x / RBC: x / WBC x   Sq Epi: x / Non Sq Epi: x / Bacteria: x      CAPILLARY BLOOD GLUCOSE            Urinalysis Basic - ( 15 Royal 2025 05:50 )    Color: x / Appearance: x / SG: x / pH: x  Gluc: 97 mg/dL / Ketone: x  / Bili: x / Urobili: x   Blood: x / Protein: x / Nitrite: x   Leuk Esterase: x / RBC: x / WBC x   Sq Epi: x / Non Sq Epi: x / Bacteria: x        MEDICATIONS  (STANDING):  carvedilol 3.125 milliGRAM(s) Oral every 12 hours  chlorhexidine 2% Cloths 1 Application(s) Topical <User Schedule>  folic acid 1 milliGRAM(s) Oral daily  lactulose Syrup 10 Gram(s) Oral three times a day  melatonin 3 milliGRAM(s) Oral at bedtime  multivitamin 1 Tablet(s) Oral daily  rifAXIMin 550 milliGRAM(s) Oral two times a day  sertraline 50 milliGRAM(s) Oral daily  traZODone 50 milliGRAM(s) Oral at bedtime    MEDICATIONS  (PRN):  acetaminophen     Tablet .. 650 milliGRAM(s) Oral every 6 hours PRN Mild Pain (1 - 3)  benzonatate 100 milliGRAM(s) Oral three times a day PRN Cough          PHYSICAL EXAM:  GENERAL: NAD, well-groomed, well-developed  HEAD:  Atraumatic, Normocephalic  CHEST/LUNG: Clear to percussion bilaterally; No rales, rhonchi, wheezing, or rubs  HEART: Regular rate and rhythm; No murmurs, rubs, or gallops  ABDOMEN: Soft, Nontender, Nondistended; Bowel sounds present  EXTREMITIES:  2+ Peripheral Pulses, No clubbing, cyanosis, or edema  LYMPH: No lymphadenopathy noted  SKIN: No rashes or lesions    Care Discussed with Consultants/Other Providers [ ] YES  [ ] NO
Patient is a 70y old  Female who presents with a chief complaint of AMS (16 Jun 2025 14:24)    Date of servie : 06-16-25 @ 16:06  INTERVAL HPI/OVERNIGHT EVENTS:  T(C): 37.1 (06-16-25 @ 05:40), Max: 37.3 (06-15-25 @ 21:40)  HR: 66 (06-16-25 @ 05:40) (66 - 71)  BP: 142/61 (06-16-25 @ 05:40) (120/54 - 148/60)  RR: 17 (06-16-25 @ 05:40) (17 - 18)  SpO2: 96% (06-16-25 @ 05:40) (96% - 99%)  Wt(kg): --  I&O's Summary      LABS:                        8.4    2.61  )-----------( 41       ( 16 Jun 2025 06:00 )             26.1     06-16    140  |  109[H]  |  10  ----------------------------<  83  4.2   |  21[L]  |  0.48[L]    Ca    8.2[L]      16 Jun 2025 06:00  Phos  3.2     06-16  Mg     1.80     06-16    TPro  5.4[L]  /  Alb  2.7[L]  /  TBili  1.6[H]  /  DBili  x   /  AST  40[H]  /  ALT  21  /  AlkPhos  104  06-16    PT/INR - ( 16 Jun 2025 06:00 )   PT: 18.8 sec;   INR: 1.59 ratio         PTT - ( 16 Jun 2025 06:00 )  PTT:35.5 sec  Urinalysis Basic - ( 16 Jun 2025 06:00 )    Color: x / Appearance: x / SG: x / pH: x  Gluc: 83 mg/dL / Ketone: x  / Bili: x / Urobili: x   Blood: x / Protein: x / Nitrite: x   Leuk Esterase: x / RBC: x / WBC x   Sq Epi: x / Non Sq Epi: x / Bacteria: x      CAPILLARY BLOOD GLUCOSE            Urinalysis Basic - ( 16 Jun 2025 06:00 )    Color: x / Appearance: x / SG: x / pH: x  Gluc: 83 mg/dL / Ketone: x  / Bili: x / Urobili: x   Blood: x / Protein: x / Nitrite: x   Leuk Esterase: x / RBC: x / WBC x   Sq Epi: x / Non Sq Epi: x / Bacteria: x        MEDICATIONS  (STANDING):  carvedilol 3.125 milliGRAM(s) Oral every 12 hours  chlorhexidine 2% Cloths 1 Application(s) Topical <User Schedule>  folic acid 1 milliGRAM(s) Oral daily  lactulose Syrup 10 Gram(s) Oral three times a day  melatonin 3 milliGRAM(s) Oral at bedtime  multivitamin 1 Tablet(s) Oral daily  rifAXIMin 550 milliGRAM(s) Oral two times a day  sertraline 50 milliGRAM(s) Oral daily  traZODone 50 milliGRAM(s) Oral at bedtime    MEDICATIONS  (PRN):  acetaminophen     Tablet .. 650 milliGRAM(s) Oral every 6 hours PRN Mild Pain (1 - 3)  benzonatate 100 milliGRAM(s) Oral three times a day PRN Cough          PHYSICAL EXAM:  GENERAL: NAD, well-groomed, well-developed  HEAD:  Atraumatic, Normocephalic  CHEST/LUNG: Clear to percussion bilaterally; No rales, rhonchi, wheezing, or rubs  HEART: Regular rate and rhythm; No murmurs, rubs, or gallops  ABDOMEN: Soft, Nontender, Nondistended; Bowel sounds present  EXTREMITIES:  2+ Peripheral Pulses, No clubbing, cyanosis, or edema  LYMPH: No lymphadenopathy noted  SKIN: No rashes or lesions    Care Discussed with Consultants/Other Providers [ ] YES  [ ] NO
Date of Service: 06-07-25 @ 10:38    Patient is a 70y old  Female who presents with a chief complaint of AMS (07 Jun 2025 09:03)      Any change in ROS: She is much  more alert and awake and is responding to questions     MEDICATIONS  (STANDING):  carvedilol 3.125 milliGRAM(s) Oral every 12 hours  chlorhexidine 2% Cloths 1 Application(s) Topical <User Schedule>  folic acid 1 milliGRAM(s) Oral daily  lactulose Syrup 10 Gram(s) Oral three times a day  multivitamin 1 Tablet(s) Oral daily  rifAXIMin 550 milliGRAM(s) Oral two times a day  sertraline 50 milliGRAM(s) Oral daily  thiamine IVPB 500 milliGRAM(s) IV Intermittent every 8 hours  thiamine IVPB   IV Intermittent     MEDICATIONS  (PRN):  LORazepam   Injectable 2 milliGRAM(s) IV Push every 1 hour PRN CIWA 15 or greater, or seizure    Vital Signs Last 24 Hrs  T(C): 36.7 (07 Jun 2025 04:15), Max: 37.2 (06 Jun 2025 20:15)  T(F): 98.1 (07 Jun 2025 04:15), Max: 98.9 (06 Jun 2025 20:15)  HR: 65 (07 Jun 2025 04:15) (60 - 76)  BP: 151/64 (07 Jun 2025 04:15) (133/64 - 151/64)  BP(mean): --  RR: 18 (07 Jun 2025 04:15) (18 - 18)  SpO2: 99% (07 Jun 2025 04:15) (95% - 100%)    Parameters below as of 07 Jun 2025 04:15  Patient On (Oxygen Delivery Method): nasal cannula  O2 Flow (L/min): 2      I&O's Summary        Physical Exam:   GENERAL: NAD, well-groomed, well-developed  HEENT: JENA/   Atraumatic, Normocephalic  ENMT: No tonsillar erythema, exudates, or enlargement; Moist mucous membranes, Good dentition, No lesions  NECK: Supple, No JVD, Normal thyroid  CHEST/LUNG: Clear to auscultaion- no wheezing  CVS: Regular rate and rhythm; No murmurs, rubs, or gallops  GI: : Soft, Nontender, Nondistended; Bowel sounds present  NERVOUS SYSTEM:  Alert & awake responding to simple questions appropriately:   on 2 L of oxygen    EXTREMITIES:  - edema  LYMPH: No lymphadenopathy noted  SKIN: No rashes or lesions  ENDOCRINOLOGY: No Thyromegaly  PSYCH: calm     Labs:  26                            8.4    2.71  )-----------( 44       ( 07 Jun 2025 04:39 )             25.7                         8.0    3.15  )-----------( 50       ( 06 Jun 2025 01:22 )             25.3                         9.2    3.38  )-----------( 44       ( 04 Jun 2025 19:13 )             28.9     06-07    145  |  112[H]  |  8   ----------------------------<  59[L]  3.6   |  21[L]  |  0.53  06-06    143  |  111[H]  |  7   ----------------------------<  75  3.6   |  24  |  0.49[L]  06-06    141  |  110[H]  |  9   ----------------------------<  91  3.5   |  22  |  0.61  06-04    143  |  111[H]  |  8   ----------------------------<  73  3.6   |  23  |  0.53    Ca    8.0[L]      07 Jun 2025 04:39  Ca    8.1[L]      06 Jun 2025 14:56  Ca    8.1[L]      06 Jun 2025 01:22  Phos  2.9     06-07  Phos  3.2     06-06  Mg     1.80     06-07  Mg     1.80     06-06    TPro  5.2[L]  /  Alb  2.7[L]  /  TBili  2.5[H]  /  DBili  1.0[H]  /  AST  38[H]  /  ALT  21  /  AlkPhos  95  06-07  TPro  5.3[L]  /  Alb  2.7[L]  /  TBili  2.9[H]  /  DBili  1.1[H]  /  AST  38[H]  /  ALT  20  /  AlkPhos  98  06-06  TPro  5.3[L]  /  Alb  2.8[L]  /  TBili  2.3[H]  /  DBili  x   /  AST  47[H]  /  ALT  22  /  AlkPhos  115  06-06  TPro  6.2  /  Alb  3.1[L]  /  TBili  2.5[H]  /  DBili  x   /  AST  42[H]  /  ALT  18  /  AlkPhos  136[H]  06-04    CAPILLARY BLOOD GLUCOSE          LIVER FUNCTIONS - ( 07 Jun 2025 04:39 )  Alb: 2.7 g/dL / Pro: 5.2 g/dL / ALK PHOS: 95 U/L / ALT: 21 U/L / AST: 38 U/L / GGT: x           PT/INR - ( 06 Jun 2025 18:20 )   PT: 18.6 sec;   INR: 1.57 ratio         PTT - ( 06 Jun 2025 18:20 )  PTT:34.1 sec  Urinalysis Basic - ( 07 Jun 2025 04:39 )    Color: x / Appearance: x / SG: x / pH: x  Gluc: 59 mg/dL / Ketone: x  / Bili: x / Urobili: x   Blood: x / Protein: x / Nitrite: x   Leuk Esterase: x / RBC: x / WBC x   Sq Epi: x / Non Sq Epi: x / Bacteria: x      D-Dimer Assay, Quantitative: 391 ng/mL DDU (06-04 @ 21:10)        RECENT CULTURES:    rad< from: CT Angio Chest PE Protocol w/ IV Cont (06.05.25 @ 01:45) >  No pulmonary embolus.    Mild interlobular septal thickening which may represent mild edema.    Mediastinal lymphadenopathy, stable and of uncertain etiology. Correlate   clinically.    Cirrhotic morphology of the liver.    Splenomegaly.    Diffuse esophageal wall thickening which can be seen with esophagitis.   Correlate clinically.    --- End of Report ---    < end of copied text >      RESPIRATORY CULTURES:          Studies  Chest X-RAY  CT SCAN Chest   Venous Dopplers: LE:   CT Abdomen  Others              
Date of Service: 06-08-25 @ 10:39    Patient is a 70y old  Female who presents with a chief complaint of AMS (07 Jun 2025 10:38)      Any change in ROS: seems OK:   no sob:   no cough ;   no phlegm      MEDICATIONS  (STANDING):  carvedilol 3.125 milliGRAM(s) Oral every 12 hours  chlorhexidine 2% Cloths 1 Application(s) Topical <User Schedule>  folic acid 1 milliGRAM(s) Oral daily  lactulose Syrup 10 Gram(s) Oral three times a day  melatonin 3 milliGRAM(s) Oral at bedtime  multivitamin 1 Tablet(s) Oral daily  rifAXIMin 550 milliGRAM(s) Oral two times a day  sertraline 50 milliGRAM(s) Oral daily  thiamine IVPB 500 milliGRAM(s) IV Intermittent every 8 hours  thiamine IVPB   IV Intermittent     MEDICATIONS  (PRN):  acetaminophen     Tablet .. 650 milliGRAM(s) Oral every 6 hours PRN Mild Pain (1 - 3)  LORazepam   Injectable 2 milliGRAM(s) IV Push every 1 hour PRN CIWA 15 or greater, or seizure    Vital Signs Last 24 Hrs  T(C): 36.2 (08 Jun 2025 08:00), Max: 36.8 (07 Jun 2025 19:55)  T(F): 97.2 (08 Jun 2025 08:00), Max: 98.2 (07 Jun 2025 19:55)  HR: 65 (08 Jun 2025 08:00) (63 - 71)  BP: 147/67 (08 Jun 2025 08:00) (129/58 - 147/67)  BP(mean): --  RR: 18 (08 Jun 2025 08:00) (17 - 18)  SpO2: 96% (08 Jun 2025 08:00) (95% - 100%)    Parameters below as of 08 Jun 2025 08:00  Patient On (Oxygen Delivery Method): nasal cannula  O2 Flow (L/min): 2      I&O's Summary    07 Jun 2025 07:01  -  08 Jun 2025 07:00  --------------------------------------------------------  IN: 120 mL / OUT: 120 mL / NET: 0 mL          Physical Exam:   GENERAL: NAD, well-groomed, well-developed  HEENT: JENA/   Atraumatic, Normocephalic  ENMT: No tonsillar erythema, exudates, or enlargement; Moist mucous membranes, Good dentition, No lesions  NECK: Supple, No JVD, Normal thyroid  CHEST/LUNG: no wheezing  CVS: Regular rate and rhythm; No murmurs, rubs, or gallops  GI: : Soft, Nontender, Nondistended; Bowel sounds present  NERVOUS SYSTEM:  Alert & Oriented X3  EXTREMITIES: - edema  LYMPH: No lymphadenopathy noted  SKIN: No rashes or lesions  ENDOCRINOLOGY: No Thyromegaly  PSYCH: Appropriate    Labs:  26                            8.4    2.71  )-----------( 44       ( 07 Jun 2025 04:39 )             25.7                         8.0    3.15  )-----------( 50       ( 06 Jun 2025 01:22 )             25.3                         9.2    3.38  )-----------( 44       ( 04 Jun 2025 19:13 )             28.9     06-08    144  |  111[H]  |  6[L]  ----------------------------<  96  3.5   |  21[L]  |  0.53  06-07    145  |  112[H]  |  8   ----------------------------<  59[L]  3.6   |  21[L]  |  0.53  06-06    143  |  111[H]  |  7   ----------------------------<  75  3.6   |  24  |  0.49[L]  06-06    141  |  110[H]  |  9   ----------------------------<  91  3.5   |  22  |  0.61  06-04    143  |  111[H]  |  8   ----------------------------<  73  3.6   |  23  |  0.53    Ca    7.9[L]      08 Jun 2025 06:45  Ca    8.0[L]      07 Jun 2025 04:39  Ca    8.1[L]      06 Jun 2025 14:56  Phos  2.6     06-08  Phos  2.9     06-07  Phos  3.2     06-06  Mg     1.70     06-08  Mg     1.80     06-07  Mg     1.80     06-06    TPro  5.2[L]  /  Alb  2.6[L]  /  TBili  2.5[H]  /  DBili  0.9[H]  /  AST  41[H]  /  ALT  22  /  AlkPhos  95  06-08  TPro  5.2[L]  /  Alb  2.7[L]  /  TBili  2.5[H]  /  DBili  1.0[H]  /  AST  38[H]  /  ALT  21  /  AlkPhos  95  06-07  TPro  5.3[L]  /  Alb  2.7[L]  /  TBili  2.9[H]  /  DBili  1.1[H]  /  AST  38[H]  /  ALT  20  /  AlkPhos  98  06-06  TPro  5.3[L]  /  Alb  2.8[L]  /  TBili  2.3[H]  /  DBili  x   /  AST  47[H]  /  ALT  22  /  AlkPhos  115  06-06  TPro  6.2  /  Alb  3.1[L]  /  TBili  2.5[H]  /  DBili  x   /  AST  42[H]  /  ALT  18  /  AlkPhos  136[H]  06-04    CAPILLARY BLOOD GLUCOSE          LIVER FUNCTIONS - ( 08 Jun 2025 06:45 )  Alb: 2.6 g/dL / Pro: 5.2 g/dL / ALK PHOS: 95 U/L / ALT: 22 U/L / AST: 41 U/L / GGT: x           PT/INR - ( 08 Jun 2025 06:45 )   PT: 19.4 sec;   INR: 1.64 ratio         PTT - ( 08 Jun 2025 06:45 )  PTT:35.6 sec  Urinalysis Basic - ( 08 Jun 2025 06:45 )    Color: x / Appearance: x / SG: x / pH: x  Gluc: 96 mg/dL / Ketone: x  / Bili: x / Urobili: x   Blood: x / Protein: x / Nitrite: x   Leuk Esterase: x / RBC: x / WBC x   Sq Epi: x / Non Sq Epi: x / Bacteria: x      D-Dimer Assay, Quantitative: 391 ng/mL DDU (06-04 @ 21:10)        RECENT CULTURES:  06-06 @ 18:19 Blood Blood-Peripheral         rad< from: CT Angio Chest PE Protocol w/ IV Cont (06.05.25 @ 01:45) >  No pulmonary embolus.    Mild interlobular septal thickening which may represent mild edema.    Mediastinal lymphadenopathy, stable and of uncertain etiology. Correlate   clinically.    Cirrhotic morphology of the liver.    Splenomegaly.    Diffuse esophageal wall thickening which can be seen with esophagitis.   Correlate clinically.    < end of copied text >         No growth at 24 hours          RESPIRATORY CULTURES:          Studies  Chest X-RAY  CT SCAN Chest   Venous Dopplers: LE:   CT Abdomen  Others              
Date of Service: 06-10-25 @ 11:31    Patient is a 70y old  Female who presents with a chief complaint of AMS (09 Jun 2025 21:08)      Any change in ROS: seems to be doing  ok ;  no sob:   no ugh:  no phlegm   she was able to sleep last night ;   she feels happy;       MEDICATIONS  (STANDING):  carvedilol 3.125 milliGRAM(s) Oral every 12 hours  chlorhexidine 2% Cloths 1 Application(s) Topical <User Schedule>  folic acid 1 milliGRAM(s) Oral daily  lactulose Syrup 10 Gram(s) Oral three times a day  melatonin 3 milliGRAM(s) Oral at bedtime  multivitamin 1 Tablet(s) Oral daily  rifAXIMin 550 milliGRAM(s) Oral two times a day  sertraline 50 milliGRAM(s) Oral daily  thiamine IVPB 250 milliGRAM(s) IV Intermittent daily  thiamine IVPB   IV Intermittent   traZODone 50 milliGRAM(s) Oral at bedtime    MEDICATIONS  (PRN):  acetaminophen     Tablet .. 650 milliGRAM(s) Oral every 6 hours PRN Mild Pain (1 - 3)  LORazepam   Injectable 2 milliGRAM(s) IV Push every 1 hour PRN CIWA 15 or greater, or seizure    Vital Signs Last 24 Hrs  T(C): 36.6 (10 Royal 2025 08:00), Max: 36.9 (09 Jun 2025 13:30)  T(F): 97.9 (10 Royal 2025 08:00), Max: 98.5 (09 Jun 2025 13:30)  HR: 67 (10 Royal 2025 08:00) (61 - 78)  BP: 145/73 (10 Royal 2025 08:00) (123/64 - 153/69)  BP(mean): --  RR: 18 (10 Royal 2025 08:00) (18 - 18)  SpO2: 98% (10 Royal 2025 08:00) (96% - 99%)    Parameters below as of 10 Royal 2025 08:00  Patient On (Oxygen Delivery Method): nasal cannula  O2 Flow (L/min): 2      I&O's Summary        Physical Exam:   GENERAL: NAD, well-groomed, well-developed  HEENT: JENA/   Atraumatic, Normocephalic  ENMT: No tonsillar erythema, exudates, or enlargement; Moist mucous membranes, Good dentition, No lesions  NECK: Supple, No JVD, Normal thyroid  CHEST/LUNG: Clear to auscultaion  CVS: Regular rate and rhythm; No murmurs, rubs, or gallops  GI: : Soft, Nontender, Nondistended; Bowel sounds present  NERVOUS SYSTEM:  Alert & Oriented X3  EXTREMITIES:  - edema  LYMPH: No lymphadenopathy noted  SKIN: No rashes or lesions  ENDOCRINOLOGY: No Thyromegaly  PSYCH: Appropriate    Labs:  26                            8.3    2.93  )-----------( 38       ( 10 Royal 2025 07:10 )             26.3                         8.1    3.70  )-----------( 45       ( 09 Jun 2025 05:30 )             24.9                         8.4    2.71  )-----------( 44       ( 07 Jun 2025 04:39 )             25.7     06-10    144  |  111[H]  |  9   ----------------------------<  87  4.1   |  24  |  0.54  06-09    141  |  109[H]  |  9   ----------------------------<  106[H]  3.8   |  24  |  0.47[L]  06-08    144  |  111[H]  |  6[L]  ----------------------------<  96  3.5   |  21[L]  |  0.53  06-07    145  |  112[H]  |  8   ----------------------------<  59[L]  3.6   |  21[L]  |  0.53  06-06    143  |  111[H]  |  7   ----------------------------<  75  3.6   |  24  |  0.49[L]    Ca    8.1[L]      10 Royal 2025 07:10  Ca    7.9[L]      09 Jun 2025 05:30  Phos  3.2     06-10  Phos  2.5     06-09  Mg     1.70     06-10  Mg     1.60     06-09    TPro  5.2[L]  /  Alb  2.7[L]  /  TBili  1.8[H]  /  DBili  x   /  AST  33[H]  /  ALT  20  /  AlkPhos  117  06-10  TPro  5.1[L]  /  Alb  2.6[L]  /  TBili  2.0[H]  /  DBili  0.8[H]  /  AST  37[H]  /  ALT  21  /  AlkPhos  122[H]  06-09  TPro  5.2[L]  /  Alb  2.6[L]  /  TBili  2.5[H]  /  DBili  0.9[H]  /  AST  41[H]  /  ALT  22  /  AlkPhos  95  06-08  TPro  5.2[L]  /  Alb  2.7[L]  /  TBili  2.5[H]  /  DBili  1.0[H]  /  AST  38[H]  /  ALT  21  /  AlkPhos  95  06-07  TPro  5.3[L]  /  Alb  2.7[L]  /  TBili  2.9[H]  /  DBili  1.1[H]  /  AST  38[H]  /  ALT  20  /  AlkPhos  98  06-06    CAPILLARY BLOOD GLUCOSE      POCT Blood Glucose.: 139 mg/dL (09 Jun 2025 22:12)      LIVER FUNCTIONS - ( 10 Royal 2025 07:10 )  Alb: 2.7 g/dL / Pro: 5.2 g/dL / ALK PHOS: 117 U/L / ALT: 20 U/L / AST: 33 U/L / GGT: x           PT/INR - ( 10 Royal 2025 07:10 )   PT: 20.1 sec;   INR: 1.70 ratio         PTT - ( 10 Royal 2025 07:10 )  PTT:34.5 sec  Urinalysis Basic - ( 10 Royal 2025 07:10 )    Color: x / Appearance: x / SG: x / pH: x  Gluc: 87 mg/dL / Ketone: x  / Bili: x / Urobili: x   Blood: x / Protein: x / Nitrite: x   Leuk Esterase: x / RBC: x / WBC x   Sq Epi: x / Non Sq Epi: x / Bacteria: x      D-Dimer Assay, Quantitative: 391 ng/mL DDU (06-04 @ 21:10)        RECENT CULTURES:  06-06 @ 18:19 Blood Blood-Peripheral                No growth at 72 Hours          RESPIRATORY CULTURES:    rad< from: CT Abdomen and Pelvis w/wo IV Cont (06.09.25 @ 09:32) >  BONES: Degenerative changes. Scoliosis. Mild anterolisthesis of L5 on S1.    IMPRESSION:    Cirrhosis with evidence of portal hypertension. Small volume ascites.    No evidence of hepatocellular carcinoma.    Small pancreatic cystic lesions, possibly sidebranch IPMN. Recommend   abdominal MRI for further evaluation.    Trace bilateral pleural effusions.    < end of copied text >        Studies  Chest X-RAY  CT SCAN Chest   Venous Dopplers: LE:   CT Abdomen  Others              
Date of Service: 06-14-25 @ 10:01    Patient is a 70y old  Female who presents with a chief complaint of AMS (13 Jun 2025 16:08)      Any change in ROS: she is on room air:   she is alert and awake and responding to questions       MEDICATIONS  (STANDING):  carvedilol 3.125 milliGRAM(s) Oral every 12 hours  chlorhexidine 2% Cloths 1 Application(s) Topical <User Schedule>  folic acid 1 milliGRAM(s) Oral daily  lactulose Syrup 10 Gram(s) Oral three times a day  melatonin 3 milliGRAM(s) Oral at bedtime  multivitamin 1 Tablet(s) Oral daily  rifAXIMin 550 milliGRAM(s) Oral two times a day  sertraline 50 milliGRAM(s) Oral daily  traZODone 50 milliGRAM(s) Oral at bedtime    MEDICATIONS  (PRN):  acetaminophen     Tablet .. 650 milliGRAM(s) Oral every 6 hours PRN Mild Pain (1 - 3)  benzonatate 100 milliGRAM(s) Oral three times a day PRN Cough    Vital Signs Last 24 Hrs  T(C): 37.2 (14 Jun 2025 05:21), Max: 37.2 (14 Jun 2025 05:21)  T(F): 98.9 (14 Jun 2025 05:21), Max: 98.9 (14 Jun 2025 05:21)  HR: 71 (14 Jun 2025 05:21) (65 - 78)  BP: 134/71 (14 Jun 2025 05:21) (134/71 - 161/75)  BP(mean): 87 (14 Jun 2025 05:21) (87 - 87)  RR: 18 (14 Jun 2025 05:21) (17 - 18)  SpO2: 95% (14 Jun 2025 05:21) (95% - 98%)    Parameters below as of 14 Jun 2025 05:21  Patient On (Oxygen Delivery Method): room air        I&O's Summary        Physical Exam:   GENERAL: NAD, well-groomed, well-developed  HEENT: JENA/   Atraumatic, Normocephalic  ENMT: No tonsillar erythema, exudates, or enlargement; Moist mucous membranes, Good dentition, No lesions  NECK: Supple, No JVD, Normal thyroid  CHEST/LUNG: Clear to auscultaion  CVS: Regular rate and rhythm; No murmurs, rubs, or gallops  GI: : Soft, Nontender, Nondistended; Bowel sounds present  NERVOUS SYSTEM:  Alert & Oriented X3  EXTREMITIES:  2+ Peripheral Pulses, No clubbing, cyanosis, or edema  LYMPH: No lymphadenopathy noted  SKIN: No rashes or lesions  ENDOCRINOLOGY: No Thyromegaly  PSYCH: Appropriate    Labs:                              8.5    2.54  )-----------( 43       ( 14 Jun 2025 06:51 )             27.1                         9.0    3.75  )-----------( 51       ( 13 Jun 2025 03:45 )             29.1                         8.3    3.69  )-----------( 42       ( 12 Jun 2025 07:00 )             26.1                         8.6    3.58  )-----------( 48       ( 11 Jun 2025 07:30 )             26.9     06-14    142  |  110[H]  |  7   ----------------------------<  88  4.0   |  24  |  0.47[L]  06-13    141  |  109[H]  |  9   ----------------------------<  94  4.0   |  23  |  0.46[L]  06-12    140  |  110[H]  |  9   ----------------------------<  83  4.4   |  23  |  0.46[L]  06-11    142  |  110[H]  |  10  ----------------------------<  96  3.8   |  23  |  0.46[L]    Ca    8.7      14 Jun 2025 06:51  Ca    8.5      13 Jun 2025 03:45  Phos  3.5     06-14  Phos  3.8     06-13  Mg     1.80     06-14  Mg     1.80     06-13    TPro  5.3[L]  /  Alb  2.7[L]  /  TBili  1.9[H]  /  DBili  x   /  AST  36[H]  /  ALT  20  /  AlkPhos  105  06-14  TPro  5.6[L]  /  Alb  2.9[L]  /  TBili  1.6[H]  /  DBili  x   /  AST  39[H]  /  ALT  22  /  AlkPhos  139[H]  06-13  TPro  5.2[L]  /  Alb  2.5[L]  /  TBili  1.6[H]  /  DBili  x   /  AST  36[H]  /  ALT  21  /  AlkPhos  125[H]  06-12  TPro  5.2[L]  /  Alb  2.6[L]  /  TBili  1.4[H]  /  DBili  x   /  AST  35[H]  /  ALT  20  /  AlkPhos  142[H]  06-11    CAPILLARY BLOOD GLUCOSE          LIVER FUNCTIONS - ( 14 Jun 2025 06:51 )  Alb: 2.7 g/dL / Pro: 5.3 g/dL / ALK PHOS: 105 U/L / ALT: 20 U/L / AST: 36 U/L / GGT: x           PT/INR - ( 14 Jun 2025 06:51 )   PT: 19.4 sec;   INR: 1.64 ratio         PTT - ( 14 Jun 2025 06:51 )  PTT:37.3 sec  Urinalysis Basic - ( 14 Jun 2025 06:51 )    Color: x / Appearance: x / SG: x / pH: x  Gluc: 88 mg/dL / Ketone: x  / Bili: x / Urobili: x   Blood: x / Protein: x / Nitrite: x   Leuk Esterase: x / RBC: x / WBC x   Sq Epi: x / Non Sq Epi: x / Bacteria: x        rad< from: Xray Chest 1 View- PORTABLE-Urgent (Xray Chest 1 View- PORTABLE-Urgent .) (06.11.25 @ 11:41) >  EXAM: Portable chest    FINDINGS:    Hazy right lung base now obscuring the hemidiaphragm may represent small   layering effusion/atelectasis.  No focal consolidations.  Heart difficult to evaluate on this AP view.  No pneumothorax.  Old left proximal humerus fracture.        COMPARISON: June 4, 2025        IMPRESSION: Hazy right lung base likely small effusion/atelectasis may be   cardiogenic in origin.    --- End of Report ---            ANNALEE BOWDEN MD; Attending Radiologist  This document has been electronically signed. Jun 11 2025  2:10PM    < end of copied text >  < from: CT Abdomen and Pelvis w/wo IV Cont (06.09.25 @ 09:32) >  BONES: Degenerative changes. Scoliosis. Mild anterolisthesis of L5 on S1.    IMPRESSION:    Cirrhosis with evidence of portal hypertension. Small volume ascites.    No evidence of hepatocellular carcinoma.    Small pancreatic cystic lesions, possibly sidebranch IPMN. Recommend   abdominal MRI for further evaluation.    Trace bilateral pleural effusions.        < end of copied text >      RECENT CULTURES:        RESPIRATORY CULTURES:          Studies  Chest X-RAY  CT SCAN Chest   Venous Dopplers: LE:   CT Abdomen  Others              
Date of Service: 06-16-25 @ 11:19    Patient is a 70y old  Female who presents with a chief complaint of AMS (15 Royal 2025 22:35)      Any change in ROS: seems to be doing  ok : awaiting joie     MEDICATIONS  (STANDING):  carvedilol 3.125 milliGRAM(s) Oral every 12 hours  chlorhexidine 2% Cloths 1 Application(s) Topical <User Schedule>  folic acid 1 milliGRAM(s) Oral daily  lactulose Syrup 10 Gram(s) Oral three times a day  melatonin 3 milliGRAM(s) Oral at bedtime  multivitamin 1 Tablet(s) Oral daily  rifAXIMin 550 milliGRAM(s) Oral two times a day  sertraline 50 milliGRAM(s) Oral daily  traZODone 50 milliGRAM(s) Oral at bedtime    MEDICATIONS  (PRN):  acetaminophen     Tablet .. 650 milliGRAM(s) Oral every 6 hours PRN Mild Pain (1 - 3)  benzonatate 100 milliGRAM(s) Oral three times a day PRN Cough    Vital Signs Last 24 Hrs  T(C): 37.1 (16 Jun 2025 05:40), Max: 37.3 (15 Royal 2025 21:40)  T(F): 98.7 (16 Jun 2025 05:40), Max: 99.1 (15 Royal 2025 21:40)  HR: 66 (16 Jun 2025 05:40) (62 - 71)  BP: 142/61 (16 Jun 2025 05:40) (101/77 - 148/60)  BP(mean): --  RR: 17 (16 Jun 2025 05:40) (17 - 18)  SpO2: 96% (16 Jun 2025 05:40) (96% - 99%)    Parameters below as of 16 Jun 2025 05:40  Patient On (Oxygen Delivery Method): room air        I&O's Summary        Physical Exam:   GENERAL: NAD, well-groomed, well-developed  HEENT: JENA/   Atraumatic, Normocephalic  ENMT: No tonsillar erythema, exudates, or enlargement; Moist mucous membranes, Good dentition, No lesions  NECK: Supple, No JVD, Normal thyroid  CHEST/LUNG: Clear to auscultaion, ; No rales, rhonchi, wheezing, or rubs  CVS: Regular rate and rhythm; No murmurs, rubs, or gallops  GI: : Soft, Nontender, Nondistended; Bowel sounds present  NERVOUS SYSTEM:  Alert & Oriented X3  EXTREMITIES:  2+ Peripheral Pulses, No clubbing, cyanosis, or edema  LYMPH: No lymphadenopathy noted  SKIN: No rashes or lesions  ENDOCRINOLOGY: No Thyromegaly  PSYCH: Appropriate    Labs:                              8.4    2.61  )-----------( 41       ( 16 Jun 2025 06:00 )             26.1                         8.8    3.00  )-----------( 50       ( 15 Royal 2025 05:50 )             27.9                         8.5    2.54  )-----------( 43       ( 14 Jun 2025 06:51 )             27.1                         9.0    3.75  )-----------( 51       ( 13 Jun 2025 03:45 )             29.1     06-16    140  |  109[H]  |  10  ----------------------------<  83  4.2   |  21[L]  |  0.48[L]  06-15    141  |  110[H]  |  10  ----------------------------<  97  4.4   |  24  |  0.51  06-14    142  |  110[H]  |  7   ----------------------------<  88  4.0   |  24  |  0.47[L]  06-13    141  |  109[H]  |  9   ----------------------------<  94  4.0   |  23  |  0.46[L]    Ca    8.2[L]      16 Jun 2025 06:00  Ca    8.7      15 Royal 2025 05:50  Phos  3.2     06-16  Phos  3.6     06-15  Mg     1.80     06-16  Mg     1.80     06-15    TPro  5.4[L]  /  Alb  2.7[L]  /  TBili  1.6[H]  /  DBili  x   /  AST  40[H]  /  ALT  21  /  AlkPhos  104  06-16  TPro  5.6[L]  /  Alb  2.8[L]  /  TBili  1.5[H]  /  DBili  x   /  AST  39[H]  /  ALT  20  /  AlkPhos  124[H]  06-15  TPro  5.3[L]  /  Alb  2.7[L]  /  TBili  1.9[H]  /  DBili  x   /  AST  36[H]  /  ALT  20  /  AlkPhos  105  06-14  TPro  5.6[L]  /  Alb  2.9[L]  /  TBili  1.6[H]  /  DBili  x   /  AST  39[H]  /  ALT  22  /  AlkPhos  139[H]  06-13    CAPILLARY BLOOD GLUCOSE          LIVER FUNCTIONS - ( 16 Jun 2025 06:00 )  Alb: 2.7 g/dL / Pro: 5.4 g/dL / ALK PHOS: 104 U/L / ALT: 21 U/L / AST: 40 U/L / GGT: x           PT/INR - ( 16 Jun 2025 06:00 )   PT: 18.8 sec;   INR: 1.59 ratio         PTT - ( 16 Jun 2025 06:00 )  PTT:35.5 sec  Urinalysis Basic - ( 16 Jun 2025 06:00 )    Color: x / Appearance: x / SG: x / pH: x  Gluc: 83 mg/dL / Ketone: x  / Bili: x / Urobili: x   Blood: x / Protein: x / Nitrite: x   Leuk Esterase: x / RBC: x / WBC x   Sq Epi: x / Non Sq Epi: x / Bacteria: x            RECENT CULTURES:        RESPIRATORY CULTURES:          Studies  Chest X-RAY  CT SCAN Chest   Venous Dopplers: LE:   CT Abdomen  Others              
Date of Service: 06-17-25 @ 11:14    Patient is a 70y old  Female who presents with a chief complaint of AMS (16 Jun 2025 16:06)      Any change in ROS: yesterday the joie was not done secondary to low plts     MEDICATIONS  (STANDING):  carvedilol 3.125 milliGRAM(s) Oral every 12 hours  chlorhexidine 2% Cloths 1 Application(s) Topical <User Schedule>  folic acid 1 milliGRAM(s) Oral daily  lactulose Syrup 10 Gram(s) Oral three times a day  melatonin 3 milliGRAM(s) Oral at bedtime  multivitamin 1 Tablet(s) Oral daily  rifAXIMin 550 milliGRAM(s) Oral two times a day  sertraline 50 milliGRAM(s) Oral daily  traZODone 50 milliGRAM(s) Oral at bedtime    MEDICATIONS  (PRN):  acetaminophen     Tablet .. 650 milliGRAM(s) Oral every 6 hours PRN Mild Pain (1 - 3)  benzonatate 100 milliGRAM(s) Oral three times a day PRN Cough    Vital Signs Last 24 Hrs  T(C): 37.2 (17 Jun 2025 05:25), Max: 37.5 (16 Jun 2025 19:05)  T(F): 98.9 (17 Jun 2025 05:25), Max: 99.5 (16 Jun 2025 19:05)  HR: 75 (17 Jun 2025 05:25) (70 - 76)  BP: 140/79 (17 Jun 2025 05:25) (120/52 - 140/79)  BP(mean): --  RR: 17 (17 Jun 2025 05:25) (17 - 17)  SpO2: 95% (17 Jun 2025 05:25) (95% - 96%)    Parameters below as of 17 Jun 2025 05:25  Patient On (Oxygen Delivery Method): room air        I&O's Summary        Physical Exam:   GENERAL: NAD, well-groomed, well-developed  HEENT: JENA/   Atraumatic, Normocephalic  ENMT: No tonsillar erythema, exudates, or enlargement; Moist mucous membranes, Good dentition, No lesions  NECK: Supple, No JVD, Normal thyroid  CHEST/LUNG: Clear to auscultaion  CVS: Regular rate and rhythm; No murmurs, rubs, or gallops  GI: : Soft, Nontender, Nondistended; Bowel sounds present  NERVOUS SYSTEM:  Alert & Oriented X3  EXTREMITIES: -edema  LYMPH: No lymphadenopathy noted  SKIN: No rashes or lesions  ENDOCRINOLOGY: No Thyromegaly  PSYCH: calm    Labs:                              8.2    3.07  )-----------( 45       ( 17 Jun 2025 03:09 )             25.7                         8.4    2.61  )-----------( 41       ( 16 Jun 2025 06:00 )             26.1                         8.8    3.00  )-----------( 50       ( 15 Royal 2025 05:50 )             27.9                         8.5    2.54  )-----------( 43       ( 14 Jun 2025 06:51 )             27.1     06-17    141  |  109[H]  |  12  ----------------------------<  102[H]  4.1   |  23  |  0.58  06-16    140  |  109[H]  |  10  ----------------------------<  83  4.2   |  21[L]  |  0.48[L]  06-15    141  |  110[H]  |  10  ----------------------------<  97  4.4   |  24  |  0.51  06-14    142  |  110[H]  |  7   ----------------------------<  88  4.0   |  24  |  0.47[L]    Ca    7.9[L]      17 Jun 2025 03:09  Ca    8.2[L]      16 Jun 2025 06:00  Phos  2.9     06-17  Phos  3.2     06-16  Mg     1.80     06-17  Mg     1.80     06-16    TPro  5.3[L]  /  Alb  2.6[L]  /  TBili  1.3[H]  /  DBili  x   /  AST  37[H]  /  ALT  21  /  AlkPhos  128[H]  06-17  TPro  5.4[L]  /  Alb  2.7[L]  /  TBili  1.6[H]  /  DBili  x   /  AST  40[H]  /  ALT  21  /  AlkPhos  104  06-16  TPro  5.6[L]  /  Alb  2.8[L]  /  TBili  1.5[H]  /  DBili  x   /  AST  39[H]  /  ALT  20  /  AlkPhos  124[H]  06-15  TPro  5.3[L]  /  Alb  2.7[L]  /  TBili  1.9[H]  /  DBili  x   /  AST  36[H]  /  ALT  20  /  AlkPhos  105  06-14    CAPILLARY BLOOD GLUCOSE          LIVER FUNCTIONS - ( 17 Jun 2025 03:09 )  Alb: 2.6 g/dL / Pro: 5.3 g/dL / ALK PHOS: 128 U/L / ALT: 21 U/L / AST: 37 U/L / GGT: x           PT/INR - ( 17 Jun 2025 03:09 )   PT: 18.5 sec;   INR: 1.60 ratio         PTT - ( 16 Jun 2025 06:00 )  PTT:35.5 sec  Urinalysis Basic - ( 17 Jun 2025 03:09 )    Color: x / Appearance: x / SG: x / pH: x  Gluc: 102 mg/dL / Ketone: x  / Bili: x / Urobili: x   Blood: x / Protein: x / Nitrite: x   Leuk Esterase: x / RBC: x / WBC x   Sq Epi: x / Non Sq Epi: x / Bacteria: x        rd< from: Xray Chest 1 View- PORTABLE-Urgent (Xray Chest 1 View- PORTABLE-Urgent .) (06.11.25 @ 11:41) >    INTERPRETATION:  CLINICAL INFORMATION: Hypoxia overnight.    TIME OF EXAMINATION: June 11, 2025 at 11:03 AM    EXAM: Portable chest    FINDINGS:    Hazy right lung base now obscuring the hemidiaphragm may represent small   layering effusion/atelectasis.  No focal consolidations.  Heart difficult to evaluate on this AP view.  No pneumothorax.  Old left proximal humerus fracture.        COMPARISON: June 4, 2025        IMPRESSION: Hazy right lung base likely small effusion/atelectasis may be   cardiogenic in origin.    --- End of Report ---            ANNALEE BOWDEN MD; Attending Radiologist  This document has been electronically signed. Jun 11 2025  2:10PM    < end of copied text >      RECENT CULTURES:        RESPIRATORY CULTURES:          Studies  Chest X-RAY  CT SCAN Chest   Venous Dopplers: LE:   CT Abdomen  Others              
Neurology Progress Note    S: Patient seen and examined.   MEDICATIONS:    acetaminophen     Tablet .. 650 milliGRAM(s) Oral every 6 hours PRN  benzonatate 100 milliGRAM(s) Oral three times a day PRN  carvedilol 3.125 milliGRAM(s) Oral every 12 hours  chlorhexidine 2% Cloths 1 Application(s) Topical <User Schedule>  folic acid 1 milliGRAM(s) Oral daily  lactulose Syrup 10 Gram(s) Oral three times a day  LORazepam   Injectable 2 milliGRAM(s) IV Push every 1 hour PRN  melatonin 3 milliGRAM(s) Oral at bedtime  multivitamin 1 Tablet(s) Oral daily  rifAXIMin 550 milliGRAM(s) Oral two times a day  sertraline 50 milliGRAM(s) Oral daily  traZODone 50 milliGRAM(s) Oral at bedtime      LABS:                          8.3    3.69  )-----------( 42       ( 12 Jun 2025 07:00 )             26.1     06-12    140  |  110[H]  |  9   ----------------------------<  83  4.4   |  23  |  0.46[L]    Ca    8.3[L]      12 Jun 2025 07:00  Phos  3.7     06-12  Mg     1.70     06-12    TPro  5.2[L]  /  Alb  2.5[L]  /  TBili  1.6[H]  /  DBili  x   /  AST  36[H]  /  ALT  21  /  AlkPhos  125[H]  06-12    CAPILLARY BLOOD GLUCOSE        PT/INR - ( 11 Jun 2025 07:30 )   PT: 19.0 sec;   INR: 1.65 ratio           Urinalysis Basic - ( 12 Jun 2025 07:00 )    Color: x / Appearance: x / SG: x / pH: x  Gluc: 83 mg/dL / Ketone: x  / Bili: x / Urobili: x   Blood: x / Protein: x / Nitrite: x   Leuk Esterase: x / RBC: x / WBC x   Sq Epi: x / Non Sq Epi: x / Bacteria: x      I&O's Summary    Vital Signs Last 24 Hrs  T(C): 37.5 (12 Jun 2025 20:20), Max: 37.5 (12 Jun 2025 20:20)  T(F): 99.5 (12 Jun 2025 20:20), Max: 99.5 (12 Jun 2025 20:20)  HR: 73 (12 Jun 2025 20:20) (66 - 85)  BP: 116/92 (12 Jun 2025 20:20) (116/92 - 135/56)  BP(mean): --  RR: 17 (12 Jun 2025 20:20) (16 - 18)  SpO2: 95% (12 Jun 2025 20:20) (95% - 100%)    Parameters below as of 12 Jun 2025 20:20  Patient On (Oxygen Delivery Method): room air      General Exam:   General Appearance: Appropriately dressed and in no acute distress       Head: Normocephalic, atraumatic and no dysmorphic features  Ear, Nose, and Throat: Moist mucous membranes  CVS: S1S2+  Resp: No SOB, no wheeze or rhonchi  Abd: soft NTND  Extremities: No edema, no cyanosis  Skin: No bruises, no rashes     Neurological Exam:  Mental Status: Awake, alert and oriented x 3., knows president  Able to follow simple commands. Able to name and repeat. fluent speech. No obvious aphasia or dysarthria noted.   Cranial Nerves: PERRL, EOMI, VFFC, sensation V1-V3 intact,  no obvious facial asymmetry, equal elevation of palate, scm/trap 5/5, tongue is midline on protrusion. hearing is grossly intact.   Motor: Normal bulk, tone and strength throughout. Fine finger movements were intact and symmetric. no tremors or drift noted.    Sensation: Intact to light touch and pinprick throughout. no right/left confusion. no extinction to tactile on DSS.   Reflexes: 1+ throughout at biceps, brachioradialis, triceps, patellars and ankles bilaterally and equal. No clonus. R toe and L toe were both downgoing.  Coordination: No dysmetria on FNF   Gait: deferred      I personally reviewed the below data/images/labs:        < from: CT Head No Cont (06.05.25 @ 01:43) >    ACC: [id number] EXAM:  CT BRAIN   ORDERED BY: MONSTER LOERA     PROCEDURE DATE:  06/05/2025          INTERPRETATION:  CLINICAL INFORMATION: Concussion    COMPARISON: CT head 5/11/2025.    CONTRAST:  IV Contrast: NONE  .    TECHNIQUE:  Serial axial images were obtained from the skull base to the   vertex using multi-slice helical technique. Sagittal and coronal   reformats were obtained.    FINDINGS:    VENTRICLES AND SULCI: Age appropriate involutional changes.  INTRA-AXIAL: No mass effect, acute hemorrhage, or midline shift.  There   are periventricular and subcortical white matter hypodensities,   consistent with microvascular type changes.  EXTRA-AXIAL: No mass or fluid collection. Basal cisterns are normal in   appearance.    VISUALIZED SINUSES:  Clear.  TYMPANOMASTOID CAVITIES:  Clear.  VISUALIZED ORBITS: Normal.  CALVARIUM: Irregularity of the right nasal bone is likely related to old   trauma. This is similar compared to previous exam..    MISCELLANEOUS: None.      IMPRESSION:  No acute intracranial hemorrhage, mass effect, or midline shift.        --- End of Report ---          FADUMO CORONA MD; Resident Radiologist  This document has been electronically signed.  ALANA GALO MD; Attending Radiologist  This document has been electronically signed. Jun 5 2025  2:54AM    < end of copied text >        
Neurology Progress Note    S: Patient seen and examined.   MEDICATIONS:    acetaminophen     Tablet .. 650 milliGRAM(s) Oral every 6 hours PRN  benzonatate 100 milliGRAM(s) Oral three times a day PRN  carvedilol 3.125 milliGRAM(s) Oral every 12 hours  chlorhexidine 2% Cloths 1 Application(s) Topical <User Schedule>  folic acid 1 milliGRAM(s) Oral daily  lactulose Syrup 10 Gram(s) Oral three times a day  melatonin 3 milliGRAM(s) Oral at bedtime  multivitamin 1 Tablet(s) Oral daily  rifAXIMin 550 milliGRAM(s) Oral two times a day  sertraline 50 milliGRAM(s) Oral daily  traZODone 50 milliGRAM(s) Oral at bedtime      LABS:                          8.4    2.61  )-----------( 41       ( 16 Jun 2025 06:00 )             26.1     06-16    140  |  109[H]  |  10  ----------------------------<  83  4.2   |  21[L]  |  0.48[L]    Ca    8.2[L]      16 Jun 2025 06:00  Phos  3.2     06-16  Mg     1.80     06-16    TPro  5.4[L]  /  Alb  2.7[L]  /  TBili  1.6[H]  /  DBili  x   /  AST  40[H]  /  ALT  21  /  AlkPhos  104  06-16    CAPILLARY BLOOD GLUCOSE        PT/INR - ( 16 Jun 2025 06:00 )   PT: 18.8 sec;   INR: 1.59 ratio         PTT - ( 16 Jun 2025 06:00 )  PTT:35.5 sec  Urinalysis Basic - ( 16 Jun 2025 06:00 )    Color: x / Appearance: x / SG: x / pH: x  Gluc: 83 mg/dL / Ketone: x  / Bili: x / Urobili: x   Blood: x / Protein: x / Nitrite: x   Leuk Esterase: x / RBC: x / WBC x   Sq Epi: x / Non Sq Epi: x / Bacteria: x      I&O's Summary    Vital Signs Last 24 Hrs  T(C): 37.1 (16 Jun 2025 05:40), Max: 37.3 (15 Royal 2025 21:40)  T(F): 98.7 (16 Jun 2025 05:40), Max: 99.1 (15 Royal 2025 21:40)  HR: 66 (16 Jun 2025 05:40) (66 - 71)  BP: 142/61 (16 Jun 2025 05:40) (120/54 - 148/60)  BP(mean): --  RR: 17 (16 Jun 2025 05:40) (17 - 18)  SpO2: 96% (16 Jun 2025 05:40) (96% - 99%)    Parameters below as of 16 Jun 2025 05:40  Patient On (Oxygen Delivery Method): room air      General Exam:   General Appearance: Appropriately dressed and in no acute distress       Head: Normocephalic, atraumatic and no dysmorphic features  Ear, Nose, and Throat: Moist mucous membranes  CVS: S1S2+  Resp: No SOB, no wheeze or rhonchi  Abd: soft NTND  Extremities: No edema, no cyanosis  Skin: No bruises, no rashes     Neurological Exam:  Mental Status: Awake, alert and oriented x 3., knows president  Able to follow simple commands. Able to name and repeat. fluent speech. No obvious aphasia or dysarthria noted.   Cranial Nerves: PERRL, EOMI, VFFC, sensation V1-V3 intact,  no obvious facial asymmetry, equal elevation of palate, scm/trap 5/5, tongue is midline on protrusion. hearing is grossly intact.   Motor: Normal bulk, tone and strength throughout. Fine finger movements were intact and symmetric. no tremors or drift noted.    Sensation: Intact to light touch and pinprick throughout. no right/left confusion. no extinction to tactile on DSS.   Reflexes: 1+ throughout at biceps, brachioradialis, triceps, patellars and ankles bilaterally and equal. No clonus. R toe and L toe were both downgoing.  Coordination: No dysmetria on FNF   Gait: deferred      I personally reviewed the below data/images/labs:        < from: CT Head No Cont (06.05.25 @ 01:43) >    ACC: [id number] EXAM:  CT BRAIN   ORDERED BY: MONSTER LOERA     PROCEDURE DATE:  06/05/2025          INTERPRETATION:  CLINICAL INFORMATION: Concussion    COMPARISON: CT head 5/11/2025.    CONTRAST:  IV Contrast: NONE  .    TECHNIQUE:  Serial axial images were obtained from the skull base to the   vertex using multi-slice helical technique. Sagittal and coronal   reformats were obtained.    FINDINGS:    VENTRICLES AND SULCI: Age appropriate involutional changes.  INTRA-AXIAL: No mass effect, acute hemorrhage, or midline shift.  There   are periventricular and subcortical white matter hypodensities,   consistent with microvascular type changes.  EXTRA-AXIAL: No mass or fluid collection. Basal cisterns are normal in   appearance.    VISUALIZED SINUSES:  Clear.  TYMPANOMASTOID CAVITIES:  Clear.  VISUALIZED ORBITS: Normal.  CALVARIUM: Irregularity of the right nasal bone is likely related to old   trauma. This is similar compared to previous exam..    MISCELLANEOUS: None.      IMPRESSION:  No acute intracranial hemorrhage, mass effect, or midline shift.        --- End of Report ---          FADUMO CORONA MD; Resident Radiologist  This document has been electronically signed.  ALANA GALO MD; Attending Radiologist  This document has been electronically signed. Jun 5 2025  2:54AM    < end of copied text >        
Patient is a 70y old  Female who presents with a chief complaint of AMS (08 Jun 2025 10:38)    Date of servie : 06-08-25 @ 11:52  INTERVAL HPI/OVERNIGHT EVENTS:  T(C): 36.2 (06-08-25 @ 08:00), Max: 36.8 (06-07-25 @ 19:55)  HR: 65 (06-08-25 @ 08:00) (63 - 71)  BP: 147/67 (06-08-25 @ 08:00) (129/58 - 147/67)  RR: 18 (06-08-25 @ 08:00) (17 - 18)  SpO2: 96% (06-08-25 @ 08:00) (95% - 100%)  Wt(kg): --  I&O's Summary    07 Jun 2025 07:01  -  08 Jun 2025 07:00  --------------------------------------------------------  IN: 120 mL / OUT: 120 mL / NET: 0 mL        LABS:                        8.4    2.71  )-----------( 44       ( 07 Jun 2025 04:39 )             25.7     06-08    144  |  111[H]  |  6[L]  ----------------------------<  96  3.5   |  21[L]  |  0.53    Ca    7.9[L]      08 Jun 2025 06:45  Phos  2.6     06-08  Mg     1.70     06-08    TPro  5.2[L]  /  Alb  2.6[L]  /  TBili  2.5[H]  /  DBili  0.9[H]  /  AST  41[H]  /  ALT  22  /  AlkPhos  95  06-08    PT/INR - ( 08 Jun 2025 06:45 )   PT: 19.4 sec;   INR: 1.64 ratio         PTT - ( 08 Jun 2025 06:45 )  PTT:35.6 sec  Urinalysis Basic - ( 08 Jun 2025 06:45 )    Color: x / Appearance: x / SG: x / pH: x  Gluc: 96 mg/dL / Ketone: x  / Bili: x / Urobili: x   Blood: x / Protein: x / Nitrite: x   Leuk Esterase: x / RBC: x / WBC x   Sq Epi: x / Non Sq Epi: x / Bacteria: x      CAPILLARY BLOOD GLUCOSE            Urinalysis Basic - ( 08 Jun 2025 06:45 )    Color: x / Appearance: x / SG: x / pH: x  Gluc: 96 mg/dL / Ketone: x  / Bili: x / Urobili: x   Blood: x / Protein: x / Nitrite: x   Leuk Esterase: x / RBC: x / WBC x   Sq Epi: x / Non Sq Epi: x / Bacteria: x        MEDICATIONS  (STANDING):  carvedilol 3.125 milliGRAM(s) Oral every 12 hours  chlorhexidine 2% Cloths 1 Application(s) Topical <User Schedule>  folic acid 1 milliGRAM(s) Oral daily  lactulose Syrup 10 Gram(s) Oral three times a day  melatonin 3 milliGRAM(s) Oral at bedtime  multivitamin 1 Tablet(s) Oral daily  rifAXIMin 550 milliGRAM(s) Oral two times a day  sertraline 50 milliGRAM(s) Oral daily  thiamine IVPB 500 milliGRAM(s) IV Intermittent every 8 hours  thiamine IVPB   IV Intermittent     MEDICATIONS  (PRN):  acetaminophen     Tablet .. 650 milliGRAM(s) Oral every 6 hours PRN Mild Pain (1 - 3)  LORazepam   Injectable 2 milliGRAM(s) IV Push every 1 hour PRN CIWA 15 or greater, or seizure          PHYSICAL EXAM:  GENERAL: NAD, well-groomed, well-developed  HEAD:  Atraumatic, Normocephalic  CHEST/LUNG: Clear to percussion bilaterally; No rales, rhonchi, wheezing, or rubs  HEART: Regular rate and rhythm; No murmurs, rubs, or gallops  ABDOMEN: Soft, Nontender, Nondistended; Bowel sounds present  EXTREMITIES:  2+ Peripheral Pulses, No clubbing, cyanosis, or edema  LYMPH: No lymphadenopathy noted  SKIN: No rashes or lesions    Care Discussed with Consultants/Other Providers [ ] YES  [ ] NO
Patient is a 70y old  Female who presents with a chief complaint of AMS (10 Royal 2025 11:31)    Date of servie : 06-10-25 @ 14:02  INTERVAL HPI/OVERNIGHT EVENTS:  T(C): 36.6 (06-10-25 @ 08:00), Max: 36.9 (06-09-25 @ 18:00)  HR: 67 (06-10-25 @ 08:00) (61 - 78)  BP: 145/73 (06-10-25 @ 08:00) (131/50 - 153/69)  RR: 18 (06-10-25 @ 08:00) (18 - 18)  SpO2: 98% (06-10-25 @ 08:00) (96% - 99%)  Wt(kg): --  I&O's Summary      LABS:                        8.3    2.93  )-----------( 38       ( 10 Royal 2025 07:10 )             26.3     06-10    144  |  111[H]  |  9   ----------------------------<  87  4.1   |  24  |  0.54    Ca    8.1[L]      10 Royal 2025 07:10  Phos  3.2     06-10  Mg     1.70     06-10    TPro  5.2[L]  /  Alb  2.7[L]  /  TBili  1.8[H]  /  DBili  x   /  AST  33[H]  /  ALT  20  /  AlkPhos  117  06-10    PT/INR - ( 10 Royal 2025 07:10 )   PT: 20.1 sec;   INR: 1.70 ratio         PTT - ( 10 Royal 2025 07:10 )  PTT:34.5 sec  Urinalysis Basic - ( 10 Royal 2025 07:10 )    Color: x / Appearance: x / SG: x / pH: x  Gluc: 87 mg/dL / Ketone: x  / Bili: x / Urobili: x   Blood: x / Protein: x / Nitrite: x   Leuk Esterase: x / RBC: x / WBC x   Sq Epi: x / Non Sq Epi: x / Bacteria: x      CAPILLARY BLOOD GLUCOSE      POCT Blood Glucose.: 139 mg/dL (09 Jun 2025 22:12)        Urinalysis Basic - ( 10 Royal 2025 07:10 )    Color: x / Appearance: x / SG: x / pH: x  Gluc: 87 mg/dL / Ketone: x  / Bili: x / Urobili: x   Blood: x / Protein: x / Nitrite: x   Leuk Esterase: x / RBC: x / WBC x   Sq Epi: x / Non Sq Epi: x / Bacteria: x        MEDICATIONS  (STANDING):  carvedilol 3.125 milliGRAM(s) Oral every 12 hours  chlorhexidine 2% Cloths 1 Application(s) Topical <User Schedule>  folic acid 1 milliGRAM(s) Oral daily  lactulose Syrup 10 Gram(s) Oral three times a day  melatonin 3 milliGRAM(s) Oral at bedtime  multivitamin 1 Tablet(s) Oral daily  rifAXIMin 550 milliGRAM(s) Oral two times a day  sertraline 50 milliGRAM(s) Oral daily  thiamine IVPB 250 milliGRAM(s) IV Intermittent daily  thiamine IVPB   IV Intermittent   traZODone 50 milliGRAM(s) Oral at bedtime    MEDICATIONS  (PRN):  acetaminophen     Tablet .. 650 milliGRAM(s) Oral every 6 hours PRN Mild Pain (1 - 3)  LORazepam   Injectable 2 milliGRAM(s) IV Push every 1 hour PRN CIWA 15 or greater, or seizure          PHYSICAL EXAM:  GENERAL: NAD, well-groomed, well-developed  HEAD:  Atraumatic, Normocephalic  CHEST/LUNG: Clear to percussion bilaterally; No rales, rhonchi, wheezing, or rubs  HEART: Regular rate and rhythm; No murmurs, rubs, or gallops  ABDOMEN: Soft, Nontender, Nondistended; Bowel sounds present  EXTREMITIES:  2+ Peripheral Pulses, No clubbing, cyanosis, or edema  LYMPH: No lymphadenopathy noted  SKIN: No rashes or lesions    Care Discussed with Consultants/Other Providers [ ] YES  [ ] NO
Date of Service: 06-09-25 @ 12:04    Patient is a 70y old  Female who presents with a chief complaint of AMS (09 Jun 2025 10:46)      Any change in ROS: Sheis on 2 Lofoxygen ;  seems OK;   nos ob:   wants trazadone     MEDICATIONS  (STANDING):  carvedilol 3.125 milliGRAM(s) Oral every 12 hours  chlorhexidine 2% Cloths 1 Application(s) Topical <User Schedule>  folic acid 1 milliGRAM(s) Oral daily  lactulose Syrup 10 Gram(s) Oral three times a day  melatonin 3 milliGRAM(s) Oral at bedtime  multivitamin 1 Tablet(s) Oral daily  rifAXIMin 550 milliGRAM(s) Oral two times a day  sertraline 50 milliGRAM(s) Oral daily  thiamine IVPB 500 milliGRAM(s) IV Intermittent every 8 hours  thiamine IVPB 250 milliGRAM(s) IV Intermittent daily  thiamine IVPB   IV Intermittent     MEDICATIONS  (PRN):  acetaminophen     Tablet .. 650 milliGRAM(s) Oral every 6 hours PRN Mild Pain (1 - 3)  LORazepam   Injectable 2 milliGRAM(s) IV Push every 1 hour PRN CIWA 15 or greater, or seizure    Vital Signs Last 24 Hrs  T(C): 36.9 (09 Jun 2025 05:20), Max: 36.9 (09 Jun 2025 05:20)  T(F): 98.4 (09 Jun 2025 05:20), Max: 98.4 (09 Jun 2025 05:20)  HR: 70 (09 Jun 2025 05:20) (66 - 73)  BP: 143/61 (09 Jun 2025 05:20) (141/60 - 147/62)  BP(mean): --  RR: 17 (09 Jun 2025 05:20) (17 - 18)  SpO2: 95% (09 Jun 2025 05:20) (95% - 100%)    Parameters below as of 09 Jun 2025 05:20  Patient On (Oxygen Delivery Method): nasal cannula  O2 Flow (L/min): 2      I&O's Summary        Physical Exam:   GENERAL: NAD, well-groomed, well-developed  HEENT: JENA/   Atraumatic, Normocephalic  ENMT: No tonsillar erythema, exudates, or enlargement; Moist mucous membranes, Good dentition, No lesions  NECK: Supple, No JVD, Normal thyroid  CHEST/LUNG: Clear to auscultaion- no wheezing  CVS: Regular rate and rhythm; No murmurs, rubs, or gallops  GI: : Soft, Nontender, Nondistended; Bowel sounds present  NERVOUS SYSTEM:  Alert & Oriented X3  EXTREMITIES: -edema  LYMPH: No lymphadenopathy noted  SKIN: No rashes or lesions  ENDOCRINOLOGY: No Thyromegaly  PSYCH: Appropriate    Labs:  26                            8.1    3.70  )-----------( 45       ( 09 Jun 2025 05:30 )             24.9                         8.4    2.71  )-----------( 44       ( 07 Jun 2025 04:39 )             25.7                         8.0    3.15  )-----------( 50       ( 06 Jun 2025 01:22 )             25.3     06-09    141  |  109[H]  |  9   ----------------------------<  106[H]  3.8   |  24  |  0.47[L]  06-08    144  |  111[H]  |  6[L]  ----------------------------<  96  3.5   |  21[L]  |  0.53  06-07    145  |  112[H]  |  8   ----------------------------<  59[L]  3.6   |  21[L]  |  0.53  06-06    143  |  111[H]  |  7   ----------------------------<  75  3.6   |  24  |  0.49[L]  06-06    141  |  110[H]  |  9   ----------------------------<  91  3.5   |  22  |  0.61    Ca    7.9[L]      09 Jun 2025 05:30  Ca    7.9[L]      08 Jun 2025 06:45  Phos  2.5     06-09  Phos  2.6     06-08  Mg     1.60     06-09  Mg     1.70     06-08    TPro  5.1[L]  /  Alb  2.6[L]  /  TBili  2.0[H]  /  DBili  0.8[H]  /  AST  37[H]  /  ALT  21  /  AlkPhos  122[H]  06-09  TPro  5.2[L]  /  Alb  2.6[L]  /  TBili  2.5[H]  /  DBili  0.9[H]  /  AST  41[H]  /  ALT  22  /  AlkPhos  95  06-08  TPro  5.2[L]  /  Alb  2.7[L]  /  TBili  2.5[H]  /  DBili  1.0[H]  /  AST  38[H]  /  ALT  21  /  AlkPhos  95  06-07  TPro  5.3[L]  /  Alb  2.7[L]  /  TBili  2.9[H]  /  DBili  1.1[H]  /  AST  38[H]  /  ALT  20  /  AlkPhos  98  06-06  TPro  5.3[L]  /  Alb  2.8[L]  /  TBili  2.3[H]  /  DBili  x   /  AST  47[H]  /  ALT  22  /  AlkPhos  115  06-06    CAPILLARY BLOOD GLUCOSE          LIVER FUNCTIONS - ( 09 Jun 2025 05:30 )  Alb: 2.6 g/dL / Pro: 5.1 g/dL / ALK PHOS: 122 U/L / ALT: 21 U/L / AST: 37 U/L / GGT: x           PT/INR - ( 09 Jun 2025 05:30 )   PT: 20.7 sec;   INR: 1.79 ratio         PTT - ( 09 Jun 2025 05:30 )  PTT:34.6 sec  Urinalysis Basic - ( 09 Jun 2025 05:30 )    Color: x / Appearance: x / SG: x / pH: x  Gluc: 106 mg/dL / Ketone: x  / Bili: x / Urobili: x   Blood: x / Protein: x / Nitrite: x   Leuk Esterase: x / RBC: x / WBC x   Sq Epi: x / Non Sq Epi: x / Bacteria: x      D-Dimer Assay, Quantitative: 391 ng/mL DDU (06-04 @ 21:10)        RECENT CULTURES:  06-06 @ 18:19 Blood Blood-Peripheral       rd< from: CT Angio Chest PE Protocol w/ IV Cont (06.05.25 @ 01:45) >    IMPRESSION:  No pulmonary embolus.    Mild interlobular septal thickening which may represent mild edema.    Mediastinal lymphadenopathy, stable and of uncertain etiology. Correlate   clinically.    Cirrhotic morphology of the liver.    Splenomegaly.    Diffuse esophageal wall thickening which can be seen with esophagitis.   Correlate clinically.    --- End of Report ---    < end of copied text >           No growth at 48 Hours          RESPIRATORY CULTURES:          Studies  Chest X-RAY  CT SCAN Chest   Venous Dopplers: LE:   CT Abdomen  Others              
Date of Service: 06-12-25 @ 11:16    Patient is a 70y old  Female who presents with a chief complaint of AMS (12 Jun 2025 09:23)      Any change in ROS: seems O K:  on 2 Lof oxygen      MEDICATIONS  (STANDING):  carvedilol 3.125 milliGRAM(s) Oral every 12 hours  chlorhexidine 2% Cloths 1 Application(s) Topical <User Schedule>  folic acid 1 milliGRAM(s) Oral daily  lactulose Syrup 10 Gram(s) Oral three times a day  melatonin 3 milliGRAM(s) Oral at bedtime  multivitamin 1 Tablet(s) Oral daily  rifAXIMin 550 milliGRAM(s) Oral two times a day  sertraline 50 milliGRAM(s) Oral daily  thiamine IVPB 250 milliGRAM(s) IV Intermittent daily  thiamine IVPB   IV Intermittent   traZODone 50 milliGRAM(s) Oral at bedtime    MEDICATIONS  (PRN):  acetaminophen     Tablet .. 650 milliGRAM(s) Oral every 6 hours PRN Mild Pain (1 - 3)  LORazepam   Injectable 2 milliGRAM(s) IV Push every 1 hour PRN CIWA 15 or greater, or seizure    Vital Signs Last 24 Hrs  T(C): 36.6 (12 Jun 2025 04:00), Max: 37.2 (11 Jun 2025 19:50)  T(F): 97.8 (12 Jun 2025 04:00), Max: 99 (11 Jun 2025 19:50)  HR: 70 (12 Jun 2025 04:00) (60 - 78)  BP: 135/56 (12 Jun 2025 04:00) (120/58 - 168/72)  BP(mean): 53 (11 Jun 2025 11:45) (53 - 53)  RR: 18 (12 Jun 2025 04:00) (16 - 18)  SpO2: 98% (12 Jun 2025 04:00) (95% - 99%)    Parameters below as of 12 Jun 2025 04:00  Patient On (Oxygen Delivery Method): room air  O2 Flow (L/min): 2      I&O's Summary        Physical Exam:   GENERAL: NAD, well-groomed, well-developed  HEENT: JENA/   Atraumatic, Normocephalic  ENMT: No tonsillar erythema, exudates, or enlargement; Moist mucous membranes, Good dentition, No lesions  NECK: Supple, No JVD, Normal thyroid  CHEST/LUNG: Clear to auscultaion  CVS: Regular rate and rhythm; No murmurs, rubs, or gallops  GI: : Soft, Nontender, Nondistended; Bowel sounds present  NERVOUS SYSTEM:  Alert & Oriented X3  EXTREMITIES: - edema  LYMPH: No lymphadenopathy noted  SKIN: No rashes or lesions  ENDOCRINOLOGY: No Thyromegaly  PSYCH: Appropriate    Labs:                              8.3    3.69  )-----------( 42       ( 12 Jun 2025 07:00 )             26.1                         8.6    3.58  )-----------( 48       ( 11 Jun 2025 07:30 )             26.9                         8.3    2.93  )-----------( 38       ( 10 Royal 2025 07:10 )             26.3                         8.1    3.70  )-----------( 45       ( 09 Jun 2025 05:30 )             24.9     06-12    140  |  110[H]  |  9   ----------------------------<  83  4.4   |  23  |  0.46[L]  06-11    142  |  110[H]  |  10  ----------------------------<  96  3.8   |  23  |  0.46[L]  06-10    144  |  111[H]  |  9   ----------------------------<  87  4.1   |  24  |  0.54  06-09    141  |  109[H]  |  9   ----------------------------<  106[H]  3.8   |  24  |  0.47[L]    Ca    8.3[L]      12 Jun 2025 07:00  Ca    8.1[L]      11 Jun 2025 06:15  Phos  3.7     06-12  Phos  3.4     06-11  Mg     1.70     06-12  Mg     1.80     06-11    TPro  5.2[L]  /  Alb  2.5[L]  /  TBili  1.6[H]  /  DBili  x   /  AST  36[H]  /  ALT  21  /  AlkPhos  125[H]  06-12  TPro  5.2[L]  /  Alb  2.6[L]  /  TBili  1.4[H]  /  DBili  x   /  AST  35[H]  /  ALT  20  /  AlkPhos  142[H]  06-11  TPro  5.2[L]  /  Alb  2.7[L]  /  TBili  1.8[H]  /  DBili  x   /  AST  33[H]  /  ALT  20  /  AlkPhos  117  06-10  TPro  5.1[L]  /  Alb  2.6[L]  /  TBili  2.0[H]  /  DBili  0.8[H]  /  AST  37[H]  /  ALT  21  /  AlkPhos  122[H]  06-09    CAPILLARY BLOOD GLUCOSE          LIVER FUNCTIONS - ( 12 Jun 2025 07:00 )  Alb: 2.5 g/dL / Pro: 5.2 g/dL / ALK PHOS: 125 U/L / ALT: 21 U/L / AST: 36 U/L / GGT: x           PT/INR - ( 11 Jun 2025 07:30 )   PT: 19.0 sec;   INR: 1.65 ratio           Urinalysis Basic - ( 12 Jun 2025 07:00 )    Color: x / Appearance: x / SG: x / pH: x  Gluc: 83 mg/dL / Ketone: x  / Bili: x / Urobili: x   Blood: x / Protein: x / Nitrite: x   Leuk Esterase: x / RBC: x / WBC x   Sq Epi: x / Non Sq Epi: x / Bacteria: x            RECENT CULTURES:  06-06 @ 18:19 Blood Blood-Peripheral       rad< from: Xray Chest 1 View- PORTABLE-Urgent (Xray Chest 1 View- PORTABLE-Urgent .) (06.11.25 @ 11:41) >  No pneumothorax.  Old left proximal humerus fracture.        COMPARISON: June 4, 2025        IMPRESSION: Hazy right lung base likely small effusion/atelectasis may be   cardiogenic in origin.    --- End of Report ---            ANNALEE BOWDEN MD; Attending Radiologist  This document has been electronically signed. Jun 11 2025  2:10PM    < end of copied text >  < from: TTE Limited W or WO Ultrasound Enhancing Agent (06.11.25 @ 14:59) >      1. Limited study with the intravenous injection of agitated saline contrast. Approximately 5-6 cardiac cycles following their appearance in the right heart, a large number of bubbles was seen in the left heart. The bubbles appear to enter the left atrium from a location remote from the interatrial septum. These factors may reflect the presence of an extracardiac shunt (for example, a pulmonary arteriovenous malformation). However, cannot definitely exclude an intracardiac shunt.    < end of copied text >           No growth at 5 days          RESPIRATORY CULTURES:          Studies  Chest X-RAY  CT SCAN Chest   Venous Dopplers: LE:   CT Abdomen  Others              
Date of Service: 06-13-25 @ 12:29    Patient is a 70y old  Female who presents with a chief complaint of AMS (12 Jun 2025 20:37)      Any change in ROS: seems OK;  moving around on floor     MEDICATIONS  (STANDING):  carvedilol 3.125 milliGRAM(s) Oral every 12 hours  chlorhexidine 2% Cloths 1 Application(s) Topical <User Schedule>  folic acid 1 milliGRAM(s) Oral daily  lactulose Syrup 10 Gram(s) Oral three times a day  melatonin 3 milliGRAM(s) Oral at bedtime  multivitamin 1 Tablet(s) Oral daily  rifAXIMin 550 milliGRAM(s) Oral two times a day  sertraline 50 milliGRAM(s) Oral daily  traZODone 50 milliGRAM(s) Oral at bedtime    MEDICATIONS  (PRN):  acetaminophen     Tablet .. 650 milliGRAM(s) Oral every 6 hours PRN Mild Pain (1 - 3)  benzonatate 100 milliGRAM(s) Oral three times a day PRN Cough  LORazepam   Injectable 2 milliGRAM(s) IV Push every 1 hour PRN CIWA 15 or greater, or seizure    Vital Signs Last 24 Hrs  T(C): 37.1 (13 Jun 2025 04:20), Max: 37.5 (12 Jun 2025 20:20)  T(F): 98.8 (13 Jun 2025 04:20), Max: 99.5 (12 Jun 2025 20:20)  HR: 69 (13 Jun 2025 04:20) (66 - 73)  BP: 148/64 (13 Jun 2025 04:20) (116/92 - 148/64)  BP(mean): --  RR: 18 (13 Jun 2025 04:20) (16 - 18)  SpO2: 97% (13 Jun 2025 04:20) (95% - 100%)    Parameters below as of 13 Jun 2025 04:20  Patient On (Oxygen Delivery Method): room air        I&O's Summary        Physical Exam:   GENERAL: NAD, well-groomed, well-developed  HEENT: JEAN/   Atraumatic, Normocephalic  ENMT: No tonsillar erythema, exudates, or enlargement; Moist mucous membranes, Good dentition, No lesions  NECK: Supple, No JVD, Normal thyroid  CHEST/LUNG: Clear to auscultaion- no wheezing  CVS: Regular rate and rhythm; No murmurs, rubs, or gallops  GI: : Soft, Nontender, Nondistended; Bowel sounds present  NERVOUS SYSTEM:  Alert & Oriented X3  EXTREMITIES: - edema  LYMPH: No lymphadenopathy noted  SKIN: No rashes or lesions  ENDOCRINOLOGY: No Thyromegaly  PSYCH: Appropriate    Labs:                              9.0    3.75  )-----------( 51       ( 13 Jun 2025 03:45 )             29.1                         8.3    3.69  )-----------( 42       ( 12 Jun 2025 07:00 )             26.1                         8.6    3.58  )-----------( 48       ( 11 Jun 2025 07:30 )             26.9                         8.3    2.93  )-----------( 38       ( 10 Royal 2025 07:10 )             26.3     06-13    141  |  109[H]  |  9   ----------------------------<  94  4.0   |  23  |  0.46[L]  06-12    140  |  110[H]  |  9   ----------------------------<  83  4.4   |  23  |  0.46[L]  06-11    142  |  110[H]  |  10  ----------------------------<  96  3.8   |  23  |  0.46[L]  06-10    144  |  111[H]  |  9   ----------------------------<  87  4.1   |  24  |  0.54    Ca    8.5      13 Jun 2025 03:45  Ca    8.3[L]      12 Jun 2025 07:00  Phos  3.8     06-13  Phos  3.7     06-12  Mg     1.80     06-13  Mg     1.70     06-12    TPro  5.6[L]  /  Alb  2.9[L]  /  TBili  1.6[H]  /  DBili  x   /  AST  39[H]  /  ALT  22  /  AlkPhos  139[H]  06-13  TPro  5.2[L]  /  Alb  2.5[L]  /  TBili  1.6[H]  /  DBili  x   /  AST  36[H]  /  ALT  21  /  AlkPhos  125[H]  06-12  TPro  5.2[L]  /  Alb  2.6[L]  /  TBili  1.4[H]  /  DBili  x   /  AST  35[H]  /  ALT  20  /  AlkPhos  142[H]  06-11  TPro  5.2[L]  /  Alb  2.7[L]  /  TBili  1.8[H]  /  DBili  x   /  AST  33[H]  /  ALT  20  /  AlkPhos  117  06-10    CAPILLARY BLOOD GLUCOSE          LIVER FUNCTIONS - ( 13 Jun 2025 03:45 )  Alb: 2.9 g/dL / Pro: 5.6 g/dL / ALK PHOS: 139 U/L / ALT: 22 U/L / AST: 39 U/L / GGT: x             Urinalysis Basic - ( 13 Jun 2025 03:45 )    Color: x / Appearance: x / SG: x / pH: x  Gluc: 94 mg/dL / Ketone: x  / Bili: x / Urobili: x   Blood: x / Protein: x / Nitrite: x   Leuk Esterase: x / RBC: x / WBC x   Sq Epi: x / Non Sq Epi: x / Bacteria: x            RECENT CULTURES:  06-06 @ 18:19 Blood Blood-Peripheral       rad< from: Xray Chest 1 View- PORTABLE-Urgent (Xray Chest 1 View- PORTABLE-Urgent .) (06.11.25 @ 11:41) >  ng effusion/atelectasis.  No focal consolidations.  Heart difficult to evaluate on this AP view.  No pneumothorax.  Old left proximal humerus fracture.        COMPARISON: June 4, 2025        IMPRESSION: Hazy right lung base likely small effusion/atelectasis may be   cardiogenic in origin.    --- End of Report ---            ANNALEE BOWDEN MD; Attending Radiologist  This document has been electronically signed. Jun 11 2025  2:10PM    < end of copied text >           No growth at 5 days          RESPIRATORY CULTURES:          Studies  Chest X-RAY  CT SCAN Chest   Venous Dopplers: LE:   CT Abdomen  Others              
Date of Service: 06-15-25 @ 10:44    Patient is a 70y old  Female who presents with a chief complaint of AMS (14 Jun 2025 17:38)      Any change in ROS: she seems to be doing  ok ;  alert and awke and responding to questions     MEDICATIONS  (STANDING):  carvedilol 3.125 milliGRAM(s) Oral every 12 hours  chlorhexidine 2% Cloths 1 Application(s) Topical <User Schedule>  folic acid 1 milliGRAM(s) Oral daily  lactulose Syrup 10 Gram(s) Oral three times a day  melatonin 3 milliGRAM(s) Oral at bedtime  multivitamin 1 Tablet(s) Oral daily  rifAXIMin 550 milliGRAM(s) Oral two times a day  sertraline 50 milliGRAM(s) Oral daily  traZODone 50 milliGRAM(s) Oral at bedtime    MEDICATIONS  (PRN):  acetaminophen     Tablet .. 650 milliGRAM(s) Oral every 6 hours PRN Mild Pain (1 - 3)  benzonatate 100 milliGRAM(s) Oral three times a day PRN Cough    Vital Signs Last 24 Hrs  T(C): 36.7 (15 Royal 2025 05:55), Max: 36.8 (14 Jun 2025 12:06)  T(F): 98 (15 Royal 2025 05:55), Max: 98.2 (14 Jun 2025 12:06)  HR: 71 (15 Royal 2025 05:55) (64 - 76)  BP: 135/78 (15 Royal 2025 05:55) (106/73 - 148/56)  BP(mean): --  RR: 18 (15 Royal 2025 05:55) (18 - 18)  SpO2: 97% (15 Royal 2025 05:55) (97% - 98%)    Parameters below as of 15 Royal 2025 05:55  Patient On (Oxygen Delivery Method): room air        I&O's Summary        Physical Exam:   GENERAL: NAD, well-groomed, well-developed  HEENT: JENA/   Atraumatic, Normocephalic  ENMT: No tonsillar erythema, exudates, or enlargement; Moist mucous membranes, Good dentition, No lesions  NECK: Supple, No JVD, Normal thyroid  CHEST/LUNG: Clear to auscultaion  CVS: Regular rate and rhythm; No murmurs, rubs, or gallops  GI: : Soft, Nontender, Nondistended; Bowel sounds present  NERVOUS SYSTEM:  Alert & Oriented X3  EXTREMITIES:  2+ Peripheral Pulses, No clubbing, cyanosis, or edema  LYMPH: No lymphadenopathy noted  SKIN: No rashes or lesions  ENDOCRINOLOGY: No Thyromegaly  PSYCH: Appropriate    Labs:                              8.8    3.00  )-----------( 50       ( 15 Royal 2025 05:50 )             27.9                         8.5    2.54  )-----------( 43       ( 14 Jun 2025 06:51 )             27.1                         9.0    3.75  )-----------( 51       ( 13 Jun 2025 03:45 )             29.1                         8.3    3.69  )-----------( 42       ( 12 Jun 2025 07:00 )             26.1     06-15    141  |  110[H]  |  10  ----------------------------<  97  4.4   |  24  |  0.51  06-14    142  |  110[H]  |  7   ----------------------------<  88  4.0   |  24  |  0.47[L]  06-13    141  |  109[H]  |  9   ----------------------------<  94  4.0   |  23  |  0.46[L]  06-12    140  |  110[H]  |  9   ----------------------------<  83  4.4   |  23  |  0.46[L]    Ca    8.7      15 Royal 2025 05:50  Ca    8.7      14 Jun 2025 06:51  Phos  3.6     06-15  Phos  3.5     06-14  Mg     1.80     06-15  Mg     1.80     06-14    TPro  5.6[L]  /  Alb  2.8[L]  /  TBili  1.5[H]  /  DBili  x   /  AST  39[H]  /  ALT  20  /  AlkPhos  124[H]  06-15  TPro  5.3[L]  /  Alb  2.7[L]  /  TBili  1.9[H]  /  DBili  x   /  AST  36[H]  /  ALT  20  /  AlkPhos  105  06-14  TPro  5.6[L]  /  Alb  2.9[L]  /  TBili  1.6[H]  /  DBili  x   /  AST  39[H]  /  ALT  22  /  AlkPhos  139[H]  06-13  TPro  5.2[L]  /  Alb  2.5[L]  /  TBili  1.6[H]  /  DBili  x   /  AST  36[H]  /  ALT  21  /  AlkPhos  125[H]  06-12    CAPILLARY BLOOD GLUCOSE          LIVER FUNCTIONS - ( 15 Royal 2025 05:50 )  Alb: 2.8 g/dL / Pro: 5.6 g/dL / ALK PHOS: 124 U/L / ALT: 20 U/L / AST: 39 U/L / GGT: x           PT/INR - ( 15 Royal 2025 05:50 )   PT: 18.1 sec;   INR: 1.57 ratio         PTT - ( 15 Royal 2025 05:50 )  PTT:35.3 sec  Urinalysis Basic - ( 15 Royal 2025 05:50 )    Color: x / Appearance: x / SG: x / pH: x  Gluc: 97 mg/dL / Ketone: x  / Bili: x / Urobili: x   Blood: x / Protein: x / Nitrite: x   Leuk Esterase: x / RBC: x / WBC x   Sq Epi: x / Non Sq Epi: x / Bacteria: x    rad< from: Xray Chest 1 View- PORTABLE-Urgent (Xray Chest 1 View- PORTABLE-Urgent .) (06.11.25 @ 11:41) >    INTERPRETATION:  CLINICAL INFORMATION: Hypoxia overnight.    TIME OF EXAMINATION: June 11, 2025 at 11:03 AM    EXAM: Portable chest    FINDINGS:    Hazy right lung base now obscuring the hemidiaphragm may represent small   layering effusion/atelectasis.  No focal consolidations.  Heart difficult to evaluate on this AP view.  No pneumothorax.  Old left proximal humerus fracture.        COMPARISON: June 4, 2025        IMPRESSION: Hazy right lung base likely small effusion/atelectasis may be   cardiogenic in origin.    --- End of Report ---            ANNALEE BOWDEN MD; Attending Radiologist  This document has been electronically signed. Jun 11 2025  2:10PM    < end of copied text >          RECENT CULTURES:        RESPIRATORY CULTURES:          Studies  Chest X-RAY  CT SCAN Chest   Venous Dopplers: LE:   CT Abdomen  Others              
[facility] Attending Follow-up Note     Patient seen and examined at bedside.    Overnight Events:     events noted     REVIEW OF SYSTEMS:  Constitutional:     [x ] negative [ ] fevers [ ] chills [ ] weight loss [ ] weight gain  HEENT:                  [x ] negative [ ] dry eyes [ ] eye irritation [ ] postnasal drip [ ] nasal congestion  CV:                         [ x] negative  [ ] chest pain [ ] orthopnea [ ] palpitations [ ] murmur  Resp:                     [ x] negative [ ] cough [ ] shortness of breath [ ] dyspnea [ ] wheezing [ ] sputum [ ]hemoptysis  GI:                          [ x] negative [ ] nausea [ ] vomiting [ ] diarrhea [ ] constipation [ ] abd pain [ ] dysphagia   :                        [ x] negative [ ] dysuria [ ] nocturia [ ] hematuria [ ] increased urinary frequency  Musculoskeletal: [ x] negative [ ] back pain [ ] myalgias [ ] arthralgias [ ] fracture  Skin:                       [ x] negative [ ] rash [ ] itch  Neurological:        [x ] negative [ ] headache [ ] dizziness [ ] syncope [ ] weakness [ ] numbness  Psychiatric:           [ x] negative [ ] anxiety [ ] depression  Endocrine:            [ x] negative [ ] diabetes [ ] thyroid problem  Heme/Lymph:      [ x] negative [ ] anemia [ ] bleeding problem  Allergic/Immune: [ x] negative [ ] itchy eyes [ ] nasal discharge [ ] hives [ ] angioedema    [ x] All other systems negative  [ ] Unable to assess ROS due to    Current Meds:  acetaminophen     Tablet .. 650 milliGRAM(s) Oral every 6 hours PRN  carvedilol 3.125 milliGRAM(s) Oral every 12 hours  chlorhexidine 2% Cloths 1 Application(s) Topical <User Schedule>  folic acid 1 milliGRAM(s) Oral daily  lactulose Syrup 10 Gram(s) Oral three times a day  LORazepam   Injectable 2 milliGRAM(s) IV Push every 1 hour PRN  melatonin 3 milliGRAM(s) Oral at bedtime  multivitamin 1 Tablet(s) Oral daily  rifAXIMin 550 milliGRAM(s) Oral two times a day  sertraline 50 milliGRAM(s) Oral daily  thiamine IVPB 500 milliGRAM(s) IV Intermittent every 8 hours  thiamine IVPB   IV Intermittent       PAST MEDICAL & SURGICAL HISTORY:  Ventral hernia  current      Migraine  pt states last 5-04-17.  Imitrex prn      Anxiety      Depression      Constipation      Lumbar herniated disc  s/p 2 epidural injections, last one 2-2017      Shoulder dislocation  history of, returned by pt. Pt states she can not stretch out arms fully      Hepatitis C  treated.  Pt states it was successful      History of ETOH abuse  pt states last drink 20 years ago attends AA meetings      S/P cholecystectomy  laparoscopic 2015      Incisional hernia  s//p surgical repair with mesh 2016      S/P colonoscopy  2016          Vitals:  T(F): 97.7 (06-08), Max: 98.2 (06-07)  HR: 66 (06-08) (60 - 66)  BP: 147/62 (06-08) (131/57 - 147/67)  RR: 18 (06-08)  SpO2: 97% (06-08)  I&O's Summary    07 Jun 2025 07:01  -  08 Jun 2025 07:00  --------------------------------------------------------  IN: 120 mL / OUT: 120 mL / NET: 0 mL        Physical Exam:  Appearance: No acute distress  HENT: No JVD   Cardiovascular: RRR, S1/S2, no murmurs  Respiratory: CTABL  Gastrointestinal: soft, NT ND, +BS  Musculoskeletal: No clubbing, no edema   Neurologic: Non-focal  Skin: No rashes, ecchymoses, or cyanosis                          8.4    2.71  )-----------( 44       ( 07 Jun 2025 04:39 )             25.7     06-08    144  |  111[H]  |  6[L]  ----------------------------<  96  3.5   |  21[L]  |  0.53    Ca    7.9[L]      08 Jun 2025 06:45  Phos  2.6     06-08  Mg     1.70     06-08    TPro  5.2[L]  /  Alb  2.6[L]  /  TBili  2.5[H]  /  DBili  0.9[H]  /  AST  41[H]  /  ALT  22  /  AlkPhos  95  06-08    PT/INR - ( 08 Jun 2025 06:45 )   PT: 19.4 sec;   INR: 1.64 ratio         PTT - ( 08 Jun 2025 06:45 )  PTT:35.6 sec              Cardiovascular Testings:     
Patient is a 70y old  Female who presents with a chief complaint of AMS  Per chart review, patient is a 70-year-old female past medical history of alcohol use disorder, cognitive impairment undergoing workup, depression, anxiety, ?Cirrhosis seen on imaging yesterday presenting to emergency room for low oxygen level and altered mental status at home.  Patient reports that her cousin wanted her to come in and dropped her off, she feels fine and has no complaints.    Kaitlin helps her with all of her medical appointments and at home, patient's remain is Leonarda.  Kaitlin reports that she has been undergoing testing with a neurologist due to worsening cognitive impairment and early dementia including forgetting her wallet keys phone leaving the stove on being confused, and over the last couple of months she has had multiple episodes of syncope versus unresponsiveness.  On 2 occasions she has fallen to the ground, on other occasions she has appeared altered bobbing her head from side-to-side and unresponsive and will have low reading O2 sat during these episodes.  Patient left AMA from the ER yesterday after negative CT head and CT chest without pulmonary embolism but some signs of pulmonary edema, cirrhosis, splenomegaly.  Needed does not feel that the patient has capacity to leave AMA.  She reports it has been difficult for them to get cardiology appointment symptoms the schedule of many months. Pt declines active smoking, drug use, alcohol use. (13 Jun 2025 15:02)    Date of servie : 06-13-25 @ 16:09  INTERVAL HPI/OVERNIGHT EVENTS:  T(C): 36.8 (06-13-25 @ 13:17), Max: 37.5 (06-12-25 @ 20:20)  HR: 65 (06-13-25 @ 13:17) (65 - 73)  BP: 138/66 (06-13-25 @ 13:17) (116/92 - 148/64)  RR: 17 (06-13-25 @ 13:17) (16 - 18)  SpO2: 98% (06-13-25 @ 13:17) (95% - 100%)  Wt(kg): --  I&O's Summary      LABS:                        9.0    3.75  )-----------( 51       ( 13 Jun 2025 03:45 )             29.1     06-13    141  |  109[H]  |  9   ----------------------------<  94  4.0   |  23  |  0.46[L]    Ca    8.5      13 Jun 2025 03:45  Phos  3.8     06-13  Mg     1.80     06-13    TPro  5.6[L]  /  Alb  2.9[L]  /  TBili  1.6[H]  /  DBili  x   /  AST  39[H]  /  ALT  22  /  AlkPhos  139[H]  06-13      Urinalysis Basic - ( 13 Jun 2025 03:45 )    Color: x / Appearance: x / SG: x / pH: x  Gluc: 94 mg/dL / Ketone: x  / Bili: x / Urobili: x   Blood: x / Protein: x / Nitrite: x   Leuk Esterase: x / RBC: x / WBC x   Sq Epi: x / Non Sq Epi: x / Bacteria: x      CAPILLARY BLOOD GLUCOSE            Urinalysis Basic - ( 13 Jun 2025 03:45 )    Color: x / Appearance: x / SG: x / pH: x  Gluc: 94 mg/dL / Ketone: x  / Bili: x / Urobili: x   Blood: x / Protein: x / Nitrite: x   Leuk Esterase: x / RBC: x / WBC x   Sq Epi: x / Non Sq Epi: x / Bacteria: x        MEDICATIONS  (STANDING):  carvedilol 3.125 milliGRAM(s) Oral every 12 hours  chlorhexidine 2% Cloths 1 Application(s) Topical <User Schedule>  folic acid 1 milliGRAM(s) Oral daily  lactulose Syrup 10 Gram(s) Oral three times a day  melatonin 3 milliGRAM(s) Oral at bedtime  multivitamin 1 Tablet(s) Oral daily  rifAXIMin 550 milliGRAM(s) Oral two times a day  sertraline 50 milliGRAM(s) Oral daily  traZODone 50 milliGRAM(s) Oral at bedtime    MEDICATIONS  (PRN):  acetaminophen     Tablet .. 650 milliGRAM(s) Oral every 6 hours PRN Mild Pain (1 - 3)  benzonatate 100 milliGRAM(s) Oral three times a day PRN Cough  LORazepam   Injectable 2 milliGRAM(s) IV Push every 1 hour PRN CIWA 15 or greater, or seizure          PHYSICAL EXAM:  GENERAL: NAD, well-groomed, well-developed  HEAD:  Atraumatic, Normocephalic  CHEST/LUNG: Clear to percussion bilaterally; No rales, rhonchi, wheezing, or rubs  HEART: Regular rate and rhythm; No murmurs, rubs, or gallops  ABDOMEN: Soft, Nontender, Nondistended; Bowel sounds present  EXTREMITIES:  2+ Peripheral Pulses, No clubbing, cyanosis, or edema  LYMPH: No lymphadenopathy noted  SKIN: No rashes or lesions    Care Discussed with Consultants/Other Providers [ ] YES  [ ] NO
Patient is a 70y old  Female who presents with a chief complaint of AMS (09 Jun 2025 12:46)    Date of servie : 06-09-25 @ 17:04  INTERVAL HPI/OVERNIGHT EVENTS:  T(C): 37.1 (06-09-25 @ 09:20), Max: 37.1 (06-09-25 @ 09:20)  HR: 71 (06-09-25 @ 09:20) (70 - 73)  BP: 130/66 (06-09-25 @ 09:20) (130/66 - 143/61)  RR: 18 (06-09-25 @ 09:20) (17 - 18)  SpO2: 100% (06-09-25 @ 09:20) (95% - 100%)  Wt(kg): --  I&O's Summary      LABS:                        8.1    3.70  )-----------( 45       ( 09 Jun 2025 05:30 )             24.9     06-09    141  |  109[H]  |  9   ----------------------------<  106[H]  3.8   |  24  |  0.47[L]    Ca    7.9[L]      09 Jun 2025 05:30  Phos  2.5     06-09  Mg     1.60     06-09    TPro  5.1[L]  /  Alb  2.6[L]  /  TBili  2.0[H]  /  DBili  0.8[H]  /  AST  37[H]  /  ALT  21  /  AlkPhos  122[H]  06-09    PT/INR - ( 09 Jun 2025 05:30 )   PT: 20.7 sec;   INR: 1.79 ratio         PTT - ( 09 Jun 2025 05:30 )  PTT:34.6 sec  Urinalysis Basic - ( 09 Jun 2025 05:30 )    Color: x / Appearance: x / SG: x / pH: x  Gluc: 106 mg/dL / Ketone: x  / Bili: x / Urobili: x   Blood: x / Protein: x / Nitrite: x   Leuk Esterase: x / RBC: x / WBC x   Sq Epi: x / Non Sq Epi: x / Bacteria: x      CAPILLARY BLOOD GLUCOSE            Urinalysis Basic - ( 09 Jun 2025 05:30 )    Color: x / Appearance: x / SG: x / pH: x  Gluc: 106 mg/dL / Ketone: x  / Bili: x / Urobili: x   Blood: x / Protein: x / Nitrite: x   Leuk Esterase: x / RBC: x / WBC x   Sq Epi: x / Non Sq Epi: x / Bacteria: x        MEDICATIONS  (STANDING):  carvedilol 3.125 milliGRAM(s) Oral every 12 hours  chlorhexidine 2% Cloths 1 Application(s) Topical <User Schedule>  folic acid 1 milliGRAM(s) Oral daily  lactulose Syrup 10 Gram(s) Oral three times a day  melatonin 3 milliGRAM(s) Oral at bedtime  multivitamin 1 Tablet(s) Oral daily  rifAXIMin 550 milliGRAM(s) Oral two times a day  sertraline 50 milliGRAM(s) Oral daily  thiamine IVPB 250 milliGRAM(s) IV Intermittent daily  thiamine IVPB   IV Intermittent     MEDICATIONS  (PRN):  acetaminophen     Tablet .. 650 milliGRAM(s) Oral every 6 hours PRN Mild Pain (1 - 3)  LORazepam   Injectable 2 milliGRAM(s) IV Push every 1 hour PRN CIWA 15 or greater, or seizure          PHYSICAL EXAM:  GENERAL: frail  CHEST/LUNG: Clear to percussion bilaterally; No rales, rhonchi, wheezing, or rubs  HEART: S1S2+  ABDOMEN: Soft, Nontender, Nondistended; Bowel sounds present  EXTREMITIES:  edema+    Care Discussed with Consultants/Other Providers [ ] YES  [ ] NO
Patient is a 70y old  Female who presents with a chief complaint of AMS (11 Jun 2025 13:40)    Date of servie : 06-11-25 @ 16:04  INTERVAL HPI/OVERNIGHT EVENTS:  T(C): 36.3 (06-11-25 @ 11:45), Max: 36.7 (06-11-25 @ 04:00)  HR: 60 (06-11-25 @ 11:45) (60 - 83)  BP: 139/- (06-11-25 @ 11:45) (123/57 - 139/-)  RR: 17 (06-11-25 @ 11:45) (17 - 18)  SpO2: 96% (06-11-25 @ 11:45) (96% - 99%)  Wt(kg): --  I&O's Summary      LABS:                        8.6    3.58  )-----------( 48       ( 11 Jun 2025 07:30 )             26.9     06-11    142  |  110[H]  |  10  ----------------------------<  96  3.8   |  23  |  0.46[L]    Ca    8.1[L]      11 Jun 2025 06:15  Phos  3.4     06-11  Mg     1.80     06-11    TPro  5.2[L]  /  Alb  2.6[L]  /  TBili  1.4[H]  /  DBili  x   /  AST  35[H]  /  ALT  20  /  AlkPhos  142[H]  06-11    PT/INR - ( 11 Jun 2025 07:30 )   PT: 19.0 sec;   INR: 1.65 ratio         PTT - ( 10 Royal 2025 07:10 )  PTT:34.5 sec  Urinalysis Basic - ( 11 Jun 2025 06:15 )    Color: x / Appearance: x / SG: x / pH: x  Gluc: 96 mg/dL / Ketone: x  / Bili: x / Urobili: x   Blood: x / Protein: x / Nitrite: x   Leuk Esterase: x / RBC: x / WBC x   Sq Epi: x / Non Sq Epi: x / Bacteria: x      CAPILLARY BLOOD GLUCOSE            Urinalysis Basic - ( 11 Jun 2025 06:15 )    Color: x / Appearance: x / SG: x / pH: x  Gluc: 96 mg/dL / Ketone: x  / Bili: x / Urobili: x   Blood: x / Protein: x / Nitrite: x   Leuk Esterase: x / RBC: x / WBC x   Sq Epi: x / Non Sq Epi: x / Bacteria: x        MEDICATIONS  (STANDING):  carvedilol 3.125 milliGRAM(s) Oral every 12 hours  chlorhexidine 2% Cloths 1 Application(s) Topical <User Schedule>  folic acid 1 milliGRAM(s) Oral daily  lactulose Syrup 10 Gram(s) Oral three times a day  melatonin 3 milliGRAM(s) Oral at bedtime  multivitamin 1 Tablet(s) Oral daily  rifAXIMin 550 milliGRAM(s) Oral two times a day  sertraline 50 milliGRAM(s) Oral daily  thiamine IVPB 250 milliGRAM(s) IV Intermittent daily  thiamine IVPB   IV Intermittent   traZODone 50 milliGRAM(s) Oral at bedtime    MEDICATIONS  (PRN):  acetaminophen     Tablet .. 650 milliGRAM(s) Oral every 6 hours PRN Mild Pain (1 - 3)  LORazepam   Injectable 2 milliGRAM(s) IV Push every 1 hour PRN CIWA 15 or greater, or seizure          PHYSICAL EXAM:  GENERAL: NAD, well-groomed, well-developed  HEAD:  Atraumatic, Normocephalic  CHEST/LUNG: Clear to percussion bilaterally; No rales, rhonchi, wheezing, or rubs  HEART: Regular rate and rhythm; No murmurs, rubs, or gallops  ABDOMEN: Soft, Nontender, Nondistended; Bowel sounds present  EXTREMITIES:  2+ Peripheral Pulses, No clubbing, cyanosis, or edema  LYMPH: No lymphadenopathy noted  SKIN: No rashes or lesions    Care Discussed with Consultants/Other Providers [ ] YES  [ ] NO
Patient is a 70y old  Female who presents with a chief complaint of AMS (12 Jun 2025 11:16)    Date of servie : 06-12-25 @ 17:56  INTERVAL HPI/OVERNIGHT EVENTS:  T(C): 36.7 (06-12-25 @ 17:25), Max: 37.2 (06-11-25 @ 19:50)  HR: 66 (06-12-25 @ 17:25) (66 - 85)  BP: 121/71 (06-12-25 @ 17:25) (120/58 - 168/72)  RR: 16 (06-12-25 @ 17:25) (16 - 18)  SpO2: 100% (06-12-25 @ 17:25) (98% - 100%)  Wt(kg): --  I&O's Summary      LABS:                        8.3    3.69  )-----------( 42       ( 12 Jun 2025 07:00 )             26.1     06-12    140  |  110[H]  |  9   ----------------------------<  83  4.4   |  23  |  0.46[L]    Ca    8.3[L]      12 Jun 2025 07:00  Phos  3.7     06-12  Mg     1.70     06-12    TPro  5.2[L]  /  Alb  2.5[L]  /  TBili  1.6[H]  /  DBili  x   /  AST  36[H]  /  ALT  21  /  AlkPhos  125[H]  06-12    PT/INR - ( 11 Jun 2025 07:30 )   PT: 19.0 sec;   INR: 1.65 ratio           Urinalysis Basic - ( 12 Jun 2025 07:00 )    Color: x / Appearance: x / SG: x / pH: x  Gluc: 83 mg/dL / Ketone: x  / Bili: x / Urobili: x   Blood: x / Protein: x / Nitrite: x   Leuk Esterase: x / RBC: x / WBC x   Sq Epi: x / Non Sq Epi: x / Bacteria: x      CAPILLARY BLOOD GLUCOSE            Urinalysis Basic - ( 12 Jun 2025 07:00 )    Color: x / Appearance: x / SG: x / pH: x  Gluc: 83 mg/dL / Ketone: x  / Bili: x / Urobili: x   Blood: x / Protein: x / Nitrite: x   Leuk Esterase: x / RBC: x / WBC x   Sq Epi: x / Non Sq Epi: x / Bacteria: x        MEDICATIONS  (STANDING):  carvedilol 3.125 milliGRAM(s) Oral every 12 hours  chlorhexidine 2% Cloths 1 Application(s) Topical <User Schedule>  folic acid 1 milliGRAM(s) Oral daily  lactulose Syrup 10 Gram(s) Oral three times a day  melatonin 3 milliGRAM(s) Oral at bedtime  multivitamin 1 Tablet(s) Oral daily  rifAXIMin 550 milliGRAM(s) Oral two times a day  sertraline 50 milliGRAM(s) Oral daily  traZODone 50 milliGRAM(s) Oral at bedtime    MEDICATIONS  (PRN):  acetaminophen     Tablet .. 650 milliGRAM(s) Oral every 6 hours PRN Mild Pain (1 - 3)  LORazepam   Injectable 2 milliGRAM(s) IV Push every 1 hour PRN CIWA 15 or greater, or seizure          PHYSICAL EXAM:  GENERAL: NAD, well-groomed, well-developed  HEAD:  Atraumatic, Normocephalic  CHEST/LUNG: Clear to percussion bilaterally; No rales, rhonchi, wheezing, or rubs  HEART: Regular rate and rhythm; No murmurs, rubs, or gallops  ABDOMEN: Soft, Nontender, Nondistended; Bowel sounds present  EXTREMITIES:  2+ Peripheral Pulses, No clubbing, cyanosis, or edema  LYMPH: No lymphadenopathy noted  SKIN: No rashes or lesions    Care Discussed with Consultants/Other Providers [ ] YES  [ ] NO
Patient is a 70y old  Female who presents with a chief complaint of AMS (17 Jun 2025 15:54)    Date of servie : 06-17-25 @ 16:22  INTERVAL HPI/OVERNIGHT EVENTS:  T(C): 36.6 (06-17-25 @ 11:42), Max: 37.5 (06-16-25 @ 19:05)  HR: 70 (06-17-25 @ 11:42) (70 - 76)  BP: 144/67 (06-17-25 @ 11:42) (120/52 - 144/67)  RR: 17 (06-17-25 @ 11:42) (17 - 17)  SpO2: 97% (06-17-25 @ 11:42) (95% - 97%)  Wt(kg): --  I&O's Summary      LABS:                        8.2    3.07  )-----------( 45       ( 17 Jun 2025 03:09 )             25.7     06-17    141  |  109[H]  |  12  ----------------------------<  102[H]  4.1   |  23  |  0.58    Ca    7.9[L]      17 Jun 2025 03:09  Phos  2.9     06-17  Mg     1.80     06-17    TPro  5.3[L]  /  Alb  2.6[L]  /  TBili  1.3[H]  /  DBili  x   /  AST  37[H]  /  ALT  21  /  AlkPhos  128[H]  06-17    PT/INR - ( 17 Jun 2025 03:09 )   PT: 18.5 sec;   INR: 1.60 ratio         PTT - ( 16 Jun 2025 06:00 )  PTT:35.5 sec  Urinalysis Basic - ( 17 Jun 2025 03:09 )    Color: x / Appearance: x / SG: x / pH: x  Gluc: 102 mg/dL / Ketone: x  / Bili: x / Urobili: x   Blood: x / Protein: x / Nitrite: x   Leuk Esterase: x / RBC: x / WBC x   Sq Epi: x / Non Sq Epi: x / Bacteria: x      CAPILLARY BLOOD GLUCOSE            Urinalysis Basic - ( 17 Jun 2025 03:09 )    Color: x / Appearance: x / SG: x / pH: x  Gluc: 102 mg/dL / Ketone: x  / Bili: x / Urobili: x   Blood: x / Protein: x / Nitrite: x   Leuk Esterase: x / RBC: x / WBC x   Sq Epi: x / Non Sq Epi: x / Bacteria: x        MEDICATIONS  (STANDING):  carvedilol 3.125 milliGRAM(s) Oral every 12 hours  chlorhexidine 2% Cloths 1 Application(s) Topical <User Schedule>  folic acid 1 milliGRAM(s) Oral daily  lactulose Syrup 10 Gram(s) Oral three times a day  melatonin 3 milliGRAM(s) Oral at bedtime  multivitamin 1 Tablet(s) Oral daily  rifAXIMin 550 milliGRAM(s) Oral two times a day  sertraline 50 milliGRAM(s) Oral daily  traZODone 50 milliGRAM(s) Oral at bedtime    MEDICATIONS  (PRN):  acetaminophen     Tablet .. 650 milliGRAM(s) Oral every 6 hours PRN Mild Pain (1 - 3)  benzonatate 100 milliGRAM(s) Oral three times a day PRN Cough          PHYSICAL EXAM:  GENERAL: NAD, well-groomed, well-developed  HEAD:  Atraumatic, Normocephalic  CHEST/LUNG: Clear to percussion bilaterally; No rales, rhonchi, wheezing, or rubs  HEART: Regular rate and rhythm; No murmurs, rubs, or gallops  ABDOMEN: Soft, Nontender, Nondistended; Bowel sounds present  EXTREMITIES:  2+ Peripheral Pulses, No clubbing, cyanosis, or edema  LYMPH: No lymphadenopathy noted  SKIN: No rashes or lesions    Care Discussed with Consultants/Other Providers [ ] YES  [ ] NO

## 2025-06-17 NOTE — H&P ADULT - NSHPSOURCEINFOTX_GEN_ALL_CORE
[place] [employer] I Stop # [phone] [facility] I Stop # [id number].    Patient has requested her first cousin, Kaitlin Raya, [phone] as her health care surrogate, and Leonarda Nuno, [phone], is patient's identified partner,  who are aware of her transfer and the patient did not wish for me to contact at this hour.

## 2025-06-18 ENCOUNTER — APPOINTMENT (OUTPATIENT)
Dept: GASTROENTEROLOGY | Facility: CLINIC | Age: 70
End: 2025-06-18

## 2025-06-18 LAB
ALBUMIN SERPL ELPH-MCNC: 2.6 G/DL — LOW (ref 3.3–5)
ALP SERPL-CCNC: 112 U/L — SIGNIFICANT CHANGE UP (ref 40–120)
ALT FLD-CCNC: 19 U/L — SIGNIFICANT CHANGE UP (ref 10–45)
AMMONIA BLD-MCNC: 66 UMOL/L — HIGH (ref 11–55)
ANION GAP SERPL CALC-SCNC: 11 MMOL/L — SIGNIFICANT CHANGE UP (ref 5–17)
APTT BLD: 36.6 SEC — SIGNIFICANT CHANGE UP (ref 26.1–36.8)
AST SERPL-CCNC: 37 U/L — SIGNIFICANT CHANGE UP (ref 10–40)
BASOPHILS # BLD AUTO: 0.02 K/UL — SIGNIFICANT CHANGE UP (ref 0–0.2)
BASOPHILS NFR BLD AUTO: 0.8 % — SIGNIFICANT CHANGE UP (ref 0–2)
BILIRUB SERPL-MCNC: 1.4 MG/DL — HIGH (ref 0.2–1.2)
BUN SERPL-MCNC: 10 MG/DL — SIGNIFICANT CHANGE UP (ref 7–23)
CALCIUM SERPL-MCNC: 8.3 MG/DL — LOW (ref 8.4–10.5)
CHLORIDE SERPL-SCNC: 111 MMOL/L — HIGH (ref 96–108)
CO2 SERPL-SCNC: 21 MMOL/L — LOW (ref 22–31)
CREAT SERPL-MCNC: 0.47 MG/DL — LOW (ref 0.5–1.3)
EGFR: 102 ML/MIN/1.73M2 — SIGNIFICANT CHANGE UP
EGFR: 102 ML/MIN/1.73M2 — SIGNIFICANT CHANGE UP
EOSINOPHIL # BLD AUTO: 0.11 K/UL — SIGNIFICANT CHANGE UP (ref 0–0.5)
EOSINOPHIL NFR BLD AUTO: 4.3 % — SIGNIFICANT CHANGE UP (ref 0–6)
GLUCOSE SERPL-MCNC: 124 MG/DL — HIGH (ref 70–99)
HCT VFR BLD CALC: 26.4 % — LOW (ref 34.5–45)
HGB BLD-MCNC: 8.3 G/DL — LOW (ref 11.5–15.5)
IMM GRANULOCYTES # BLD AUTO: 0.01 K/UL — SIGNIFICANT CHANGE UP (ref 0–0.07)
IMM GRANULOCYTES NFR BLD AUTO: 0.4 % — SIGNIFICANT CHANGE UP (ref 0–0.9)
IMMATURE PLATELET FRACTION #: 0.9 K/UL — LOW (ref 4.7–11.1)
IMMATURE PLATELET FRACTION %: 2.4 % — SIGNIFICANT CHANGE UP (ref 1.6–4.9)
INR BLD: 1.5 RATIO — HIGH (ref 0.85–1.16)
LYMPHOCYTES # BLD AUTO: 0.66 K/UL — LOW (ref 1–3.3)
LYMPHOCYTES NFR BLD AUTO: 25.8 % — SIGNIFICANT CHANGE UP (ref 13–44)
MCHC RBC-ENTMCNC: 29.7 PG — SIGNIFICANT CHANGE UP (ref 27–34)
MCHC RBC-ENTMCNC: 31.4 G/DL — LOW (ref 32–36)
MCV RBC AUTO: 94.6 FL — SIGNIFICANT CHANGE UP (ref 80–100)
MONOCYTES # BLD AUTO: 0.23 K/UL — SIGNIFICANT CHANGE UP (ref 0–0.9)
MONOCYTES NFR BLD AUTO: 9 % — SIGNIFICANT CHANGE UP (ref 2–14)
NEUTROPHILS # BLD AUTO: 1.53 K/UL — LOW (ref 1.8–7.4)
NEUTROPHILS NFR BLD AUTO: 59.7 % — SIGNIFICANT CHANGE UP (ref 43–77)
NRBC # BLD AUTO: 0 K/UL — SIGNIFICANT CHANGE UP (ref 0–0)
NRBC # FLD: 0 K/UL — SIGNIFICANT CHANGE UP (ref 0–0)
NRBC BLD AUTO-RTO: 0 /100 WBCS — SIGNIFICANT CHANGE UP (ref 0–0)
PLATELET # BLD AUTO: 39 K/UL — LOW (ref 150–400)
PMV BLD: 11.1 FL — SIGNIFICANT CHANGE UP (ref 7–13)
POTASSIUM SERPL-MCNC: 3.6 MMOL/L — SIGNIFICANT CHANGE UP (ref 3.5–5.3)
POTASSIUM SERPL-SCNC: 3.6 MMOL/L — SIGNIFICANT CHANGE UP (ref 3.5–5.3)
PROT SERPL-MCNC: 5.4 G/DL — LOW (ref 6–8.3)
PROTHROM AB SERPL-ACNC: 17 SEC — HIGH (ref 9.9–13.4)
RBC # BLD: 2.79 M/UL — LOW (ref 3.8–5.2)
RBC # FLD: 17.5 % — HIGH (ref 10.3–14.5)
SODIUM SERPL-SCNC: 143 MMOL/L — SIGNIFICANT CHANGE UP (ref 135–145)
WBC # BLD: 2.56 K/UL — LOW (ref 3.8–10.5)
WBC # FLD AUTO: 2.56 K/UL — LOW (ref 3.8–10.5)

## 2025-06-18 PROCEDURE — 99223 1ST HOSP IP/OBS HIGH 75: CPT

## 2025-06-18 RX ORDER — ACETAMINOPHEN 500 MG/5ML
650 LIQUID (ML) ORAL EVERY 6 HOURS
Refills: 0 | Status: DISCONTINUED | OUTPATIENT
Start: 2025-06-18 | End: 2025-06-23

## 2025-06-18 RX ADMIN — FOLIC ACID 1 MILLIGRAM(S): 1 TABLET ORAL at 12:18

## 2025-06-18 RX ADMIN — Medication 50 MILLIGRAM(S): at 22:48

## 2025-06-18 RX ADMIN — CARVEDILOL 3.12 MILLIGRAM(S): 3.12 TABLET, FILM COATED ORAL at 17:11

## 2025-06-18 RX ADMIN — LACTULOSE 10 GRAM(S): 10 SOLUTION ORAL at 22:48

## 2025-06-18 RX ADMIN — Medication 1 TABLET(S): at 12:18

## 2025-06-18 RX ADMIN — LACTULOSE 10 GRAM(S): 10 SOLUTION ORAL at 06:24

## 2025-06-18 RX ADMIN — Medication 650 MILLIGRAM(S): at 13:00

## 2025-06-18 RX ADMIN — Medication 650 MILLIGRAM(S): at 12:18

## 2025-06-18 RX ADMIN — Medication 3 MILLIGRAM(S): at 02:22

## 2025-06-18 RX ADMIN — LACTULOSE 10 GRAM(S): 10 SOLUTION ORAL at 13:42

## 2025-06-18 RX ADMIN — CARVEDILOL 3.12 MILLIGRAM(S): 3.12 TABLET, FILM COATED ORAL at 06:24

## 2025-06-18 RX ADMIN — SERTRALINE 50 MILLIGRAM(S): 100 TABLET, FILM COATED ORAL at 12:18

## 2025-06-18 NOTE — CONSULT NOTE ADULT - REASON FOR ADMISSION
Transferred from [facility] to [place] for procedure evaluation
Transferred from [facility] to [place] for procedure evaluation

## 2025-06-18 NOTE — CONSULT NOTE ADULT - ASSESSMENT
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, undifferentiated cognitive impairment with patient with S/P laparoscopic ventral hernia repair with mesh and liver wedge biopsy and known severe liver cirrhosis from June 2017, with patient apparently with a presumed mechanical fall Jun 4 2025 for a presumed mechanical fall with low 90s O2 saturation with CTT head negative and apparently was discharged AMA from [facility], but was called back following cirrhosis on the prior imaging of the liver, with apparently the patient's first cousin above brought patient to the ER with apparently progressively impaired cognition and confusional state at home with patient leaving her keys and wallet on the stove at home, with patient S/P CIWA protocol and high dose IV thiamine, noted elevated NH3 at [facility], with patient seen by hepatology with lactulose and rifaximin started, and Coreg 3.125 mg BID for varices known to patient.  Imaging of the CTT abdomen with diffuse esophageal thickening noted and endoscopic assessment deferred by GI due to thrombocytopenia, with echo at [facility] with R/O PFO versus AVM, with severe AS noted, with patient transferred to [place] with patient seen by Neurology, Psychiatry, Cardiology and Pulmonary for evaluation by Interventional Cardiology for possible RHC and intervention for possible pulmonary AVM and consideration for possible ANNE.    Patient now on bedtime Trazodone for insomnia, AM Zoloft and tolerating Rx with no delirium.    Patient is AxOx3 but with subtle short term memory impairment.   Responded to June 18 2025 rather than the 17th for today's date.  CTH  6/5 neg ; chornic chagnes   TTE 6/8: LVSF wnl, EF 65%   had elevated ammonioa on admission to [facility]     Imprssion:   1) AMS likely multifactorial, MCI/dementia/hepatic encephalopathy   2) ETOH Abuse     - monitor LFTs and ammonia level  - b12, RPR, TSH if not checked ; has been undergoing dementia workup outaptient   - getting lactulose and rifaxamine   - s/p IV thiamine.  c/w thiamine and folic acid  - delerium precuations   - MRI brain outaptient if no imrpovemetn    - PT.OT   - check FS, glucose control <180  - GI/DVT ppx  - Counseling on diet, exercise, and medication adherence was done  - Counseling on smoking cessation and alcohol consumption offered when appropriate.  - Pain assessed and judicious use of narcotics when appropriate was discussed.    - Stroke education given when appropriate.  - Importance of fall prevention discussed.   - Differential diagnosis and plan of care discussed with patient and/or family and primary team  - Thank you for allowing me to participate in the care of this patient. Call with questions.   Christiano Ann MD  Vascular Neurology  Office: [phone] 
70F w/ etoh use, memory loss, depression, and cirrhosis initially admitted to [facility] for AMS found to be hypoxic. TTE w/ possible intra-cardiac shunt and pulmonary AVM. Transferred to [facility] for further eval     -previously cleared by hepatology for ANNE, pending ANNE at [facility].   -will need platelet number to be least >50k prior to ANNE   -eventual RHC/Pulmonary angio w/ Dr. Montes   -pulm and neurology f/u     Aida Hummel MD FAC  Attending Interventional Cardiologist, [facility].   Avaliable on [employer] Team

## 2025-06-18 NOTE — PHARMACOTHERAPY INTERVENTION NOTE - COMMENTS
Performed medication reconciliation and home medication list updated in prescription writer/ outpatient medication review. Medications verified with patient and pharmacy.     Home medications:  acetaminophen 325 mg oral tablet: 2 tab(s) orally every 6 hours As needed Mild Pain (1 - 3)  benzonatate 100 mg oral capsule: 1 cap(s) orally 3 times a day As needed Cough  carvedilol 3.125 mg oral tablet: 1 tab(s) orally every 12 hours  folic acid 1 mg oral tablet: 1 tab(s) orally once a day  lactulose 10 g/15 mL oral syrup: 15 milliliter(s) orally 3 times a day  melatonin 3 mg oral tablet: 1 tab(s) orally once a day (at bedtime)  Multiple Vitamins oral tablet: 1 tab(s) orally once a day  rifAXIMin 550 mg oral tablet: 1 tab(s) orally 2 times a day  sertraline 50 mg oral tablet: 1 tab(s) orally once a day  traZODone 50 mg oral tablet: 1 tab(s) orally once a day (at bedtime)    Heaven Colin PharmD  Transition of Care Pharmacist  Available on [employer] Teams (preferred)

## 2025-06-18 NOTE — CONSULT NOTE ADULT - SUBJECTIVE AND OBJECTIVE BOX
[facility] Cardiology Attending Consultation Note     Patient seen and evaluated at bedside    Chief Complaint:    HPI:  NIGHT HOSPITALIST:    Patient UNKNOWN to  me previously, assigned to me at this point by Dr. Mitchell to accept transfer tor this 69 yo F--followed by Dr. 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, undifferentiated cognitive impairment with patient with S/P laparoscopic ventral hernia repair with mesh and liver wedge biopsy and known severe liver cirrhosis from June 2017, with patient apparently with a presumed mechanical fall Jun 4 2025 for a presumed mechanical fall with low 90s O2 saturation with CTT head negative and apparently was discharged AMA from [facility], but was called back following cirrhosis on the prior imaging of the liver, with apparently the patient's first cousin above brought patient to the ER with apparently progressively impaired cognition and confusional state at home with patient leaving her keys and wallet on the stove at home, with patient S/P CIWA protocol and high dose IV thiamine, noted elevated NH3 at [facility], with patient seen by hepatology with lactulose and rifaximin started, and Coreg 3.125 mg BID for varices known to patient.  Imaging of the CTT abdomen with diffuse esophageal thickening noted and endoscopic assessment deferred by GI due to thrombocytopenia, with echo at [facility] with R/O PFO versus AVM, with severe AS noted, with patient transferred to [place] with patient seen by Neurology, Psychiatry, Cardiology and Pulmonary for evaluation by Interventional Cardiology for possible RHC and intervention for possible pulmonary AVM and consideration for possible ANNE.    Patient now on bedtime Trazodone for insomnia, AM Zoloft and tolerating Rx with no delirium.    Patient is AxOx3 but with subtle short term memory impairment.   Responded to June 18 2025 rather than the 17th for today's date.    Presently offers no complaint. (17 Jun 2025 22:00)      PMHx:   Ventral hernia    Migraine    Anxiety    Depression    Constipation    Lumbar herniated disc    Shoulder dislocation    Hepatitis C    History of ETOH abuse    History of cirrhosis of liver        PSHx:   S/P cholecystectomy    Incisional hernia    S/P colonoscopy        Allergies:  avocado (Hives)  No Known Drug Allergies  Codeine Phosphate-GuaiFENesin (Nausea)      Home Meds:    Current Medications:   acetaminophen     Tablet .. 650 milliGRAM(s) Oral every 6 hours PRN  carvedilol 3.125 milliGRAM(s) Oral every 12 hours  folic acid 1 milliGRAM(s) Oral daily  lactulose Syrup 10 Gram(s) Oral three times a day  melatonin 3 milliGRAM(s) Oral at bedtime PRN  multivitamin 1 Tablet(s) Oral daily  rifAXIMin 550 milliGRAM(s) Oral two times a day  sertraline 50 milliGRAM(s) Oral daily  traZODone 50 milliGRAM(s) Oral at bedtime      FAMILY HISTORY:      Social History: Personally reviewed   No tobacco, EtOH or IVDU     REVIEW OF SYSTEMS:  Constitutional:     [x ] negative [ ] fevers [ ] chills [ ] weight loss [ ] weight gain  HEENT:                  [x ] negative [ ] dry eyes [ ] eye irritation [ ] postnasal drip [ ] nasal congestion  CV:                         [ x] negative  [ ] chest pain [ ] orthopnea [ ] palpitations [ ] murmur  Resp:                     [x ] negative [ ] cough [ ] shortness of breath [ ] dyspnea [ ] wheezing [ ] sputum [ ]hemoptysis  GI:                          [ x] negative [ ] nausea [ ] vomiting [ ] diarrhea [ ] constipation [ ] abd pain [ ] dysphagia   :                        [ x] negative [ ] dysuria [ ] nocturia [ ] hematuria [ ] increased urinary frequency  Musculoskeletal: [x ] negative [ ] back pain [ ] myalgias [ ] arthralgias [ ] fracture  Skin:                       [ x] negative [ ] rash [ ] itch  Neurological:        [ x] negative [ ] headache [ ] dizziness [ ] syncope [ ] weakness [ ] numbness  Psychiatric:           [ x] negative [ ] anxiety [ ] depression  Endocrine:            [ x] negative [ ] diabetes [ ] thyroid problem  Heme/Lymph:      [ x] negative [ ] anemia [ ] bleeding problem  Allergic/Immune: [ x] negative [ ] itchy eyes [ ] nasal discharge [ ] hives [ ] angioedema    [ x] All other systems negative  [ ] Unable to assess ROS due to      Physical Exam:  T(F): 98.4 (06-18), Max: 98.4 (06-18)  HR: 62 (06-18) (61 - 63)  BP: 148/74 (06-18) (115/72 - 164/79)  RR: 18 (06-18)  SpO2: 94% (06-18)    Gen: Well appearing   HENNT: No JVP   CV: regular rate, regular rhtyhm, no murmur   Pulm: clear to auscultation bilaterally   Abdomen: Non-tender, non-distended   Ext: no edema b/l   Neuro: grossly non-focal     Cardiovascular Diagnostic Testing:       CONCLUSIONS:      1. Limited study with the intravenous injection of agitated saline contrast. Approximately 5-6 cardiac cycles following their appearance in the right heart, a large number of bubbles was seen in the left heart. The bubbles appear to enter the left atrium from a location remote from the interatrial septum. These factors may reflect the presence of an extracardiac shunt (for example, a pulmonary arteriovenous malformation). However, cannot definitely exclude an intracardiac shunt.    Labs: Personally reviewed                        8.3    2.56  )-----------( 39       ( 18 Jun 2025 07:50 )             26.4     06-18    143  |  111[H]  |  10  ----------------------------<  124[H]  3.6   |  21[L]  |  0.47[L]    Ca    8.3[L]      18 Jun 2025 07:51  Phos  2.9     06-17  Mg     1.80     06-17    TPro  5.4[L]  /  Alb  2.6[L]  /  TBili  1.4[H]  /  DBili  x   /  AST  37  /  ALT  19  /  AlkPhos  112  06-18    PT/INR - ( 18 Jun 2025 07:51 )   PT: 17.0 sec;   INR: 1.50 ratio         PTT - ( 18 Jun 2025 07:51 )  PTT:36.6 sec        
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, undifferentiated cognitive impairment with patient with S/P laparoscopic ventral hernia repair with mesh and liver wedge biopsy and known severe liver cirrhosis from June 2017, with patient apparently with a presumed mechanical fall Jun 4 2025 for a presumed mechanical fall with low 90s O2 saturation with CTT head negative and apparently was discharged AMA from [facility], but was called back following cirrhosis on the prior imaging of the liver, with apparently the patient's first cousin above brought patient to the ER with apparently progressively impaired cognition and confusional state at home with patient leaving her keys and wallet on the stove at home, with patient S/P CIWA protocol and high dose IV thiamine, noted elevated NH3 at [facility], with patient seen by hepatology with lactulose and rifaximin started, and Coreg 3.125 mg BID for varices known to patient.  Imaging of the CTT abdomen with diffuse esophageal thickening noted and endoscopic assessment deferred by GI due to thrombocytopenia, with echo at [facility] with R/O PFO versus AVM, with severe AS noted, with patient transferred to [place] with patient seen by Neurology, Psychiatry, Cardiology and Pulmonary for evaluation by Interventional Cardiology for possible RHC and intervention for possible pulmonary AVM and consideration for possible ANNE.    Patient now on bedtime Trazodone for insomnia, AM Zoloft and tolerating Rx with no delirium.    Patient is AxOx3 but with subtle short term memory impairment.   Responded to June 18 2025 rather than the 17th for today's date.    Presently offers no complaint. (17 Jun 2025 22:00)       PAST MEDICAL & SURGICAL HISTORY:  Ventral hernia  current      Migraine  pt states last 5-04-17.  Imitrex prn      Anxiety      Depression      Constipation      Lumbar herniated disc  s/p 2 epidural injections, last one 2-2017      Shoulder dislocation  history of, returned by pt. Pt states she can not stretch out arms fully      Hepatitis C  treated.  Pt states it was successful      History of ETOH abuse  pt states last drink 20 years ago attends AA meetings      History of cirrhosis of liver      S/P cholecystectomy  laparoscopic 2015      Incisional hernia  s//p surgical repair with mesh 2016      S/P colonoscopy  2016          Allergies: Allergies    avocado (Hives)  No Known Drug Allergies    Intolerances    Codeine Phosphate-GuaiFENesin (Nausea)      Social History: Denies toxic habits including tobacco, ETOH or illicit drugs.    Family History: FAMILY HISTORY:  . No family history of strokes    Medications: MEDICATIONS  (STANDING):  carvedilol 3.125 milliGRAM(s) Oral every 12 hours  folic acid 1 milliGRAM(s) Oral daily  lactulose Syrup 10 Gram(s) Oral three times a day  multivitamin 1 Tablet(s) Oral daily  rifAXIMin 550 milliGRAM(s) Oral two times a day  sertraline 50 milliGRAM(s) Oral daily  traZODone 50 milliGRAM(s) Oral at bedtime    MEDICATIONS  (PRN):  melatonin 3 milliGRAM(s) Oral at bedtime PRN Insomnia      Review of Systems:  CONSTITUTIONAL:  No weight loss, fever, chills, weakness or fatigue.  HEENT:  Eyes:  No visual loss, blurred vision, double vision or yellow sclera. Ears, Nose, Throat:  No hearing loss, sneezing, congestion, runny nose or sore throat.  SKIN:  No rash or itching.  CARDIOVASCULAR:  No chest pain, chest pressure or chest discomfort. No palpitations or edema.  RESPIRATORY:  No shortness of breath, cough or sputum.  GASTROINTESTINAL:  No anorexia, nausea, vomiting or diarrhea. No abdominal pain or blood.  GENITOURINARY:  No burning on urination or incontinence   NEUROLOGICAL:  No headache, dizziness, syncope, paralysis, ataxia, numbness or tingling in the extremities. No change in bowel or bladder control. no limb weakness. no vision changes.   MUSCULOSKELETAL:  No muscle, back pain, joint pain or stiffness.  HEMATOLOGIC:  No anemia, bleeding or bruising.  LYMPHATICS:  No enlarged nodes. No history of splenectomy.  PSYCHIATRIC:  No history of depression or anxiety.  ENDOCRINOLOGIC:  No reports of sweating, cold or heat intolerance. No polyuria or polydipsia.      Vitals:  Vital Signs Last 24 Hrs  T(C): 36.9 (18 Jun 2025 05:06), Max: 37.2 (17 Jun 2025 17:16)  T(F): 98.4 (18 Jun 2025 05:06), Max: 98.9 (17 Jun 2025 17:16)  HR: 61 (18 Jun 2025 05:06) (61 - 70)  BP: 115/72 (18 Jun 2025 05:06) (115/72 - 165/73)  BP(mean): --  RR: 18 (18 Jun 2025 05:06) (17 - 18)  SpO2: 99% (18 Jun 2025 05:06) (94% - 99%)    Parameters below as of 18 Jun 2025 05:06  Patient On (Oxygen Delivery Method): room air        General Exam:   General Appearance: Appropriately dressed and in no acute distress       Head: Normocephalic, atraumatic and no dysmorphic features  Ear, Nose, and Throat: Moist mucous membranes  CVS: S1S2+  Resp: No SOB, no wheeze or rhonchi  GI: soft NT/ND  Extremities: No edema or cyanosis  Skin: No bruises or rashes     Neurological Exam:  Mental Status: Awake, alert and oriented x 3.  Able to follow simple and complex verbal commands. Able to name and repeat. fluent speech. No obvious aphasia or dysarthria noted.   Cranial Nerves: PERRL, EOMI, VFFC, sensation V1-V3 intact,  no obvious facial asymmetry, equal elevation of palate, scm/trap 5/5, tongue is midline on protrusion. no obvious papilledema on fundoscopic exam. hearing is grossly intact.   Motor: Normal bulk, tone and strength throughout. Fine finger movements were intact and symmetric. no tremors or drift noted.    Sensation: Intact to light touch and pinprick throughout. no right/left confusion. no extinction to tactile on DSS.   Reflexes: 1+ throughout at biceps, brachioradialis, triceps, patellars and ankles bilaterally and equal. No clonus. R toe and L toe were both downgoing.  Coordination: No dysmetria on FNF    Gait: deferred     Data/Labs/Imaging which I personally reviewed.     Labs:     CBC Full  -  ( 18 Jun 2025 07:50 )  WBC Count : 2.56 K/uL  RBC Count : 2.79 M/uL  Hemoglobin : 8.3 g/dL  Hematocrit : 26.4 %  Platelet Count - Automated : 39 K/uL  Mean Cell Volume : 94.6 fl  Mean Cell Hemoglobin : 29.7 pg  Mean Cell Hemoglobin Concentration : 31.4 g/dL  Auto Neutrophil # : 1.53 K/uL  Auto Lymphocyte # : 0.66 K/uL  Auto Monocyte # : 0.23 K/uL  Auto Eosinophil # : 0.11 K/uL  Auto Basophil # : 0.02 K/uL  Auto Neutrophil % : 59.7 %  Auto Lymphocyte % : 25.8 %  Auto Monocyte % : 9.0 %  Auto Eosinophil % : 4.3 %  Auto Basophil % : 0.8 %    06-18    143  |  111[H]  |  10  ----------------------------<  124[H]  3.6   |  21[L]  |  0.47[L]    Ca    8.3[L]      18 Jun 2025 07:51  Phos  2.9     06-17  Mg     1.80     06-17    TPro  5.4[L]  /  Alb  2.6[L]  /  TBili  1.4[H]  /  DBili  x   /  AST  37  /  ALT  19  /  AlkPhos  112  06-18    LIVER FUNCTIONS - ( 18 Jun 2025 07:51 )  Alb: 2.6 g/dL / Pro: 5.4 g/dL / ALK PHOS: 112 U/L / ALT: 19 U/L / AST: 37 U/L / GGT: x           PT/INR - ( 18 Jun 2025 07:51 )   PT: 17.0 sec;   INR: 1.50 ratio         PTT - ( 18 Jun 2025 07:51 )  PTT:36.6 sec  Urinalysis Basic - ( 18 Jun 2025 07:51 )    Color: x / Appearance: x / SG: x / pH: x  Gluc: 124 mg/dL / Ketone: x  / Bili: x / Urobili: x   Blood: x / Protein: x / Nitrite: x   Leuk Esterase: x / RBC: x / WBC x   Sq Epi: x / Non Sq Epi: x / Bacteria: x        COMPARISON: CT head 5/11/2025.    CONTRAST:  IV Contrast: NONE  .    TECHNIQUE:  Serial axial images were obtained from the skull base to the   vertex using multi-slice helical technique. Sagittal and coronal   reformats were obtained.    FINDINGS:    VENTRICLES AND SULCI: Age appropriate involutional changes.  INTRA-AXIAL: No mass effect, acute hemorrhage, or midline shift.  There   are periventricular and subcortical white matter hypodensities,   consistent with microvascular type changes.  EXTRA-AXIAL: No mass or fluid collection. Basal cisterns are normal in   appearance.    VISUALIZED SINUSES:  Clear.  TYMPANOMASTOID CAVITIES:  Clear.  VISUALIZED ORBITS: Normal.  CALVARIUM: Irregularity of the right nasal bone is likely related to old   trauma. This is similar compared to previous exam..    MISCELLANEOUS: None.      IMPRESSION:  No acute intracranial hemorrhage, mass effect, or midline shift.        --- End of Report ---

## 2025-06-19 LAB
ALBUMIN SERPL ELPH-MCNC: 3.1 G/DL — LOW (ref 3.3–5)
ALP SERPL-CCNC: 138 U/L — HIGH (ref 40–120)
ALT FLD-CCNC: 22 U/L — SIGNIFICANT CHANGE UP (ref 10–45)
ANION GAP SERPL CALC-SCNC: 12 MMOL/L — SIGNIFICANT CHANGE UP (ref 5–17)
APTT BLD: 35.9 SEC — SIGNIFICANT CHANGE UP (ref 26.1–36.8)
AST SERPL-CCNC: 46 U/L — HIGH (ref 10–40)
BASOPHILS # BLD AUTO: 0.02 K/UL — SIGNIFICANT CHANGE UP (ref 0–0.2)
BASOPHILS NFR BLD AUTO: 0.5 % — SIGNIFICANT CHANGE UP (ref 0–2)
BILIRUB SERPL-MCNC: 1.5 MG/DL — HIGH (ref 0.2–1.2)
BUN SERPL-MCNC: 12 MG/DL — SIGNIFICANT CHANGE UP (ref 7–23)
CALCIUM SERPL-MCNC: 8.9 MG/DL — SIGNIFICANT CHANGE UP (ref 8.4–10.5)
CHLORIDE SERPL-SCNC: 111 MMOL/L — HIGH (ref 96–108)
CO2 SERPL-SCNC: 21 MMOL/L — LOW (ref 22–31)
CREAT SERPL-MCNC: 0.47 MG/DL — LOW (ref 0.5–1.3)
EGFR: 102 ML/MIN/1.73M2 — SIGNIFICANT CHANGE UP
EGFR: 102 ML/MIN/1.73M2 — SIGNIFICANT CHANGE UP
EOSINOPHIL # BLD AUTO: 0.15 K/UL — SIGNIFICANT CHANGE UP (ref 0–0.5)
EOSINOPHIL NFR BLD AUTO: 4 % — SIGNIFICANT CHANGE UP (ref 0–6)
GLUCOSE BLDC GLUCOMTR-MCNC: 144 MG/DL — HIGH (ref 70–99)
GLUCOSE SERPL-MCNC: 75 MG/DL — SIGNIFICANT CHANGE UP (ref 70–99)
HCT VFR BLD CALC: 30.4 % — LOW (ref 34.5–45)
HGB BLD-MCNC: 9.4 G/DL — LOW (ref 11.5–15.5)
IMM GRANULOCYTES # BLD AUTO: 0.02 K/UL — SIGNIFICANT CHANGE UP (ref 0–0.07)
IMM GRANULOCYTES NFR BLD AUTO: 0.5 % — SIGNIFICANT CHANGE UP (ref 0–0.9)
IMMATURE PLATELET FRACTION #: 1.4 K/UL — LOW (ref 4.7–11.1)
IMMATURE PLATELET FRACTION %: 2.7 % — SIGNIFICANT CHANGE UP (ref 1.6–4.9)
INR BLD: 1.39 RATIO — HIGH (ref 0.85–1.16)
LYMPHOCYTES # BLD AUTO: 1.04 K/UL — SIGNIFICANT CHANGE UP (ref 1–3.3)
LYMPHOCYTES NFR BLD AUTO: 27.6 % — SIGNIFICANT CHANGE UP (ref 13–44)
MAGNESIUM SERPL-MCNC: 1.9 MG/DL — SIGNIFICANT CHANGE UP (ref 1.6–2.6)
MCHC RBC-ENTMCNC: 29.2 PG — SIGNIFICANT CHANGE UP (ref 27–34)
MCHC RBC-ENTMCNC: 30.9 G/DL — LOW (ref 32–36)
MCV RBC AUTO: 94.4 FL — SIGNIFICANT CHANGE UP (ref 80–100)
MELD SCORE WITH DIALYSIS: 25 POINTS — SIGNIFICANT CHANGE UP
MELD SCORE WITHOUT DIALYSIS: 12 POINTS — SIGNIFICANT CHANGE UP
MONOCYTES # BLD AUTO: 0.27 K/UL — SIGNIFICANT CHANGE UP (ref 0–0.9)
MONOCYTES NFR BLD AUTO: 7.2 % — SIGNIFICANT CHANGE UP (ref 2–14)
NEUTROPHILS # BLD AUTO: 2.27 K/UL — SIGNIFICANT CHANGE UP (ref 1.8–7.4)
NEUTROPHILS NFR BLD AUTO: 60.2 % — SIGNIFICANT CHANGE UP (ref 43–77)
NRBC # BLD AUTO: 0 K/UL — SIGNIFICANT CHANGE UP (ref 0–0)
NRBC # FLD: 0 K/UL — SIGNIFICANT CHANGE UP (ref 0–0)
NRBC BLD AUTO-RTO: 0 /100 WBCS — SIGNIFICANT CHANGE UP (ref 0–0)
PLATELET # BLD AUTO: 52 K/UL — LOW (ref 150–400)
PMV BLD: 12.2 FL — SIGNIFICANT CHANGE UP (ref 7–13)
POTASSIUM SERPL-MCNC: 4.2 MMOL/L — SIGNIFICANT CHANGE UP (ref 3.5–5.3)
POTASSIUM SERPL-SCNC: 4.2 MMOL/L — SIGNIFICANT CHANGE UP (ref 3.5–5.3)
PROT SERPL-MCNC: 6 G/DL — SIGNIFICANT CHANGE UP (ref 6–8.3)
PROTHROM AB SERPL-ACNC: 15.8 SEC — HIGH (ref 9.9–13.4)
RBC # BLD: 3.22 M/UL — LOW (ref 3.8–5.2)
RBC # FLD: 17.7 % — HIGH (ref 10.3–14.5)
SODIUM SERPL-SCNC: 144 MMOL/L — SIGNIFICANT CHANGE UP (ref 135–145)
WBC # BLD: 3.77 K/UL — LOW (ref 3.8–10.5)
WBC # FLD AUTO: 3.77 K/UL — LOW (ref 3.8–10.5)

## 2025-06-19 PROCEDURE — 99232 SBSQ HOSP IP/OBS MODERATE 35: CPT

## 2025-06-19 RX ADMIN — SERTRALINE 50 MILLIGRAM(S): 100 TABLET, FILM COATED ORAL at 12:17

## 2025-06-19 RX ADMIN — LACTULOSE 10 GRAM(S): 10 SOLUTION ORAL at 13:02

## 2025-06-19 RX ADMIN — Medication 1 TABLET(S): at 12:18

## 2025-06-19 RX ADMIN — Medication 50 MILLIGRAM(S): at 21:22

## 2025-06-19 RX ADMIN — Medication 3 MILLIGRAM(S): at 22:44

## 2025-06-19 RX ADMIN — LACTULOSE 10 GRAM(S): 10 SOLUTION ORAL at 06:08

## 2025-06-19 RX ADMIN — LACTULOSE 10 GRAM(S): 10 SOLUTION ORAL at 21:22

## 2025-06-19 RX ADMIN — FOLIC ACID 1 MILLIGRAM(S): 1 TABLET ORAL at 12:17

## 2025-06-19 RX ADMIN — CARVEDILOL 3.12 MILLIGRAM(S): 3.12 TABLET, FILM COATED ORAL at 17:12

## 2025-06-19 RX ADMIN — CARVEDILOL 3.12 MILLIGRAM(S): 3.12 TABLET, FILM COATED ORAL at 06:08

## 2025-06-19 NOTE — DIETITIAN INITIAL EVALUATION ADULT - PROBLEM SELECTOR PLAN 2
Transferred from [facility] to [place] for evaluation for procedure in the setting of patient with a history of cirrhosis, alcohol use disorder, reports abstinence since 1999 followed by Dr. Cain at [facility] Hepatology with undifferentiated diffuse esophageal wall thickening with patient started on Coreg BID for known varices, lactulose and rifaximin with improvement in apparent hepatic encephalopathy, albeit with subtle short term memory impairment,  with prior desaturation on [facility] on room air with echo r/o PFO v AVM with patient transferred for evaluation for right heart cath and possible pulmonary angiogram and possible ANNE.   Patient with chronic thrombocytopenia with no present active bleeding but further interventions to be reviewed by Primary Provider with subspecialists and with patient/patient's health care surrogate, Kaitlin Raya, patient's first cousin.   Would consider formal Cardiology and Pulmonary evaluation in the AM.

## 2025-06-19 NOTE — DIETITIAN INITIAL EVALUATION ADULT - ENERGY INTAKE
Pt reports good appetite/PO intake in-house, reports consuming >/=75% of her meals. Food preferences obtained, will honor as able to optimize PO intake. Pt made aware RD remains available and will follow up for any additional questions/concerns and per protocol.  Adequate (%)

## 2025-06-19 NOTE — DIETITIAN INITIAL EVALUATION ADULT - OTHER CALCULATIONS
Fluid needs defer to MD team, calculation based on dosing weight with consideration for liver cirrhosis

## 2025-06-19 NOTE — DIETITIAN INITIAL EVALUATION ADULT - OTHER INFO
- Live: Per chart, hx of Hepatic cirrhosis, AST/ALT noted elevated  - Multivitamin/mineral supplementation: ordered for folic acid and daily multivitamin   - GI: ordered for Lactulose syrup

## 2025-06-19 NOTE — DIETITIAN INITIAL EVALUATION ADULT - PERTINENT MEDS FT
MEDICATIONS  (STANDING):  carvedilol 3.125 milliGRAM(s) Oral every 12 hours  folic acid 1 milliGRAM(s) Oral daily  lactulose Syrup 10 Gram(s) Oral three times a day  multivitamin 1 Tablet(s) Oral daily  rifAXIMin 550 milliGRAM(s) Oral two times a day  sertraline 50 milliGRAM(s) Oral daily  traZODone 50 milliGRAM(s) Oral at bedtime    MEDICATIONS  (PRN):  acetaminophen     Tablet .. 650 milliGRAM(s) Oral every 6 hours PRN Temp greater or equal to 38C (100.4F), Moderate Pain (4 - 6)  melatonin 3 milliGRAM(s) Oral at bedtime PRN Insomnia

## 2025-06-19 NOTE — DIETITIAN INITIAL EVALUATION ADULT - PERTINENT LABORATORY DATA
06-19    144  |  111[H]  |  12  ----------------------------<  75  4.2   |  21[L]  |  0.47[L]    Ca    8.9      19 Jun 2025 06:59  Mg     1.9     06-19    TPro  6.0  /  Alb  3.1[L]  /  TBili  1.5[H]  /  DBili  x   /  AST  46[H]  /  ALT  22  /  AlkPhos  138[H]  06-19

## 2025-06-19 NOTE — DIETITIAN INITIAL EVALUATION ADULT - REASON INDICATOR FOR ASSESSMENT
Patient seen for "Consult for MST score 2 or >",Source: Pt alert and oriented x 3, Electronic Medical Record. Chart reviewed, events noted.

## 2025-06-19 NOTE — DIETITIAN INITIAL EVALUATION ADULT - ADD RECOMMEND
1. Recommend to continue current Regular diet as ordered and tolerated  2. Food preferences obtained, will honor as able to optimize PO intake   3. Multivitamin/mineral supplementation: continue folic acid and multivitamin as ordered  4. Continue to monitor PO intake, weight trend, electrolytes, blood glucose, labs, BMs in-house

## 2025-06-19 NOTE — DIETITIAN INITIAL EVALUATION ADULT - PERSON TAUGHT/METHOD
Emphasized the importance of adequate kcal and protein intake; Encourage use of daily menus. Honor dietary preferences as expressed as able.  Pt made aware RD remains available and will follow up for any additional questions/concerns and per protocol./verbal instruction/patient instructed

## 2025-06-19 NOTE — DIETITIAN INITIAL EVALUATION ADULT - PROBLEM SELECTOR PLAN 5
Transitions of Care Status:  1.  Name of PCP:    Kathleen Gallegos MD (PCP) [phone]  2.  PCP Contacted on Admission: [ ] Y    [ x] N    3.  PCP contacted at Discharge: [ ] Y    [ ] N    [ ] N/A  4.  Post-Discharge Appointment Date and Location:  5.  Summary of Handoff given to PCP:

## 2025-06-19 NOTE — DIETITIAN INITIAL EVALUATION ADULT - ORAL INTAKE PTA/DIET HISTORY
Patient reports good appetite/PO intake PTA. Usually consumes 3 meals daily. Pt prepares meals daily. Follows a Regular diet, and low sodium dietary restrictions. Confirms allergic to avocado. Avoids garlic, spicy/hot foods. Takes a daily multivitamin, denies liquid protein supplement uses. Denies chewing/swallowing difficulties. No nausea/vomiting reported. States having regular bowel movements daily at home.

## 2025-06-19 NOTE — DIETITIAN INITIAL EVALUATION ADULT - PHYSCIAL ASSESSMENT
Nutrition focused physical exam not warranted at this time. No overt sign of muscle/fat depletion noted. Pt confirms her height to be 5'3''. Pt reports 10lbs intentional weight loss from 140 to 130lbs,  pt was eating well PTA and being physically active daily.     Per [facility] weight history: unable to load, per RD note, (6/2025)124lbs;    IBW:115lbs  IBW%: 113%

## 2025-06-20 LAB
GLUCOSE BLDC GLUCOMTR-MCNC: 90 MG/DL — SIGNIFICANT CHANGE UP (ref 70–99)
HCT VFR BLD CALC: 29.5 % — LOW (ref 34.5–45)
HGB BLD-MCNC: 9.3 G/DL — LOW (ref 11.5–15.5)
IMMATURE PLATELET FRACTION #: 1.4 K/UL — LOW (ref 4.7–11.1)
IMMATURE PLATELET FRACTION %: 2.2 % — SIGNIFICANT CHANGE UP (ref 1.6–4.9)
MCHC RBC-ENTMCNC: 30 PG — SIGNIFICANT CHANGE UP (ref 27–34)
MCHC RBC-ENTMCNC: 31.5 G/DL — LOW (ref 32–36)
MCV RBC AUTO: 95.2 FL — SIGNIFICANT CHANGE UP (ref 80–100)
NRBC # BLD AUTO: 0 K/UL — SIGNIFICANT CHANGE UP (ref 0–0)
NRBC # FLD: 0 K/UL — SIGNIFICANT CHANGE UP (ref 0–0)
NRBC BLD AUTO-RTO: 0 /100 WBCS — SIGNIFICANT CHANGE UP (ref 0–0)
PLATELET # BLD AUTO: 63 K/UL — LOW (ref 150–400)
PMV BLD: 11.1 FL — SIGNIFICANT CHANGE UP (ref 7–13)
RBC # BLD: 3.1 M/UL — LOW (ref 3.8–5.2)
RBC # FLD: 17.8 % — HIGH (ref 10.3–14.5)
WBC # BLD: 4.32 K/UL — SIGNIFICANT CHANGE UP (ref 3.8–10.5)
WBC # FLD AUTO: 4.32 K/UL — SIGNIFICANT CHANGE UP (ref 3.8–10.5)

## 2025-06-20 RX ADMIN — CARVEDILOL 3.12 MILLIGRAM(S): 3.12 TABLET, FILM COATED ORAL at 05:52

## 2025-06-20 RX ADMIN — CARVEDILOL 3.12 MILLIGRAM(S): 3.12 TABLET, FILM COATED ORAL at 17:38

## 2025-06-20 RX ADMIN — SERTRALINE 50 MILLIGRAM(S): 100 TABLET, FILM COATED ORAL at 13:58

## 2025-06-20 RX ADMIN — Medication 3 MILLIGRAM(S): at 23:06

## 2025-06-20 RX ADMIN — Medication 50 MILLIGRAM(S): at 22:33

## 2025-06-20 RX ADMIN — Medication 1 TABLET(S): at 13:59

## 2025-06-20 RX ADMIN — FOLIC ACID 1 MILLIGRAM(S): 1 TABLET ORAL at 13:58

## 2025-06-20 RX ADMIN — LACTULOSE 10 GRAM(S): 10 SOLUTION ORAL at 05:52

## 2025-06-21 RX ADMIN — LACTULOSE 10 GRAM(S): 10 SOLUTION ORAL at 06:40

## 2025-06-21 RX ADMIN — LACTULOSE 10 GRAM(S): 10 SOLUTION ORAL at 15:22

## 2025-06-21 RX ADMIN — Medication 1 TABLET(S): at 11:50

## 2025-06-21 RX ADMIN — Medication 50 MILLIGRAM(S): at 22:19

## 2025-06-21 RX ADMIN — SERTRALINE 50 MILLIGRAM(S): 100 TABLET, FILM COATED ORAL at 11:50

## 2025-06-21 RX ADMIN — LACTULOSE 10 GRAM(S): 10 SOLUTION ORAL at 22:19

## 2025-06-21 RX ADMIN — CARVEDILOL 3.12 MILLIGRAM(S): 3.12 TABLET, FILM COATED ORAL at 06:40

## 2025-06-21 RX ADMIN — CARVEDILOL 3.12 MILLIGRAM(S): 3.12 TABLET, FILM COATED ORAL at 18:04

## 2025-06-21 RX ADMIN — Medication 3 MILLIGRAM(S): at 22:22

## 2025-06-21 RX ADMIN — FOLIC ACID 1 MILLIGRAM(S): 1 TABLET ORAL at 11:49

## 2025-06-22 LAB
ADD ON TEST-SPECIMEN IN LAB: SIGNIFICANT CHANGE UP
ALBUMIN SERPL ELPH-MCNC: 2.6 G/DL — LOW (ref 3.3–5)
ALP SERPL-CCNC: 133 U/L — HIGH (ref 40–120)
ALT FLD-CCNC: 21 U/L — SIGNIFICANT CHANGE UP (ref 10–45)
ANION GAP SERPL CALC-SCNC: 8 MMOL/L — SIGNIFICANT CHANGE UP (ref 5–17)
AST SERPL-CCNC: 37 U/L — SIGNIFICANT CHANGE UP (ref 10–40)
BILIRUB SERPL-MCNC: 1.3 MG/DL — HIGH (ref 0.2–1.2)
BUN SERPL-MCNC: 10 MG/DL — SIGNIFICANT CHANGE UP (ref 7–23)
CALCIUM SERPL-MCNC: 8.1 MG/DL — LOW (ref 8.4–10.5)
CHLORIDE SERPL-SCNC: 112 MMOL/L — HIGH (ref 96–108)
CO2 SERPL-SCNC: 23 MMOL/L — SIGNIFICANT CHANGE UP (ref 22–31)
CREAT SERPL-MCNC: 0.49 MG/DL — LOW (ref 0.5–1.3)
EGFR: 101 ML/MIN/1.73M2 — SIGNIFICANT CHANGE UP
EGFR: 101 ML/MIN/1.73M2 — SIGNIFICANT CHANGE UP
GLUCOSE SERPL-MCNC: 93 MG/DL — SIGNIFICANT CHANGE UP (ref 70–99)
HCT VFR BLD CALC: 27.3 % — LOW (ref 34.5–45)
HGB BLD-MCNC: 8.5 G/DL — LOW (ref 11.5–15.5)
IMMATURE PLATELET FRACTION #: 0.8 K/UL — LOW (ref 4.7–11.1)
IMMATURE PLATELET FRACTION %: 1.8 % — SIGNIFICANT CHANGE UP (ref 1.6–4.9)
MAGNESIUM SERPL-MCNC: 1.9 MG/DL — SIGNIFICANT CHANGE UP (ref 1.6–2.6)
MCHC RBC-ENTMCNC: 30 PG — SIGNIFICANT CHANGE UP (ref 27–34)
MCHC RBC-ENTMCNC: 31.1 G/DL — LOW (ref 32–36)
MCV RBC AUTO: 96.5 FL — SIGNIFICANT CHANGE UP (ref 80–100)
NRBC # BLD AUTO: 0 K/UL — SIGNIFICANT CHANGE UP (ref 0–0)
NRBC # FLD: 0 K/UL — SIGNIFICANT CHANGE UP (ref 0–0)
NRBC BLD AUTO-RTO: 0 /100 WBCS — SIGNIFICANT CHANGE UP (ref 0–0)
PLATELET # BLD AUTO: 44 K/UL — LOW (ref 150–400)
PMV BLD: 11.8 FL — SIGNIFICANT CHANGE UP (ref 7–13)
POTASSIUM SERPL-MCNC: 4 MMOL/L — SIGNIFICANT CHANGE UP (ref 3.5–5.3)
POTASSIUM SERPL-SCNC: 4 MMOL/L — SIGNIFICANT CHANGE UP (ref 3.5–5.3)
PROT SERPL-MCNC: 5.3 G/DL — LOW (ref 6–8.3)
RBC # BLD: 2.83 M/UL — LOW (ref 3.8–5.2)
RBC # FLD: 17.2 % — HIGH (ref 10.3–14.5)
SODIUM SERPL-SCNC: 143 MMOL/L — SIGNIFICANT CHANGE UP (ref 135–145)
WBC # BLD: 2.73 K/UL — LOW (ref 3.8–10.5)
WBC # FLD AUTO: 2.73 K/UL — LOW (ref 3.8–10.5)

## 2025-06-22 RX ORDER — MAGNESIUM SULFATE 500 MG/ML
1 SYRINGE (ML) INJECTION ONCE
Refills: 0 | Status: COMPLETED | OUTPATIENT
Start: 2025-06-22 | End: 2025-06-22

## 2025-06-22 RX ADMIN — Medication 100 GRAM(S): at 18:01

## 2025-06-22 RX ADMIN — Medication 3 MILLIGRAM(S): at 23:12

## 2025-06-22 RX ADMIN — Medication 50 MILLIGRAM(S): at 23:12

## 2025-06-22 RX ADMIN — CARVEDILOL 3.12 MILLIGRAM(S): 3.12 TABLET, FILM COATED ORAL at 05:12

## 2025-06-22 RX ADMIN — LACTULOSE 10 GRAM(S): 10 SOLUTION ORAL at 23:12

## 2025-06-22 RX ADMIN — LACTULOSE 10 GRAM(S): 10 SOLUTION ORAL at 05:11

## 2025-06-22 RX ADMIN — Medication 1 TABLET(S): at 13:29

## 2025-06-22 RX ADMIN — LACTULOSE 10 GRAM(S): 10 SOLUTION ORAL at 13:29

## 2025-06-22 RX ADMIN — FOLIC ACID 1 MILLIGRAM(S): 1 TABLET ORAL at 13:29

## 2025-06-22 RX ADMIN — SERTRALINE 50 MILLIGRAM(S): 100 TABLET, FILM COATED ORAL at 13:29

## 2025-06-22 RX ADMIN — CARVEDILOL 3.12 MILLIGRAM(S): 3.12 TABLET, FILM COATED ORAL at 18:02

## 2025-06-23 ENCOUNTER — TRANSCRIPTION ENCOUNTER (OUTPATIENT)
Age: 70
End: 2025-06-23

## 2025-06-23 VITALS
SYSTOLIC BLOOD PRESSURE: 119 MMHG | RESPIRATION RATE: 18 BRPM | TEMPERATURE: 98 F | HEART RATE: 68 BPM | OXYGEN SATURATION: 93 % | DIASTOLIC BLOOD PRESSURE: 65 MMHG

## 2025-06-23 PROCEDURE — 36415 COLL VENOUS BLD VENIPUNCTURE: CPT

## 2025-06-23 PROCEDURE — 82140 ASSAY OF AMMONIA: CPT

## 2025-06-23 PROCEDURE — 82962 GLUCOSE BLOOD TEST: CPT

## 2025-06-23 PROCEDURE — 80053 COMPREHEN METABOLIC PANEL: CPT

## 2025-06-23 PROCEDURE — A9585: CPT

## 2025-06-23 PROCEDURE — 75561 CARDIAC MRI FOR MORPH W/DYE: CPT | Mod: 26

## 2025-06-23 PROCEDURE — 85610 PROTHROMBIN TIME: CPT

## 2025-06-23 PROCEDURE — 75561 CARDIAC MRI FOR MORPH W/DYE: CPT

## 2025-06-23 PROCEDURE — 83735 ASSAY OF MAGNESIUM: CPT

## 2025-06-23 PROCEDURE — 85027 COMPLETE CBC AUTOMATED: CPT

## 2025-06-23 PROCEDURE — 85025 COMPLETE CBC W/AUTO DIFF WBC: CPT

## 2025-06-23 PROCEDURE — 75565 CARD MRI VELOC FLOW MAPPING: CPT | Mod: 26

## 2025-06-23 PROCEDURE — 75565 CARD MRI VELOC FLOW MAPPING: CPT

## 2025-06-23 PROCEDURE — 85730 THROMBOPLASTIN TIME PARTIAL: CPT

## 2025-06-23 RX ADMIN — Medication 1 TABLET(S): at 11:25

## 2025-06-23 RX ADMIN — FOLIC ACID 1 MILLIGRAM(S): 1 TABLET ORAL at 11:25

## 2025-06-23 RX ADMIN — SERTRALINE 50 MILLIGRAM(S): 100 TABLET, FILM COATED ORAL at 11:26

## 2025-06-23 RX ADMIN — CARVEDILOL 3.12 MILLIGRAM(S): 3.12 TABLET, FILM COATED ORAL at 05:28

## 2025-06-23 RX ADMIN — LACTULOSE 10 GRAM(S): 10 SOLUTION ORAL at 05:28

## 2025-06-23 RX ADMIN — LACTULOSE 10 GRAM(S): 10 SOLUTION ORAL at 13:02

## 2025-06-23 RX ADMIN — CARVEDILOL 3.12 MILLIGRAM(S): 3.12 TABLET, FILM COATED ORAL at 17:33

## 2025-06-23 NOTE — DISCHARGE NOTE PROVIDER - NSDCCPCAREPLAN_GEN_ALL_CORE_FT
PRINCIPAL DISCHARGE DIAGNOSIS  Diagnosis: Altered mental status  Assessment and Plan of Treatment: CT Head negative  s/p Consult with Pulmonary, cardiology and neurology.   s/p cardiac mri  follow up with PMD in 1 week.      SECONDARY DISCHARGE DIAGNOSES  Diagnosis: Hepatic cirrhosis  Assessment and Plan of Treatment: Continue present medication regimen.   Follow up with PMD in 1 week.

## 2025-06-23 NOTE — PROGRESS NOTE ADULT - REASON FOR ADMISSION
Transferred from [facility] to [place] for procedure evaluation
Transferred from [facility] to [place] for procedure evaluation
Transferred from [facility] to [place] for procedure evaluation
Transferred from [facility] to [place] for procedure evaluation
Transferred from [facility] to [place] for procedure evaluation
Transferred from [facility] to [place] for procedure evaluation
Transferred from [facility] to [place] for procedure evaluation
Transferred from [facility] to [place] for procedure evaluation
Transferred from [facility] to [place] for procedure evaluation
Transferred from [facility] to [place] for procedure evaluation
Transferred from [facility] to [place] for procedure evaluation
Transferred from [facility] to [place] for procedure evaluation
Transferred from [facility] to [place] for procedure evaluation
Transferred from [facility] to [place] for procedure evaluation
Transferred from [facility] to [place] for procedure evaluation
Transferred from [facility] to [place] for procedure evaluation
Transferred from [facility] [facility] to [place] for procedure evaluation
Transferred from [facility] to [place] for procedure evaluation

## 2025-06-23 NOTE — DISCHARGE NOTE PROVIDER - CARE PROVIDER_API CALL
Kathleen Gallegos E  Internal Medicine  [address] [place] [address]  Phone: [phone]  Fax: [phone]  Established Patient  Follow Up Time: 1 week

## 2025-06-23 NOTE — DISCHARGE NOTE PROVIDER - NSDCMRMEDTOKEN_GEN_ALL_CORE_FT
acetaminophen 325 mg oral tablet: 2 tab(s) orally every 6 hours As needed Mild Pain (1 - 3)  carvedilol 3.125 mg oral tablet: 1 tab(s) orally every 12 hours  folic acid 1 mg oral tablet: 1 tab(s) orally once a day  lactulose 10 g/15 mL oral syrup: 15 milliliter(s) orally 3 times a day  melatonin 3 mg oral tablet: 1 tab(s) orally once a day (at bedtime)  Multiple Vitamins oral tablet: 1 tab(s) orally once a day  rifAXIMin 550 mg oral tablet: 1 tab(s) orally 2 times a day  sertraline 50 mg oral tablet: 1 tab(s) orally once a day  traZODone 50 mg oral tablet: 1 tab(s) orally once a day (at bedtime)

## 2025-06-23 NOTE — DISCHARGE NOTE PROVIDER - HOSPITAL COURSE
HPI:  NIGHT HOSPITALIST:    Patient UNKNOWN to  me previously, assigned to me at this point by Dr. Mitchell to accept transfer tor this 69 yo F--followed by Dr. 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, undifferentiated cognitive impairment with patient with S/P laparoscopic ventral hernia repair with mesh and liver wedge biopsy and known severe liver cirrhosis from June 2017, with patient apparently with a presumed mechanical fall Jun 4 2025 for a presumed mechanical fall with low 90s O2 saturation with CTT head negative and apparently was discharged AMA from [facility], but was called back following cirrhosis on the prior imaging of the liver, with apparently the patient's first cousin above brought patient to the ER with apparently progressively impaired cognition and confusional state at home with patient leaving her keys and wallet on the stove at home, with patient S/P CIWA protocol and high dose IV thiamine, noted elevated NH3 at [facility], with patient seen by hepatology with lactulose and rifaximin started, and Coreg 3.125 mg BID for varices known to patient.  Imaging of the CTT abdomen with diffuse esophageal thickening noted and endoscopic assessment deferred by GI due to thrombocytopenia, with echo at [facility] with R/O PFO versus AVM, with severe AS noted, with patient transferred to [place] with patient seen by Neurology, Psychiatry, Cardiology and Pulmonary for evaluation by Interventional Cardiology for possible RHC and intervention for possible pulmonary AVM and consideration for possible ANNE.    Patient now on bedtime Trazodone for insomnia, AM Zoloft and tolerating Rx with no delirium.    Patient is AxOx3 but with subtle short term memory impairment.   Responded to June 18 2025 rather than the 17th for today's date.      Hospital Course:  A 70-year-old female with past alcohol abuse, cognitive impairment, depression, and anxiety was brought to the ER by her cousin due to low oxygen saturation and altered mental status. The patient had previously left AMA from the ER after a fall but returned due to concerns about cirrhosis seen on imaging. Her cousin reported worsening cognitive decline, including forgetfulness and confusion, as well as multiple episodes of syncope/unresponsiveness with low oxygen saturation. Initial workup was negative for pulmonary embolism but revealed pulmonary edema, cirrhosis, and splenomegaly. The patient's ammonia levels were elevated, and she was started on lactulose and rifaximin. 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 s/p  cardiac MRI -> negative for cardiac shunting.  The patient's mental status improved, but she remains on supplemental oxygen. Pt is medically cleared for discharge to home today.  Pt will follow up with her PMD in 1 week.     Important Medication Changes and Reason:  NA    Active or Pending Issues Requiring Follow-up:  Follow up with PMD in 1 week.     Advanced Directives:   [ x] Full code  [ ] DNR  [ ] Hospice    Discharge Diagnoses:  s/p Fall   AMS  ETOH Abuser  Cirrhosis

## 2025-06-23 NOTE — DISCHARGE NOTE NURSING/CASE MANAGEMENT/SOCIAL WORK - PATIENT PORTAL LINK FT
You can access the FollowMyHealth Patient Portal offered by [facility] by registering at the following website: [email or web address]. By joining [facility]’s FollowMyHealth portal, you will also be able to view your health information using other applications (apps) compatible with our system.

## 2025-06-23 NOTE — DISCHARGE NOTE NURSING/CASE MANAGEMENT/SOCIAL WORK - NSDCPEFALRISK_GEN_ALL_CORE
For information on Fall & Injury Prevention, visit: [email or web address] OR  [email or web address] OR  https://www.cdc.gov/steadi/patient.html

## 2025-06-23 NOTE — DISCHARGE NOTE NURSING/CASE MANAGEMENT/SOCIAL WORK - FINANCIAL ASSISTANCE
[facility] provides services at a reduced cost to those who are determined to be eligible through [facility]’s financial assistance program. Information regarding [facility]’s financial assistance program can be found by going to [email or web address] or by calling [phone].

## 2025-06-23 NOTE — PROGRESS NOTE ADULT - ASSESSMENT
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, undifferentiated cognitive impairment with patient with S/P laparoscopic ventral hernia repair with mesh and liver wedge biopsy and known severe liver cirrhosis from June 2017, with patient apparently with a presumed mechanical fall Jun 4 2025 for a presumed mechanical fall with low 90s O2 saturation with CTT head negative and apparently was discharged AMA from [facility], but was called back following cirrhosis on the prior imaging of the liver, with apparently the patient's first cousin above brought patient to the ER with apparently progressively impaired cognition and confusional state at home with patient leaving her keys and wallet on the stove at home, with patient S/P CIWA protocol and high dose IV thiamine, noted elevated NH3 at [facility], with patient seen by hepatology with lactulose and rifaximin started, and Coreg 3.125 mg BID for varices known to patient.  Imaging of the CTT abdomen with diffuse esophageal thickening noted and endoscopic assessment deferred by GI due to thrombocytopenia, with echo at [facility] with R/O PFO versus AVM, with severe AS noted, with patient transferred to [place] with patient seen by Neurology, Psychiatry, Cardiology and Pulmonary for evaluation by Interventional Cardiology for possible RHC and intervention for possible pulmonary AVM and consideration for possible ANNE.    Patient now on bedtime Trazodone for insomnia, AM Zoloft and tolerating Rx with no delirium.    Patient is AxOx3 but with subtle short term memory impairment.   Responded to June 18 2025 rather than the 17th for today's date.  CTH  6/5 neg ; chornic chagnes   TTE 6/8: LVSF wnl, EF 65%   had elevated ammonioa on admission to [facility]     Imprssion:   1) AMS likely multifactorial, MCI/dementia/hepatic encephalopathy   2) ETOH Abuse     - f/u cardiac MRI   - monitor LFTs and ammonia level  - b12, RPR, TSH if not checked ; has been undergoing dementia workup outaptient   - getting lactulose and rifaxamine   - s/p IV thiamine.  c/w thiamine and folic acid  - delerium precuations   - MRI brain outaptient if no imrpovemetn    - PT.OT   - check FS, glucose control <180  - GI/DVT ppx   - Thank you for allowing me to participate in the care of this patient. Call with questions.   Christiano Ann MD  Vascular Neurology  Office: [phone] 
 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, but was called back following cirrhosis on the prior imaging of the liver, with apparently the patient's first cousin above brought patient to the ER with apparently progressively impaired cognition and confusional state at home with patient leaving her keys and wallet on the stove at home, with patient S/P CIWA protocol and high dose IV thiamine, noted elevated NH3 at [facility], with patient seen by hepatology with lactulose and rifaximin started, and Coreg 3.125 mg BID for varices known to patient.  Imaging of the CTT abdomen with diffuse esophageal thickening noted and endoscopic assessment deferred by GI due to thrombocytopenia, with echo at [facility] with R/O PFO versus AVM, with severe AS noted, with patient transferred to [place] with patient seen by Neurology, Psychiatry, Cardiology and Pulmonary for evaluation by Interventional Cardiology for possible RHC and intervention for possible pulmonary AVM and consideration for possible ANNE.    Patient now on bedtime Trazodone for insomnia, AM Zoloft and tolerating Rx with no delirium.    Patient is AxOx3 but with subtle short term memory impairment.   Responded to June 18 2025 rather than the 17th for today's date.  CTH  6/5 neg ; chornic chagnes   TTE 6/8: LVSF wnl, EF 65%   had elevated ammonioa on admission to [facility]     Imprssion:   1) AMS likely multifactorial, MCI/dementia/hepatic encephalopathy   2) ETOH Abuse     - monitor LFTs and ammonia level  - b12, RPR, TSH if not checked ; has been undergoing dementia workup outaptient   - getting lactulose and rifaxamine   - s/p IV thiamine.  c/w thiamine and folic acid  - delerium precuations   - MRI brain outaptient if no imrpovemetn    - PT.OT   - check FS, glucose control <180  - GI/DVT ppx   - Thank you for allowing me to participate in the care of this patient. Call with questions.   Christiano Ann MD  Vascular Neurology  Office: [phone] 
70-year-old female past medical history of alcohol use disorder, cognitive impairment undergoing workup, depression, anxiety, ?Cirrhosis seen on imaging yesterday presenting to emergency room for low oxygen level and altered mental status at home.  Patient reports that her cousin wanted her to come in and dropped her off, she feels fine and has no complaints.    Kaitlin helps her with all of her medical appointments and at home, patient's remain is Leonarda.  Kaitlin reports that she has been undergoing testing with a neurologist due to worsening cognitive impairment and early dementia including forgetting her wallet keys phone leaving the stove on being confused, and over the last couple of months she has had multiple episodes of syncope versus unresponsiveness.  On 2 occasions she has fallen to the ground, on other occasions she has appeared altered bobbing her head from side-to-side and unresponsive and will have low reading O2 sat during these episodes.  Patient left AMA from the ER yesterday after negative CT head and CT chest without pulmonary embolism but some signs of pulmonary edema, cirrhosis, splenomegaly.  Needed does not feel that the patient has capacity to leave AMA.  She reports it has been difficult for them to get cardiology appointment symptoms the schedule of many months. Pt declines active smoking, drug use, alcohol use.    AMS  - likely worsening dementia vs physiatry issues vs alcohol abuse disorder   - psych and neuro fu   - CT head neg  - CIWA protocol  - fall precautions  - safety watch     Anxiety, depression  - cw home meds  - psych fu     Hx of cirrhosis  - cw lactulose and rifaximin    Pulmonary AVM  -  ANNE pending for am   - intervention cards fu     DVT prophylaxis     dw pt, pulmonary and intervention cardiology         
70-year-old female past medical history of alcohol use disorder, cognitive impairment undergoing workup, depression, anxiety, ?Cirrhosis seen on imaging yesterday presenting to emergency room for low oxygen level and altered mental status at home.  Patient reports that her cousin wanted her to come in and dropped her off, she feels fine and has no complaints.    Kaitlin helps her with all of her medical appointments and at home, patient's remain is Leonarda.  Kaitlin reports that she has been undergoing testing with a neurologist due to worsening cognitive impairment and early dementia including forgetting her wallet keys phone leaving the stove on being confused, and over the last couple of months she has had multiple episodes of syncope versus unresponsiveness.  On 2 occasions she has fallen to the ground, on other occasions she has appeared altered bobbing her head from side-to-side and unresponsive and will have low reading O2 sat during these episodes.  Patient left AMA from the ER yesterday after negative CT head and CT chest without pulmonary embolism but some signs of pulmonary edema, cirrhosis, splenomegaly.  Needed does not feel that the patient has capacity to leave AMA.  She reports it has been difficult for them to get cardiology appointment symptoms the schedule of many months. Pt declines active smoking, drug use, alcohol use.    AMS  - likely worsening dementia vs physiatry issues vs alcohol abuse disorder   - psych and neuro fu   - CT head neg  - CIWA protocol  - fall precautions  - safety watch     Anxiety, depression  - cw home meds  - psych fu     Hx of cirrhosis  - cw lactulose and rifaximin    Pulmonary AVM  - sp cardiac MRI   - intervention cards fu     DVT prophylaxis     dw pt, pulmonary and intervention cardiology   
70-year-old female past medical history of alcohol use disorder, cognitive impairment undergoing workup, depression, anxiety, ?Cirrhosis seen on imaging yesterday presenting to emergency room for low oxygen level and altered mental status at home.  Patient reports that her cousin wanted her to come in and dropped her off, she feels fine and has no complaints.  Collateral information was obtained from her cousin Jc at phone number [phone].  Jc helps her with all of her medical appointments and at home, patient's remain is Leonarda.  Jc reports that she has been undergoing testing with a neurologist due to worsening cognitive impairment and early dementia including forgetting her wallet keys phone leaving the stove on being confused, and over the last couple of months she has had multiple episodes of syncope versus unresponsiveness.  On 2 occasions she has fallen to the ground, on other occasions she has appeared altered bobbing her head from side-to-side and unresponsive and will have low reading O2 sat during these episodes.  Patient left AMA from the ER yesterday after negative CT head and CT chest without pulmonary embolism but some signs of pulmonary edema, cirrhosis, splenomegaly.  Needed does not feel that the patient has capacity to leave AMA.  She reports it has been difficult for them to get cardiology appointment symptoms the schedule of many months. Pt declines active smoking, drug use, alcohol use. (06 Jun 2025 09:59)    called jc to get more information above noted: : she is on 2 L : 10 days ago " she was OK:  forgetful  sometimes she is off:  she is seeing a neurologist and one more tst is pending to conform for dementia: ;  she does not have any ling problems  has second hand smoking:  she did have oxygen three years ago :  she was on oxygen  : but she gave h er back ;   she was seeing a pulm before for breathing:  sheis using on albuterol  prn ?    never had pe or dvt before   she fell about three months ago and was bruised:  given atfer pill:  and after that better   last month she fell down three  ti mes:       S/P fall : confusion ;? developing dementia   remote alcohol abuser   Cirrhosis  Anxiety/ Depression      S/P fall : confusion ;? developing dementia   she fell down and was found to be hypoxic  per his sister:  at [facility] three years ago  :  she was on h9ome oxygen  and then she returned it:   she lives with somebody   who smokes  so has passive smoking:   CTA on admission showed: No pulmonary embolus. Mild interlobular septal thickening which may represent mild edema. Mediastinal lymphadenopathy, stable and of uncertain etiology. Correlate clinically. Cirrhotic morphology of the liver. Splenomegaly.  Diffuse esophageal wall thickening which can be seen with esophagitis.  Correlate clinically.  check echo and probnp: though initial is low   she has no pneumonia   VBG is OK;    6/7:  sheis much m ore alert and awake and is more oriented   on 2 Lof oxygen  :   seems better then yesterday    her ammonia is high :  started on   lactulose for some time   she still seems slightly confused    Hepatology following    6/8: seems to be alert and awake and responding to questions appropriately:  she used to take zoloft and trazadone/;l  demanding to start them again ;     6/10; seems to be doing  ok ;  no sob:  she is n 2 L of oxygen ; can try to wean off the oxygen     she is on rifaxiin and lactulose     6/11; she seems OK;  but she desats on room air:  need to have limited echo for AVM as she has cirrhosis     6/12: SHE HAS PULM AVM : NEED PULM ANGIOGRAPHY ; NEED TO GET INTERVENTIONAL CARDIOLOGIST TO follow  if they can block  the AVM ; she will need home oxygen    DW DR LOLY Montes interventional cardiologist ; they want RHC first:  would defer to international cardiologist       6/13: she looks pretty good;   no sob;   moving around on floor;  has pulm avm;   cards following:  do cta ;  had discussed with cardiology in detail from [facility] too yesterday if the AVM can be closed;    ?needed to be transferred to [facility]  she would need gi clearance from gi  ;  if pt can ave JOIE and if no PFO and then RHC and pulm angiography  /RHC at the same time    6/14: GI clearance awaited:   oxygenation has imrpoved;   has AVM   once gi clear:  need joie:  and then furterh workup as described above     6/15: gi clearance is still aawited for joie:  then further course will be taken once joie is done     6/16: gi cleared for joie:  ? will be done today   6/17: te not done secondary to low plts:  today plts are less then 50/k  can we transfuse during the procedure and do the procedure?   6/18: SHEIS NOT TRANSFERED TO [facility] for abby motta : needs joie and cath ; her plts counts are low;  would need plt transfusion before joie:  dr loly Montes is supposed to do the cath and procedure :  informed:     6/19  -Plan for eventual JOIE and RHC   -Denies CP/SOB     6/20  ? Eventual JOIE, RHC when platelets allow     6/21: dw IC :  no plan for joie now:  ? for cardiac MRI:   eventual closure o f pulm AVM     6/22: seems K:  awaiting cardiac MRI now:   defer to IC     6/23; seems OK:   MRI reported with no intracardiac shunt:    for procedure per primary Interventional cardiology  :  has pulm avm;       remote alcohol abuser/ Cirrhosis  cirrhosis demonstrated on ct:   check ammonia:   she used to drink before quiet a bit     6/7: ammonia is high:   ? start lactulose: ? gi consult     6/8: doing  ok ;  no sob:   cont current rx:   on lactulose:  gi following      6/9; she seems to be doing  ok : wants zoloft and trazadone to be started;  she says shehas been taking for last thirty years;   ?psych consult:   she could ot sleel last night:  trazodone she was using forsleep      6/10: she was able to sleep last night: fEels OK;     6/11: cont current treatment   6/12: as above   6/13; cont current rx:   6/14: sh eis alert and awke and oriented x  3  6/15: she seems stable;  on current regimen   6/16; she is alert and awake and responding to questions well   6/17; she looks pretty god:  no sob; no cough ;   no phlegm   lts counts remains low;  ? can we transfuse the plts prior to JOIE   6/18: as above   6/19: Per GI   6/20: Per GI   6/21: cont current rx:   6/22: she is alert and awake and is oriented:  cont current rx:   6/23: seems OK: no sob;  no cough :  no phlegm      Anxiety/ Depression  -per primary team    AMS  -Neuro following  6/21; resolved   6/23: she is alert and awake and responsive:  x 3       called acp   
Echo < from: TTE W or WO Ultrasound Enhancing Agent (06.08.25 @ 09:24) >   1. Left ventricular cavity is normal in size. Left ventricular wall thickness is normal. Left ventricular systolic function is normal with an ejection fraction of65 % by El's method of disks. There are no regional wall motion abnormalities seen.   2. Normal right ventricular cavity size and normal right ventricular systolic function. Tricuspid annular plane systolic excursion (TAPSE) is 2.5 cm (normal >=1.7 cm).   3. There is mild (grade 1) left ventricular diastolic dysfunction.   4. Structurally normal mitral valve with normal leaflet excursion. There is calcification of the mitral valve annulus. There is mild mitral regurgitation.   5. The aortic valve anatomy cannot be determined. There is calcification of the aortic valve leaflets. Despite the transaortic gradients, the gross appearance of the valve is consistent with significant (possibly severe) aortic stenosis. However, unable to accurately estimate the aortic valve area. The peak transaortic velocity is 3.39 m/s, peak transaortic gradient is 46.0 mmHg and mean transaortic gradient is 26.0 mmHg with an LVOT/aortic valve VTI ratio of 0.51. There is mild to moderate aortic regurgitation.   6. The left atrium is moderately dilated with an indexed volume of 45.13 ml/m².    < end of copied text >    < from: TTE Limited W or WO Ultrasound Enhancing Agent (06.11.25 @ 14:59) >   1. Limited study with the intravenous injection of agitated saline contrast. Approximately 5-6 cardiac cycles following their appearance in the right heart, a large number of bubbles was seen in the left heart. The bubbles appear to enter the left atrium from a location remote from the interatrial septum. These factors may reflect the presence of an extracardiac shunt (for example, a pulmonary arteriovenous malformation). However, cannot definitely exclude an intracardiac shunt.    < end of copied text >      70F w/ etoh use, memory loss, depression, and cirrhosis initially admitted to [facility] for AMS found to be hypoxic. TTE w/ possible intra-cardiac shunt and pulmonary AVM. Transferred to [facility] for further eval     Hypoxia   -planned for ANNE but cancelled and now planned for Cardiac MRI to r/o Intra cardiac shunting and Pulm AVM , will consider RHC after   -eventual RHC/Pulmonary angio w/ Dr. Montes   -pulm and neurology f/u       HTN c/w coreg     Cirrhosis   f/u hepatology recs   
70-year-old female past medical history of alcohol use disorder, cognitive impairment undergoing workup, depression, anxiety, ?Cirrhosis seen on imaging yesterday presenting to emergency room for low oxygen level and altered mental status at home.  Patient reports that her cousin wanted her to come in and dropped her off, she feels fine and has no complaints.  Collateral information was obtained from her cousin Jc at phone number [phone].  Jc helps her with all of her medical appointments and at home, patient's remain is Leonarda.  Jc reports that she has been undergoing testing with a neurologist due to worsening cognitive impairment and early dementia including forgetting her wallet keys phone leaving the stove on being confused, and over the last couple of months she has had multiple episodes of syncope versus unresponsiveness.  On 2 occasions she has fallen to the ground, on other occasions she has appeared altered bobbing her head from side-to-side and unresponsive and will have low reading O2 sat during these episodes.  Patient left AMA from the ER yesterday after negative CT head and CT chest without pulmonary embolism but some signs of pulmonary edema, cirrhosis, splenomegaly.  Needed does not feel that the patient has capacity to leave AMA.  She reports it has been difficult for them to get cardiology appointment symptoms the schedule of many months. Pt declines active smoking, drug use, alcohol use. (06 Jun 2025 09:59)    called jc to get more information above noted: : she is on 2 L : 10 days ago " she was OK:  forgetful  sometimes she is off:  she is seeing a neurologist and one more tst is pending to conform for dementia: ;  she does not have any ling problems  has second hand smoking:  she did have oxygen three years ago :  she was on oxygen  : but she gave h er back ;   she was seeing a pulm before for breathing:  sheis using on albuterol  prn ?    never had pe or dvt before   she fell about three months ago and was bruised:  given atfer pill:  and after that better   last month she fell down three  ti mes:       S/P fall : confusion ;? developing dementia   remote alcohol abuser   Cirrhosis  Anxiety/ Depression      S/P fall : confusion ;? developing dementia   she fell down and was found to be hypoxic  per his sister:  at [facility] three years ago  :  she was on h9ome oxygen  and then she returned it:   she lives with somebody   who smokes  so has passive smoking:   CTA on admission showed: No pulmonary embolus. Mild interlobular septal thickening which may represent mild edema. Mediastinal lymphadenopathy, stable and of uncertain etiology. Correlate clinically. Cirrhotic morphology of the liver. Splenomegaly.  Diffuse esophageal wall thickening which can be seen with esophagitis.  Correlate clinically.  check echo and probnp: though initial is low   she has no pneumonia   VBG is OK;    6/7:  sheis much m ore alert and awake and is more oriented   on 2 Lof oxygen  :   seems better then yesterday    her ammonia is high :  started on   lactulose for some time   she still seems slightly confused    Hepatology following    6/8: seems to be alert and awake and responding to questions appropriately:  she used to take zoloft and trazadone/;l  demanding to start them again ;     6/10; seems to be doing  ok ;  no sob:  she is n 2 L of oxygen ; can try to wean off the oxygen     she is on rifaxiin and lactulose     6/11; she seems OK;  but she desats on room air:  need to have limited echo for AVM as she has cirrhosis     6/12: SHE HAS PULM AVM : NEED PULM ANGIOGRAPHY ; NEED TO GET INTERVENTIONAL CARDIOLOGIST TO follow  if they can block  the AVM ; she will need home oxygen    DW DR LOLY Montes interventional cardiologist ; they want RHC first:  would defer to international cardiologist       6/13: she looks pretty good;   no sob;   moving around on floor;  has pulm avm;   cards following:  do cta ;  had discussed with cardiology in detail from [facility] too yesterday if the AVM can be closed;    ?needed to be transferred to [facility]  she would need gi clearance from gi  ;  if pt can ave JOIE and if no PFO and then RHC and pulm angiography  /RHC at the same time    6/14: GI clearance awaited:   oxygenation has imrpoved;   has AVM   once gi clear:  need joie:  and then furterh workup as described above     6/15: gi clearance is still aawited for joie:  then further course will be taken once joie is done     6/16: gi cleared for joie:  ? will be done today   6/17: te not done secondary to low plts:  today plts are less then 50/k  can we transfuse during the procedure and do the procedure?   6/18: SHEIS NOT TRANSFERED TO [facility] [place] for abby blump : needs joie and cath ; her plts counts are low;  would need plt transfusion before joie:  dr loly Montes is supposed to do the cath and procedure :  informed:     6/19  -Plan for eventual JOIE and RHC   -Denies CP/SOB     6/20  ? Eventual JOIE, RHC when platelets allow     6/21: dw IC :  no plan for joie now:  ? for cardiac MRI:   eventual closure o f pulm AVM           remote alcohol abuser/ Cirrhosis  cirrhosis demonstrated on ct:   check ammonia:   she used to drink before quiet a bit     6/7: ammonia is high:   ? start lactulose: ? gi consult     6/8: doing  ok ;  no sob:   cont current rx:   on lactulose:  gi following      6/9; she seems to be doing  ok : wants zoloft and trazadone to be started;  she says shehas been taking for last thirty years;   ?psych consult:   she could ot sleel last night:  trazodone she was using forsleep      6/10: she was able to sleep last night: fEels OK;     6/11: cont current treatment   6/12: as above   6/13; cont current rx:   6/14: sh eis alert and awke and oriented x  3  6/15: she seems stable;  on current regimen   6/16; she is alert and awake and responding to questions well   6/17; she looks pretty god:  no sob; no cough ;   no phlegm   lts counts remains low;  ? can we transfuse the plts prior to JOIE   6/18: as above   6/19: Per GI   6/20: Per GI   6/21: cont current rx:     Anxiety/ Depression  -per primary team    AMS  -Neuro following  6/21; resolved       dw team   
 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, undifferentiated cognitive impairment with patient with S/P laparoscopic ventral hernia repair with mesh and liver wedge biopsy and known severe liver cirrhosis from June 2017, with patient apparently with a presumed mechanical fall Jun 4 2025 for a presumed mechanical fall with low 90s O2 saturation with CTT head negative and apparently was discharged AMA from [facility], but was called back following cirrhosis on the prior imaging of the liver, with apparently the patient's first cousin above brought patient to the ER with apparently progressively impaired cognition and confusional state at home with patient leaving her keys and wallet on the stove at home, with patient S/P CIWA protocol and high dose IV thiamine, noted elevated NH3 at [facility], with patient seen by hepatology with lactulose and rifaximin started, and Coreg 3.125 mg BID for varices known to patient.  Imaging of the CTT abdomen with diffuse esophageal thickening noted and endoscopic assessment deferred by GI due to thrombocytopenia, with echo at [facility] with R/O PFO versus AVM, with severe AS noted, with patient transferred to [place] with patient seen by Neurology, Psychiatry, Cardiology and Pulmonary for evaluation by Interventional Cardiology for possible RHC and intervention for possible pulmonary AVM and consideration for possible ANNE.    Patient now on bedtime Trazodone for insomnia, AM Zoloft and tolerating Rx with no delirium.    Patient is AxOx3 but with subtle short term memory impairment.   Responded to June 18 2025 rather than the 17th for today's date.  CTH  6/5 neg ; chornic chagnes   TTE 6/8: LVSF wnl, EF 65%   had elevated ammonioa on admission to [facility]     Imprssion:   1) AMS likely multifactorial, MCI/dementia/hepatic encephalopathy   2) ETOH Abuse     - f/u cardiac MRI  and cardio, eventually RHC?   - monitor LFTs and ammonia level  - b12, RPR, TSH if not checked ; has been undergoing dementia workup outaptient   - getting lactulose and rifaxamine   - s/p IV thiamine.  c/w thiamine and folic acid  - delerium precuations   - MRI brain outaptient if no imrpovemetn    - PT.OT   - check FS, glucose control <180  - GI/DVT ppx   - Thank you for allowing me to participate in the care of this patient. Call with questions.   Christiano Ann MD  Vascular Neurology  Office: [phone] 
70-year-old female past medical history of alcohol use disorder, cognitive impairment undergoing workup, depression, anxiety, ?Cirrhosis seen on imaging yesterday presenting to emergency room for low oxygen level and altered mental status at home.  Patient reports that her cousin wanted her to come in and dropped her off, she feels fine and has no complaints.    Kaitlin helps her with all of her medical appointments and at home, patient's remain is Leonarda.  Kaitlin reports that she has been undergoing testing with a neurologist due to worsening cognitive impairment and early dementia including forgetting her wallet keys phone leaving the stove on being confused, and over the last couple of months she has had multiple episodes of syncope versus unresponsiveness.  On 2 occasions she has fallen to the ground, on other occasions she has appeared altered bobbing her head from side-to-side and unresponsive and will have low reading O2 sat during these episodes.  Patient left AMA from the ER yesterday after negative CT head and CT chest without pulmonary embolism but some signs of pulmonary edema, cirrhosis, splenomegaly.  Needed does not feel that the patient has capacity to leave AMA.  She reports it has been difficult for them to get cardiology appointment symptoms the schedule of many months. Pt declines active smoking, drug use, alcohol use.    AMS  - likely worsening dementia vs physiatry issues vs alcohol abuse disorder   - psych and neuro fu   - CT head neg  - CIWA protocol  - fall precautions  - safety watch     Anxiety, depression  - cw home meds  - psych fu     Hx of cirrhosis  - cw lactulose and rifaximin    Pulmonary AVM  -  ANNE pending for am   - intervention cards fu     DVT prophylaxis     dw pt, pulmonary and intervention cardiology     
70-year-old female past medical history of alcohol use disorder, cognitive impairment undergoing workup, depression, anxiety, ?Cirrhosis seen on imaging yesterday presenting to emergency room for low oxygen level and altered mental status at home.  Patient reports that her cousin wanted her to come in and dropped her off, she feels fine and has no complaints.    Kaitlin helps her with all of her medical appointments and at home, patient's remain is Leonarda.  Kaitlin reports that she has been undergoing testing with a neurologist due to worsening cognitive impairment and early dementia including forgetting her wallet keys phone leaving the stove on being confused, and over the last couple of months she has had multiple episodes of syncope versus unresponsiveness.  On 2 occasions she has fallen to the ground, on other occasions she has appeared altered bobbing her head from side-to-side and unresponsive and will have low reading O2 sat during these episodes.  Patient left AMA from the ER yesterday after negative CT head and CT chest without pulmonary embolism but some signs of pulmonary edema, cirrhosis, splenomegaly.  Needed does not feel that the patient has capacity to leave AMA.  She reports it has been difficult for them to get cardiology appointment symptoms the schedule of many months. Pt declines active smoking, drug use, alcohol use.    AMS  - likely worsening dementia vs physiatry issues vs alcohol abuse disorder   - psych and neuro fu   - CT head neg  - CIWA protocol  - fall precautions  - safety watch     Anxiety, depression  - cw home meds- psych fu     Hx of cirrhosis  - cw lactulose and rifaximin    Pulmonary AVM  - ANNE pending for am   - intervention cards fu     DVT prophylaxis     dw pt, pulmonary and intervention cardiology     
70-year-old female past medical history of alcohol use disorder, cognitive impairment undergoing workup, depression, anxiety, ?Cirrhosis seen on imaging yesterday presenting to emergency room for low oxygen level and altered mental status at home.  Patient reports that her cousin wanted her to come in and dropped her off, she feels fine and has no complaints.  Collateral information was obtained from her cousin Jc at phone number [phone].  Jc helps her with all of her medical appointments and at home, patient's remain is Leonarda.  Jc reports that she has been undergoing testing with a neurologist due to worsening cognitive impairment and early dementia including forgetting her wallet keys phone leaving the stove on being confused, and over the last couple of months she has had multiple episodes of syncope versus unresponsiveness.  On 2 occasions she has fallen to the ground, on other occasions she has appeared altered bobbing her head from side-to-side and unresponsive and will have low reading O2 sat during these episodes.  Patient left AMA from the ER yesterday after negative CT head and CT chest without pulmonary embolism but some signs of pulmonary edema, cirrhosis, splenomegaly.  Needed does not feel that the patient has capacity to leave AMA.  She reports it has been difficult for them to get cardiology appointment symptoms the schedule of many months. Pt declines active smoking, drug use, alcohol use. (06 Jun 2025 09:59)    called jc to get more information above noted: : she is on 2 L : 10 days ago " she was OK:  forgetful  sometimes she is off:  she is seeing a neurologist and one more tst is pending to conform for dementia: ;  she does not have any ling problems  has second hand smoking:  she did have oxygen three years ago :  she was on oxygen  : but she gave h er back ;   she was seeing a pulm before for breathing:  sheis using on albuterol  prn ?    never had pe or dvt before   she fell about three months ago and was bruised:  given atfer pill:  and after that better   last month she fell down three  ti mes:       S/P fall : confusion ;? developing dementia   remote alcohol abuser   Cirrhosis  Anxiety/ Depression      S/P fall : confusion ;? developing dementia   she fell down and was found to be hypoxic  per his sister:  at [facility] three years ago  :  she was on h9ome oxygen  and then she returned it:   she lives with somebody   who smokes  so has passive smoking:   CTA on admission showed: No pulmonary embolus. Mild interlobular septal thickening which may represent mild edema. Mediastinal lymphadenopathy, stable and of uncertain etiology. Correlate clinically. Cirrhotic morphology of the liver. Splenomegaly.  Diffuse esophageal wall thickening which can be seen with esophagitis.  Correlate clinically.  check echo and probnp: though initial is low   she has no pneumonia   VBG is OK;    6/7:  sheis much m ore alert and awake and is more oriented   on 2 Lof oxygen  :   seems better then yesterday    her ammonia is high :  started on   lactulose for some time   she still seems slightly confused    Hepatology following    6/8: seems to be alert and awake and responding to questions appropriately:  she used to take zoloft and trazadone/;l  demanding to start them again ;     6/10; seems to be doing  ok ;  no sob:  she is n 2 L of oxygen ; can try to wean off the oxygen     she is on rifaxiin and lactulose     6/11; she seems OK;  but she desats on room air:  need to have limited echo for AVM as she has cirrhosis     6/12: SHE HAS PULM AVM : NEED PULM ANGIOGRAPHY ; NEED TO GET INTERVENTIONAL CARDIOLOGIST TO follow  if they can block  the AVM ; she will need home oxygen    DW DR LOLY Montes interventional cardiologist ; they want RHC first:  would defer to international cardiologist       6/13: she looks pretty good;   no sob;   moving around on floor;  has pulm avm;   cards following:  do cta ;  had discussed with cardiology in detail from [facility] too yesterday if the AVM can be closed;    ?needed to be transferred to [facility]  she would need gi clearance from gi  ;  if pt can ave JOIE and if no PFO and then RHC and pulm angiography  /RHC at the same time    6/14: GI clearance awaited:   oxygenation has imrpoved;   has AVM   once gi clear:  need joie:  and then furterh workup as described above     6/15: gi clearance is still aawited for joie:  then further course will be taken once joie is done     6/16: gi cleared for joie:  ? will be done today   6/17: te not done secondary to low plts:  today plts are less then 50/k  can we transfuse during the procedure and do the procedure?   6/18: SHEIS NOT TRANSFERED TO [facility] for [facility] : needs joie and cath ; her plts counts are low;  would need plt transfusion before joie:  dr loly Montes is supposed to do the cath and procedure :  informed:     6/19  -Plan for eventual JOIE and RHC   -Denies CP/SOB     6/20  ? Eventual JOIE, RHC when platelets allow     6/21: dw IC :  no plan for joie now:  ? for cardiac MRI:   eventual closure o f pulm AVM     6/22: seems K:  awaiting cardiac MRI now:   defer to IC       remote alcohol abuser/ Cirrhosis  cirrhosis demonstrated on ct:   check ammonia:   she used to drink before quiet a bit     6/7: ammonia is high:   ? start lactulose: ? gi consult     6/8: doing  ok ;  no sob:   cont current rx:   on lactulose:  gi following      6/9; she seems to be doing  ok : wants zoloft and trazadone to be started;  she says shehas been taking for last thirty years;   ?psych consult:   she could ot sleel last night:  trazodone she was using forsleep      6/10: she was able to sleep last night: fEels OK;     6/11: cont current treatment   6/12: as above   6/13; cont current rx:   6/14: sh eis alert and awke and oriented x  3  6/15: she seems stable;  on current regimen   6/16; she is alert and awake and responding to questions well   6/17; she looks pretty god:  no sob; no cough ;   no phlegm   lts counts remains low;  ? can we transfuse the plts prior to JOIE   6/18: as above   6/19: Per GI   6/20: Per GI   6/21: cont current rx:   6/22: she is alert and awake and is oriented:  cont current rx:     Anxiety/ Depression  -per primary team    AMS  -Neuro following  6/21; resolved       dw team   
70-year-old female past medical history of alcohol use disorder, cognitive impairment undergoing workup, depression, anxiety, ?Cirrhosis seen on imaging yesterday presenting to emergency room for low oxygen level and altered mental status at home.  Patient reports that her cousin wanted her to come in and dropped her off, she feels fine and has no complaints.  Collateral information was obtained from her cousin Jc at phone number [phone].  Jc helps her with all of her medical appointments and at home, patient's remain is Leonarda.  Jc reports that she has been undergoing testing with a neurologist due to worsening cognitive impairment and early dementia including forgetting her wallet keys phone leaving the stove on being confused, and over the last couple of months she has had multiple episodes of syncope versus unresponsiveness.  On 2 occasions she has fallen to the ground, on other occasions she has appeared altered bobbing her head from side-to-side and unresponsive and will have low reading O2 sat during these episodes.  Patient left AMA from the ER yesterday after negative CT head and CT chest without pulmonary embolism but some signs of pulmonary edema, cirrhosis, splenomegaly.  Needed does not feel that the patient has capacity to leave AMA.  She reports it has been difficult for them to get cardiology appointment symptoms the schedule of many months. Pt declines active smoking, drug use, alcohol use. (06 Jun 2025 09:59)    called jc to get more information above noted: : she is on 2 L : 10 days ago " she was OK:  forgetful  sometimes she is off:  she is seeing a neurologist and one more tst is pending to conform for dementia: ;  she does not have any ling problems  has second hand smoking:  she did have oxygen three years ago :  she was on oxygen  : but she gave h er back ;   she was seeing a pulm before for breathing:  sheis using on albuterol  prn ?    never had pe or dvt before   she fell about three months ago and was bruised:  given atfer pill:  and after that better   last month she fell down three  ti mes:       S/P fall : confusion ;? developing dementia   remote alcohol abuser   Cirrhosis  Anxiety/ Depression      S/P fall : confusion ;? developing dementia   she fell down and was found to be hypoxic  per his sister:  at [facility] three years ago  :  she was on h9ome oxygen  and then she returned it:   she lives with somebody   who smokes  so has passive smoking:   CTA on admission showed: No pulmonary embolus. Mild interlobular septal thickening which may represent mild edema. Mediastinal lymphadenopathy, stable and of uncertain etiology. Correlate clinically. Cirrhotic morphology of the liver. Splenomegaly.  Diffuse esophageal wall thickening which can be seen with esophagitis.  Correlate clinically.  check echo and probnp: though initial is low   she has no pneumonia   VBG is OK;    6/7:  sheis much m ore alert and awake and is more oriented   on 2 Lof oxygen  :   seems better then yesterday    her ammonia is high :  started on   lactulose for some time   she still seems slightly confused    Hepatology following    6/8: seems to be alert and awake and responding to questions appropriately:  she used to take zoloft and trazadone/;l  demanding to start them again ;     6/10; seems to be doing  ok ;  no sob:  she is n 2 L of oxygen ; can try to wean off the oxygen     she is on rifaxiin and lactulose     6/11; she seems OK;  but she desats on room air:  need to have limited echo for AVM as she has cirrhosis     6/12: SHE HAS PULM AVM : NEED PULM ANGIOGRAPHY ; NEED TO GET INTERVENTIONAL CARDIOLOGIST TO follow  if they can block  the AVM ; she will need home oxygen    DW DR LOLY Montes interventional cardiologist ; they want RHC first:  would defer to international cardiologist       6/13: she looks pretty good;   no sob;   moving around on floor;  has pulm avm;   cards following:  do cta ;  had discussed with cardiology in detail from [facility] too yesterday if the AVM can be closed;    ?needed to be transferred to [facility]  she would need gi clearance from gi  ;  if pt can ave JOIE and if no PFO and then RHC and pulm angiography  /RHC at the same time    6/14: GI clearance awaited:   oxygenation has imrpoved;   has AVM   once gi clear:  need joie:  and then furterh workup as described above     6/15: gi clearance is still aawited for joie:  then further course will be taken once joie is done     6/16: gi cleared for joie:  ? will be done today   6/17: te not done secondary to low plts:  today plts are less then 50/k  can we transfuse during the procedure and do the procedure?   6/18: SHEIS NOT TRANSFERED TO [facility] for furterh viktoria : needs joie and cath ; her plts counts are low;  would need plt transfusion before joie:  dr loly Montes is supposed to do the cath and procedure :  informed:     6/19  -Plan for eventual JOIE and RHC   -Denies CP/SOB         remote alcohol abuser/ Cirrhosis  cirrhosis demonstrated on ct:   check ammonia:   she used to drink before quiet a bit     6/7: ammonia is high:   ? start lactulose: ? gi consult     6/8: doing  ok ;  no sob:   cont current rx:   on lactulose:  gi following      6/9; she seems to be doing  ok : wants zoloft and trazadone to be started;  she says shehas been taking for last thirty years;   ?psych consult:   she could ot sleel last night:  trazodone she was using forsleep      6/10: she was able to sleep last night: fEels OK;     6/11: cont current treatment   6/12: as above   6/13; cont current rx:   6/14: sh eis alert and awke and oriented x  3  6/15: she seems stable;  on current regimen   6/16; she is alert and awake and responding to questions well   6/17; she looks pretty god:  no sob; no cough ;   no phlegm   lts counts remains low;  ? can we transfuse the plts prior to JOIE   6/18: as above   6/19: Per GI     Anxiety/ Depression  -per primary team    AMS  -Neuro following    
70-year-old female past medical history of alcohol use disorder, cognitive impairment undergoing workup, depression, anxiety, ?Cirrhosis seen on imaging yesterday presenting to emergency room for low oxygen level and altered mental status at home.  Patient reports that her cousin wanted her to come in and dropped her off, she feels fine and has no complaints.  Collateral information was obtained from her cousin cJ at phone number [phone].  Jc helps her with all of her medical appointments and at home, patient's remain is Leonarda.  Jc reports that she has been undergoing testing with a neurologist due to worsening cognitive impairment and early dementia including forgetting her wallet keys phone leaving the stove on being confused, and over the last couple of months she has had multiple episodes of syncope versus unresponsiveness.  On 2 occasions she has fallen to the ground, on other occasions she has appeared altered bobbing her head from side-to-side and unresponsive and will have low reading O2 sat during these episodes.  Patient left AMA from the ER yesterday after negative CT head and CT chest without pulmonary embolism but some signs of pulmonary edema, cirrhosis, splenomegaly.  Needed does not feel that the patient has capacity to leave AMA.  She reports it has been difficult for them to get cardiology appointment symptoms the schedule of many months. Pt declines active smoking, drug use, alcohol use. (06 Jun 2025 09:59)    called jc to get more information above noted: : she is on 2 L : 10 days ago " she was OK:  forgetful  sometimes she is off:  she is seeing a neurologist and one more tst is pending to conform for dementia: ;  she does not have any ling problems  has second hand smoking:  she did have oxygen three years ago :  she was on oxygen  : but she gave h er back ;   she was seeing a pulm before for breathing:  sheis using on albuterol  prn ?    never had pe or dvt before   she fell about three months ago and was bruised:  given atfer pill:  and after that better   last month she fell down three  ti mes:       S/P fall : confusion ;? developing dementia   remote alcohol abuser   Cirrhosis  Anxiety/ Depression      S/P fall : confusion ;? developing dementia   she fell down and was found to be hypoxic  per his sister:  at [facility] three years ago  :  she was on h9ome oxygen  and then she returned it:   she lives with somebody   who smokes  so has passive smoking:   CTA on admission showed: No pulmonary embolus. Mild interlobular septal thickening which may represent mild edema. Mediastinal lymphadenopathy, stable and of uncertain etiology. Correlate clinically. Cirrhotic morphology of the liver. Splenomegaly.  Diffuse esophageal wall thickening which can be seen with esophagitis.  Correlate clinically.  check echo and probnp: though initial is low   she has no pneumonia   VBG is OK;    6/7:  sheis much m ore alert and awake and is more oriented   on 2 Lof oxygen  :   seems better then yesterday    her ammonia is high :  started on   lactulose for some time   she still seems slightly confused    Hepatology following    6/8: seems to be alert and awake and responding to questions appropriately:  she used to take zoloft and trazadone/;l  demanding to start them again ;     6/10; seems to be doing  ok ;  no sob:  she is n 2 L of oxygen ; can try to wean off the oxygen     she is on rifaxiin and lactulose     6/11; she seems OK;  but she desats on room air:  need to have limited echo for AVM as she has cirrhosis     6/12: SHE HAS PULM AVM : NEED PULM ANGIOGRAPHY ; NEED TO GET INTERVENTIONAL CARDIOLOGIST TO follow  if they can block  the AVM ; she will need home oxygen    DW DR LOLY Montes interventional cardiologist ; they want RHC first:  would defer to international cardiologist       6/13: she looks pretty good;   no sob;   moving around on floor;  has pulm avm;   cards following:  do cta ;  had discussed with cardiology in detail from [facility] too yesterday if the AVM can be closed;    ?needed to be transferred to [facility]  she would need gi clearance from gi  ;  if pt can ave JOIE and if no PFO and then RHC and pulm angiography  /RHC at the same time    6/14: GI clearance awaited:   oxygenation has imrpoved;   has AVM   once gi clear:  need joie:  and then furterh workup as described above     6/15: gi clearance is still aawited for joie:  then further course will be taken once joie is done     6/16: gi cleared for joie:  ? will be done today   6/17: te not done secondary to low plts:  today plts are less then 50/k  can we transfuse during the procedure and do the procedure?   6/18: SHEIS NOT TRANSFERED TO [facility] for [facility] : needs joie and cath ; her plts counts are low;  would need plt transfusion before joie:  dr loly Montes is supposed to do the cath and procedure :  informed:     6/19  -Plan for eventual JOIE and RHC   -Denies CP/SOB     6/20  ? Eventual JOIE, RHC when platelets allow           remote alcohol abuser/ Cirrhosis  cirrhosis demonstrated on ct:   check ammonia:   she used to drink before quiet a bit     6/7: ammonia is high:   ? start lactulose: ? gi consult     6/8: doing  ok ;  no sob:   cont current rx:   on lactulose:  gi following      6/9; she seems to be doing  ok : wants zoloft and trazadone to be started;  she says shehas been taking for last thirty years;   ?psych consult:   she could ot sleel last night:  trazodone she was using forsleep      6/10: she was able to sleep last night: fEels OK;     6/11: cont current treatment   6/12: as above   6/13; cont current rx:   6/14: sh eis alert and awke and oriented x  3  6/15: she seems stable;  on current regimen   6/16; she is alert and awake and responding to questions well   6/17; she looks pretty god:  no sob; no cough ;   no phlegm   lts counts remains low;  ? can we transfuse the plts prior to JOIE   6/18: as above   6/19: Per GI   6/20: Per GI     Anxiety/ Depression  -per primary team    AMS  -Neuro following    
70F w/ etoh use, memory loss, depression, and cirrhosis initially admitted to [facility] for AMS found to be hypoxic. TTE w/ possible intra-cardiac shunt and pulmonary AVM. Transferred to [facility] for further eval     -previously cleared by hepatology for ANNE, pending ANNE at [facility].   -will need platelet number to be least >50k prior to ANNE   -eventual RHC/Pulmonary angio w/ Dr. Montes   -pulm and neurology f/u     Aida Hummel MD FAC  Attending Interventional Cardiologist, [facility].   Avaliable on [employer] Team  
Echo < from: TTE W or WO Ultrasound Enhancing Agent (06.08.25 @ 09:24) >   1. Left ventricular cavity is normal in size. Left ventricular wall thickness is normal. Left ventricular systolic function is normal with an ejection fraction of65 % by El's method of disks. There are no regional wall motion abnormalities seen.   2. Normal right ventricular cavity size and normal right ventricular systolic function. Tricuspid annular plane systolic excursion (TAPSE) is 2.5 cm (normal >=1.7 cm).   3. There is mild (grade 1) left ventricular diastolic dysfunction.   4. Structurally normal mitral valve with normal leaflet excursion. There is calcification of the mitral valve annulus. There is mild mitral regurgitation.   5. The aortic valve anatomy cannot be determined. There is calcification of the aortic valve leaflets. Despite the transaortic gradients, the gross appearance of the valve is consistent with significant (possibly severe) aortic stenosis. However, unable to accurately estimate the aortic valve area. The peak transaortic velocity is 3.39 m/s, peak transaortic gradient is 46.0 mmHg and mean transaortic gradient is 26.0 mmHg with an LVOT/aortic valve VTI ratio of 0.51. There is mild to moderate aortic regurgitation.   6. The left atrium is moderately dilated with an indexed volume of 45.13 ml/m².    < end of copied text >    < from: TTE Limited W or WO Ultrasound Enhancing Agent (06.11.25 @ 14:59) >   1. Limited study with the intravenous injection of agitated saline contrast. Approximately 5-6 cardiac cycles following their appearance in the right heart, a large number of bubbles was seen in the left heart. The bubbles appear to enter the left atrium from a location remote from the interatrial septum. These factors may reflect the presence of an extracardiac shunt (for example, a pulmonary arteriovenous malformation). However, cannot definitely exclude an intracardiac shunt.    < end of copied text >      70F w/ etoh use, memory loss, depression, and cirrhosis initially admitted to [facility] for AMS found to be hypoxic. TTE w/ possible intra-cardiac shunt and pulmonary AVM. Transferred to [facility] for further eval     Hypoxia   -planned for ANNE but cancelled cardiac MRI shows no shunt, normal LV mild AS mild AR, bubbles in left heart are likely sec to extracardiac shunt like pul AVMs  -pulm and neurology f/u       HTN c/w coreg     Cirrhosis   f/u hepatology recs   
70-year-old female past medical history of alcohol use disorder, cognitive impairment undergoing workup, depression, anxiety, ?Cirrhosis seen on imaging yesterday presenting to emergency room for low oxygen level and altered mental status at home.  Patient reports that her cousin wanted her to come in and dropped her off, she feels fine and has no complaints.    Kaitlin helps her with all of her medical appointments and at home, patient's remain is [place]a.  Kaitlin reports that she has been undergoing testing with a neurologist due to worsening cognitive impairment and early dementia including forgetting her wallet keys phone leaving the stove on being confused, and over the last couple of months she has had multiple episodes of syncope versus unresponsiveness.  On 2 occasions she has fallen to the ground, on other occasions she has appeared altered bobbing her head from side-to-side and unresponsive and will have low reading O2 sat during these episodes.  Patient left AMA from the ER yesterday after negative CT head and CT chest without pulmonary embolism but some signs of pulmonary edema, cirrhosis, splenomegaly.  Needed does not feel that the patient has capacity to leave AMA.  She reports it has been difficult for them to get cardiology appointment symptoms the schedule of many months. Pt declines active smoking, drug use, alcohol use.    AMS  - likely worsening dementia vs physiatry issues vs alcohol abuse disorder   - psych and neuro fu   - CT head neg  - CIWA protocol  - fall precautions  - safety watch     Anxiety, depression  - cw home meds  - psych fu     Hx of cirrhosis  - cw lactulose and rifaximin    Pulmonary AVM  -  ANNE cancelled, now planned for MRI   - intervention cards fu     DVT prophylaxis     dw pt, pulmonary and intervention cardiology 
70-year-old female past medical history of alcohol use disorder, cognitive impairment undergoing workup, depression, anxiety, ?Cirrhosis seen on imaging yesterday presenting to emergency room for low oxygen level and altered mental status at home.  Patient reports that her cousin wanted her to come in and dropped her off, she feels fine and has no complaints.    Kaitlin helps her with all of her medical appointments and at home, patient's remain is Leonarda.  Kaitlin reports that she has been undergoing testing with a neurologist due to worsening cognitive impairment and early dementia including forgetting her wallet keys phone leaving the stove on being confused, and over the last couple of months she has had multiple episodes of syncope versus unresponsiveness.  On 2 occasions she has fallen to the ground, on other occasions she has appeared altered bobbing her head from side-to-side and unresponsive and will have low reading O2 sat during these episodes.  Patient left AMA from the ER yesterday after negative CT head and CT chest without pulmonary embolism but some signs of pulmonary edema, cirrhosis, splenomegaly.  Needed does not feel that the patient has capacity to leave AMA.  She reports it has been difficult for them to get cardiology appointment symptoms the schedule of many months. Pt declines active smoking, drug use, alcohol use.    AMS  - likely worsening dementia vs physiatry issues vs alcohol abuse disorder   - psych and neuro fu   - CT head neg  - CIWA protocol  - fall precautions  - safety watch     Anxiety, depression  - cw home meds  - psych fu     Hx of cirrhosis  - cw lactulose and rifaximin    Pulmonary AVM  -  ANNE pending  - intervention cards fu     DVT prophylaxis 
70-year-old female past medical history of alcohol use disorder, cognitive impairment undergoing workup, depression, anxiety, ?Cirrhosis seen on imaging yesterday presenting to emergency room for low oxygen level and altered mental status at home.  Patient reports that her cousin wanted her to come in and dropped her off, she feels fine and has no complaints.  Collateral information was obtained from her cousin Jc at phone number [phone].  Jc helps her with all of her medical appointments and at home, patient's remain is Leonarda.  Jc reports that she has been undergoing testing with a neurologist due to worsening cognitive impairment and early dementia including forgetting her wallet keys phone leaving the stove on being confused, and over the last couple of months she has had multiple episodes of syncope versus unresponsiveness.  On 2 occasions she has fallen to the ground, on other occasions she has appeared altered bobbing her head from side-to-side and unresponsive and will have low reading O2 sat during these episodes.  Patient left AMA from the ER yesterday after negative CT head and CT chest without pulmonary embolism but some signs of pulmonary edema, cirrhosis, splenomegaly.  Needed does not feel that the patient has capacity to leave AMA.  She reports it has been difficult for them to get cardiology appointment symptoms the schedule of many months. Pt declines active smoking, drug use, alcohol use. (06 Jun 2025 09:59)    called jc to get more information above noted: : she is on 2 L : 10 days ago " she was OK:  forgetful  sometimes she is off:  she is seeing a neurologist and one more tst is pending to conform for dementia: ;  she does not have any ling problems  has second hand smoking:  she did have oxygen three years ago :  she was on oxygen  : but she gave h er back ;   she was seeing a pulm before for breathing:  sheis using on albuterol  prn ?    never had pe or dvt before   she fell about three months ago and was bruised:  given atfer pill:  and after that better   last month she fell down three  ti mes:       S/P fall : confusion ;? developing dementia   remote alcohol abuser   Cirrhosis  Anxiety/ Depression      S/P fall : confusion ;? developing dementia   she fell down and was found to be hypoxic  per his sister:  at [facility] three years ago  :  she was on h9ome oxygen  and then she returned it:   she lives with somebody   who smokes  so has passive smoking:   CTA on admission showed: No pulmonary embolus. Mild interlobular septal thickening which may represent mild edema. Mediastinal lymphadenopathy, stable and of uncertain etiology. Correlate clinically. Cirrhotic morphology of the liver. Splenomegaly.  Diffuse esophageal wall thickening which can be seen with esophagitis.  Correlate clinically.  check echo and probnp: though initial is low   she has no pneumonia   VBG is OK;    6/7:  sheis much m ore alert and awake and is more oriented   on 2 Lof oxygen  :   seems better then yesterday    her ammonia is high :  started on   lactulose for some time   she still seems slightly confused    Hepatology following    6/8: seems to be alert and awake and responding to questions appropriately:  she used to take zoloft and trazadone/;l  demanding to start them again ;     6/10; seems to be doing  ok ;  no sob:  she is n 2 L of oxygen ; can try to wean off the oxygen     she is on rifaxiin and lactulose     6/11; she seems OK;  but she desats on room air:  need to have limited echo for AVM as she has cirrhosis     6/12: SHE HAS PULM AVM : NEED PULM ANGIOGRAPHY ; NEED TO GET INTERVENTIONAL CARDIOLOGIST TO follow  if they can block  the AVM ; she will need home oxygen    DW DR LOLY Montes interventional cardiologist ; they want RHC first:  would defer to international cardiologist       6/13: she looks pretty good;   no sob;   moving around on floor;  has pulm avm;   cards following:  do cta ;  had discussed with cardiology in detail from [facility] too yesterday if the AVM can be closed;    ?needed to be transferred to [facility]  she would need gi clearance from gi  ;  if pt can ave JOIE and if no PFO and then RHC and pulm angiography  /RHC at the same time    6/14: GI clearance awaited:   oxygenation has imrpoved;   has AVM   once gi clear:  need joie:  and then furterh workup as described above     6/15: gi clearance is still aawited for joie:  then further course will be taken once joie is done     6/16: gi cleared for joie:  ? will be done today   6/17: te not done secondary to low plts:  today plts are less then 50/k  can we transfuse during the procedure and do the procedure?   6/18: [facility] : needs joie and cath ; her plts counts are low;  would need plt transfusion before joie:  dr loly Montes is supposed to do the cath and procedure :  informed:         remote alcohol abuser/ Cirrhosis  cirrhosis demonstrated on ct:   check ammonia:   she used to drink before quiet a bit     6/7: ammonia is high:   ? start lactulose: ? gi consult     6/8: doing  ok ;  no sob:   cont current rx:   on lactulose:  gi following      6/9; she seems to be doing  ok : wants zoloft and trazadone to be started;  she says shehas been taking for last thirty years;   ?psych consult:   she could ot sleel last night:  trazodone she was using forsleep      6/10: she was able to sleep last night: fEels OK;     6/11: cont current treatment   6/12: as above   6/13; cont current rx:   6/14: sh eis alert and awke and oriented x  3  6/15: she seems stable;  on current regimen   6/16; she is alert and awake and responding to questions well   6/17; she looks pretty god:  no sob; no cough ;   no phlegm   lts counts remains low;  ? can we transfuse the plts prior to JOIE   6/18: as above     Anxiety/ Depression  she is on psych medications,  and the sister believes that she is confused secondary to these drugs    6/8: restart zoloft and trazodone one by one     6/9: defer to primary team   6/14; seen by neuro:  started back on trazodone;       dvt prophylaxis    dw acp here at [facility]

## 2025-06-23 NOTE — PROGRESS NOTE ADULT - PROVIDER SPECIALTY LIST ADULT
Cardiology
Hospitalist
Pulmonology
Cardiology
Hospitalist
Hospitalist
Neurology
Neurology
Pulmonology
Cardiology
Hospitalist
Neurology
Pulmonology
Pulmonology

## 2025-06-23 NOTE — PROGRESS NOTE ADULT - SUBJECTIVE AND OBJECTIVE BOX
Date of Service: 06-18-25 @ 16:59    Patient is a 70y old  Female who presents with a chief complaint of Transferred from [facility] to [place] for procedure evaluation (18 Jun 2025 10:25)      Any change in ROS: pt transferred to [facility] for further workup for pulm avm:       MEDICATIONS  (STANDING):  carvedilol 3.125 milliGRAM(s) Oral every 12 hours  folic acid 1 milliGRAM(s) Oral daily  lactulose Syrup 10 Gram(s) Oral three times a day  multivitamin 1 Tablet(s) Oral daily  rifAXIMin 550 milliGRAM(s) Oral two times a day  sertraline 50 milliGRAM(s) Oral daily  traZODone 50 milliGRAM(s) Oral at bedtime    MEDICATIONS  (PRN):  acetaminophen     Tablet .. 650 milliGRAM(s) Oral every 6 hours PRN Temp greater or equal to 38C (100.4F), Moderate Pain (4 - 6)  melatonin 3 milliGRAM(s) Oral at bedtime PRN Insomnia    Vital Signs Last 24 Hrs  T(C): 36.9 (18 Jun 2025 11:37), Max: 37.2 (17 Jun 2025 17:16)  T(F): 98.4 (18 Jun 2025 11:37), Max: 98.9 (17 Jun 2025 17:16)  HR: 62 (18 Jun 2025 11:37) (61 - 69)  BP: 148/74 (18 Jun 2025 11:37) (115/72 - 165/73)  BP(mean): --  RR: 18 (18 Jun 2025 11:37) (18 - 18)  SpO2: 94% (18 Jun 2025 11:37) (94% - 99%)    Parameters below as of 18 Jun 2025 11:37  Patient On (Oxygen Delivery Method): room air        I&O's Summary    17 Jun 2025 07:01  -  18 Jun 2025 07:00  --------------------------------------------------------  IN: 300 mL / OUT: 0 mL / NET: 300 mL          Physical Exam:   GENERAL: NAD, well-groomed, well-developed  HEENT: JENA/   Atraumatic, Normocephalic  ENMT: No tonsillar erythema, exudates, or enlargement; Moist mucous membranes, Good dentition, No lesions  NECK: Supple, No JVD, Normal thyroid  CHEST/LUNG: Clear to auscultaion  CVS: Regular rate and rhythm; No murmurs, rubs, or gallops  GI: : Soft, Nontender, Nondistended; Bowel sounds present  NERVOUS SYSTEM:  Alert & Oriented X3  EXTREMITIES:-edema  LYMPH: No lymphadenopathy noted  SKIN: No rashes or lesions  ENDOCRINOLOGY: No Thyromegaly  PSYCH: Appropriate    Labs:                              8.3    2.56  )-----------( 39       ( 18 Jun 2025 07:50 )             26.4                         8.2    3.07  )-----------( 45       ( 17 Jun 2025 03:09 )             25.7                         8.4    2.61  )-----------( 41       ( 16 Jun 2025 06:00 )             26.1                         8.8    3.00  )-----------( 50       ( 15 Royal 2025 05:50 )             27.9     06-18    143  |  111[H]  |  10  ----------------------------<  124[H]  3.6   |  21[L]  |  0.47[L]  06-17    141  |  109[H]  |  12  ----------------------------<  102[H]  4.1   |  23  |  0.58  06-16    140  |  109[H]  |  10  ----------------------------<  83  4.2   |  21[L]  |  0.48[L]  06-15    141  |  110[H]  |  10  ----------------------------<  97  4.4   |  24  |  0.51    Ca    8.3[L]      18 Jun 2025 07:51  Ca    7.9[L]      17 Jun 2025 03:09  Phos  2.9     06-17  Mg     1.80     06-17    TPro  5.4[L]  /  Alb  2.6[L]  /  TBili  1.4[H]  /  DBili  x   /  AST  37  /  ALT  19  /  AlkPhos  112  06-18  TPro  5.3[L]  /  Alb  2.6[L]  /  TBili  1.3[H]  /  DBili  x   /  AST  37[H]  /  ALT  21  /  AlkPhos  128[H]  06-17  TPro  5.4[L]  /  Alb  2.7[L]  /  TBili  1.6[H]  /  DBili  x   /  AST  40[H]  /  ALT  21  /  AlkPhos  104  06-16  TPro  5.6[L]  /  Alb  2.8[L]  /  TBili  1.5[H]  /  DBili  x   /  AST  39[H]  /  ALT  20  /  AlkPhos  124[H]  06-15    CAPILLARY BLOOD GLUCOSE          LIVER FUNCTIONS - ( 18 Jun 2025 07:51 )  Alb: 2.6 g/dL / Pro: 5.4 g/dL / ALK PHOS: 112 U/L / ALT: 19 U/L / AST: 37 U/L / GGT: x           PT/INR - ( 18 Jun 2025 07:51 )   PT: 17.0 sec;   INR: 1.50 ratio         PTT - ( 18 Jun 2025 07:51 )  PTT:36.6 sec  Urinalysis Basic - ( 18 Jun 2025 07:51 )    Color: x / Appearance: x / SG: x / pH: x  Gluc: 124 mg/dL / Ketone: x  / Bili: x / Urobili: x   Blood: x / Protein: x / Nitrite: x   Leuk Esterase: x / RBC: x / WBC x   Sq Epi: x / Non Sq Epi: x / Bacteria: x    RAD< from: Xray Chest 1 View- PORTABLE-Urgent (Xray Chest 1 View- PORTABLE-Urgent .) (06.11.25 @ 11:41) >  layering effusion/atelectasis.  No focal consolidations.  Heart difficult to evaluate on this AP view.  No pneumothorax.  Old left proximal humerus fracture.        COMPARISON: June 4, 2025        IMPRESSION: Hazy right lung base likely small effusion/atelectasis may be   cardiogenic in origin.    --- End of Report ---            ANNALEE BOWDEN MD; Attending Radiologist  This document has been electronically signed. Jun 11 2025  2:10PM    < end of copied text >  < from: CT Angio Chest PE Protocol w/ IV Cont (06.05.25 @ 01:45) >  IMPRESSION:  No pulmonary embolus.    Mild interlobular septal thickening which may represent mild edema.    Mediastinal lymphadenopathy, stable and of uncertain etiology. Correlate   clinically.    Cirrhotic morphology of the liver.    Splenomegaly.    Diffuse esophageal wall thickening which can be seen with esophagitis.   Correlate clinically.    --- End of Report ---    < end of copied text >          RECENT CULTURES:        RESPIRATORY CULTURES:          Studies  Chest X-RAY  CT SCAN Chest   Venous Dopplers: LE:   CT Abdomen  Others              
Date of Service: 06-19-25 @ 15:33    Patient is a 70y old  Female who presents with a chief complaint of Transferred from [facility] to [place] for procedure evaluation (19 Jun 2025 11:30)      Any change in ROS:   No new respiratory events overnight. Denies SOB/CP.    MEDICATIONS  (STANDING):  carvedilol 3.125 milliGRAM(s) Oral every 12 hours  folic acid 1 milliGRAM(s) Oral daily  lactulose Syrup 10 Gram(s) Oral three times a day  multivitamin 1 Tablet(s) Oral daily  rifAXIMin 550 milliGRAM(s) Oral two times a day  sertraline 50 milliGRAM(s) Oral daily  traZODone 50 milliGRAM(s) Oral at bedtime    MEDICATIONS  (PRN):  acetaminophen     Tablet .. 650 milliGRAM(s) Oral every 6 hours PRN Temp greater or equal to 38C (100.4F), Moderate Pain (4 - 6)  melatonin 3 milliGRAM(s) Oral at bedtime PRN Insomnia    Vital Signs Last 24 Hrs  T(C): 36.4 (19 Jun 2025 11:48), Max: 36.7 (18 Jun 2025 20:00)  T(F): 97.5 (19 Jun 2025 11:48), Max: 98.1 (18 Jun 2025 20:00)  HR: 62 (19 Jun 2025 11:48) (55 - 69)  BP: 140/62 (19 Jun 2025 11:48) (120/64 - 155/91)  BP(mean): --  RR: 18 (19 Jun 2025 11:48) (18 - 18)  SpO2: 94% (19 Jun 2025 11:48) (91% - 94%)    Parameters below as of 19 Jun 2025 11:48  Patient On (Oxygen Delivery Method): room air        I&O's Summary    18 Jun 2025 07:01  -  19 Jun 2025 07:00  --------------------------------------------------------  IN: 600 mL / OUT: 0 mL / NET: 600 mL          Physical Exam:   GENERAL: NAD, well-groomed, well-developed  HEENT: JENA/   Atraumatic, Normocephalic  ENMT: No tonsillar erythema, exudates, or enlargement; Moist mucous membranes, Good dentition, No lesions  NECK: Supple, No JVD, Normal thyroid  CHEST/LUNG: grossly clear   CVS: Regular rate and rhythm; No murmurs, rubs, or gallops  GI: : Soft, Nontender, Nondistended; Bowel sounds present  NERVOUS SYSTEM:  Alert & Oriented X3  EXTREMITIES:  2+ Peripheral Pulses, No clubbing, cyanosis, or edema  LYMPH: No lymphadenopathy noted  SKIN: No rashes or lesions  ENDOCRINOLOGY: No Thyromegaly  PSYCH: Appropriate    Labs:                              9.4    3.77  )-----------( 52       ( 19 Jun 2025 07:59 )             30.4                         8.3    2.56  )-----------( 39       ( 18 Jun 2025 07:50 )             26.4                         8.2    3.07  )-----------( 45       ( 17 Jun 2025 03:09 )             25.7                         8.4    2.61  )-----------( 41       ( 16 Jun 2025 06:00 )             26.1     06-19    144  |  111[H]  |  12  ----------------------------<  75  4.2   |  21[L]  |  0.47[L]  06-18    143  |  111[H]  |  10  ----------------------------<  124[H]  3.6   |  21[L]  |  0.47[L]  06-17    141  |  109[H]  |  12  ----------------------------<  102[H]  4.1   |  23  |  0.58  06-16    140  |  109[H]  |  10  ----------------------------<  83  4.2   |  21[L]  |  0.48[L]    Ca    8.9      19 Jun 2025 06:59  Ca    8.3[L]      18 Jun 2025 07:51  Mg     1.9     06-19    TPro  6.0  /  Alb  3.1[L]  /  TBili  1.5[H]  /  DBili  x   /  AST  46[H]  /  ALT  22  /  AlkPhos  138[H]  06-19  TPro  5.4[L]  /  Alb  2.6[L]  /  TBili  1.4[H]  /  DBili  x   /  AST  37  /  ALT  19  /  AlkPhos  112  06-18  TPro  5.3[L]  /  Alb  2.6[L]  /  TBili  1.3[H]  /  DBili  x   /  AST  37[H]  /  ALT  21  /  AlkPhos  128[H]  06-17  TPro  5.4[L]  /  Alb  2.7[L]  /  TBili  1.6[H]  /  DBili  x   /  AST  40[H]  /  ALT  21  /  AlkPhos  104  06-16    CAPILLARY BLOOD GLUCOSE          LIVER FUNCTIONS - ( 19 Jun 2025 06:59 )  Alb: 3.1 g/dL / Pro: 6.0 g/dL / ALK PHOS: 138 U/L / ALT: 22 U/L / AST: 46 U/L / GGT: x           PT/INR - ( 19 Jun 2025 07:03 )   PT: 15.8 sec;   INR: 1.39 ratio         PTT - ( 19 Jun 2025 07:03 )  PTT:35.9 sec  Urinalysis Basic - ( 19 Jun 2025 06:59 )    Color: x / Appearance: x / SG: x / pH: x  Gluc: 75 mg/dL / Ketone: x  / Bili: x / Urobili: x   Blood: x / Protein: x / Nitrite: x   Leuk Esterase: x / RBC: x / WBC x   Sq Epi: x / Non Sq Epi: x / Bacteria: x            RECENT CULTURES:        RESPIRATORY CULTURES:          < from: CT Angio Chest PE Protocol w/ IV Cont (06.05.25 @ 01:45) >    ACC: [id number] EXAM:  CT ANGIO CHEST PULM ART [facility]   ORDERED BY: MONSTER LOERA     PROCEDURE DATE:  06/05/2025          INTERPRETATION:  CLINICAL INFORMATION: Hypoxia, evaluate for pulmonary   embolism.    COMPARISON: CTA of the chest from 1/7/2025    CONTRAST/COMPLICATIONS:  IV Contrast: Omnipaque 350  48 cc administered   52 cc discarded  Oral Contrast: NONE.    PROCEDURE:  CT Angiography of the Chest.  Sagittal and coronal reformats were performed as well as 3D (MIP)   reconstructions.    FINDINGS:    LUNGS AND LARGE AIRWAYS: Patent central airways. Dependent and bibasilar   atelectasis.. Mild interlobular septal thickening.  PLEURA: No pleural effusion.  VESSELS: Aortic calcifications. Coronary artery calcifications. The main   pulmonary artery is mildly dilated. This is nonspecific though can be   seen with underlying pulmonary arterial hypertension. There are no   filling defects seen within the opacified pulmonary arterial tree.  HEART: Cardiomegaly. Trace pericardial fluid. Valvular calcifications are   noted.  MEDIASTINUM AND DAVID: Diffuse wall thickening of the esophagus. Prominent   mediastinal lymph nodes are noted which measure up to 1.2 cm in the short   axis dimension. These are similar compared to previous exam  CHEST WALL AND LOWER NECK: There is generalized soft tissue edema.  VISUALIZED UPPER ABDOMEN: Nodular contour of the liver, compatible with   cirrhosis. Cholecystectomy. Stable prominence of the common bile duct is   likely related to cholecystectomy status. Splenomegaly with   calcifications.  BONES: Degenerative changes of the spine. Chronic fracture deformity of   the left humeral head. There is a curvature of the spine.    IMPRESSION:  No pulmonary embolus.    Mild interlobular septal thickening which may represent mild edema.    Mediastinal lymphadenopathy, stable and of uncertain etiology. Correlate   clinically.    Cirrhotic morphology of the liver.    Splenomegaly.    Diffuse esophageal wall thickening which can be seen with esophagitis.   Correlate clinically.    --- End of Report ---    < end of copied text >        
Date of Service: 06-20-25 @ 14:31    Patient is a 70y old  Female who presents with a chief complaint of Transferred from [facility] to [place] for procedure evaluation (19 Jun 2025 22:56)      Any change in ROS:   No new respiratory events overnight. Denies SOB/CP.      MEDICATIONS  (STANDING):  carvedilol 3.125 milliGRAM(s) Oral every 12 hours  folic acid 1 milliGRAM(s) Oral daily  lactulose Syrup 10 Gram(s) Oral three times a day  multivitamin 1 Tablet(s) Oral daily  rifAXIMin 550 milliGRAM(s) Oral two times a day  sertraline 50 milliGRAM(s) Oral daily  traZODone 50 milliGRAM(s) Oral at bedtime    MEDICATIONS  (PRN):  acetaminophen     Tablet .. 650 milliGRAM(s) Oral every 6 hours PRN Temp greater or equal to 38C (100.4F), Moderate Pain (4 - 6)  melatonin 3 milliGRAM(s) Oral at bedtime PRN Insomnia    Vital Signs Last 24 Hrs  T(C): 36.6 (20 Jun 2025 11:40), Max: 36.7 (19 Jun 2025 20:06)  T(F): 97.8 (20 Jun 2025 11:40), Max: 98 (19 Jun 2025 20:06)  HR: 58 (20 Jun 2025 11:40) (58 - 74)  BP: 147/77 (20 Jun 2025 11:40) (132/66 - 147/77)  BP(mean): --  RR: 18 (20 Jun 2025 11:40) (18 - 18)  SpO2: 94% (20 Jun 2025 11:40) (93% - 95%)    Parameters below as of 20 Jun 2025 11:40  Patient On (Oxygen Delivery Method): room air        I&O's Summary        Physical Exam:   GENERAL: NAD, well-groomed, well-developed  HEENT: JENA/   Atraumatic, Normocephalic  ENMT: No tonsillar erythema, exudates, or enlargement; Moist mucous membranes, Good dentition, No lesions  NECK: Supple, No JVD, Normal thyroid  CHEST/LUNG: grossly clear   CVS: Regular rate and rhythm; No murmurs, rubs, or gallops  GI: : Soft, Nontender, Nondistended; Bowel sounds present  NERVOUS SYSTEM:  Alert & Oriented X3  EXTREMITIES:  2+ Peripheral Pulses, No clubbing, cyanosis, or edema  LYMPH: No lymphadenopathy noted  SKIN: No rashes or lesions  ENDOCRINOLOGY: No Thyromegaly  PSYCH: Appropriate    Labs:                              9.3    4.32  )-----------( 63       ( 20 Jun 2025 11:43 )             29.5                         9.4    3.77  )-----------( 52       ( 19 Jun 2025 07:59 )             30.4                         8.3    2.56  )-----------( 39       ( 18 Jun 2025 07:50 )             26.4                         8.2    3.07  )-----------( 45       ( 17 Jun 2025 03:09 )             25.7     06-19    144  |  111[H]  |  12  ----------------------------<  75  4.2   |  21[L]  |  0.47[L]  06-18    143  |  111[H]  |  10  ----------------------------<  124[H]  3.6   |  21[L]  |  0.47[L]  06-17    141  |  109[H]  |  12  ----------------------------<  102[H]  4.1   |  23  |  0.58    Ca    8.9      19 Jun 2025 06:59  Mg     1.9     06-19    TPro  6.0  /  Alb  3.1[L]  /  TBili  1.5[H]  /  DBili  x   /  AST  46[H]  /  ALT  22  /  AlkPhos  138[H]  06-19  TPro  5.4[L]  /  Alb  2.6[L]  /  TBili  1.4[H]  /  DBili  x   /  AST  37  /  ALT  19  /  AlkPhos  112  06-18  TPro  5.3[L]  /  Alb  2.6[L]  /  TBili  1.3[H]  /  DBili  x   /  AST  37[H]  /  ALT  21  /  AlkPhos  128[H]  06-17    CAPILLARY BLOOD GLUCOSE      POCT Blood Glucose.: 90 mg/dL (20 Jun 2025 05:56)  POCT Blood Glucose.: 144 mg/dL (19 Jun 2025 23:56)      LIVER FUNCTIONS - ( 19 Jun 2025 06:59 )  Alb: 3.1 g/dL / Pro: 6.0 g/dL / ALK PHOS: 138 U/L / ALT: 22 U/L / AST: 46 U/L / GGT: x           PT/INR - ( 19 Jun 2025 07:03 )   PT: 15.8 sec;   INR: 1.39 ratio         PTT - ( 19 Jun 2025 07:03 )  PTT:35.9 sec  Urinalysis Basic - ( 19 Jun 2025 06:59 )    Color: x / Appearance: x / SG: x / pH: x  Gluc: 75 mg/dL / Ketone: x  / Bili: x / Urobili: x   Blood: x / Protein: x / Nitrite: x   Leuk Esterase: x / RBC: x / WBC x   Sq Epi: x / Non Sq Epi: x / Bacteria: x      Studies    < from: CT Angio Chest PE Protocol w/ IV Cont (06.05.25 @ 01:45) >    ACC: [id number] EXAM:  CT ANGIO CHEST PULM ART [facility]   ORDERED BY: MONSTER LOERA     PROCEDURE DATE:  06/05/2025          INTERPRETATION:  CLINICAL INFORMATION: Hypoxia, evaluate for pulmonary   embolism.    COMPARISON: CTA of the chest from 1/7/2025    CONTRAST/COMPLICATIONS:  IV Contrast: Omnipaque 350  48 cc administered   52 cc discarded  Oral Contrast: NONE.    PROCEDURE:  CT Angiography of the Chest.  Sagittal and coronal reformats were performed as well as 3D (MIP)   reconstructions.    FINDINGS:    LUNGS AND LARGE AIRWAYS: Patent central airways. Dependent and bibasilar   atelectasis.. Mild interlobular septal thickening.  PLEURA: No pleural effusion.  VESSELS: Aortic calcifications. Coronary artery calcifications. The main   pulmonary artery is mildly dilated. This is nonspecific though can be   seen with underlying pulmonary arterial hypertension. There are no   filling defects seen within the opacified pulmonary arterial tree.  HEART: Cardiomegaly. Trace pericardial fluid. Valvular calcifications are   noted.  MEDIASTINUM AND DAVID: Diffuse wall thickening of the esophagus. Prominent   mediastinal lymph nodes are noted which measure up to 1.2 cm in the short   axis dimension. These are similar compared to previous exam  CHEST WALL AND LOWER NECK: There is generalized soft tissue edema.  VISUALIZED UPPER ABDOMEN: Nodular contour of the liver, compatible with   cirrhosis. Cholecystectomy. Stable prominence of the common bile duct is   likely related to cholecystectomy status. Splenomegaly with   calcifications.  BONES: Degenerative changes of the spine. Chronic fracture deformity of   the left humeral head. There is a curvature of the spine.    IMPRESSION:  No pulmonary embolus.    Mild interlobular septal thickening which may represent mild edema.    Mediastinal lymphadenopathy, stable and of uncertain etiology. Correlate   clinically.    Cirrhotic morphology of the liver.    Splenomegaly.    Diffuse esophageal wall thickening which can be seen with esophagitis.   Correlate clinically.    --- End of Report ---    < end of copied text >            
Date of Service: 06-22-25 @ 15:07    Patient is a 70y old  Female who presents with a chief complaint of Transferred from [facility] to [place] for procedure evaluation (22 Jun 2025 11:57)      Any change in ROS: seems OK:  no sob:       MEDICATIONS  (STANDING):  carvedilol 3.125 milliGRAM(s) Oral every 12 hours  folic acid 1 milliGRAM(s) Oral daily  lactulose Syrup 10 Gram(s) Oral three times a day  magnesium sulfate  IVPB 1 Gram(s) IV Intermittent once  multivitamin 1 Tablet(s) Oral daily  rifAXIMin 550 milliGRAM(s) Oral two times a day  sertraline 50 milliGRAM(s) Oral daily  traZODone 50 milliGRAM(s) Oral at bedtime    MEDICATIONS  (PRN):  acetaminophen     Tablet .. 650 milliGRAM(s) Oral every 6 hours PRN Temp greater or equal to 38C (100.4F), Moderate Pain (4 - 6)  melatonin 3 milliGRAM(s) Oral at bedtime PRN Insomnia    Vital Signs Last 24 Hrs  T(C): 36.7 (22 Jun 2025 11:11), Max: 36.8 (21 Jun 2025 19:39)  T(F): 98 (22 Jun 2025 11:11), Max: 98.2 (21 Jun 2025 19:39)  HR: 75 (22 Jun 2025 11:11) (64 - 76)  BP: 111/66 (22 Jun 2025 11:11) (110/71 - 124/51)  BP(mean): --  RR: 18 (22 Jun 2025 11:11) (18 - 18)  SpO2: 97% (22 Jun 2025 11:11) (93% - 97%)    Parameters below as of 22 Jun 2025 11:11  Patient On (Oxygen Delivery Method): room air        I&O's Summary        Physical Exam:   GENERAL: NAD, well-groomed, well-developed  HEENT: JENA/   Atraumatic, Normocephalic  ENMT: No tonsillar erythema, exudates, or enlargement; Moist mucous membranes, Good dentition, No lesions  NECK: Supple, No JVD, Normal thyroid  CHEST/LUNG: Clear to auscultaion  CVS: Regular rate and rhythm; No murmurs, rubs, or gallops  GI: : Soft, Nontender, Nondistended; Bowel sounds present  NERVOUS SYSTEM:  Alert & Oriented X3  EXTREMITIES: - edema  LYMPH: No lymphadenopathy noted  SKIN: No rashes or lesions  ENDOCRINOLOGY: No Thyromegaly  PSYCH: Appropriate    Labs:                              8.5    2.73  )-----------( 44       ( 22 Jun 2025 06:47 )             27.3                         9.3    4.32  )-----------( 63       ( 20 Jun 2025 11:43 )             29.5                         9.4    3.77  )-----------( 52       ( 19 Jun 2025 07:59 )             30.4     06-22    143  |  112[H]  |  10  ----------------------------<  93  4.0   |  23  |  0.49[L]  06-19    144  |  111[H]  |  12  ----------------------------<  75  4.2   |  21[L]  |  0.47[L]    Ca    8.1[L]      22 Jun 2025 06:47  Mg     1.9     06-22    TPro  5.3[L]  /  Alb  2.6[L]  /  TBili  1.3[H]  /  DBili  x   /  AST  37  /  ALT  21  /  AlkPhos  133[H]  06-22  TPro  6.0  /  Alb  3.1[L]  /  TBili  1.5[H]  /  DBili  x   /  AST  46[H]  /  ALT  22  /  AlkPhos  138[H]  06-19    CAPILLARY BLOOD GLUCOSE          LIVER FUNCTIONS - ( 22 Jun 2025 06:47 )  Alb: 2.6 g/dL / Pro: 5.3 g/dL / ALK PHOS: 133 U/L / ALT: 21 U/L / AST: 37 U/L / GGT: x             Urinalysis Basic - ( 22 Jun 2025 06:47 )    Color: x / Appearance: x / SG: x / pH: x  Gluc: 93 mg/dL / Ketone: x  / Bili: x / Urobili: x   Blood: x / Protein: x / Nitrite: x   Leuk Esterase: x / RBC: x / WBC x   Sq Epi: x / Non Sq Epi: x / Bacteria: x      rad< from: Xray Chest 1 View- PORTABLE-Urgent (Xray Chest 1 View- PORTABLE-Urgent .) (06.11.25 @ 11:41) >    TIME OF EXAMINATION: June 11, 2025 at 11:03 AM    EXAM: Portable chest    FINDINGS:    Hazy right lung base now obscuring the hemidiaphragm may represent small   layering effusion/atelectasis.  No focal consolidations.  Heart difficult to evaluate on this AP view.  No pneumothorax.  Old left proximal humerus fracture.        COMPARISON: June 4, 2025        IMPRESSION: Hazy right lung base likely small effusion/atelectasis may be   cardiogenic in origin.    --- End of Report ---            ANNALEE BOWDEN MD; Attending Radiologist  This document has been electronically signed. Jun 11 2025  2:10PM    < end of copied text >        RECENT CULTURES:        RESPIRATORY CULTURES:          Studies  Chest X-RAY  CT SCAN Chest   Venous Dopplers: LE:   CT Abdomen  Others              
Date of Service: 06-23-25 @ 14:10    Patient is a 70y old  Female who presents with a chief complaint of Transferred from [facility] to [place] for procedure evaluation (23 Jun 2025 11:00)      Any change in ROS: seems OK: on rooma ir     MEDICATIONS  (STANDING):  carvedilol 3.125 milliGRAM(s) Oral every 12 hours  folic acid 1 milliGRAM(s) Oral daily  lactulose Syrup 10 Gram(s) Oral three times a day  multivitamin 1 Tablet(s) Oral daily  rifAXIMin 550 milliGRAM(s) Oral two times a day  sertraline 50 milliGRAM(s) Oral daily  traZODone 50 milliGRAM(s) Oral at bedtime    MEDICATIONS  (PRN):  acetaminophen     Tablet .. 650 milliGRAM(s) Oral every 6 hours PRN Temp greater or equal to 38C (100.4F), Moderate Pain (4 - 6)  melatonin 3 milliGRAM(s) Oral at bedtime PRN Insomnia    Vital Signs Last 24 Hrs  T(C): 36.3 (23 Jun 2025 11:50), Max: 36.9 (23 Jun 2025 04:30)  T(F): 97.3 (23 Jun 2025 11:50), Max: 98.4 (23 Jun 2025 04:30)  HR: 65 (23 Jun 2025 11:50) (65 - 69)  BP: 134/81 (23 Jun 2025 11:50) (109/55 - 134/81)  BP(mean): --  RR: 18 (23 Jun 2025 11:50) (18 - 18)  SpO2: 93% (23 Jun 2025 11:50) (93% - 95%)    Parameters below as of 23 Jun 2025 11:50  Patient On (Oxygen Delivery Method): room air        I&O's Summary        Physical Exam:   GENERAL: NAD, well-groomed, well-developed  HEENT: JENA/   Atraumatic, Normocephalic  ENMT: No tonsillar erythema, exudates, or enlargement; Moist mucous membranes, Good dentition, No lesions  NECK: Supple, No JVD, Normal thyroid  CHEST/LUNG: Clear to auscultaion  CVS: Regular rate and rhythm; No murmurs, rubs, or gallops  GI: : Soft, Nontender, Nondistended; Bowel sounds present  NERVOUS SYSTEM:  Alert & Oriented X3  EXTREMITIES:  -edema  LYMPH: No lymphadenopathy noted  SKIN: No rashes or lesions  ENDOCRINOLOGY: No Thyromegaly  PSYCH: Appropriate    Labs:                              8.5    2.73  )-----------( 44       ( 22 Jun 2025 06:47 )             27.3                         9.3    4.32  )-----------( 63       ( 20 Jun 2025 11:43 )             29.5     06-22    143  |  112[H]  |  10  ----------------------------<  93  4.0   |  23  |  0.49[L]    Ca    8.1[L]      22 Jun 2025 06:47  Mg     1.9     06-22    TPro  5.3[L]  /  Alb  2.6[L]  /  TBili  1.3[H]  /  DBili  x   /  AST  37  /  ALT  21  /  AlkPhos  133[H]  06-22    CAPILLARY BLOOD GLUCOSE          LIVER FUNCTIONS - ( 22 Jun 2025 06:47 )  Alb: 2.6 g/dL / Pro: 5.3 g/dL / ALK PHOS: 133 U/L / ALT: 21 U/L / AST: 37 U/L / GGT: x             Urinalysis Basic - ( 22 Jun 2025 06:47 )    Color: x / Appearance: x / SG: x / pH: x  Gluc: 93 mg/dL / Ketone: x  / Bili: x / Urobili: x   Blood: x / Protein: x / Nitrite: x   Leuk Esterase: x / RBC: x / WBC x   Sq Epi: x / Non Sq Epi: x / Bacteria: x      r< from: MR Cardiac Velocity Flow Map (06.23.25 @ 10:19) >  1. The left ventricular ejection fraction measures 62%. No segmental wall   motion abnormality.  2. No MR evidence of intracardiac shunt.  3. No myocardial scar.  4. Thickening and calcification of the aortic valve leaflets. There is   aortic regurgitation (regurgitation fraction=26.5%) and stenosis (aortic   valve area=1.57 cm2).    < end of copied text >        RECENT CULTURES:        RESPIRATORY CULTURES:          Studies  Chest X-RAY  CT SCAN Chest   Venous Dopplers: LE:   CT Abdomen  Others              
[facility] Attending Follow-up Note     Patient seen and examined at bedside.    Overnight Events:     events noted     REVIEW OF SYSTEMS:  Constitutional:     [x ] negative [ ] fevers [ ] chills [ ] weight loss [ ] weight gain  HEENT:                  [x ] negative [ ] dry eyes [ ] eye irritation [ ] postnasal drip [ ] nasal congestion  CV:                         [ x] negative  [ ] chest pain [ ] orthopnea [ ] palpitations [ ] murmur  Resp:                     [ x] negative [ ] cough [ ] shortness of breath [ ] dyspnea [ ] wheezing [ ] sputum [ ]hemoptysis  GI:                          [ x] negative [ ] nausea [ ] vomiting [ ] diarrhea [ ] constipation [ ] abd pain [ ] dysphagia   :                        [ x] negative [ ] dysuria [ ] nocturia [ ] hematuria [ ] increased urinary frequency  Musculoskeletal: [ x] negative [ ] back pain [ ] myalgias [ ] arthralgias [ ] fracture  Skin:                       [ x] negative [ ] rash [ ] itch  Neurological:        [x ] negative [ ] headache [ ] dizziness [ ] syncope [ ] weakness [ ] numbness  Psychiatric:           [ x] negative [ ] anxiety [ ] depression  Endocrine:            [ x] negative [ ] diabetes [ ] thyroid problem  Heme/Lymph:      [ x] negative [ ] anemia [ ] bleeding problem  Allergic/Immune: [ x] negative [ ] itchy eyes [ ] nasal discharge [ ] hives [ ] angioedema    [ x] All other systems negative  [ ] Unable to assess ROS due to    Current Meds:  acetaminophen     Tablet .. 650 milliGRAM(s) Oral every 6 hours PRN  carvedilol 3.125 milliGRAM(s) Oral every 12 hours  folic acid 1 milliGRAM(s) Oral daily  lactulose Syrup 10 Gram(s) Oral three times a day  melatonin 3 milliGRAM(s) Oral at bedtime PRN  multivitamin 1 Tablet(s) Oral daily  rifAXIMin 550 milliGRAM(s) Oral two times a day  sertraline 50 milliGRAM(s) Oral daily  traZODone 50 milliGRAM(s) Oral at bedtime      PAST MEDICAL & SURGICAL HISTORY:  Ventral hernia  current      Migraine  pt states last 5-04-17.  Imitrex prn      Anxiety      Depression      Constipation      Lumbar herniated disc  s/p 2 epidural injections, last one 2-2017      Shoulder dislocation  history of, returned by pt. Pt states she can not stretch out arms fully      Hepatitis C  treated.  Pt states it was successful      History of ETOH abuse  pt states last drink 20 years ago attends AA meetings      History of cirrhosis of liver      S/P cholecystectomy  laparoscopic 2015      Incisional hernia  s//p surgical repair with mesh 2016      S/P colonoscopy  2016          Vitals:  T(F): 98 (06-19), Max: 98 (06-19)  HR: 73 (06-19) (55 - 73)  BP: 132/66 (06-19) (120/64 - 155/91)  RR: 18 (06-19)  SpO2: 95% (06-19)  I&O's Summary    18 Jun 2025 07:01  -  19 Jun 2025 07:00  --------------------------------------------------------  IN: 600 mL / OUT: 0 mL / NET: 600 mL        Physical Exam:  Appearance: No acute distress  HENT: No JVD   Cardiovascular: RRR, S1/S2, no murmurs  Respiratory: CTABL  Gastrointestinal: soft, NT ND, +BS  Musculoskeletal: No clubbing, no edema   Neurologic: Non-focal  Skin: No rashes, ecchymoses, or cyanosis                          9.4    3.77  )-----------( 52       ( 19 Jun 2025 07:59 )             30.4     06-19    144  |  111[H]  |  12  ----------------------------<  75  4.2   |  21[L]  |  0.47[L]    Ca    8.9      19 Jun 2025 06:59  Mg     1.9     06-19    TPro  6.0  /  Alb  3.1[L]  /  TBili  1.5[H]  /  DBili  x   /  AST  46[H]  /  ALT  22  /  AlkPhos  138[H]  06-19    PT/INR - ( 19 Jun 2025 07:03 )   PT: 15.8 sec;   INR: 1.39 ratio         PTT - ( 19 Jun 2025 07:03 )  PTT:35.9 sec              Cardiovascular Testings:     
Neurology      S: patient seen, doing okay         Medications: MEDICATIONS  (STANDING):  carvedilol 3.125 milliGRAM(s) Oral every 12 hours  folic acid 1 milliGRAM(s) Oral daily  lactulose Syrup 10 Gram(s) Oral three times a day  multivitamin 1 Tablet(s) Oral daily  rifAXIMin 550 milliGRAM(s) Oral two times a day  sertraline 50 milliGRAM(s) Oral daily  traZODone 50 milliGRAM(s) Oral at bedtime    MEDICATIONS  (PRN):  acetaminophen     Tablet .. 650 milliGRAM(s) Oral every 6 hours PRN Temp greater or equal to 38C (100.4F), Moderate Pain (4 - 6)  melatonin 3 milliGRAM(s) Oral at bedtime PRN Insomnia       Vitals:  Vital Signs Last 24 Hrs  T(C): 36.9 (23 Jun 2025 16:45), Max: 36.9 (23 Jun 2025 04:30)  T(F): 98.4 (23 Jun 2025 16:45), Max: 98.4 (23 Jun 2025 04:30)  HR: 68 (23 Jun 2025 16:45) (65 - 69)  BP: 119/65 (23 Jun 2025 16:45) (109/55 - 134/81)  BP(mean): --  RR: 18 (23 Jun 2025 16:45) (18 - 18)  SpO2: 93% (23 Jun 2025 16:45) (93% - 94%)    Parameters below as of 23 Jun 2025 16:45  Patient On (Oxygen Delivery Method): room air          General Exam:   General Appearance: Appropriately dressed and in no acute distress       Head: Normocephalic, atraumatic and no dysmorphic features  Ear, Nose, and Throat: Moist mucous membranes  CVS: S1S2+  Resp: No SOB, no wheeze or rhonchi  GI: soft NT/ND  Extremities: No edema or cyanosis  Skin: No bruises or rashes     Neurological Exam:  Mental Status: Awake, alert and oriented x 3.  Able to follow simple and complex verbal commands. Able to name and repeat. fluent speech. No obvious aphasia or dysarthria noted.   Cranial Nerves: PERRL, EOMI, VFFC, sensation V1-V3 intact,  no obvious facial asymmetry, equal elevation of palate, scm/trap 5/5, tongue is midline on protrusion. no obvious papilledema on fundoscopic exam. hearing is grossly intact.   Motor: Normal bulk, tone and strength throughout. Fine finger movements were intact and symmetric. no tremors or drift noted.    Sensation: Intact to light touch and pinprick throughout. no right/left confusion. no extinction to tactile on DSS.   Reflexes: 1+ throughout at biceps, brachioradialis, triceps, patellars and ankles bilaterally and equal. No clonus. R toe and L toe were both downgoing.  Coordination: No dysmetria on FNF    Gait: deferred     Data/Labs/Imaging which I personally reviewed.     Labs:                               8.5    2.73  )-----------( 44       ( 22 Jun 2025 06:47 )             27.3     06-22    143  |  112[H]  |  10  ----------------------------<  93  4.0   |  23  |  0.49[L]    Ca    8.1[L]      22 Jun 2025 06:47    TPro  5.3[L]  /  Alb  2.6[L]  /  TBili  1.3[H]  /  DBili  x   /  AST  37  /  ALT  21  /  AlkPhos  133[H]  06-22    LIVER FUNCTIONS - ( 22 Jun 2025 06:47 )  Alb: 2.6 g/dL / Pro: 5.3 g/dL / ALK PHOS: 133 U/L / ALT: 21 U/L / AST: 37 U/L / GGT: x             Urinalysis Basic - ( 22 Jun 2025 06:47 )    Color: x / Appearance: x / SG: x / pH: x  Gluc: 93 mg/dL / Ketone: x  / Bili: x / Urobili: x   Blood: x / Protein: x / Nitrite: x   Leuk Esterase: x / RBC: x / WBC x   Sq Epi: x / Non Sq Epi: x / Bacteria: x                 COMPARISON: CT head 5/11/2025.    CONTRAST:  IV Contrast: NONE  .    TECHNIQUE:  Serial axial images were obtained from the skull base to the   vertex using multi-slice helical technique. Sagittal and coronal   reformats were obtained.    FINDINGS:    VENTRICLES AND SULCI: Age appropriate involutional changes.  INTRA-AXIAL: No mass effect, acute hemorrhage, or midline shift.  There   are periventricular and subcortical white matter hypodensities,   consistent with microvascular type changes.  EXTRA-AXIAL: No mass or fluid collection. Basal cisterns are normal in   appearance.    VISUALIZED SINUSES:  Clear.  TYMPANOMASTOID CAVITIES:  Clear.  VISUALIZED ORBITS: Normal.  CALVARIUM: Irregularity of the right nasal bone is likely related to old   trauma. This is similar compared to previous exam..    MISCELLANEOUS: None.      IMPRESSION:  No acute intracranial hemorrhage, mass effect, or midline shift.       
Patient is a 70y old  Female who presents with a chief complaint of Transferred from [facility] to [place] for procedure evaluation (20 Jun 2025 14:31)    Date of servie : 06-20-25 @ 15:27  INTERVAL HPI/OVERNIGHT EVENTS:  T(C): 36.6 (06-20-25 @ 11:40), Max: 36.7 (06-19-25 @ 20:06)  HR: 58 (06-20-25 @ 11:40) (58 - 74)  BP: 147/77 (06-20-25 @ 11:40) (132/66 - 147/77)  RR: 18 (06-20-25 @ 11:40) (18 - 18)  SpO2: 94% (06-20-25 @ 11:40) (93% - 95%)  Wt(kg): --  I&O's Summary    20 Jun 2025 07:01  -  20 Jun 2025 15:27  --------------------------------------------------------  IN: 0 mL / OUT: 0 mL / NET: 0 mL        LABS:                        9.3    4.32  )-----------( 63       ( 20 Jun 2025 11:43 )             29.5     06-19    144  |  111[H]  |  12  ----------------------------<  75  4.2   |  21[L]  |  0.47[L]    Ca    8.9      19 Jun 2025 06:59  Mg     1.9     06-19    TPro  6.0  /  Alb  3.1[L]  /  TBili  1.5[H]  /  DBili  x   /  AST  46[H]  /  ALT  22  /  AlkPhos  138[H]  06-19    PT/INR - ( 19 Jun 2025 07:03 )   PT: 15.8 sec;   INR: 1.39 ratio         PTT - ( 19 Jun 2025 07:03 )  PTT:35.9 sec  Urinalysis Basic - ( 19 Jun 2025 06:59 )    Color: x / Appearance: x / SG: x / pH: x  Gluc: 75 mg/dL / Ketone: x  / Bili: x / Urobili: x   Blood: x / Protein: x / Nitrite: x   Leuk Esterase: x / RBC: x / WBC x   Sq Epi: x / Non Sq Epi: x / Bacteria: x      CAPILLARY BLOOD GLUCOSE      POCT Blood Glucose.: 90 mg/dL (20 Jun 2025 05:56)  POCT Blood Glucose.: 144 mg/dL (19 Jun 2025 23:56)        Urinalysis Basic - ( 19 Jun 2025 06:59 )    Color: x / Appearance: x / SG: x / pH: x  Gluc: 75 mg/dL / Ketone: x  / Bili: x / Urobili: x   Blood: x / Protein: x / Nitrite: x   Leuk Esterase: x / RBC: x / WBC x   Sq Epi: x / Non Sq Epi: x / Bacteria: x        MEDICATIONS  (STANDING):  carvedilol 3.125 milliGRAM(s) Oral every 12 hours  folic acid 1 milliGRAM(s) Oral daily  lactulose Syrup 10 Gram(s) Oral three times a day  multivitamin 1 Tablet(s) Oral daily  rifAXIMin 550 milliGRAM(s) Oral two times a day  sertraline 50 milliGRAM(s) Oral daily  traZODone 50 milliGRAM(s) Oral at bedtime    MEDICATIONS  (PRN):  acetaminophen     Tablet .. 650 milliGRAM(s) Oral every 6 hours PRN Temp greater or equal to 38C (100.4F), Moderate Pain (4 - 6)  melatonin 3 milliGRAM(s) Oral at bedtime PRN Insomnia          PHYSICAL EXAM:  GENERAL: NAD, well-groomed, well-developed  HEAD:  Atraumatic, Normocephalic  CHEST/LUNG: Clear to percussion bilaterally; No rales, rhonchi, wheezing, or rubs  HEART: Regular rate and rhythm; No murmurs, rubs, or gallops  ABDOMEN: Soft, Nontender, Nondistended; Bowel sounds present  EXTREMITIES:  2+ Peripheral Pulses, No clubbing, cyanosis, or edema  LYMPH: No lymphadenopathy noted  SKIN: No rashes or lesions    Care Discussed with Consultants/Other Providers [ ] YES  [ ] NO
Date of Service: 06-21-25 @ 15:04    Patient is a 70y old  Female who presents with a chief complaint of Transferred from [facility] to [place] for procedure evaluation (21 Jun 2025 13:22)      Any change in ROS: seems OK:  no sob:  no cough ; on room air     MEDICATIONS  (STANDING):  carvedilol 3.125 milliGRAM(s) Oral every 12 hours  folic acid 1 milliGRAM(s) Oral daily  lactulose Syrup 10 Gram(s) Oral three times a day  multivitamin 1 Tablet(s) Oral daily  rifAXIMin 550 milliGRAM(s) Oral two times a day  sertraline 50 milliGRAM(s) Oral daily  traZODone 50 milliGRAM(s) Oral at bedtime    MEDICATIONS  (PRN):  acetaminophen     Tablet .. 650 milliGRAM(s) Oral every 6 hours PRN Temp greater or equal to 38C (100.4F), Moderate Pain (4 - 6)  melatonin 3 milliGRAM(s) Oral at bedtime PRN Insomnia    Vital Signs Last 24 Hrs  T(C): 36.8 (21 Jun 2025 12:21), Max: 36.8 (20 Jun 2025 21:11)  T(F): 98.3 (21 Jun 2025 12:21), Max: 98.3 (20 Jun 2025 21:11)  HR: 63 (21 Jun 2025 12:21) (60 - 67)  BP: 117/70 (21 Jun 2025 12:21) (117/70 - 132/52)  BP(mean): --  RR: 18 (21 Jun 2025 12:21) (18 - 18)  SpO2: 96% (21 Jun 2025 12:21) (90% - 96%)    Parameters below as of 21 Jun 2025 12:21  Patient On (Oxygen Delivery Method): room air        I&O's Summary    20 Jun 2025 07:01  -  21 Jun 2025 07:00  --------------------------------------------------------  IN: 0 mL / OUT: 0 mL / NET: 0 mL          Physical Exam:   GENERAL: NAD, well-groomed, well-developed  HEENT: JENA/   Atraumatic, Normocephalic  ENMT: No tonsillar erythema, exudates, or enlargement; Moist mucous membranes, Good dentition, No lesions  NECK: Supple, No JVD, Normal thyroid  CHEST/LUNG: Clear to auscultaion  CVS: Regular rate and rhythm; No murmurs, rubs, or gallops  GI: : Soft, Nontender, Nondistended; Bowel sounds present  NERVOUS SYSTEM:  Alert & Oriented X3  EXTREMITIES:  2+ Peripheral Pulses, No clubbing, cyanosis, or edema  LYMPH: No lymphadenopathy noted  SKIN: No rashes or lesions  ENDOCRINOLOGY: No Thyromegaly  PSYCH: Appropriate    Labs:                              9.3    4.32  )-----------( 63       ( 20 Jun 2025 11:43 )             29.5                         9.4    3.77  )-----------( 52       ( 19 Jun 2025 07:59 )             30.4                         8.3    2.56  )-----------( 39       ( 18 Jun 2025 07:50 )             26.4     06-19    144  |  111[H]  |  12  ----------------------------<  75  4.2   |  21[L]  |  0.47[L]  06-18    143  |  111[H]  |  10  ----------------------------<  124[H]  3.6   |  21[L]  |  0.47[L]      TPro  6.0  /  Alb  3.1[L]  /  TBili  1.5[H]  /  DBili  x   /  AST  46[H]  /  ALT  22  /  AlkPhos  138[H]  06-19  TPro  5.4[L]  /  Alb  2.6[L]  /  TBili  1.4[H]  /  DBili  x   /  AST  37  /  ALT  19  /  AlkPhos  112  06-18    CAPILLARY BLOOD GLUCOSE        rad< from: Xray Chest 1 View- PORTABLE-Urgent (Xray Chest 1 View- PORTABLE-Urgent .) (06.11.25 @ 11:41) >  FINDINGS:    Hazy right lung base now obscuring the hemidiaphragm may represent small   layering effusion/atelectasis.  No focal consolidations.  Heart difficult to evaluate on this AP view.  No pneumothorax.  Old left proximal humerus fracture.        COMPARISON: June 4, 2025        IMPRESSION: Hazy right lung base likely small effusion/atelectasis may be   cardiogenic in origin.    --- End of Report ---            ANNALEE BOWDEN MD; Attending Radiologist  This document has been electronically signed. Jun 11 2025  2:10PM    < end of copied text >  < from: CT Abdomen and Pelvis w/wo IV Cont (06.09.25 @ 09:32) >  PERITONEUM/RETROPERITONEUM: Small volume ascites.  VESSELS: Atherosclerotic changes. Prominent splenorenal shunt. Patent   hepatic and portal veins.  LYMPH NODES: No lymphadenopathy.  ABDOMINAL WALL: Diffuse subcutaneous edema. Ventral hernia mesh.  BONES: Degenerative changes. Scoliosis. Mild anterolisthesis of L5 on S1.    IMPRESSION:    Cirrhosis with evidence of portal hypertension. Small volume ascites.    No evidence of hepatocellular carcinoma.    Small pancreatic cystic lesions, possibly sidebranch IPMN. Recommend   abdominal MRI for further evaluation.    Trace bilateral pleural effusions.        < end of copied text >  < from: CT Angio Chest PE Protocol w/ IV Cont (06.05.25 @ 01:45) >  No pulmonary embolus.    Mild interlobular septal thickening which may represent mild edema.    Mediastinal lymphadenopathy, stable and of uncertain etiology. Correlate   clinically.    Cirrhotic morphology of the liver.    Splenomegaly.    Diffuse esophageal wall thickening which can be seen with esophagitis.   Correlate clinically.    < end of copied text >                RECENT CULTURES:        RESPIRATORY CULTURES:          Studies  Chest X-RAY  CT SCAN Chest   Venous Dopplers: LE:   CT Abdomen  Others              
Neurology      S: patient seen, doing okay       Medications: MEDICATIONS  (STANDING):  carvedilol 3.125 milliGRAM(s) Oral every 12 hours  folic acid 1 milliGRAM(s) Oral daily  lactulose Syrup 10 Gram(s) Oral three times a day  multivitamin 1 Tablet(s) Oral daily  rifAXIMin 550 milliGRAM(s) Oral two times a day  sertraline 50 milliGRAM(s) Oral daily  traZODone 50 milliGRAM(s) Oral at bedtime    MEDICATIONS  (PRN):  acetaminophen     Tablet .. 650 milliGRAM(s) Oral every 6 hours PRN Temp greater or equal to 38C (100.4F), Moderate Pain (4 - 6)  melatonin 3 milliGRAM(s) Oral at bedtime PRN Insomnia       Vitals:  Vital Signs Last 24 Hrs  T(C): 36.4 (19 Jun 2025 11:48), Max: 36.7 (18 Jun 2025 20:00)  T(F): 97.5 (19 Jun 2025 11:48), Max: 98.1 (18 Jun 2025 20:00)  HR: 62 (19 Jun 2025 11:48) (55 - 69)  BP: 140/62 (19 Jun 2025 11:48) (120/64 - 155/91)  BP(mean): --  RR: 18 (19 Jun 2025 11:48) (18 - 18)  SpO2: 94% (19 Jun 2025 11:48) (91% - 94%)    Parameters below as of 19 Jun 2025 11:48  Patient On (Oxygen Delivery Method): room air           General Exam:   General Appearance: Appropriately dressed and in no acute distress       Head: Normocephalic, atraumatic and no dysmorphic features  Ear, Nose, and Throat: Moist mucous membranes  CVS: S1S2+  Resp: No SOB, no wheeze or rhonchi  GI: soft NT/ND  Extremities: No edema or cyanosis  Skin: No bruises or rashes     Neurological Exam:  Mental Status: Awake, alert and oriented x 3.  Able to follow simple and complex verbal commands. Able to name and repeat. fluent speech. No obvious aphasia or dysarthria noted.   Cranial Nerves: PERRL, EOMI, VFFC, sensation V1-V3 intact,  no obvious facial asymmetry, equal elevation of palate, scm/trap 5/5, tongue is midline on protrusion. no obvious papilledema on fundoscopic exam. hearing is grossly intact.   Motor: Normal bulk, tone and strength throughout. Fine finger movements were intact and symmetric. no tremors or drift noted.    Sensation: Intact to light touch and pinprick throughout. no right/left confusion. no extinction to tactile on DSS.   Reflexes: 1+ throughout at biceps, brachioradialis, triceps, patellars and ankles bilaterally and equal. No clonus. R toe and L toe were both downgoing.  Coordination: No dysmetria on FNF    Gait: deferred     Data/Labs/Imaging which I personally reviewed.     Labs:       LABS:                          9.4    3.77  )-----------( 52       ( 19 Jun 2025 07:59 )             30.4     06-19    144  |  111[H]  |  12  ----------------------------<  75  4.2   |  21[L]  |  0.47[L]    Ca    8.9      19 Jun 2025 06:59  Mg     1.9     06-19    TPro  6.0  /  Alb  3.1[L]  /  TBili  1.5[H]  /  DBili  x   /  AST  46[H]  /  ALT  22  /  AlkPhos  138[H]  06-19    LIVER FUNCTIONS - ( 19 Jun 2025 06:59 )  Alb: 3.1 g/dL / Pro: 6.0 g/dL / ALK PHOS: 138 U/L / ALT: 22 U/L / AST: 46 U/L / GGT: x           PT/INR - ( 19 Jun 2025 07:03 )   PT: 15.8 sec;   INR: 1.39 ratio         PTT - ( 19 Jun 2025 07:03 )  PTT:35.9 sec  Urinalysis Basic - ( 19 Jun 2025 06:59 )    Color: x / Appearance: x / SG: x / pH: x  Gluc: 75 mg/dL / Ketone: x  / Bili: x / Urobili: x   Blood: x / Protein: x / Nitrite: x   Leuk Esterase: x / RBC: x / WBC x   Sq Epi: x / Non Sq Epi: x / Bacteria: x             COMPARISON: CT head 5/11/2025.    CONTRAST:  IV Contrast: NONE  .    TECHNIQUE:  Serial axial images were obtained from the skull base to the   vertex using multi-slice helical technique. Sagittal and coronal   reformats were obtained.    FINDINGS:    VENTRICLES AND SULCI: Age appropriate involutional changes.  INTRA-AXIAL: No mass effect, acute hemorrhage, or midline shift.  There   are periventricular and subcortical white matter hypodensities,   consistent with microvascular type changes.  EXTRA-AXIAL: No mass or fluid collection. Basal cisterns are normal in   appearance.    VISUALIZED SINUSES:  Clear.  TYMPANOMASTOID CAVITIES:  Clear.  VISUALIZED ORBITS: Normal.  CALVARIUM: Irregularity of the right nasal bone is likely related to old   trauma. This is similar compared to previous exam..    MISCELLANEOUS: None.      IMPRESSION:  No acute intracranial hemorrhage, mass effect, or midline shift.       
Patient is a 70y old  Female who presents with a chief complaint of Transferred from [facility] to [place] for procedure evaluation (18 Jun 2025 16:59)    Date of servie : 06-18-25 @ 17:02  INTERVAL HPI/OVERNIGHT EVENTS:  T(C): 36.9 (06-18-25 @ 11:37), Max: 37.2 (06-17-25 @ 17:16)  HR: 62 (06-18-25 @ 11:37) (61 - 69)  BP: 148/74 (06-18-25 @ 11:37) (115/72 - 165/73)  RR: 18 (06-18-25 @ 11:37) (18 - 18)  SpO2: 94% (06-18-25 @ 11:37) (94% - 99%)  Wt(kg): --  I&O's Summary    17 Jun 2025 07:01  -  18 Jun 2025 07:00  --------------------------------------------------------  IN: 300 mL / OUT: 0 mL / NET: 300 mL        LABS:                        8.3    2.56  )-----------( 39       ( 18 Jun 2025 07:50 )             26.4     06-18    143  |  111[H]  |  10  ----------------------------<  124[H]  3.6   |  21[L]  |  0.47[L]    Ca    8.3[L]      18 Jun 2025 07:51  Phos  2.9     06-17  Mg     1.80     06-17    TPro  5.4[L]  /  Alb  2.6[L]  /  TBili  1.4[H]  /  DBili  x   /  AST  37  /  ALT  19  /  AlkPhos  112  06-18    PT/INR - ( 18 Jun 2025 07:51 )   PT: 17.0 sec;   INR: 1.50 ratio         PTT - ( 18 Jun 2025 07:51 )  PTT:36.6 sec  Urinalysis Basic - ( 18 Jun 2025 07:51 )    Color: x / Appearance: x / SG: x / pH: x  Gluc: 124 mg/dL / Ketone: x  / Bili: x / Urobili: x   Blood: x / Protein: x / Nitrite: x   Leuk Esterase: x / RBC: x / WBC x   Sq Epi: x / Non Sq Epi: x / Bacteria: x      CAPILLARY BLOOD GLUCOSE            Urinalysis Basic - ( 18 Jun 2025 07:51 )    Color: x / Appearance: x / SG: x / pH: x  Gluc: 124 mg/dL / Ketone: x  / Bili: x / Urobili: x   Blood: x / Protein: x / Nitrite: x   Leuk Esterase: x / RBC: x / WBC x   Sq Epi: x / Non Sq Epi: x / Bacteria: x        MEDICATIONS  (STANDING):  carvedilol 3.125 milliGRAM(s) Oral every 12 hours  folic acid 1 milliGRAM(s) Oral daily  lactulose Syrup 10 Gram(s) Oral three times a day  multivitamin 1 Tablet(s) Oral daily  rifAXIMin 550 milliGRAM(s) Oral two times a day  sertraline 50 milliGRAM(s) Oral daily  traZODone 50 milliGRAM(s) Oral at bedtime    MEDICATIONS  (PRN):  acetaminophen     Tablet .. 650 milliGRAM(s) Oral every 6 hours PRN Temp greater or equal to 38C (100.4F), Moderate Pain (4 - 6)  melatonin 3 milliGRAM(s) Oral at bedtime PRN Insomnia          PHYSICAL EXAM:  GENERAL: NAD, well-groomed, well-developed  HEAD:  Atraumatic, Normocephalic  CHEST/LUNG: Clear to percussion bilaterally; No rales, rhonchi, wheezing, or rubs  HEART: Regular rate and rhythm; No murmurs, rubs, or gallops  ABDOMEN: Soft, Nontender, Nondistended; Bowel sounds present  EXTREMITIES:  2+ Peripheral Pulses, No clubbing, cyanosis, or edema  LYMPH: No lymphadenopathy noted  SKIN: No rashes or lesions    Care Discussed with Consultants/Other Providers [ ] YES  [ ] NO
Patient is a 70y old  Female who presents with a chief complaint of Transferred from [facility] to [place] for procedure evaluation (22 Jun 2025 08:59)    Date of servie : 06-22-25 @ 11:58  INTERVAL HPI/OVERNIGHT EVENTS:  T(C): 36.7 (06-22-25 @ 11:11), Max: 36.8 (06-21-25 @ 12:21)  HR: 75 (06-22-25 @ 11:11) (63 - 76)  BP: 111/66 (06-22-25 @ 11:11) (110/71 - 124/51)  RR: 18 (06-22-25 @ 11:11) (18 - 18)  SpO2: 97% (06-22-25 @ 11:11) (93% - 97%)  Wt(kg): --  I&O's Summary      LABS:                        8.5    2.73  )-----------( 44       ( 22 Jun 2025 06:47 )             27.3     06-22    143  |  112[H]  |  10  ----------------------------<  93  4.0   |  23  |  0.49[L]    Ca    8.1[L]      22 Jun 2025 06:47    TPro  5.3[L]  /  Alb  2.6[L]  /  TBili  1.3[H]  /  DBili  x   /  AST  37  /  ALT  21  /  AlkPhos  133[H]  06-22      Urinalysis Basic - ( 22 Jun 2025 06:47 )    Color: x / Appearance: x / SG: x / pH: x  Gluc: 93 mg/dL / Ketone: x  / Bili: x / Urobili: x   Blood: x / Protein: x / Nitrite: x   Leuk Esterase: x / RBC: x / WBC x   Sq Epi: x / Non Sq Epi: x / Bacteria: x      CAPILLARY BLOOD GLUCOSE            Urinalysis Basic - ( 22 Jun 2025 06:47 )    Color: x / Appearance: x / SG: x / pH: x  Gluc: 93 mg/dL / Ketone: x  / Bili: x / Urobili: x   Blood: x / Protein: x / Nitrite: x   Leuk Esterase: x / RBC: x / WBC x   Sq Epi: x / Non Sq Epi: x / Bacteria: x        MEDICATIONS  (STANDING):  carvedilol 3.125 milliGRAM(s) Oral every 12 hours  folic acid 1 milliGRAM(s) Oral daily  lactulose Syrup 10 Gram(s) Oral three times a day  multivitamin 1 Tablet(s) Oral daily  rifAXIMin 550 milliGRAM(s) Oral two times a day  sertraline 50 milliGRAM(s) Oral daily  traZODone 50 milliGRAM(s) Oral at bedtime    MEDICATIONS  (PRN):  acetaminophen     Tablet .. 650 milliGRAM(s) Oral every 6 hours PRN Temp greater or equal to 38C (100.4F), Moderate Pain (4 - 6)  melatonin 3 milliGRAM(s) Oral at bedtime PRN Insomnia          PHYSICAL EXAM:  GENERAL: NAD, well-groomed, well-developed  HEAD:  Atraumatic, Normocephalic  CHEST/LUNG: Clear to percussion bilaterally; No rales, rhonchi, wheezing, or rubs  HEART: Regular rate and rhythm; No murmurs, rubs, or gallops  ABDOMEN: Soft, Nontender, Nondistended; Bowel sounds present  EXTREMITIES:  2+ Peripheral Pulses, No clubbing, cyanosis, or edema  LYMPH: No lymphadenopathy noted  SKIN: No rashes or lesions    Care Discussed with Consultants/Other Providers [ ] YES  [ ] NO
Patient is a 70y old  Female who presents with a chief complaint of Transferred from [facility] to [place] for procedure evaluation (23 Jun 2025 14:10)    Date of servie : 06-23-25 @ 15:56  INTERVAL HPI/OVERNIGHT EVENTS:  T(C): 36.3 (06-23-25 @ 11:50), Max: 36.9 (06-23-25 @ 04:30)  HR: 65 (06-23-25 @ 11:50) (65 - 69)  BP: 134/81 (06-23-25 @ 11:50) (109/55 - 134/81)  RR: 18 (06-23-25 @ 11:50) (18 - 18)  SpO2: 93% (06-23-25 @ 11:50) (93% - 95%)  Wt(kg): --  I&O's Summary    23 Jun 2025 07:01  -  23 Jun 2025 15:56  --------------------------------------------------------  IN: 320 mL / OUT: 0 mL / NET: 320 mL        LABS:                        8.5    2.73  )-----------( 44       ( 22 Jun 2025 06:47 )             27.3     06-22    143  |  112[H]  |  10  ----------------------------<  93  4.0   |  23  |  0.49[L]    Ca    8.1[L]      22 Jun 2025 06:47  Mg     1.9     06-22    TPro  5.3[L]  /  Alb  2.6[L]  /  TBili  1.3[H]  /  DBili  x   /  AST  37  /  ALT  21  /  AlkPhos  133[H]  06-22      Urinalysis Basic - ( 22 Jun 2025 06:47 )    Color: x / Appearance: x / SG: x / pH: x  Gluc: 93 mg/dL / Ketone: x  / Bili: x / Urobili: x   Blood: x / Protein: x / Nitrite: x   Leuk Esterase: x / RBC: x / WBC x   Sq Epi: x / Non Sq Epi: x / Bacteria: x      CAPILLARY BLOOD GLUCOSE            Urinalysis Basic - ( 22 Jun 2025 06:47 )    Color: x / Appearance: x / SG: x / pH: x  Gluc: 93 mg/dL / Ketone: x  / Bili: x / Urobili: x   Blood: x / Protein: x / Nitrite: x   Leuk Esterase: x / RBC: x / WBC x   Sq Epi: x / Non Sq Epi: x / Bacteria: x        MEDICATIONS  (STANDING):  carvedilol 3.125 milliGRAM(s) Oral every 12 hours  folic acid 1 milliGRAM(s) Oral daily  lactulose Syrup 10 Gram(s) Oral three times a day  multivitamin 1 Tablet(s) Oral daily  rifAXIMin 550 milliGRAM(s) Oral two times a day  sertraline 50 milliGRAM(s) Oral daily  traZODone 50 milliGRAM(s) Oral at bedtime    MEDICATIONS  (PRN):  acetaminophen     Tablet .. 650 milliGRAM(s) Oral every 6 hours PRN Temp greater or equal to 38C (100.4F), Moderate Pain (4 - 6)  melatonin 3 milliGRAM(s) Oral at bedtime PRN Insomnia          PHYSICAL EXAM:  GENERAL: NAD, well-groomed, well-developed  HEAD:  Atraumatic, Normocephalic  CHEST/LUNG: Clear to percussion bilaterally; No rales, rhonchi, wheezing, or rubs  HEART: Regular rate and rhythm; No murmurs, rubs, or gallops  ABDOMEN: Soft, Nontender, Nondistended; Bowel sounds present  EXTREMITIES:  2+ Peripheral Pulses, No clubbing, cyanosis, or edema  LYMPH: No lymphadenopathy noted  SKIN: No rashes or lesions    Care Discussed with Consultants/Other Providers [ ] YES  [ ] NO
  Priyank Ivey MD  Interventional Cardiology / Endovascular Specialist  [place] Office : [address] [place] [address] [place] [address]  Tel:   [place] Office : [address] [facility] [place] [address]  Tel: [phone]    Pt sitting in bed in NAD   	  MEDICATIONS:  carvedilol 3.125 milliGRAM(s) Oral every 12 hours    rifAXIMin 550 milliGRAM(s) Oral two times a day      acetaminophen     Tablet .. 650 milliGRAM(s) Oral every 6 hours PRN  melatonin 3 milliGRAM(s) Oral at bedtime PRN  sertraline 50 milliGRAM(s) Oral daily  traZODone 50 milliGRAM(s) Oral at bedtime    lactulose Syrup 10 Gram(s) Oral three times a day      folic acid 1 milliGRAM(s) Oral daily  multivitamin 1 Tablet(s) Oral daily      PAST MEDICAL/SURGICAL HISTORY  PAST MEDICAL & SURGICAL HISTORY:  Ventral hernia  current      Migraine  pt states last 5-04-17.  Imitrex prn      Anxiety      Depression      Constipation      Lumbar herniated disc  s/p 2 epidural injections, last one 2-2017      Shoulder dislocation  history of, returned by pt. Pt states she can not stretch out arms fully      Hepatitis C  treated.  Pt states it was successful      History of ETOH abuse  pt states last drink 20 years ago attends AA meetings      History of cirrhosis of liver      S/P cholecystectomy  laparoscopic 2015      Incisional hernia  s//p surgical repair with mesh 2016      S/P colonoscopy  2016          SOCIAL HISTORY: Substance Use (street drugs): ( x ) never used  (  ) other:    FAMILY HISTORY:      REVIEW OF SYSTEMS:  CONSTITUTIONAL: No fever, weight loss, or fatigue  EYES: No eye pain, visual disturbances, or discharge  ENMT:  No difficulty hearing, tinnitus, vertigo; No sinus or throat pain  BREASTS: No pain, masses, or nipple discharge  GASTROINTESTINAL: No abdominal or epigastric pain. No nausea, vomiting, or hematemesis; No diarrhea or constipation. No melena or hematochezia.  GENITOURINARY: No dysuria, frequency, hematuria, or incontinence  NEUROLOGICAL: No headaches, memory loss, loss of strength, numbness, or tremors  ENDOCRINE: No heat or cold intolerance; No hair loss  MUSCULOSKELETAL: No joint pain or swelling; No muscle, back, or extremity pain  PSYCHIATRIC: No depression, anxiety, mood swings, or difficulty sleeping  HEME/LYMPH: No easy bruising, or bleeding gums  All others negative    PHYSICAL EXAM:  T(C): 36.6 (06-20-25 @ 11:40), Max: 36.6 (06-20-25 @ 11:40)  HR: 58 (06-20-25 @ 11:40) (58 - 74)  BP: 147/77 (06-20-25 @ 11:40) (133/64 - 147/77)  RR: 18 (06-20-25 @ 11:40) (18 - 18)  SpO2: 94% (06-20-25 @ 11:40) (93% - 94%)  Wt(kg): --  I&O's Summary    20 Jun 2025 07:01  -  20 Jun 2025 20:30  --------------------------------------------------------  IN: 0 mL / OUT: 0 mL / NET: 0 mL    Appearance: No acute distress  HENT: No JVD   Cardiovascular: RRR, S1/S2, no murmurs  Respiratory: CTABL  Gastrointestinal: soft, NT ND, +BS  Musculoskeletal: No clubbing, no edema   Neurologic: Non-focal  Skin: No rashes, ecchymoses, or cyanosis                              9.3    4.32  )-----------( 63       ( 20 Jun 2025 11:43 )             29.5     06-19    144  |  111[H]  |  12  ----------------------------<  75  4.2   |  21[L]  |  0.47[L]    Ca    8.9      19 Jun 2025 06:59  Mg     1.9     06-19    TPro  6.0  /  Alb  3.1[L]  /  TBili  1.5[H]  /  DBili  x   /  AST  46[H]  /  ALT  22  /  AlkPhos  138[H]  06-19    proBNP:   Lipid Profile:   HgA1c:   TSH:     Consultant(s) Notes Reviewed:  [x ] YES  [ ] NO    Care Discussed with Consultants/Other Providers [ x] YES  [ ] NO    Imaging Personally Reviewed independently:  [x] YES  [ ] NO    All labs, radiologic studies, vitals, orders and medications list reviewed. Patient is seen and examined at bedside. Case discussed with medical team.              
Neurology      S: patient seen, doing okay        Medications: MEDICATIONS  (STANDING):  carvedilol 3.125 milliGRAM(s) Oral every 12 hours  folic acid 1 milliGRAM(s) Oral daily  lactulose Syrup 10 Gram(s) Oral three times a day  multivitamin 1 Tablet(s) Oral daily  rifAXIMin 550 milliGRAM(s) Oral two times a day  sertraline 50 milliGRAM(s) Oral daily  traZODone 50 milliGRAM(s) Oral at bedtime    MEDICATIONS  (PRN):  acetaminophen     Tablet .. 650 milliGRAM(s) Oral every 6 hours PRN Temp greater or equal to 38C (100.4F), Moderate Pain (4 - 6)  melatonin 3 milliGRAM(s) Oral at bedtime PRN Insomnia       Vitals:  Vital Signs Last 24 Hrs  T(C): 36.6 (22 Jun 2025 04:31), Max: 36.8 (21 Jun 2025 12:21)  T(F): 97.8 (22 Jun 2025 04:31), Max: 98.3 (21 Jun 2025 12:21)  HR: 64 (22 Jun 2025 04:31) (63 - 76)  BP: 124/51 (22 Jun 2025 04:31) (110/71 - 124/51)  BP(mean): --  RR: 18 (22 Jun 2025 04:31) (18 - 18)  SpO2: 93% (22 Jun 2025 04:31) (93% - 96%)    Parameters below as of 22 Jun 2025 04:31  Patient On (Oxygen Delivery Method): room air            General Exam:   General Appearance: Appropriately dressed and in no acute distress       Head: Normocephalic, atraumatic and no dysmorphic features  Ear, Nose, and Throat: Moist mucous membranes  CVS: S1S2+  Resp: No SOB, no wheeze or rhonchi  GI: soft NT/ND  Extremities: No edema or cyanosis  Skin: No bruises or rashes     Neurological Exam:  Mental Status: Awake, alert and oriented x 3.  Able to follow simple and complex verbal commands. Able to name and repeat. fluent speech. No obvious aphasia or dysarthria noted.   Cranial Nerves: PERRL, EOMI, VFFC, sensation V1-V3 intact,  no obvious facial asymmetry, equal elevation of palate, scm/trap 5/5, tongue is midline on protrusion. no obvious papilledema on fundoscopic exam. hearing is grossly intact.   Motor: Normal bulk, tone and strength throughout. Fine finger movements were intact and symmetric. no tremors or drift noted.    Sensation: Intact to light touch and pinprick throughout. no right/left confusion. no extinction to tactile on DSS.   Reflexes: 1+ throughout at biceps, brachioradialis, triceps, patellars and ankles bilaterally and equal. No clonus. R toe and L toe were both downgoing.  Coordination: No dysmetria on FNF    Gait: deferred     Data/Labs/Imaging which I personally reviewed.     Labs:      LABS:                          8.5    2.73  )-----------( 44       ( 22 Jun 2025 06:47 )             27.3     06-22    143  |  112[H]  |  10  ----------------------------<  93  4.0   |  23  |  0.49[L]    Ca    8.1[L]      22 Jun 2025 06:47    TPro  5.3[L]  /  Alb  2.6[L]  /  TBili  1.3[H]  /  DBili  x   /  AST  37  /  ALT  21  /  AlkPhos  133[H]  06-22    LIVER FUNCTIONS - ( 22 Jun 2025 06:47 )  Alb: 2.6 g/dL / Pro: 5.3 g/dL / ALK PHOS: 133 U/L / ALT: 21 U/L / AST: 37 U/L / GGT: x             Urinalysis Basic - ( 22 Jun 2025 06:47 )    Color: x / Appearance: x / SG: x / pH: x  Gluc: 93 mg/dL / Ketone: x  / Bili: x / Urobili: x   Blood: x / Protein: x / Nitrite: x   Leuk Esterase: x / RBC: x / WBC x   Sq Epi: x / Non Sq Epi: x / Bacteria: x                 COMPARISON: CT head 5/11/2025.    CONTRAST:  IV Contrast: NONE  .    TECHNIQUE:  Serial axial images were obtained from the skull base to the   vertex using multi-slice helical technique. Sagittal and coronal   reformats were obtained.    FINDINGS:    VENTRICLES AND SULCI: Age appropriate involutional changes.  INTRA-AXIAL: No mass effect, acute hemorrhage, or midline shift.  There   are periventricular and subcortical white matter hypodensities,   consistent with microvascular type changes.  EXTRA-AXIAL: No mass or fluid collection. Basal cisterns are normal in   appearance.    VISUALIZED SINUSES:  Clear.  TYMPANOMASTOID CAVITIES:  Clear.  VISUALIZED ORBITS: Normal.  CALVARIUM: Irregularity of the right nasal bone is likely related to old   trauma. This is similar compared to previous exam..    MISCELLANEOUS: None.      IMPRESSION:  No acute intracranial hemorrhage, mass effect, or midline shift.       
Patient is a 70y old  Female who presents with a chief complaint of Transferred from [facility] to [place] for procedure evaluation (19 Jun 2025 15:33)    Date of servie : 06-19-25 @ 16:55  INTERVAL HPI/OVERNIGHT EVENTS:  T(C): 36.4 (06-19-25 @ 11:48), Max: 36.7 (06-18-25 @ 20:00)  HR: 62 (06-19-25 @ 11:48) (55 - 69)  BP: 140/62 (06-19-25 @ 11:48) (120/64 - 155/91)  RR: 18 (06-19-25 @ 11:48) (18 - 18)  SpO2: 94% (06-19-25 @ 11:48) (91% - 94%)  Wt(kg): --  I&O's Summary    18 Jun 2025 07:01  -  19 Jun 2025 07:00  --------------------------------------------------------  IN: 600 mL / OUT: 0 mL / NET: 600 mL        LABS:                        9.4    3.77  )-----------( 52       ( 19 Jun 2025 07:59 )             30.4     06-19    144  |  111[H]  |  12  ----------------------------<  75  4.2   |  21[L]  |  0.47[L]    Ca    8.9      19 Jun 2025 06:59  Mg     1.9     06-19    TPro  6.0  /  Alb  3.1[L]  /  TBili  1.5[H]  /  DBili  x   /  AST  46[H]  /  ALT  22  /  AlkPhos  138[H]  06-19    PT/INR - ( 19 Jun 2025 07:03 )   PT: 15.8 sec;   INR: 1.39 ratio         PTT - ( 19 Jun 2025 07:03 )  PTT:35.9 sec  Urinalysis Basic - ( 19 Jun 2025 06:59 )    Color: x / Appearance: x / SG: x / pH: x  Gluc: 75 mg/dL / Ketone: x  / Bili: x / Urobili: x   Blood: x / Protein: x / Nitrite: x   Leuk Esterase: x / RBC: x / WBC x   Sq Epi: x / Non Sq Epi: x / Bacteria: x      CAPILLARY BLOOD GLUCOSE            Urinalysis Basic - ( 19 Jun 2025 06:59 )    Color: x / Appearance: x / SG: x / pH: x  Gluc: 75 mg/dL / Ketone: x  / Bili: x / Urobili: x   Blood: x / Protein: x / Nitrite: x   Leuk Esterase: x / RBC: x / WBC x   Sq Epi: x / Non Sq Epi: x / Bacteria: x        MEDICATIONS  (STANDING):  carvedilol 3.125 milliGRAM(s) Oral every 12 hours  folic acid 1 milliGRAM(s) Oral daily  lactulose Syrup 10 Gram(s) Oral three times a day  multivitamin 1 Tablet(s) Oral daily  rifAXIMin 550 milliGRAM(s) Oral two times a day  sertraline 50 milliGRAM(s) Oral daily  traZODone 50 milliGRAM(s) Oral at bedtime    MEDICATIONS  (PRN):  acetaminophen     Tablet .. 650 milliGRAM(s) Oral every 6 hours PRN Temp greater or equal to 38C (100.4F), Moderate Pain (4 - 6)  melatonin 3 milliGRAM(s) Oral at bedtime PRN Insomnia          PHYSICAL EXAM:  GENERAL: NAD, well-groomed, well-developed  HEAD:  Atraumatic, Normocephalic  CHEST/LUNG: Clear to percussion bilaterally; No rales, rhonchi, wheezing, or rubs  HEART: Regular rate and rhythm; No murmurs, rubs, or gallops  ABDOMEN: Soft, Nontender, Nondistended; Bowel sounds present  EXTREMITIES:  2+ Peripheral Pulses, No clubbing, cyanosis, or edema  LYMPH: No lymphadenopathy noted  SKIN: No rashes or lesions    Care Discussed with Consultants/Other Providers [ ] YES  [ ] NO
Patient is a 70y old  Female who presents with a chief complaint of Transferred from [facility] to [place] for procedure evaluation (20 Jun 2025 15:27)    Date of servie : 06-21-25 @ 13:22  INTERVAL HPI/OVERNIGHT EVENTS:  T(C): 36.8 (06-21-25 @ 12:21), Max: 36.8 (06-20-25 @ 21:11)  HR: 63 (06-21-25 @ 12:21) (60 - 67)  BP: 117/70 (06-21-25 @ 12:21) (117/70 - 132/52)  RR: 18 (06-21-25 @ 12:21) (18 - 18)  SpO2: 96% (06-21-25 @ 12:21) (90% - 96%)  Wt(kg): --  I&O's Summary    20 Jun 2025 07:01  -  21 Jun 2025 07:00  --------------------------------------------------------  IN: 0 mL / OUT: 0 mL / NET: 0 mL        LABS:                        9.3    4.32  )-----------( 63       ( 20 Jun 2025 11:43 )             29.5               CAPILLARY BLOOD GLUCOSE                MEDICATIONS  (STANDING):  carvedilol 3.125 milliGRAM(s) Oral every 12 hours  folic acid 1 milliGRAM(s) Oral daily  lactulose Syrup 10 Gram(s) Oral three times a day  multivitamin 1 Tablet(s) Oral daily  rifAXIMin 550 milliGRAM(s) Oral two times a day  sertraline 50 milliGRAM(s) Oral daily  traZODone 50 milliGRAM(s) Oral at bedtime    MEDICATIONS  (PRN):  acetaminophen     Tablet .. 650 milliGRAM(s) Oral every 6 hours PRN Temp greater or equal to 38C (100.4F), Moderate Pain (4 - 6)  melatonin 3 milliGRAM(s) Oral at bedtime PRN Insomnia          PHYSICAL EXAM:  GENERAL: NAD, well-groomed, well-developed  HEAD:  Atraumatic, Normocephalic  CHEST/LUNG: Clear to percussion bilaterally; No rales, rhonchi, wheezing, or rubs  HEART: Regular rate and rhythm; No murmurs, rubs, or gallops  ABDOMEN: Soft, Nontender, Nondistended; Bowel sounds present  EXTREMITIES:  2+ Peripheral Pulses, No clubbing, cyanosis, or edema  LYMPH: No lymphadenopathy noted  SKIN: No rashes or lesions    Care Discussed with Consultants/Other Providers [ ] YES  [ ] NO
Priyank Ivey MD  Interventional Cardiology / Advance Heart Failure and Cardiac Transplant Specialist  [place] Office : [address] [place] [address] [place] [address]  Tel:   [place] Office : [address] [facility] [place] [address]  Tel: [phone]       Pt is lying in bed comfortable not in distress, no chest pains no SOB no palpitations  	  MEDICATIONS:  carvedilol 3.125 milliGRAM(s) Oral every 12 hours    rifAXIMin 550 milliGRAM(s) Oral two times a day      acetaminophen     Tablet .. 650 milliGRAM(s) Oral every 6 hours PRN  melatonin 3 milliGRAM(s) Oral at bedtime PRN  sertraline 50 milliGRAM(s) Oral daily  traZODone 50 milliGRAM(s) Oral at bedtime    lactulose Syrup 10 Gram(s) Oral three times a day      folic acid 1 milliGRAM(s) Oral daily  multivitamin 1 Tablet(s) Oral daily      PAST MEDICAL/SURGICAL HISTORY  PAST MEDICAL & SURGICAL HISTORY:  Ventral hernia  current      Migraine  pt states last 5-04-17.  Imitrex prn      Anxiety      Depression      Constipation      Lumbar herniated disc  s/p 2 epidural injections, last one 2-2017      Shoulder dislocation  history of, returned by pt. Pt states she can not stretch out arms fully      Hepatitis C  treated.  Pt states it was successful      History of ETOH abuse  pt states last drink 20 years ago attends AA meetings      History of cirrhosis of liver      S/P cholecystectomy  laparoscopic 2015      Incisional hernia  s//p surgical repair with mesh 2016      S/P colonoscopy  2016          SOCIAL HISTORY: Substance Use (street drugs): ( x ) never used  (  ) other:    FAMILY HISTORY:           PHYSICAL EXAM:  T(C): 36.3 (06-23-25 @ 11:50), Max: 36.9 (06-23-25 @ 04:30)  HR: 65 (06-23-25 @ 11:50) (65 - 69)  BP: 134/81 (06-23-25 @ 11:50) (109/55 - 134/81)  RR: 18 (06-23-25 @ 11:50) (18 - 18)  SpO2: 93% (06-23-25 @ 11:50) (93% - 95%)  Wt(kg): --  I&O's Summary        GENERAL: NAD  EYES:   PERRLA   ENMT:   Moist mucous membranes, Good dentition, No lesions  Cardiovascular: Normal S1 S2, No JVD, 2/6 ejection systolic murmurs, No edema  Respiratory: Lungs clear to auscultation	  Gastrointestinal:  Soft, Non-tender, + BS	  Extremities: no edema                                    8.5    2.73  )-----------( 44       ( 22 Jun 2025 06:47 )             27.3     06-22    143  |  112[H]  |  10  ----------------------------<  93  4.0   |  23  |  0.49[L]    Ca    8.1[L]      22 Jun 2025 06:47  Mg     1.9     06-22    TPro  5.3[L]  /  Alb  2.6[L]  /  TBili  1.3[H]  /  DBili  x   /  AST  37  /  ALT  21  /  AlkPhos  133[H]  06-22    proBNP:   Lipid Profile:   HgA1c:   TSH:     Consultant(s) Notes Reviewed:  [x ] YES  [ ] NO    Care Discussed with Consultants/Other Providers [ x] YES  [ ] NO    Imaging Personally Reviewed independently:  [x] YES  [ ] NO    All labs, radiologic studies, vitals, orders and medications list reviewed. Patient is seen and examined at bedside. Case discussed with medical team.

## 2025-07-05 ENCOUNTER — EMERGENCY (EMERGENCY)
Facility: HOSPITAL | Age: 70
LOS: 1 days | End: 2025-07-05
Attending: EMERGENCY MEDICINE | Admitting: EMERGENCY MEDICINE
Payer: MEDICARE

## 2025-07-05 VITALS
HEART RATE: 67 BPM | OXYGEN SATURATION: 92 % | DIASTOLIC BLOOD PRESSURE: 68 MMHG | RESPIRATION RATE: 17 BRPM | TEMPERATURE: 98 F | SYSTOLIC BLOOD PRESSURE: 138 MMHG

## 2025-07-05 VITALS
HEART RATE: 82 BPM | WEIGHT: 145.06 LBS | RESPIRATION RATE: 20 BRPM | SYSTOLIC BLOOD PRESSURE: 131 MMHG | TEMPERATURE: 98 F | OXYGEN SATURATION: 93 % | HEIGHT: 62 IN | DIASTOLIC BLOOD PRESSURE: 63 MMHG

## 2025-07-05 DIAGNOSIS — K43.2 INCISIONAL HERNIA WITHOUT OBSTRUCTION OR GANGRENE: Chronic | ICD-10-CM

## 2025-07-05 DIAGNOSIS — Z98.890 OTHER SPECIFIED POSTPROCEDURAL STATES: Chronic | ICD-10-CM

## 2025-07-05 DIAGNOSIS — Z90.49 ACQUIRED ABSENCE OF OTHER SPECIFIED PARTS OF DIGESTIVE TRACT: Chronic | ICD-10-CM

## 2025-07-05 LAB
ALBUMIN SERPL ELPH-MCNC: 3 G/DL — LOW (ref 3.3–5)
ALP SERPL-CCNC: 126 U/L — HIGH (ref 40–120)
ALT FLD-CCNC: 23 U/L — SIGNIFICANT CHANGE UP (ref 4–33)
ANION GAP SERPL CALC-SCNC: 10 MMOL/L — SIGNIFICANT CHANGE UP (ref 7–14)
APTT BLD: 35.9 SEC — SIGNIFICANT CHANGE UP (ref 26.1–36.8)
AST SERPL-CCNC: 45 U/L — HIGH (ref 4–32)
BILIRUB SERPL-MCNC: 1.8 MG/DL — HIGH (ref 0.2–1.2)
BUN SERPL-MCNC: 14 MG/DL — SIGNIFICANT CHANGE UP (ref 7–23)
CALCIUM SERPL-MCNC: 8.8 MG/DL — SIGNIFICANT CHANGE UP (ref 8.4–10.5)
CHLORIDE SERPL-SCNC: 110 MMOL/L — HIGH (ref 98–107)
CO2 SERPL-SCNC: 21 MMOL/L — LOW (ref 22–31)
CREAT SERPL-MCNC: 0.47 MG/DL — LOW (ref 0.5–1.3)
EGFR: 102 ML/MIN/1.73M2 — SIGNIFICANT CHANGE UP
EGFR: 102 ML/MIN/1.73M2 — SIGNIFICANT CHANGE UP
ETHANOL SERPL-MCNC: <10 MG/DL — SIGNIFICANT CHANGE UP
GLUCOSE SERPL-MCNC: 76 MG/DL — SIGNIFICANT CHANGE UP (ref 70–99)
HCT VFR BLD CALC: 27.7 % — LOW (ref 34.5–45)
HGB BLD-MCNC: 8.6 G/DL — LOW (ref 11.5–15.5)
IMMATURE PLATELET FRACTION #: 1 K/UL — LOW (ref 4.7–11.1)
IMMATURE PLATELET FRACTION %: 2.5 % — SIGNIFICANT CHANGE UP (ref 1.6–4.9)
INR BLD: 1.43 RATIO — HIGH (ref 0.85–1.16)
MCHC RBC-ENTMCNC: 29.8 PG — SIGNIFICANT CHANGE UP (ref 27–34)
MCHC RBC-ENTMCNC: 31 G/DL — LOW (ref 32–36)
MCV RBC AUTO: 95.8 FL — SIGNIFICANT CHANGE UP (ref 80–100)
NRBC # BLD AUTO: 0 K/UL — SIGNIFICANT CHANGE UP (ref 0–0)
NRBC # FLD: 0 K/UL — SIGNIFICANT CHANGE UP (ref 0–0)
NRBC BLD AUTO-RTO: 0 /100 WBCS — SIGNIFICANT CHANGE UP (ref 0–0)
PLATELET # BLD AUTO: 40 K/UL — LOW (ref 150–400)
PMV BLD: 12.5 FL — SIGNIFICANT CHANGE UP (ref 7–13)
POTASSIUM SERPL-MCNC: 4.2 MMOL/L — SIGNIFICANT CHANGE UP (ref 3.5–5.3)
POTASSIUM SERPL-SCNC: 4.2 MMOL/L — SIGNIFICANT CHANGE UP (ref 3.5–5.3)
PROT SERPL-MCNC: 6.2 G/DL — SIGNIFICANT CHANGE UP (ref 6–8.3)
PROTHROM AB SERPL-ACNC: 16.9 SEC — HIGH (ref 9.9–13.4)
RBC # BLD: 2.89 M/UL — LOW (ref 3.8–5.2)
RBC # FLD: 17.3 % — HIGH (ref 10.3–14.5)
SODIUM SERPL-SCNC: 141 MMOL/L — SIGNIFICANT CHANGE UP (ref 135–145)
WBC # BLD: 3.58 K/UL — LOW (ref 3.8–10.5)
WBC # FLD AUTO: 3.58 K/UL — LOW (ref 3.8–10.5)

## 2025-07-05 PROCEDURE — 99284 EMERGENCY DEPT VISIT MOD MDM: CPT

## 2025-07-05 PROCEDURE — 70486 CT MAXILLOFACIAL W/O DYE: CPT | Mod: 26

## 2025-07-05 PROCEDURE — 70450 CT HEAD/BRAIN W/O DYE: CPT | Mod: 26

## 2025-07-05 NOTE — ED PROVIDER NOTE - NSFOLLOWUPINSTRUCTIONS_ED_ALL_ED_FT
You were seen in the Emergency Department today following a head injury after a fall.    Please continue to use ice for the injured area to reduce swelling. You should not take Advil (Ibuprofen) due to your liver condition. You can take Tylenol (Acetaminophen) as needed for pain.    Return to the ED if you experience worsening symptoms, including but not limited to increasing severe pain, fever, sudden changes to vision.

## 2025-07-05 NOTE — ED PROVIDER NOTE - EYES, MLM
LT eye periorbital hematoma with marked swelling LT eye periorbital ecchymoses with marked swelling LT eye periorbital ecchymoses with marked swelling. Lateral movements of eye intact. Chemosis of LT lower eyelid. Baseline vision intact.

## 2025-07-05 NOTE — ED PROVIDER NOTE - PRINCIPAL DIAGNOSIS
A (Catheter 6fr Fr4 Crv 100cm Full Lgth Wire Braid) catheter was used to cross the aortic valve and placed in the left ventricle.  LV pressure was recorded and measured.  Closed head injury

## 2025-07-05 NOTE — ED PROVIDER NOTE - NEUROLOGICAL, MLM
Alert and oriented, no focal deficits, no motor or sensory deficits. Alert and oriented. Vibratory sense of LE diminished, RT>LT. No UE and LE motor deficits. Vibratory sense of LE diminished, RT>LT. No UE and LE motor deficits.

## 2025-07-05 NOTE — ED ADULT NURSE NOTE - OBJECTIVE STATEMENT
pt states fell in kitchen on water andhit face on chair denies loc. pt arrives with bruise to left eye with some swelling placed on cm nsr pulse ox 96 on 2 l nc

## 2025-07-05 NOTE — ED PROVIDER NOTE - CLINICAL SUMMARY MEDICAL DECISION MAKING FREE TEXT BOX
70year old female PMH ETOH dependency presents with a head injury following a fall. Differential diagnoses include but not limited to mild traumatic brain injury, intracranial hemorrhage, basilar skull fracture, orbital trauma. VS stable. GCS 15. 70year old female PMH ETOH dependency presents with a head injury following a fall. Differential diagnoses include but not limited to mild traumatic brain injury, intracranial hemorrhage, basilar skull fracture, orbital trauma. VS stable. GCS 15. Noncontrast head and maxillofacial CT ordered to r/o SDH and basilar skull fracture. 70year old female PMH ETOH dependency presents with a head injury following a fall. Differential diagnoses include but not limited to mild traumatic brain injury, intracranial hemorrhage, basilar skull fracture, orbital trauma. VS stable. GCS 15. Noncon head and maxillofacial CT ordered to r/o SDH and basilar skull fracture. 70year old female PMH ETOH dependency presents with a head injury following a fall. Differential diagnoses include but not limited to mild traumatic brain injury, intracranial hemorrhage, basilar skull fracture, orbital trauma. VS stable. GCS 15. Noncon head and maxillofacial CT ordered to r/o SDH and basilar skull fracture.    Labs reviewed. Pancytopenic, increased PTT, elevated liver enzymes expected due to her h/o liver disease. CT scans did not show evidence of intracranial pathology or fractures. Patient advised to continue using ice as needed to reduce swelling. Patient educated on her abnormal lab results and advised to follow up with her PCP. Patient d/c to home. 70year old female PMH ETOH dependency liver cirrhosis presents with a head injury following a fall. Differential diagnoses include but not limited to mild traumatic brain injury, intracranial hemorrhage, basilar skull fracture, orbital trauma. VS stable. GCS 15. Noncon head and maxillofacial CT ordered to r/o SDH and basilar skull fracture.    Labs reviewed. Pancytopenic, increased PTT, elevated liver enzymes expected due to her h/o liver disease. CT scans did not show evidence of intracranial pathology or fractures. Patient advised to continue using ice as needed to reduce swelling. Patient educated on her abnormal lab results and advised to follow up with her PCP. Patient d/c to home.

## 2025-07-05 NOTE — ED PROVIDER NOTE - NSICDXPASTSURGICALHX_GEN_ALL_CORE_FT
no PAST SURGICAL HISTORY:  Incisional hernia s//p surgical repair with mesh 2016    S/P cholecystectomy laparoscopic 2015    S/P colonoscopy 2016

## 2025-07-05 NOTE — ED ADULT TRIAGE NOTE - CHIEF COMPLAINT QUOTE
c/o left eye pain, periorbital edema and ecchymosis s/p trip and fall today reports fell due water being on the floor in the kitchen, denies LOC. pt is somnolent at times phx of low O2 but not on home oxygen. extremities are strong and equal B/l no adduction or abduction of extremities noted, denies blood thinner use. Additional Phx of ETOH abuse, cirrhosis, hepatitis C.

## 2025-07-05 NOTE — ED PROVIDER NOTE - OBJECTIVE STATEMENT
70year old female PMH ETOH dependency presents with a head injury following a fall. Fall occurred 1.5 hours ago. Was a mechanical fall due to wet floor, did not feel dizzy. Fell face first onto chair and hit her LT eyebrow region. Does not believe she hit her eye. Has no pain around injury, no neck or back pain, or UE or LE pain. Denies fevers, chills, chest pain, SOB, MSK pain. 70year old female PMH ETOH dependency presents with a head injury following a fall. Fall occurred 1.5 hours ago. Was a mechanical fall due to wet floor, did not feel dizzy. Fell face first onto chair and hit her LT eyebrow region. No LOC, no blood thinners. Does not believe she hit her eye. Has no pain around injury, no neck or back pain, or UE or LE pain. Denies fevers, chills, chest pain, SOB, MSK pain. 70year old female PMH ETOH dependency liver cirrhosis presents with a head injury following a fall. Fall occurred 1.5 hours ago. Was a mechanical fall due to wet floor, did not feel dizzy. Fell face first onto chair and hit her LT eyebrow region. No LOC, no blood thinners. Does not believe she hit her eye. Has no pain around injury, no neck or back pain, or UE or LE pain. Denies fevers, chills, chest pain, SOB, MSK pain.

## 2025-07-05 NOTE — ED PROVIDER NOTE - PATIENT PORTAL LINK FT
You can access the FollowMyHealth Patient Portal offered by [facility] by registering at the following website: [email or web address]. By joining [employer]’s FollowMyHealth portal, you will also be able to view your health information using other applications (apps) compatible with our system.

## 2025-07-05 NOTE — ED PROVIDER NOTE - ATTENDING CONTRIBUTION TO CARE
ronit: 70-year-old woman states she had a slip and fall on water.  Chart reports that she has a history of alcohol abuse however she says she has not had a drink in 30 years.  Patient here has very ecchymotic left eye and orbital area.  Fall occurred an hour and a half ago.  Not on any blood thinners.    Past medical history from chart review includes depression anxiety hep C history of cirrhosis status post cholecystectomy status post colonoscopy 2016    Vital signs include blood pressure 131/63 respiratory rate 20 heart rate 82 temp 97.9 O2 sat is 93  Pt alert and can phonate well  h large eccymosis on left orbit, tender,  eye barely could pry open, pos chemosis and pt states she can see from that eye.  pt can move eyeball to lateral medial sides  perrl, conj clear, sclera anicteric,  neck supple  abd soft no r/g/t  ext no edema no deformities  neueo awake, lucid normal gait moves all extremities with strength  psych borderline inappropriate affect, slow to anbswer questions but appears cooperative  vs reasonable    Patient without tremor denies any recent alcohol use although with odd affect alcohol levels added.  History of cirrhosis as well.  He has no extraocular muscle entrapment that we can see at this time.  Will CT head and max face to evaluate abdomen soft no need for CAT scan there nurses are okay    I performed a history and physical exam of the patient and discussed their management with the resident and /or advanced care provider. I personally made/approved the management plan and take responsibility for the patient management. I reviewed the resident and /or ACP's note and agree with the documented findings and plan of care. My medical decison making and observations are found above.

## 2025-07-06 PROBLEM — Z87.19 PERSONAL HISTORY OF OTHER DISEASES OF THE DIGESTIVE SYSTEM: Chronic | Status: ACTIVE | Noted: 2025-06-17

## 2025-07-06 LAB
ANISOCYTOSIS BLD QL: ABNORMAL
BASOPHILS # BLD AUTO: 0.02 K/UL — SIGNIFICANT CHANGE UP (ref 0–0.2)
BASOPHILS # BLD MANUAL: 0 K/UL — SIGNIFICANT CHANGE UP (ref 0–0.2)
BASOPHILS NFR BLD AUTO: 0.6 % — SIGNIFICANT CHANGE UP (ref 0–2)
BASOPHILS NFR BLD MANUAL: 0 % — SIGNIFICANT CHANGE UP (ref 0–2)
BURR CELLS BLD QL SMEAR: SLIGHT — SIGNIFICANT CHANGE UP
ELLIPTOCYTES BLD QL SMEAR: SLIGHT — SIGNIFICANT CHANGE UP
EOSINOPHIL # BLD AUTO: 0.2 K/UL — SIGNIFICANT CHANGE UP (ref 0–0.5)
EOSINOPHIL # BLD MANUAL: 0.09 K/UL — SIGNIFICANT CHANGE UP (ref 0–0.5)
EOSINOPHIL NFR BLD AUTO: 5.6 % — SIGNIFICANT CHANGE UP (ref 0–6)
EOSINOPHIL NFR BLD MANUAL: 2.6 % — SIGNIFICANT CHANGE UP (ref 0–6)
GIANT PLATELETS BLD QL SMEAR: PRESENT
IMM GRANULOCYTES # BLD AUTO: 0.01 K/UL — SIGNIFICANT CHANGE UP (ref 0–0.07)
IMM GRANULOCYTES NFR BLD AUTO: 0.3 % — SIGNIFICANT CHANGE UP (ref 0–0.9)
LYMPHOCYTES # BLD AUTO: 0.89 K/UL — LOW (ref 1–3.3)
LYMPHOCYTES # BLD MANUAL: 0.71 K/UL — LOW (ref 1–3.3)
LYMPHOCYTES NFR BLD AUTO: 24.9 % — SIGNIFICANT CHANGE UP (ref 13–44)
LYMPHOCYTES NFR BLD MANUAL: 19.7 % — SIGNIFICANT CHANGE UP (ref 13–44)
MACROCYTES BLD QL: SLIGHT — SIGNIFICANT CHANGE UP
MICROCYTES BLD QL: SLIGHT — SIGNIFICANT CHANGE UP
MONOCYTES # BLD AUTO: 0.33 K/UL — SIGNIFICANT CHANGE UP (ref 0–0.9)
MONOCYTES # BLD MANUAL: 0.21 K/UL — SIGNIFICANT CHANGE UP (ref 0–0.9)
MONOCYTES NFR BLD AUTO: 9.2 % — SIGNIFICANT CHANGE UP (ref 2–14)
MONOCYTES NFR BLD MANUAL: 6 % — SIGNIFICANT CHANGE UP (ref 2–14)
NEUTROPHILS # BLD AUTO: 2.13 K/UL — SIGNIFICANT CHANGE UP (ref 1.8–7.4)
NEUTROPHILS # BLD MANUAL: 2.57 K/UL — SIGNIFICANT CHANGE UP (ref 1.8–7.4)
NEUTROPHILS NFR BLD AUTO: 59.4 % — SIGNIFICANT CHANGE UP (ref 43–77)
NEUTROPHILS NFR BLD MANUAL: 71.7 % — SIGNIFICANT CHANGE UP (ref 43–77)
OVALOCYTES BLD QL SMEAR: SLIGHT — SIGNIFICANT CHANGE UP
PLAT MORPH BLD: ABNORMAL
PLATELET COUNT - ESTIMATE: ABNORMAL
POIKILOCYTOSIS BLD QL AUTO: ABNORMAL
POLYCHROMASIA BLD QL SMEAR: ABNORMAL
RBC BLD AUTO: SIGNIFICANT CHANGE UP
SCHISTOCYTES BLD QL AUTO: SLIGHT — SIGNIFICANT CHANGE UP
SMUDGE CELLS # BLD: PRESENT

## 2025-07-23 ENCOUNTER — INPATIENT (INPATIENT)
Facility: HOSPITAL | Age: 70
LOS: 5 days | Discharge: HOME HEALTH | End: 2025-07-29
Attending: INTERNAL MEDICINE | Admitting: INTERNAL MEDICINE
Payer: MEDICARE

## 2025-07-23 VITALS
RESPIRATION RATE: 19 BRPM | WEIGHT: 139.99 LBS | HEIGHT: 62 IN | HEART RATE: 57 BPM | SYSTOLIC BLOOD PRESSURE: 100 MMHG | TEMPERATURE: 101 F | DIASTOLIC BLOOD PRESSURE: 63 MMHG | OXYGEN SATURATION: 98 %

## 2025-07-23 DIAGNOSIS — R06.02 SHORTNESS OF BREATH: ICD-10-CM

## 2025-07-23 DIAGNOSIS — Z90.49 ACQUIRED ABSENCE OF OTHER SPECIFIED PARTS OF DIGESTIVE TRACT: Chronic | ICD-10-CM

## 2025-07-23 DIAGNOSIS — K43.2 INCISIONAL HERNIA WITHOUT OBSTRUCTION OR GANGRENE: Chronic | ICD-10-CM

## 2025-07-23 DIAGNOSIS — Z98.890 OTHER SPECIFIED POSTPROCEDURAL STATES: Chronic | ICD-10-CM

## 2025-07-23 LAB
ADD ON TEST-SPECIMEN IN LAB: SIGNIFICANT CHANGE UP
ALBUMIN SERPL ELPH-MCNC: 3.1 G/DL — LOW (ref 3.3–5)
ALP SERPL-CCNC: 107 U/L — SIGNIFICANT CHANGE UP (ref 40–120)
ALT FLD-CCNC: 24 U/L — SIGNIFICANT CHANGE UP (ref 4–33)
ANION GAP SERPL CALC-SCNC: 10 MMOL/L — SIGNIFICANT CHANGE UP (ref 7–14)
ANISOCYTOSIS BLD QL: ABNORMAL
APPEARANCE UR: CLEAR — SIGNIFICANT CHANGE UP
APTT BLD: 32.5 SEC — SIGNIFICANT CHANGE UP (ref 26.1–36.8)
AST SERPL-CCNC: 50 U/L — HIGH (ref 4–32)
BACTERIA # UR AUTO: NEGATIVE /HPF — SIGNIFICANT CHANGE UP
BASOPHILS # BLD AUTO: 0.01 K/UL — SIGNIFICANT CHANGE UP (ref 0–0.2)
BASOPHILS # BLD MANUAL: 0 K/UL — SIGNIFICANT CHANGE UP (ref 0–0.2)
BASOPHILS NFR BLD AUTO: 0.5 % — SIGNIFICANT CHANGE UP (ref 0–2)
BASOPHILS NFR BLD MANUAL: 0 % — SIGNIFICANT CHANGE UP (ref 0–2)
BILIRUB SERPL-MCNC: 2.2 MG/DL — HIGH (ref 0.2–1.2)
BILIRUB UR-MCNC: NEGATIVE — SIGNIFICANT CHANGE UP
BLOOD GAS VENOUS COMPREHENSIVE RESULT: SIGNIFICANT CHANGE UP
BUN SERPL-MCNC: 11 MG/DL — SIGNIFICANT CHANGE UP (ref 7–23)
CALCIUM SERPL-MCNC: 8.5 MG/DL — SIGNIFICANT CHANGE UP (ref 8.4–10.5)
CAST: 0 /LPF — SIGNIFICANT CHANGE UP (ref 0–4)
CHLORIDE SERPL-SCNC: 109 MMOL/L — HIGH (ref 98–107)
CO2 SERPL-SCNC: 19 MMOL/L — LOW (ref 22–31)
COLOR SPEC: YELLOW — SIGNIFICANT CHANGE UP
CREAT SERPL-MCNC: 0.52 MG/DL — SIGNIFICANT CHANGE UP (ref 0.5–1.3)
DIFF PNL FLD: ABNORMAL
EGFR: 100 ML/MIN/1.73M2 — SIGNIFICANT CHANGE UP
EGFR: 100 ML/MIN/1.73M2 — SIGNIFICANT CHANGE UP
ELLIPTOCYTES BLD QL SMEAR: SLIGHT — SIGNIFICANT CHANGE UP
EOSINOPHIL # BLD AUTO: 0.05 K/UL — SIGNIFICANT CHANGE UP (ref 0–0.5)
EOSINOPHIL # BLD MANUAL: 0.05 K/UL — SIGNIFICANT CHANGE UP (ref 0–0.5)
EOSINOPHIL NFR BLD AUTO: 2.4 % — SIGNIFICANT CHANGE UP (ref 0–6)
EOSINOPHIL NFR BLD MANUAL: 2.6 % — SIGNIFICANT CHANGE UP (ref 0–6)
FLUAV AG NPH QL: SIGNIFICANT CHANGE UP
FLUBV AG NPH QL: SIGNIFICANT CHANGE UP
GLUCOSE SERPL-MCNC: 71 MG/DL — SIGNIFICANT CHANGE UP (ref 70–99)
GLUCOSE UR QL: NEGATIVE MG/DL — SIGNIFICANT CHANGE UP
HCT VFR BLD CALC: 26.2 % — LOW (ref 34.5–45)
HGB BLD-MCNC: 8.3 G/DL — LOW (ref 11.5–15.5)
HYPOCHROMIA BLD QL: SLIGHT — SIGNIFICANT CHANGE UP
IMM GRANULOCYTES # BLD AUTO: 0.01 K/UL — SIGNIFICANT CHANGE UP (ref 0–0.07)
IMM GRANULOCYTES NFR BLD AUTO: 0.5 % — SIGNIFICANT CHANGE UP (ref 0–0.9)
IMMATURE PLATELET FRACTION #: 1 K/UL — LOW (ref 4.7–11.1)
IMMATURE PLATELET FRACTION %: 3 % — SIGNIFICANT CHANGE UP (ref 1.6–4.9)
INR BLD: 1.61 RATIO — HIGH (ref 0.85–1.16)
KETONES UR QL: NEGATIVE MG/DL — SIGNIFICANT CHANGE UP
LEUKOCYTE ESTERASE UR-ACNC: NEGATIVE — SIGNIFICANT CHANGE UP
LYMPHOCYTES # BLD AUTO: 0.44 K/UL — LOW (ref 1–3.3)
LYMPHOCYTES # BLD MANUAL: 0.23 K/UL — LOW (ref 1–3.3)
LYMPHOCYTES NFR BLD AUTO: 21.2 % — SIGNIFICANT CHANGE UP (ref 13–44)
LYMPHOCYTES NFR BLD MANUAL: 11.1 % — LOW (ref 13–44)
MACROCYTES BLD QL: ABNORMAL
MCHC RBC-ENTMCNC: 29.9 PG — SIGNIFICANT CHANGE UP (ref 27–34)
MCHC RBC-ENTMCNC: 31.7 G/DL — LOW (ref 32–36)
MCV RBC AUTO: 94.2 FL — SIGNIFICANT CHANGE UP (ref 80–100)
MONOCYTES # BLD AUTO: 0.26 K/UL — SIGNIFICANT CHANGE UP (ref 0–0.9)
MONOCYTES # BLD MANUAL: 0.02 K/UL — SIGNIFICANT CHANGE UP (ref 0–0.9)
MONOCYTES NFR BLD AUTO: 12.5 % — SIGNIFICANT CHANGE UP (ref 2–14)
MONOCYTES NFR BLD MANUAL: 0.9 % — LOW (ref 2–14)
NEUTROPHILS # BLD AUTO: 1.31 K/UL — LOW (ref 1.8–7.4)
NEUTROPHILS # BLD MANUAL: 1.78 K/UL — LOW (ref 1.8–7.4)
NEUTROPHILS NFR BLD AUTO: 62.9 % — SIGNIFICANT CHANGE UP (ref 43–77)
NEUTROPHILS NFR BLD MANUAL: 85.4 % — HIGH (ref 43–77)
NITRITE UR-MCNC: NEGATIVE — SIGNIFICANT CHANGE UP
NRBC # BLD AUTO: 0 K/UL — SIGNIFICANT CHANGE UP (ref 0–0)
NRBC # FLD: 0 K/UL — SIGNIFICANT CHANGE UP (ref 0–0)
NRBC BLD AUTO-RTO: 0 /100 WBCS — SIGNIFICANT CHANGE UP (ref 0–0)
OVALOCYTES BLD QL SMEAR: ABNORMAL
PH UR: 6.5 — SIGNIFICANT CHANGE UP (ref 5–8)
PLAT MORPH BLD: NORMAL — SIGNIFICANT CHANGE UP
PLATELET # BLD AUTO: 33 K/UL — LOW (ref 150–400)
PLATELET COUNT - ESTIMATE: ABNORMAL
PMV BLD: 11.2 FL — SIGNIFICANT CHANGE UP (ref 7–13)
POLYCHROMASIA BLD QL SMEAR: SLIGHT — SIGNIFICANT CHANGE UP
POTASSIUM SERPL-MCNC: 4.1 MMOL/L — SIGNIFICANT CHANGE UP (ref 3.5–5.3)
POTASSIUM SERPL-SCNC: 4.1 MMOL/L — SIGNIFICANT CHANGE UP (ref 3.5–5.3)
PROT SERPL-MCNC: 6 G/DL — SIGNIFICANT CHANGE UP (ref 6–8.3)
PROT UR-MCNC: NEGATIVE MG/DL — SIGNIFICANT CHANGE UP
PROTHROM AB SERPL-ACNC: 18.6 SEC — HIGH (ref 9.9–13.4)
RBC # BLD: 2.78 M/UL — LOW (ref 3.8–5.2)
RBC # FLD: 16.5 % — HIGH (ref 10.3–14.5)
RBC BLD AUTO: ABNORMAL
RBC CASTS # UR COMP ASSIST: 5 /HPF — HIGH (ref 0–4)
RSV RNA NPH QL NAA+NON-PROBE: SIGNIFICANT CHANGE UP
SARS-COV-2 RNA SPEC QL NAA+PROBE: DETECTED
SMUDGE CELLS # BLD: PRESENT
SODIUM SERPL-SCNC: 138 MMOL/L — SIGNIFICANT CHANGE UP (ref 135–145)
SOURCE RESPIRATORY: SIGNIFICANT CHANGE UP
SP GR SPEC: 1.05 — HIGH (ref 1–1.03)
SQUAMOUS # UR AUTO: 0 /HPF — SIGNIFICANT CHANGE UP (ref 0–5)
TROPONIN T, HIGH SENSITIVITY RESULT: 15 NG/L — SIGNIFICANT CHANGE UP
UROBILINOGEN FLD QL: 1 MG/DL — SIGNIFICANT CHANGE UP (ref 0.2–1)
WBC # BLD: 2.08 K/UL — LOW (ref 3.8–10.5)
WBC # FLD AUTO: 2.08 K/UL — LOW (ref 3.8–10.5)
WBC UR QL: 0 /HPF — SIGNIFICANT CHANGE UP (ref 0–5)

## 2025-07-23 PROCEDURE — 70450 CT HEAD/BRAIN W/O DYE: CPT | Mod: 26

## 2025-07-23 PROCEDURE — 71046 X-RAY EXAM CHEST 2 VIEWS: CPT | Mod: 26

## 2025-07-23 PROCEDURE — 71275 CT ANGIOGRAPHY CHEST: CPT | Mod: 26

## 2025-07-23 PROCEDURE — 99285 EMERGENCY DEPT VISIT HI MDM: CPT

## 2025-07-23 RX ORDER — ACETAMINOPHEN 500 MG/5ML
975 LIQUID (ML) ORAL ONCE
Refills: 0 | Status: COMPLETED | OUTPATIENT
Start: 2025-07-23 | End: 2025-07-23

## 2025-07-23 RX ORDER — PIPERACILLIN-TAZO-DEXTROSE,ISO 3.375G/5
3.38 IV SOLUTION, PIGGYBACK PREMIX FROZEN(ML) INTRAVENOUS ONCE
Refills: 0 | Status: COMPLETED | OUTPATIENT
Start: 2025-07-23 | End: 2025-07-23

## 2025-07-23 RX ORDER — IPRATROPIUM BROMIDE AND ALBUTEROL SULFATE .5; 2.5 MG/3ML; MG/3ML
3 SOLUTION RESPIRATORY (INHALATION) ONCE
Refills: 0 | Status: COMPLETED | OUTPATIENT
Start: 2025-07-23 | End: 2025-07-23

## 2025-07-23 RX ORDER — VANCOMYCIN HCL IN 5 % DEXTROSE 1.5G/250ML
1000 PLASTIC BAG, INJECTION (ML) INTRAVENOUS ONCE
Refills: 0 | Status: COMPLETED | OUTPATIENT
Start: 2025-07-23 | End: 2025-07-23

## 2025-07-23 RX ORDER — FUROSEMIDE 10 MG/ML
40 INJECTION INTRAMUSCULAR; INTRAVENOUS ONCE
Refills: 0 | Status: COMPLETED | OUTPATIENT
Start: 2025-07-23 | End: 2025-07-23

## 2025-07-23 RX ORDER — OLANZAPINE 10 MG/1
2.5 TABLET ORAL ONCE
Refills: 0 | Status: COMPLETED | OUTPATIENT
Start: 2025-07-23 | End: 2025-07-23

## 2025-07-23 RX ADMIN — Medication 975 MILLIGRAM(S): at 20:36

## 2025-07-23 RX ADMIN — Medication 200 GRAM(S): at 18:16

## 2025-07-23 RX ADMIN — Medication 250 MILLIGRAM(S): at 19:43

## 2025-07-23 RX ADMIN — FUROSEMIDE 40 MILLIGRAM(S): 10 INJECTION INTRAMUSCULAR; INTRAVENOUS at 22:17

## 2025-07-23 RX ADMIN — IPRATROPIUM BROMIDE AND ALBUTEROL SULFATE 3 MILLILITER(S): .5; 2.5 SOLUTION RESPIRATORY (INHALATION) at 18:16

## 2025-07-23 RX ADMIN — Medication 1000 MILLILITER(S): at 18:24

## 2025-07-23 RX ADMIN — OLANZAPINE 2.5 MILLIGRAM(S): 10 TABLET ORAL at 22:46

## 2025-07-24 DIAGNOSIS — K74.60 UNSPECIFIED CIRRHOSIS OF LIVER: ICD-10-CM

## 2025-07-24 DIAGNOSIS — I50.33 ACUTE ON CHRONIC DIASTOLIC (CONGESTIVE) HEART FAILURE: ICD-10-CM

## 2025-07-24 DIAGNOSIS — F41.9 ANXIETY DISORDER, UNSPECIFIED: ICD-10-CM

## 2025-07-24 DIAGNOSIS — I10 ESSENTIAL (PRIMARY) HYPERTENSION: ICD-10-CM

## 2025-07-24 DIAGNOSIS — D61.818 OTHER PANCYTOPENIA: ICD-10-CM

## 2025-07-24 DIAGNOSIS — J96.01 ACUTE RESPIRATORY FAILURE WITH HYPOXIA: ICD-10-CM

## 2025-07-24 DIAGNOSIS — Z29.9 ENCOUNTER FOR PROPHYLACTIC MEASURES, UNSPECIFIED: ICD-10-CM

## 2025-07-24 DIAGNOSIS — U07.1 COVID-19: ICD-10-CM

## 2025-07-24 LAB
ANION GAP SERPL CALC-SCNC: 10 MMOL/L — SIGNIFICANT CHANGE UP (ref 7–14)
APTT BLD: 34.6 SEC — SIGNIFICANT CHANGE UP (ref 26.1–36.8)
BASOPHILS # BLD AUTO: 0 K/UL — SIGNIFICANT CHANGE UP (ref 0–0.2)
BASOPHILS NFR BLD AUTO: 0 % — SIGNIFICANT CHANGE UP (ref 0–2)
BLD GP AB SCN SERPL QL: NEGATIVE — SIGNIFICANT CHANGE UP
BUN SERPL-MCNC: 12 MG/DL — SIGNIFICANT CHANGE UP (ref 7–23)
CALCIUM SERPL-MCNC: 8.2 MG/DL — LOW (ref 8.4–10.5)
CHLORIDE SERPL-SCNC: 111 MMOL/L — HIGH (ref 98–107)
CO2 SERPL-SCNC: 22 MMOL/L — SIGNIFICANT CHANGE UP (ref 22–31)
CREAT SERPL-MCNC: 0.51 MG/DL — SIGNIFICANT CHANGE UP (ref 0.5–1.3)
D DIMER BLD IA.RAPID-MCNC: 398 NG/ML DDU — HIGH
EGFR: 100 ML/MIN/1.73M2 — SIGNIFICANT CHANGE UP
EGFR: 100 ML/MIN/1.73M2 — SIGNIFICANT CHANGE UP
EOSINOPHIL # BLD AUTO: 0 K/UL — SIGNIFICANT CHANGE UP (ref 0–0.5)
EOSINOPHIL NFR BLD AUTO: 0 % — SIGNIFICANT CHANGE UP (ref 0–6)
GLUCOSE SERPL-MCNC: 181 MG/DL — HIGH (ref 70–99)
HCT VFR BLD CALC: 25.8 % — LOW (ref 34.5–45)
HGB BLD-MCNC: 8.2 G/DL — LOW (ref 11.5–15.5)
IMM GRANULOCYTES # BLD AUTO: 0.01 K/UL — SIGNIFICANT CHANGE UP (ref 0–0.07)
IMM GRANULOCYTES NFR BLD AUTO: 0.7 % — SIGNIFICANT CHANGE UP (ref 0–0.9)
IMMATURE PLATELET FRACTION #: 0.8 K/UL — LOW (ref 4.7–11.1)
IMMATURE PLATELET FRACTION %: 2.8 % — SIGNIFICANT CHANGE UP (ref 1.6–4.9)
INR BLD: 1.59 RATIO — HIGH (ref 0.85–1.16)
LYMPHOCYTES # BLD AUTO: 0.26 K/UL — LOW (ref 1–3.3)
LYMPHOCYTES NFR BLD AUTO: 18.2 % — SIGNIFICANT CHANGE UP (ref 13–44)
MAGNESIUM SERPL-MCNC: 1.8 MG/DL — SIGNIFICANT CHANGE UP (ref 1.6–2.6)
MCHC RBC-ENTMCNC: 30.4 PG — SIGNIFICANT CHANGE UP (ref 27–34)
MCHC RBC-ENTMCNC: 31.8 G/DL — LOW (ref 32–36)
MCV RBC AUTO: 95.6 FL — SIGNIFICANT CHANGE UP (ref 80–100)
MONOCYTES # BLD AUTO: 0.09 K/UL — SIGNIFICANT CHANGE UP (ref 0–0.9)
MONOCYTES NFR BLD AUTO: 6.3 % — SIGNIFICANT CHANGE UP (ref 2–14)
NEUTROPHILS # BLD AUTO: 1.07 K/UL — LOW (ref 1.8–7.4)
NEUTROPHILS NFR BLD AUTO: 74.8 % — SIGNIFICANT CHANGE UP (ref 43–77)
NRBC # BLD AUTO: 0 K/UL — SIGNIFICANT CHANGE UP (ref 0–0)
NRBC # FLD: 0 K/UL — SIGNIFICANT CHANGE UP (ref 0–0)
NRBC BLD AUTO-RTO: 0 /100 WBCS — SIGNIFICANT CHANGE UP (ref 0–0)
PHOSPHATE SERPL-MCNC: 3.4 MG/DL — SIGNIFICANT CHANGE UP (ref 2.5–4.5)
PLATELET # BLD AUTO: 30 K/UL — LOW (ref 150–400)
PMV BLD: 10.9 FL — SIGNIFICANT CHANGE UP (ref 7–13)
POTASSIUM SERPL-MCNC: 3.7 MMOL/L — SIGNIFICANT CHANGE UP (ref 3.5–5.3)
POTASSIUM SERPL-SCNC: 3.7 MMOL/L — SIGNIFICANT CHANGE UP (ref 3.5–5.3)
PROTHROM AB SERPL-ACNC: 18.4 SEC — HIGH (ref 9.9–13.4)
RBC # BLD: 2.7 M/UL — LOW (ref 3.8–5.2)
RBC # FLD: 16.6 % — HIGH (ref 10.3–14.5)
RH IG SCN BLD-IMP: POSITIVE — SIGNIFICANT CHANGE UP
SODIUM SERPL-SCNC: 143 MMOL/L — SIGNIFICANT CHANGE UP (ref 135–145)
WBC # BLD: 1.43 K/UL — LOW (ref 3.8–10.5)
WBC # FLD AUTO: 1.43 K/UL — LOW (ref 3.8–10.5)

## 2025-07-24 PROCEDURE — 99223 1ST HOSP IP/OBS HIGH 75: CPT

## 2025-07-24 RX ORDER — TRAZODONE HCL 100 MG
50 TABLET ORAL AT BEDTIME
Refills: 0 | Status: DISCONTINUED | OUTPATIENT
Start: 2025-07-24 | End: 2025-07-29

## 2025-07-24 RX ORDER — FOLIC ACID 1 MG/1
1 TABLET ORAL DAILY
Refills: 0 | Status: DISCONTINUED | OUTPATIENT
Start: 2025-07-24 | End: 2025-07-29

## 2025-07-24 RX ORDER — CARVEDILOL 3.12 MG/1
3.12 TABLET, FILM COATED ORAL EVERY 12 HOURS
Refills: 0 | Status: DISCONTINUED | OUTPATIENT
Start: 2025-07-24 | End: 2025-07-29

## 2025-07-24 RX ORDER — DEXAMETHASONE 0.5 MG/1
6 TABLET ORAL DAILY
Refills: 0 | Status: DISCONTINUED | OUTPATIENT
Start: 2025-07-25 | End: 2025-07-24

## 2025-07-24 RX ORDER — FUROSEMIDE 10 MG/ML
20 INJECTION INTRAMUSCULAR; INTRAVENOUS DAILY
Refills: 0 | Status: DISCONTINUED | OUTPATIENT
Start: 2025-07-24 | End: 2025-07-24

## 2025-07-24 RX ORDER — ACETAMINOPHEN 500 MG/5ML
650 LIQUID (ML) ORAL EVERY 6 HOURS
Refills: 0 | Status: DISCONTINUED | OUTPATIENT
Start: 2025-07-24 | End: 2025-07-29

## 2025-07-24 RX ORDER — DEXAMETHASONE 0.5 MG/1
6 TABLET ORAL ONCE
Refills: 0 | Status: COMPLETED | OUTPATIENT
Start: 2025-07-24 | End: 2025-07-24

## 2025-07-24 RX ORDER — B1/B2/B3/B5/B6/B12/VIT C/FOLIC 500-0.5 MG
1 TABLET ORAL DAILY
Refills: 0 | Status: DISCONTINUED | OUTPATIENT
Start: 2025-07-24 | End: 2025-07-29

## 2025-07-24 RX ORDER — DEXAMETHASONE 0.5 MG/1
6 TABLET ORAL DAILY
Refills: 0 | Status: DISCONTINUED | OUTPATIENT
Start: 2025-07-25 | End: 2025-07-29

## 2025-07-24 RX ORDER — SERTRALINE 100 MG/1
50 TABLET, FILM COATED ORAL AT BEDTIME
Refills: 0 | Status: DISCONTINUED | OUTPATIENT
Start: 2025-07-24 | End: 2025-07-29

## 2025-07-24 RX ORDER — LACTULOSE 10 G/15ML
10 SOLUTION ORAL THREE TIMES A DAY
Refills: 0 | Status: DISCONTINUED | OUTPATIENT
Start: 2025-07-24 | End: 2025-07-29

## 2025-07-24 RX ADMIN — LACTULOSE 10 GRAM(S): 10 SOLUTION ORAL at 05:20

## 2025-07-24 RX ADMIN — Medication 40 MILLIGRAM(S): at 16:45

## 2025-07-24 RX ADMIN — Medication 1 TABLET(S): at 12:34

## 2025-07-24 RX ADMIN — LACTULOSE 10 GRAM(S): 10 SOLUTION ORAL at 14:30

## 2025-07-24 RX ADMIN — SERTRALINE 50 MILLIGRAM(S): 100 TABLET, FILM COATED ORAL at 21:33

## 2025-07-24 RX ADMIN — SERTRALINE 50 MILLIGRAM(S): 100 TABLET, FILM COATED ORAL at 02:19

## 2025-07-24 RX ADMIN — FOLIC ACID 1 MILLIGRAM(S): 1 TABLET ORAL at 12:34

## 2025-07-24 RX ADMIN — DEXAMETHASONE 6 MILLIGRAM(S): 0.5 TABLET ORAL at 05:19

## 2025-07-24 RX ADMIN — Medication 50 MILLIGRAM(S): at 02:19

## 2025-07-24 RX ADMIN — Medication 50 MILLIGRAM(S): at 21:33

## 2025-07-24 RX ADMIN — LACTULOSE 10 GRAM(S): 10 SOLUTION ORAL at 21:33

## 2025-07-24 RX ADMIN — FUROSEMIDE 20 MILLIGRAM(S): 10 INJECTION INTRAMUSCULAR; INTRAVENOUS at 05:20

## 2025-07-25 LAB
ALBUMIN SERPL ELPH-MCNC: 2.6 G/DL — LOW (ref 3.3–5)
ALP SERPL-CCNC: 96 U/L — SIGNIFICANT CHANGE UP (ref 40–120)
ALT FLD-CCNC: 23 U/L — SIGNIFICANT CHANGE UP (ref 4–33)
ANION GAP SERPL CALC-SCNC: 10 MMOL/L — SIGNIFICANT CHANGE UP (ref 7–14)
AST SERPL-CCNC: 36 U/L — HIGH (ref 4–32)
BASOPHILS # BLD AUTO: 0 K/UL — SIGNIFICANT CHANGE UP (ref 0–0.2)
BASOPHILS NFR BLD AUTO: 0 % — SIGNIFICANT CHANGE UP (ref 0–2)
BILIRUB SERPL-MCNC: 1.2 MG/DL — SIGNIFICANT CHANGE UP (ref 0.2–1.2)
BUN SERPL-MCNC: 14 MG/DL — SIGNIFICANT CHANGE UP (ref 7–23)
CALCIUM SERPL-MCNC: 8 MG/DL — LOW (ref 8.4–10.5)
CHLORIDE SERPL-SCNC: 112 MMOL/L — HIGH (ref 98–107)
CO2 SERPL-SCNC: 20 MMOL/L — LOW (ref 22–31)
CREAT SERPL-MCNC: 0.47 MG/DL — LOW (ref 0.5–1.3)
CRP SERPL-MCNC: 8.3 MG/L — HIGH
EGFR: 102 ML/MIN/1.73M2 — SIGNIFICANT CHANGE UP
EGFR: 102 ML/MIN/1.73M2 — SIGNIFICANT CHANGE UP
EOSINOPHIL # BLD AUTO: 0 K/UL — SIGNIFICANT CHANGE UP (ref 0–0.5)
EOSINOPHIL NFR BLD AUTO: 0 % — SIGNIFICANT CHANGE UP (ref 0–6)
GLUCOSE SERPL-MCNC: 106 MG/DL — HIGH (ref 70–99)
HCT VFR BLD CALC: 24.7 % — LOW (ref 34.5–45)
HGB BLD-MCNC: 7.7 G/DL — LOW (ref 11.5–15.5)
IMM GRANULOCYTES # BLD AUTO: 0.01 K/UL — SIGNIFICANT CHANGE UP (ref 0–0.07)
IMM GRANULOCYTES NFR BLD AUTO: 0.3 % — SIGNIFICANT CHANGE UP (ref 0–0.9)
IMMATURE PLATELET FRACTION #: 0.8 K/UL — LOW (ref 4.7–11.1)
IMMATURE PLATELET FRACTION %: 2.7 % — SIGNIFICANT CHANGE UP (ref 1.6–4.9)
INR BLD: 1.64 RATIO — HIGH (ref 0.85–1.16)
LYMPHOCYTES # BLD AUTO: 0.43 K/UL — LOW (ref 1–3.3)
LYMPHOCYTES NFR BLD AUTO: 14.7 % — SIGNIFICANT CHANGE UP (ref 13–44)
MAGNESIUM SERPL-MCNC: 1.9 MG/DL — SIGNIFICANT CHANGE UP (ref 1.6–2.6)
MCHC RBC-ENTMCNC: 29.8 PG — SIGNIFICANT CHANGE UP (ref 27–34)
MCHC RBC-ENTMCNC: 31.2 G/DL — LOW (ref 32–36)
MCV RBC AUTO: 95.7 FL — SIGNIFICANT CHANGE UP (ref 80–100)
MELD SCORE WITH DIALYSIS: 26 POINTS — SIGNIFICANT CHANGE UP
MELD SCORE WITHOUT DIALYSIS: 13 POINTS — SIGNIFICANT CHANGE UP
MONOCYTES # BLD AUTO: 0.23 K/UL — SIGNIFICANT CHANGE UP (ref 0–0.9)
MONOCYTES NFR BLD AUTO: 7.8 % — SIGNIFICANT CHANGE UP (ref 2–14)
NEUTROPHILS # BLD AUTO: 2.26 K/UL — SIGNIFICANT CHANGE UP (ref 1.8–7.4)
NEUTROPHILS NFR BLD AUTO: 77.2 % — HIGH (ref 43–77)
NRBC # BLD AUTO: 0 K/UL — SIGNIFICANT CHANGE UP (ref 0–0)
NRBC # FLD: 0 K/UL — SIGNIFICANT CHANGE UP (ref 0–0)
NRBC BLD AUTO-RTO: 0 /100 WBCS — SIGNIFICANT CHANGE UP (ref 0–0)
PHOSPHATE SERPL-MCNC: 3.1 MG/DL — SIGNIFICANT CHANGE UP (ref 2.5–4.5)
PLATELET # BLD AUTO: 31 K/UL — LOW (ref 150–400)
PMV BLD: 12.4 FL — SIGNIFICANT CHANGE UP (ref 7–13)
POTASSIUM SERPL-MCNC: 3.6 MMOL/L — SIGNIFICANT CHANGE UP (ref 3.5–5.3)
POTASSIUM SERPL-SCNC: 3.6 MMOL/L — SIGNIFICANT CHANGE UP (ref 3.5–5.3)
PROT SERPL-MCNC: 5.4 G/DL — LOW (ref 6–8.3)
PROTHROM AB SERPL-ACNC: 18.9 SEC — HIGH (ref 9.9–13.4)
RBC # BLD: 2.58 M/UL — LOW (ref 3.8–5.2)
RBC # FLD: 16.3 % — HIGH (ref 10.3–14.5)
SODIUM SERPL-SCNC: 142 MMOL/L — SIGNIFICANT CHANGE UP (ref 135–145)
WBC # BLD: 2.93 K/UL — LOW (ref 3.8–10.5)
WBC # FLD AUTO: 2.93 K/UL — LOW (ref 3.8–10.5)

## 2025-07-25 RX ORDER — ALBUTEROL SULFATE 2.5 MG/3ML
1 VIAL, NEBULIZER (ML) INHALATION ONCE
Refills: 0 | Status: COMPLETED | OUTPATIENT
Start: 2025-07-25 | End: 2025-07-25

## 2025-07-25 RX ORDER — ALBUTEROL SULFATE 2.5 MG/3ML
2 VIAL, NEBULIZER (ML) INHALATION EVERY 6 HOURS
Refills: 0 | Status: DISCONTINUED | OUTPATIENT
Start: 2025-07-25 | End: 2025-07-29

## 2025-07-25 RX ORDER — BENZONATATE 100 MG
100 CAPSULE ORAL EVERY 8 HOURS
Refills: 0 | Status: DISCONTINUED | OUTPATIENT
Start: 2025-07-25 | End: 2025-07-29

## 2025-07-25 RX ORDER — LACTULOSE 10 G/15ML
20 SOLUTION ORAL ONCE
Refills: 0 | Status: COMPLETED | OUTPATIENT
Start: 2025-07-25 | End: 2025-07-25

## 2025-07-25 RX ADMIN — Medication 1 TABLET(S): at 11:12

## 2025-07-25 RX ADMIN — CARVEDILOL 3.12 MILLIGRAM(S): 3.12 TABLET, FILM COATED ORAL at 05:37

## 2025-07-25 RX ADMIN — LACTULOSE 10 GRAM(S): 10 SOLUTION ORAL at 21:06

## 2025-07-25 RX ADMIN — Medication 40 MILLIGRAM(S): at 07:07

## 2025-07-25 RX ADMIN — Medication 50 MILLIGRAM(S): at 21:09

## 2025-07-25 RX ADMIN — Medication 2 PUFF(S): at 21:07

## 2025-07-25 RX ADMIN — Medication 1 PUFF(S): at 15:00

## 2025-07-25 RX ADMIN — LACTULOSE 10 GRAM(S): 10 SOLUTION ORAL at 05:37

## 2025-07-25 RX ADMIN — LACTULOSE 10 GRAM(S): 10 SOLUTION ORAL at 13:23

## 2025-07-25 RX ADMIN — SERTRALINE 50 MILLIGRAM(S): 100 TABLET, FILM COATED ORAL at 21:09

## 2025-07-25 RX ADMIN — LACTULOSE 20 GRAM(S): 10 SOLUTION ORAL at 13:19

## 2025-07-25 RX ADMIN — FOLIC ACID 1 MILLIGRAM(S): 1 TABLET ORAL at 11:14

## 2025-07-26 LAB
ALBUMIN SERPL ELPH-MCNC: 2.7 G/DL — LOW (ref 3.3–5)
ALP SERPL-CCNC: 93 U/L — SIGNIFICANT CHANGE UP (ref 40–120)
ALT FLD-CCNC: 23 U/L — SIGNIFICANT CHANGE UP (ref 4–33)
AMMONIA BLD-MCNC: 76 UMOL/L — HIGH (ref 11–55)
ANION GAP SERPL CALC-SCNC: 8 MMOL/L — SIGNIFICANT CHANGE UP (ref 7–14)
AST SERPL-CCNC: 37 U/L — HIGH (ref 4–32)
BASOPHILS # BLD AUTO: 0.01 K/UL — SIGNIFICANT CHANGE UP (ref 0–0.2)
BASOPHILS NFR BLD AUTO: 0.4 % — SIGNIFICANT CHANGE UP (ref 0–2)
BILIRUB SERPL-MCNC: 1.3 MG/DL — HIGH (ref 0.2–1.2)
BUN SERPL-MCNC: 12 MG/DL — SIGNIFICANT CHANGE UP (ref 7–23)
CALCIUM SERPL-MCNC: 7.9 MG/DL — LOW (ref 8.4–10.5)
CHLORIDE SERPL-SCNC: 113 MMOL/L — HIGH (ref 98–107)
CO2 SERPL-SCNC: 23 MMOL/L — SIGNIFICANT CHANGE UP (ref 22–31)
CREAT SERPL-MCNC: 0.47 MG/DL — LOW (ref 0.5–1.3)
EGFR: 102 ML/MIN/1.73M2 — SIGNIFICANT CHANGE UP
EGFR: 102 ML/MIN/1.73M2 — SIGNIFICANT CHANGE UP
EOSINOPHIL # BLD AUTO: 0.08 K/UL — SIGNIFICANT CHANGE UP (ref 0–0.5)
EOSINOPHIL NFR BLD AUTO: 3.2 % — SIGNIFICANT CHANGE UP (ref 0–6)
GAS PNL BLDV: SIGNIFICANT CHANGE UP
GLUCOSE SERPL-MCNC: 78 MG/DL — SIGNIFICANT CHANGE UP (ref 70–99)
HCT VFR BLD CALC: 26.6 % — LOW (ref 34.5–45)
HGB BLD-MCNC: 8.3 G/DL — LOW (ref 11.5–15.5)
IMM GRANULOCYTES # BLD AUTO: 0.02 K/UL — SIGNIFICANT CHANGE UP (ref 0–0.07)
IMM GRANULOCYTES NFR BLD AUTO: 0.8 % — SIGNIFICANT CHANGE UP (ref 0–0.9)
IMMATURE PLATELET FRACTION #: 0.8 K/UL — LOW (ref 4.7–11.1)
IMMATURE PLATELET FRACTION %: 2.2 % — SIGNIFICANT CHANGE UP (ref 1.6–4.9)
LYMPHOCYTES # BLD AUTO: 0.65 K/UL — LOW (ref 1–3.3)
LYMPHOCYTES NFR BLD AUTO: 25.9 % — SIGNIFICANT CHANGE UP (ref 13–44)
MAGNESIUM SERPL-MCNC: 1.7 MG/DL — SIGNIFICANT CHANGE UP (ref 1.6–2.6)
MCHC RBC-ENTMCNC: 30.1 PG — SIGNIFICANT CHANGE UP (ref 27–34)
MCHC RBC-ENTMCNC: 31.2 G/DL — LOW (ref 32–36)
MCV RBC AUTO: 96.4 FL — SIGNIFICANT CHANGE UP (ref 80–100)
MONOCYTES # BLD AUTO: 0.21 K/UL — SIGNIFICANT CHANGE UP (ref 0–0.9)
MONOCYTES NFR BLD AUTO: 8.4 % — SIGNIFICANT CHANGE UP (ref 2–14)
NEUTROPHILS # BLD AUTO: 1.54 K/UL — LOW (ref 1.8–7.4)
NEUTROPHILS NFR BLD AUTO: 61.3 % — SIGNIFICANT CHANGE UP (ref 43–77)
NRBC # BLD AUTO: 0 K/UL — SIGNIFICANT CHANGE UP (ref 0–0)
NRBC # FLD: 0 K/UL — SIGNIFICANT CHANGE UP (ref 0–0)
NRBC BLD AUTO-RTO: 0 /100 WBCS — SIGNIFICANT CHANGE UP (ref 0–0)
PHOSPHATE SERPL-MCNC: 2.7 MG/DL — SIGNIFICANT CHANGE UP (ref 2.5–4.5)
PLATELET # BLD AUTO: 35 K/UL — LOW (ref 150–400)
PMV BLD: 11.5 FL — SIGNIFICANT CHANGE UP (ref 7–13)
POTASSIUM SERPL-MCNC: 3.8 MMOL/L — SIGNIFICANT CHANGE UP (ref 3.5–5.3)
POTASSIUM SERPL-SCNC: 3.8 MMOL/L — SIGNIFICANT CHANGE UP (ref 3.5–5.3)
PROT SERPL-MCNC: 5.3 G/DL — LOW (ref 6–8.3)
RBC # BLD: 2.76 M/UL — LOW (ref 3.8–5.2)
RBC # FLD: 16.7 % — HIGH (ref 10.3–14.5)
SODIUM SERPL-SCNC: 144 MMOL/L — SIGNIFICANT CHANGE UP (ref 135–145)
WBC # BLD: 2.51 K/UL — LOW (ref 3.8–10.5)
WBC # FLD AUTO: 2.51 K/UL — LOW (ref 3.8–10.5)

## 2025-07-26 RX ORDER — REMDESIVIR 5 MG/ML
200 INJECTION INTRAVENOUS ONCE
Refills: 0 | Status: COMPLETED | OUTPATIENT
Start: 2025-07-26 | End: 2025-07-26

## 2025-07-26 RX ORDER — REMDESIVIR 5 MG/ML
100 INJECTION INTRAVENOUS ONCE
Refills: 0 | Status: COMPLETED | OUTPATIENT
Start: 2025-07-28 | End: 2025-07-28

## 2025-07-26 RX ORDER — REMDESIVIR 5 MG/ML
100 INJECTION INTRAVENOUS ONCE
Refills: 0 | Status: COMPLETED | OUTPATIENT
Start: 2025-07-27 | End: 2025-07-27

## 2025-07-26 RX ADMIN — Medication 1 TABLET(S): at 13:34

## 2025-07-26 RX ADMIN — Medication 40 MILLIGRAM(S): at 05:28

## 2025-07-26 RX ADMIN — REMDESIVIR 200 MILLIGRAM(S): 5 INJECTION INTRAVENOUS at 17:40

## 2025-07-26 RX ADMIN — LACTULOSE 10 GRAM(S): 10 SOLUTION ORAL at 22:54

## 2025-07-26 RX ADMIN — CARVEDILOL 3.12 MILLIGRAM(S): 3.12 TABLET, FILM COATED ORAL at 05:28

## 2025-07-26 RX ADMIN — CARVEDILOL 3.12 MILLIGRAM(S): 3.12 TABLET, FILM COATED ORAL at 17:40

## 2025-07-26 RX ADMIN — Medication 100 MILLIGRAM(S): at 22:54

## 2025-07-26 RX ADMIN — Medication 2 PUFF(S): at 05:33

## 2025-07-26 RX ADMIN — FOLIC ACID 1 MILLIGRAM(S): 1 TABLET ORAL at 13:34

## 2025-07-26 RX ADMIN — SERTRALINE 50 MILLIGRAM(S): 100 TABLET, FILM COATED ORAL at 22:54

## 2025-07-26 RX ADMIN — Medication 100 MILLIGRAM(S): at 13:34

## 2025-07-26 RX ADMIN — LACTULOSE 10 GRAM(S): 10 SOLUTION ORAL at 05:27

## 2025-07-26 RX ADMIN — Medication 50 MILLIGRAM(S): at 22:54

## 2025-07-26 RX ADMIN — LACTULOSE 10 GRAM(S): 10 SOLUTION ORAL at 13:34

## 2025-07-26 RX ADMIN — DEXAMETHASONE 6 MILLIGRAM(S): 0.5 TABLET ORAL at 05:28

## 2025-07-27 LAB
ALBUMIN SERPL ELPH-MCNC: 2.3 G/DL — LOW (ref 3.3–5)
ALP SERPL-CCNC: 111 U/L — SIGNIFICANT CHANGE UP (ref 40–120)
ALT FLD-CCNC: 22 U/L — SIGNIFICANT CHANGE UP (ref 4–33)
ANION GAP SERPL CALC-SCNC: 10 MMOL/L — SIGNIFICANT CHANGE UP (ref 7–14)
AST SERPL-CCNC: 49 U/L — HIGH (ref 4–32)
BILIRUB SERPL-MCNC: 0.9 MG/DL — SIGNIFICANT CHANGE UP (ref 0.2–1.2)
BUN SERPL-MCNC: 11 MG/DL — SIGNIFICANT CHANGE UP (ref 7–23)
CALCIUM SERPL-MCNC: 7.6 MG/DL — LOW (ref 8.4–10.5)
CHLORIDE SERPL-SCNC: 116 MMOL/L — HIGH (ref 98–107)
CO2 SERPL-SCNC: 16 MMOL/L — LOW (ref 22–31)
CREAT SERPL-MCNC: 0.4 MG/DL — LOW (ref 0.5–1.3)
EGFR: 106 ML/MIN/1.73M2 — SIGNIFICANT CHANGE UP
EGFR: 106 ML/MIN/1.73M2 — SIGNIFICANT CHANGE UP
GLUCOSE SERPL-MCNC: 70 MG/DL — SIGNIFICANT CHANGE UP (ref 70–99)
MAGNESIUM SERPL-MCNC: 1.9 MG/DL — SIGNIFICANT CHANGE UP (ref 1.6–2.6)
PHOSPHATE SERPL-MCNC: 2.8 MG/DL — SIGNIFICANT CHANGE UP (ref 2.5–4.5)
POTASSIUM SERPL-MCNC: 4.3 MMOL/L — SIGNIFICANT CHANGE UP (ref 3.5–5.3)
POTASSIUM SERPL-SCNC: 4.3 MMOL/L — SIGNIFICANT CHANGE UP (ref 3.5–5.3)
PROT SERPL-MCNC: 5.1 G/DL — LOW (ref 6–8.3)
SODIUM SERPL-SCNC: 142 MMOL/L — SIGNIFICANT CHANGE UP (ref 135–145)

## 2025-07-27 RX ORDER — IPRATROPIUM BROMIDE AND ALBUTEROL SULFATE .5; 2.5 MG/3ML; MG/3ML
3 SOLUTION RESPIRATORY (INHALATION) EVERY 6 HOURS
Refills: 0 | Status: COMPLETED | OUTPATIENT
Start: 2025-07-27 | End: 2025-07-28

## 2025-07-27 RX ADMIN — Medication 40 MILLIGRAM(S): at 06:22

## 2025-07-27 RX ADMIN — Medication 2 PUFF(S): at 22:30

## 2025-07-27 RX ADMIN — Medication 1 TABLET(S): at 11:02

## 2025-07-27 RX ADMIN — Medication 2 PUFF(S): at 06:28

## 2025-07-27 RX ADMIN — FOLIC ACID 1 MILLIGRAM(S): 1 TABLET ORAL at 11:02

## 2025-07-27 RX ADMIN — Medication 100 MILLIGRAM(S): at 06:28

## 2025-07-27 RX ADMIN — DEXAMETHASONE 6 MILLIGRAM(S): 0.5 TABLET ORAL at 06:22

## 2025-07-27 RX ADMIN — Medication 100 MILLIGRAM(S): at 22:29

## 2025-07-27 RX ADMIN — CARVEDILOL 3.12 MILLIGRAM(S): 3.12 TABLET, FILM COATED ORAL at 06:22

## 2025-07-27 RX ADMIN — LACTULOSE 10 GRAM(S): 10 SOLUTION ORAL at 06:22

## 2025-07-27 RX ADMIN — REMDESIVIR 200 MILLIGRAM(S): 5 INJECTION INTRAVENOUS at 16:49

## 2025-07-27 RX ADMIN — LACTULOSE 10 GRAM(S): 10 SOLUTION ORAL at 22:29

## 2025-07-27 RX ADMIN — LACTULOSE 10 GRAM(S): 10 SOLUTION ORAL at 16:48

## 2025-07-27 RX ADMIN — Medication 50 MILLIGRAM(S): at 22:29

## 2025-07-27 RX ADMIN — CARVEDILOL 3.12 MILLIGRAM(S): 3.12 TABLET, FILM COATED ORAL at 17:21

## 2025-07-27 RX ADMIN — SERTRALINE 50 MILLIGRAM(S): 100 TABLET, FILM COATED ORAL at 22:29

## 2025-07-28 LAB
ALBUMIN SERPL ELPH-MCNC: 2.9 G/DL — LOW (ref 3.3–5)
ALP SERPL-CCNC: 141 U/L — HIGH (ref 40–120)
ALT FLD-CCNC: 29 U/L — SIGNIFICANT CHANGE UP (ref 4–33)
ANION GAP SERPL CALC-SCNC: 9 MMOL/L — SIGNIFICANT CHANGE UP (ref 7–14)
AST SERPL-CCNC: 38 U/L — HIGH (ref 4–32)
BASOPHILS # BLD AUTO: 0 K/UL — SIGNIFICANT CHANGE UP (ref 0–0.2)
BASOPHILS NFR BLD AUTO: 0 % — SIGNIFICANT CHANGE UP (ref 0–2)
BILIRUB SERPL-MCNC: 1 MG/DL — SIGNIFICANT CHANGE UP (ref 0.2–1.2)
BUN SERPL-MCNC: 13 MG/DL — SIGNIFICANT CHANGE UP (ref 7–23)
CALCIUM SERPL-MCNC: 8.2 MG/DL — LOW (ref 8.4–10.5)
CHLORIDE SERPL-SCNC: 109 MMOL/L — HIGH (ref 98–107)
CO2 SERPL-SCNC: 21 MMOL/L — LOW (ref 22–31)
CREAT SERPL-MCNC: 0.44 MG/DL — LOW (ref 0.5–1.3)
CULTURE RESULTS: SIGNIFICANT CHANGE UP
CULTURE RESULTS: SIGNIFICANT CHANGE UP
EGFR: 104 ML/MIN/1.73M2 — SIGNIFICANT CHANGE UP
EGFR: 104 ML/MIN/1.73M2 — SIGNIFICANT CHANGE UP
EOSINOPHIL # BLD AUTO: 0 K/UL — SIGNIFICANT CHANGE UP (ref 0–0.5)
EOSINOPHIL NFR BLD AUTO: 0 % — SIGNIFICANT CHANGE UP (ref 0–6)
GAS PNL BLDV: SIGNIFICANT CHANGE UP
GLUCOSE SERPL-MCNC: 101 MG/DL — HIGH (ref 70–99)
HCT VFR BLD CALC: 28.2 % — LOW (ref 34.5–45)
HGB BLD-MCNC: 8.8 G/DL — LOW (ref 11.5–15.5)
IMM GRANULOCYTES # BLD AUTO: 0.03 K/UL — SIGNIFICANT CHANGE UP (ref 0–0.07)
IMM GRANULOCYTES NFR BLD AUTO: 0.5 % — SIGNIFICANT CHANGE UP (ref 0–0.9)
IMMATURE PLATELET FRACTION #: 1 K/UL — LOW (ref 4.7–11.1)
IMMATURE PLATELET FRACTION %: 2.1 % — SIGNIFICANT CHANGE UP (ref 1.6–4.9)
LYMPHOCYTES # BLD AUTO: 0.91 K/UL — LOW (ref 1–3.3)
LYMPHOCYTES NFR BLD AUTO: 15.2 % — SIGNIFICANT CHANGE UP (ref 13–44)
MAGNESIUM SERPL-MCNC: 1.9 MG/DL — SIGNIFICANT CHANGE UP (ref 1.6–2.6)
MCHC RBC-ENTMCNC: 29.6 PG — SIGNIFICANT CHANGE UP (ref 27–34)
MCHC RBC-ENTMCNC: 31.2 G/DL — LOW (ref 32–36)
MCV RBC AUTO: 94.9 FL — SIGNIFICANT CHANGE UP (ref 80–100)
MONOCYTES # BLD AUTO: 0.3 K/UL — SIGNIFICANT CHANGE UP (ref 0–0.9)
MONOCYTES NFR BLD AUTO: 5 % — SIGNIFICANT CHANGE UP (ref 2–14)
NEUTROPHILS # BLD AUTO: 4.75 K/UL — SIGNIFICANT CHANGE UP (ref 1.8–7.4)
NEUTROPHILS NFR BLD AUTO: 79.3 % — HIGH (ref 43–77)
NRBC # BLD AUTO: 0 K/UL — SIGNIFICANT CHANGE UP (ref 0–0)
NRBC # FLD: 0 K/UL — SIGNIFICANT CHANGE UP (ref 0–0)
NRBC BLD AUTO-RTO: 0 /100 WBCS — SIGNIFICANT CHANGE UP (ref 0–0)
PHOSPHATE SERPL-MCNC: 3 MG/DL — SIGNIFICANT CHANGE UP (ref 2.5–4.5)
PLATELET # BLD AUTO: 48 K/UL — LOW (ref 150–400)
PMV BLD: 10.7 FL — SIGNIFICANT CHANGE UP (ref 7–13)
POTASSIUM SERPL-MCNC: 3.9 MMOL/L — SIGNIFICANT CHANGE UP (ref 3.5–5.3)
POTASSIUM SERPL-SCNC: 3.9 MMOL/L — SIGNIFICANT CHANGE UP (ref 3.5–5.3)
PROT SERPL-MCNC: 5.8 G/DL — LOW (ref 6–8.3)
RBC # BLD: 2.97 M/UL — LOW (ref 3.8–5.2)
RBC # FLD: 16.4 % — HIGH (ref 10.3–14.5)
SODIUM SERPL-SCNC: 139 MMOL/L — SIGNIFICANT CHANGE UP (ref 135–145)
SPECIMEN SOURCE: SIGNIFICANT CHANGE UP
SPECIMEN SOURCE: SIGNIFICANT CHANGE UP
WBC # BLD: 5.99 K/UL — SIGNIFICANT CHANGE UP (ref 3.8–10.5)
WBC # FLD AUTO: 5.99 K/UL — SIGNIFICANT CHANGE UP (ref 3.8–10.5)

## 2025-07-28 RX ADMIN — LACTULOSE 10 GRAM(S): 10 SOLUTION ORAL at 21:47

## 2025-07-28 RX ADMIN — CARVEDILOL 3.12 MILLIGRAM(S): 3.12 TABLET, FILM COATED ORAL at 06:37

## 2025-07-28 RX ADMIN — Medication 50 MILLIGRAM(S): at 21:48

## 2025-07-28 RX ADMIN — DEXAMETHASONE 6 MILLIGRAM(S): 0.5 TABLET ORAL at 06:37

## 2025-07-28 RX ADMIN — LACTULOSE 10 GRAM(S): 10 SOLUTION ORAL at 13:15

## 2025-07-28 RX ADMIN — REMDESIVIR 200 MILLIGRAM(S): 5 INJECTION INTRAVENOUS at 17:14

## 2025-07-28 RX ADMIN — Medication 100 MILLIGRAM(S): at 06:37

## 2025-07-28 RX ADMIN — FOLIC ACID 1 MILLIGRAM(S): 1 TABLET ORAL at 11:13

## 2025-07-28 RX ADMIN — CARVEDILOL 3.12 MILLIGRAM(S): 3.12 TABLET, FILM COATED ORAL at 17:14

## 2025-07-28 RX ADMIN — Medication 1 TABLET(S): at 11:14

## 2025-07-28 RX ADMIN — LACTULOSE 10 GRAM(S): 10 SOLUTION ORAL at 06:38

## 2025-07-28 RX ADMIN — SERTRALINE 50 MILLIGRAM(S): 100 TABLET, FILM COATED ORAL at 21:48

## 2025-07-28 RX ADMIN — Medication 40 MILLIGRAM(S): at 06:37

## 2025-07-29 ENCOUNTER — TRANSCRIPTION ENCOUNTER (OUTPATIENT)
Age: 70
End: 2025-07-29

## 2025-07-29 VITALS
HEART RATE: 66 BPM | SYSTOLIC BLOOD PRESSURE: 116 MMHG | OXYGEN SATURATION: 100 % | DIASTOLIC BLOOD PRESSURE: 81 MMHG | RESPIRATION RATE: 18 BRPM

## 2025-07-29 RX ORDER — DEXAMETHASONE 0.5 MG/1
1 TABLET ORAL
Qty: 5 | Refills: 0
Start: 2025-07-29 | End: 2025-08-02

## 2025-07-29 RX ORDER — ALBUTEROL SULFATE 2.5 MG/3ML
2 VIAL, NEBULIZER (ML) INHALATION
Qty: 1 | Refills: 0
Start: 2025-07-29 | End: 2025-08-27

## 2025-07-29 RX ADMIN — CARVEDILOL 3.12 MILLIGRAM(S): 3.12 TABLET, FILM COATED ORAL at 18:14

## 2025-07-29 RX ADMIN — LACTULOSE 10 GRAM(S): 10 SOLUTION ORAL at 06:55

## 2025-07-29 RX ADMIN — Medication 40 MILLIGRAM(S): at 06:56

## 2025-07-29 RX ADMIN — LACTULOSE 10 GRAM(S): 10 SOLUTION ORAL at 12:53

## 2025-07-29 RX ADMIN — Medication 1 TABLET(S): at 12:53

## 2025-07-29 RX ADMIN — CARVEDILOL 3.12 MILLIGRAM(S): 3.12 TABLET, FILM COATED ORAL at 06:56

## 2025-07-29 RX ADMIN — FOLIC ACID 1 MILLIGRAM(S): 1 TABLET ORAL at 12:53

## 2025-07-29 RX ADMIN — DEXAMETHASONE 6 MILLIGRAM(S): 0.5 TABLET ORAL at 06:56

## 2025-07-31 ENCOUNTER — APPOINTMENT (OUTPATIENT)
Age: 70
End: 2025-07-31
Payer: MEDICARE

## 2025-07-31 DIAGNOSIS — Q25.72 CONGENITAL PULMONARY ARTERIOVENOUS MALFORMATION: ICD-10-CM

## 2025-07-31 DIAGNOSIS — K74.60 UNSPECIFIED CIRRHOSIS OF LIVER: ICD-10-CM

## 2025-07-31 DIAGNOSIS — U07.1 COVID-19: ICD-10-CM

## 2025-07-31 DIAGNOSIS — I10 ESSENTIAL (PRIMARY) HYPERTENSION: ICD-10-CM

## 2025-07-31 PROCEDURE — 99495 TRANSJ CARE MGMT MOD F2F 14D: CPT | Mod: 93

## 2025-08-10 ENCOUNTER — INPATIENT (INPATIENT)
Facility: HOSPITAL | Age: 70
LOS: 4 days | Discharge: HOME | End: 2025-08-15
Attending: INTERNAL MEDICINE | Admitting: INTERNAL MEDICINE
Payer: MEDICARE

## 2025-08-10 VITALS
HEIGHT: 62 IN | RESPIRATION RATE: 20 BRPM | DIASTOLIC BLOOD PRESSURE: 60 MMHG | HEART RATE: 64 BPM | WEIGHT: 123.02 LBS | OXYGEN SATURATION: 95 % | SYSTOLIC BLOOD PRESSURE: 126 MMHG | TEMPERATURE: 98 F

## 2025-08-10 DIAGNOSIS — Z98.890 OTHER SPECIFIED POSTPROCEDURAL STATES: Chronic | ICD-10-CM

## 2025-08-10 DIAGNOSIS — K43.2 INCISIONAL HERNIA WITHOUT OBSTRUCTION OR GANGRENE: Chronic | ICD-10-CM

## 2025-08-10 DIAGNOSIS — S06.5XAA TRAUMATIC SUBDURAL HEMORRHAGE WITH LOSS OF CONSCIOUSNESS STATUS UNKNOWN, INITIAL ENCOUNTER: ICD-10-CM

## 2025-08-10 DIAGNOSIS — Z90.49 ACQUIRED ABSENCE OF OTHER SPECIFIED PARTS OF DIGESTIVE TRACT: Chronic | ICD-10-CM

## 2025-08-10 LAB
ALBUMIN SERPL ELPH-MCNC: 2.8 G/DL — LOW (ref 3.3–5)
ALP SERPL-CCNC: 119 U/L — SIGNIFICANT CHANGE UP (ref 40–120)
ALT FLD-CCNC: 23 U/L — SIGNIFICANT CHANGE UP (ref 4–33)
ANION GAP SERPL CALC-SCNC: 9 MMOL/L — SIGNIFICANT CHANGE UP (ref 7–14)
ANISOCYTOSIS BLD QL: ABNORMAL
APPEARANCE UR: CLEAR — SIGNIFICANT CHANGE UP
APTT BLD: 35.9 SEC — SIGNIFICANT CHANGE UP (ref 26.1–36.8)
APTT BLD: 38.9 SEC — HIGH (ref 26.1–36.8)
AST SERPL-CCNC: 38 U/L — HIGH (ref 4–32)
BACTERIA # UR AUTO: NEGATIVE /HPF — SIGNIFICANT CHANGE UP
BASOPHILS # BLD AUTO: 0.01 K/UL — SIGNIFICANT CHANGE UP (ref 0–0.2)
BASOPHILS # BLD AUTO: 0.01 K/UL — SIGNIFICANT CHANGE UP (ref 0–0.2)
BASOPHILS # BLD MANUAL: 0.05 K/UL — SIGNIFICANT CHANGE UP (ref 0–0.2)
BASOPHILS NFR BLD AUTO: 0.3 % — SIGNIFICANT CHANGE UP (ref 0–2)
BASOPHILS NFR BLD AUTO: 0.3 % — SIGNIFICANT CHANGE UP (ref 0–2)
BASOPHILS NFR BLD MANUAL: 1.7 % — SIGNIFICANT CHANGE UP (ref 0–2)
BILIRUB SERPL-MCNC: 1.7 MG/DL — HIGH (ref 0.2–1.2)
BILIRUB UR-MCNC: NEGATIVE — SIGNIFICANT CHANGE UP
BLD GP AB SCN SERPL QL: NEGATIVE — SIGNIFICANT CHANGE UP
BLOOD GAS VENOUS COMPREHENSIVE RESULT: SIGNIFICANT CHANGE UP
BUN SERPL-MCNC: 10 MG/DL — SIGNIFICANT CHANGE UP (ref 7–23)
CALCIUM SERPL-MCNC: 8 MG/DL — LOW (ref 8.4–10.5)
CAST: 0 /LPF — SIGNIFICANT CHANGE UP (ref 0–4)
CHLORIDE SERPL-SCNC: 111 MMOL/L — HIGH (ref 98–107)
CO2 SERPL-SCNC: 22 MMOL/L — SIGNIFICANT CHANGE UP (ref 22–31)
COLOR SPEC: YELLOW — SIGNIFICANT CHANGE UP
CREAT SERPL-MCNC: 0.52 MG/DL — SIGNIFICANT CHANGE UP (ref 0.5–1.3)
DACRYOCYTES BLD QL SMEAR: SLIGHT — SIGNIFICANT CHANGE UP
DIFF PNL FLD: ABNORMAL
EGFR: 100 ML/MIN/1.73M2 — SIGNIFICANT CHANGE UP
EGFR: 100 ML/MIN/1.73M2 — SIGNIFICANT CHANGE UP
ELLIPTOCYTES BLD QL SMEAR: SLIGHT — SIGNIFICANT CHANGE UP
EOSINOPHIL # BLD AUTO: 0.07 K/UL — SIGNIFICANT CHANGE UP (ref 0–0.5)
EOSINOPHIL # BLD AUTO: 0.09 K/UL — SIGNIFICANT CHANGE UP (ref 0–0.5)
EOSINOPHIL # BLD MANUAL: 0.16 K/UL — SIGNIFICANT CHANGE UP (ref 0–0.5)
EOSINOPHIL NFR BLD AUTO: 1.8 % — SIGNIFICANT CHANGE UP (ref 0–6)
EOSINOPHIL NFR BLD AUTO: 2.8 % — SIGNIFICANT CHANGE UP (ref 0–6)
EOSINOPHIL NFR BLD MANUAL: 5.1 % — SIGNIFICANT CHANGE UP (ref 0–6)
GLUCOSE SERPL-MCNC: 89 MG/DL — SIGNIFICANT CHANGE UP (ref 70–99)
GLUCOSE UR QL: NEGATIVE MG/DL — SIGNIFICANT CHANGE UP
HCT VFR BLD CALC: 24.7 % — LOW (ref 34.5–45)
HCT VFR BLD CALC: 25.8 % — LOW (ref 34.5–45)
HGB BLD-MCNC: 7.8 G/DL — LOW (ref 11.5–15.5)
HGB BLD-MCNC: 8.2 G/DL — LOW (ref 11.5–15.5)
IMM GRANULOCYTES # BLD AUTO: 0.02 K/UL — SIGNIFICANT CHANGE UP (ref 0–0.07)
IMM GRANULOCYTES # BLD AUTO: 0.03 K/UL — SIGNIFICANT CHANGE UP (ref 0–0.07)
IMM GRANULOCYTES NFR BLD AUTO: 0.5 % — SIGNIFICANT CHANGE UP (ref 0–0.9)
IMM GRANULOCYTES NFR BLD AUTO: 0.9 % — SIGNIFICANT CHANGE UP (ref 0–0.9)
IMMATURE PLATELET FRACTION #: 0.5 K/UL — LOW (ref 4.7–11.1)
IMMATURE PLATELET FRACTION #: 0.6 K/UL — LOW (ref 4.7–11.1)
IMMATURE PLATELET FRACTION %: 1.4 % — LOW (ref 1.6–4.9)
IMMATURE PLATELET FRACTION %: 2 % — SIGNIFICANT CHANGE UP (ref 1.6–4.9)
INR BLD: 1.23 RATIO — HIGH (ref 0.85–1.16)
INR BLD: 1.51 RATIO — HIGH (ref 0.85–1.16)
KETONES UR QL: NEGATIVE MG/DL — SIGNIFICANT CHANGE UP
LEUKOCYTE ESTERASE UR-ACNC: NEGATIVE — SIGNIFICANT CHANGE UP
LYMPHOCYTES # BLD AUTO: 0.65 K/UL — LOW (ref 1–3.3)
LYMPHOCYTES # BLD AUTO: 0.69 K/UL — LOW (ref 1–3.3)
LYMPHOCYTES # BLD MANUAL: 0.19 K/UL — LOW (ref 1–3.3)
LYMPHOCYTES NFR BLD AUTO: 17.7 % — SIGNIFICANT CHANGE UP (ref 13–44)
LYMPHOCYTES NFR BLD AUTO: 20.4 % — SIGNIFICANT CHANGE UP (ref 13–44)
LYMPHOCYTES NFR BLD MANUAL: 6 % — LOW (ref 13–44)
MACROCYTES BLD QL: ABNORMAL
MANUAL NEUTROPHIL BANDS #: 0.08 K/UL — SIGNIFICANT CHANGE UP (ref 0–0.84)
MCHC RBC-ENTMCNC: 29.5 PG — SIGNIFICANT CHANGE UP (ref 27–34)
MCHC RBC-ENTMCNC: 30 PG — SIGNIFICANT CHANGE UP (ref 27–34)
MCHC RBC-ENTMCNC: 31.6 G/DL — LOW (ref 32–36)
MCHC RBC-ENTMCNC: 31.8 G/DL — LOW (ref 32–36)
MCV RBC AUTO: 93.6 FL — SIGNIFICANT CHANGE UP (ref 80–100)
MCV RBC AUTO: 94.5 FL — SIGNIFICANT CHANGE UP (ref 80–100)
MONOCYTES # BLD AUTO: 0.29 K/UL — SIGNIFICANT CHANGE UP (ref 0–0.9)
MONOCYTES # BLD AUTO: 0.38 K/UL — SIGNIFICANT CHANGE UP (ref 0–0.9)
MONOCYTES # BLD MANUAL: 0.14 K/UL — SIGNIFICANT CHANGE UP (ref 0–0.9)
MONOCYTES NFR BLD AUTO: 9.1 % — SIGNIFICANT CHANGE UP (ref 2–14)
MONOCYTES NFR BLD AUTO: 9.8 % — SIGNIFICANT CHANGE UP (ref 2–14)
MONOCYTES NFR BLD MANUAL: 4.3 % — SIGNIFICANT CHANGE UP (ref 2–14)
NEUTROPHILS # BLD AUTO: 2.12 K/UL — SIGNIFICANT CHANGE UP (ref 1.8–7.4)
NEUTROPHILS # BLD AUTO: 2.72 K/UL — SIGNIFICANT CHANGE UP (ref 1.8–7.4)
NEUTROPHILS # BLD MANUAL: 2.56 K/UL — SIGNIFICANT CHANGE UP (ref 1.8–7.4)
NEUTROPHILS NFR BLD AUTO: 66.5 % — SIGNIFICANT CHANGE UP (ref 43–77)
NEUTROPHILS NFR BLD AUTO: 69.9 % — SIGNIFICANT CHANGE UP (ref 43–77)
NEUTROPHILS NFR BLD MANUAL: 80.3 % — HIGH (ref 43–77)
NEUTS BAND # BLD: 2.6 % — SIGNIFICANT CHANGE UP (ref 0–8)
NEUTS BAND NFR BLD: 2.6 % — SIGNIFICANT CHANGE UP (ref 0–8)
NITRITE UR-MCNC: NEGATIVE — SIGNIFICANT CHANGE UP
NRBC # BLD AUTO: 0 K/UL — SIGNIFICANT CHANGE UP (ref 0–0)
NRBC # BLD AUTO: 0 K/UL — SIGNIFICANT CHANGE UP (ref 0–0)
NRBC # FLD: 0 K/UL — SIGNIFICANT CHANGE UP (ref 0–0)
NRBC # FLD: 0 K/UL — SIGNIFICANT CHANGE UP (ref 0–0)
NRBC BLD AUTO-RTO: 0 /100 WBCS — SIGNIFICANT CHANGE UP (ref 0–0)
NT-PROBNP SERPL-SCNC: 650 PG/ML — HIGH
OVALOCYTES BLD QL SMEAR: SLIGHT — SIGNIFICANT CHANGE UP
PH UR: 6.5 — SIGNIFICANT CHANGE UP (ref 5–8)
PLAT MORPH BLD: ABNORMAL
PLATELET # BLD AUTO: 29 K/UL — LOW (ref 150–400)
PLATELET # BLD AUTO: 35 K/UL — LOW (ref 150–400)
PLATELET COUNT - ESTIMATE: ABNORMAL
PMV BLD: 11.2 FL — SIGNIFICANT CHANGE UP (ref 7–13)
PMV BLD: SIGNIFICANT CHANGE UP FL (ref 7–13)
POIKILOCYTOSIS BLD QL AUTO: SLIGHT — SIGNIFICANT CHANGE UP
POLYCHROMASIA BLD QL SMEAR: SLIGHT — SIGNIFICANT CHANGE UP
POTASSIUM SERPL-MCNC: 3.5 MMOL/L — SIGNIFICANT CHANGE UP (ref 3.5–5.3)
POTASSIUM SERPL-SCNC: 3.5 MMOL/L — SIGNIFICANT CHANGE UP (ref 3.5–5.3)
PROT SERPL-MCNC: 5.5 G/DL — LOW (ref 6–8.3)
PROT UR-MCNC: NEGATIVE MG/DL — SIGNIFICANT CHANGE UP
PROTHROM AB SERPL-ACNC: 14.2 SEC — HIGH (ref 9.9–13.4)
PROTHROM AB SERPL-ACNC: 17.4 SEC — HIGH (ref 9.9–13.4)
RBC # BLD: 2.64 M/UL — LOW (ref 3.8–5.2)
RBC # BLD: 2.73 M/UL — LOW (ref 3.8–5.2)
RBC # FLD: 17.6 % — HIGH (ref 10.3–14.5)
RBC # FLD: 17.7 % — HIGH (ref 10.3–14.5)
RBC BLD AUTO: ABNORMAL
RBC CASTS # UR COMP ASSIST: 3 /HPF — SIGNIFICANT CHANGE UP (ref 0–4)
RH IG SCN BLD-IMP: POSITIVE — SIGNIFICANT CHANGE UP
SMUDGE CELLS # BLD: PRESENT
SODIUM SERPL-SCNC: 142 MMOL/L — SIGNIFICANT CHANGE UP (ref 135–145)
SP GR SPEC: 1.02 — SIGNIFICANT CHANGE UP (ref 1–1.03)
SQUAMOUS # UR AUTO: 0 /HPF — SIGNIFICANT CHANGE UP (ref 0–5)
TROPONIN T, HIGH SENSITIVITY RESULT: 19 NG/L — SIGNIFICANT CHANGE UP
UROBILINOGEN FLD QL: 1 MG/DL — SIGNIFICANT CHANGE UP (ref 0.2–1)
WBC # BLD: 3.19 K/UL — LOW (ref 3.8–10.5)
WBC # BLD: 3.89 K/UL — SIGNIFICANT CHANGE UP (ref 3.8–10.5)
WBC # FLD AUTO: 3.19 K/UL — LOW (ref 3.8–10.5)
WBC # FLD AUTO: 3.89 K/UL — SIGNIFICANT CHANGE UP (ref 3.8–10.5)
WBC UR QL: 0 /HPF — SIGNIFICANT CHANGE UP (ref 0–5)

## 2025-08-10 PROCEDURE — 70450 CT HEAD/BRAIN W/O DYE: CPT | Mod: 26,77

## 2025-08-10 PROCEDURE — 71046 X-RAY EXAM CHEST 2 VIEWS: CPT | Mod: 26

## 2025-08-10 PROCEDURE — 70450 CT HEAD/BRAIN W/O DYE: CPT | Mod: 26

## 2025-08-10 PROCEDURE — 99285 EMERGENCY DEPT VISIT HI MDM: CPT

## 2025-08-10 RX ORDER — PROTHROMBIN COMPLEX CONCENTRATE (HUMAN) 25.5; 16.5; 24; 22; 22; 26 [IU]/ML; [IU]/ML; [IU]/ML; [IU]/ML; [IU]/ML; [IU]/ML
2000 POWDER, FOR SOLUTION INTRAVENOUS ONCE
Refills: 0 | Status: COMPLETED | OUTPATIENT
Start: 2025-08-10 | End: 2025-08-10

## 2025-08-10 RX ORDER — PROTHROMBIN COMPLEX CONCENTRATE (HUMAN) 25.5; 16.5; 24; 22; 22; 26 [IU]/ML; [IU]/ML; [IU]/ML; [IU]/ML; [IU]/ML; [IU]/ML
3000 POWDER, FOR SOLUTION INTRAVENOUS ONCE
Refills: 0 | Status: DISCONTINUED | OUTPATIENT
Start: 2025-08-10 | End: 2025-08-10

## 2025-08-10 RX ADMIN — PROTHROMBIN COMPLEX CONCENTRATE (HUMAN) 2000 INTERNATIONAL UNIT(S): 25.5; 16.5; 24; 22; 22; 26 POWDER, FOR SOLUTION INTRAVENOUS at 19:08

## 2025-08-10 RX ADMIN — PROTHROMBIN COMPLEX CONCENTRATE (HUMAN) 400 INTERNATIONAL UNIT(S): 25.5; 16.5; 24; 22; 22; 26 POWDER, FOR SOLUTION INTRAVENOUS at 18:56

## 2025-08-11 DIAGNOSIS — Z86.79 PERSONAL HISTORY OF OTHER DISEASES OF THE CIRCULATORY SYSTEM: ICD-10-CM

## 2025-08-11 DIAGNOSIS — S06.5XAA TRAUMATIC SUBDURAL HEMORRHAGE WITH LOSS OF CONSCIOUSNESS STATUS UNKNOWN, INITIAL ENCOUNTER: ICD-10-CM

## 2025-08-11 DIAGNOSIS — K74.60 UNSPECIFIED CIRRHOSIS OF LIVER: ICD-10-CM

## 2025-08-11 DIAGNOSIS — D69.6 THROMBOCYTOPENIA, UNSPECIFIED: ICD-10-CM
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
## 2025-08-11 DIAGNOSIS — R29.898 OTHER SYMPTOMS AND SIGNS INVOLVING THE MUSCULOSKELETAL SYSTEM: ICD-10-CM

## 2025-08-11 DIAGNOSIS — Z29.9 ENCOUNTER FOR PROPHYLACTIC MEASURES, UNSPECIFIED: ICD-10-CM

## 2025-08-11 DIAGNOSIS — F41.9 ANXIETY DISORDER, UNSPECIFIED: ICD-10-CM

## 2025-08-11 PROBLEM — K59.00 CONSTIPATION, UNSPECIFIED: Chronic | Status: INACTIVE | Noted: 2017-06-05 | Resolved: 2025-08-11

## 2025-08-11 PROBLEM — G43.909 MIGRAINE, UNSPECIFIED, NOT INTRACTABLE, WITHOUT STATUS MIGRAINOSUS: Chronic | Status: INACTIVE | Noted: 2017-06-05 | Resolved: 2025-08-11

## 2025-08-11 PROBLEM — K43.9 VENTRAL HERNIA WITHOUT OBSTRUCTION OR GANGRENE: Chronic | Status: INACTIVE | Noted: 2017-06-05 | Resolved: 2025-08-11

## 2025-08-11 PROBLEM — M51.26 OTHER INTERVERTEBRAL DISC DISPLACEMENT, LUMBAR REGION: Chronic | Status: INACTIVE | Noted: 2017-06-05 | Resolved: 2025-08-11

## 2025-08-11 LAB
AMMONIA BLD-MCNC: 84 UMOL/L — HIGH (ref 11–55)
ANION GAP SERPL CALC-SCNC: 12 MMOL/L — SIGNIFICANT CHANGE UP (ref 7–14)
BUN SERPL-MCNC: 9 MG/DL — SIGNIFICANT CHANGE UP (ref 7–23)
CALCIUM SERPL-MCNC: 8.3 MG/DL — LOW (ref 8.4–10.5)
CHLORIDE SERPL-SCNC: 108 MMOL/L — HIGH (ref 98–107)
CO2 SERPL-SCNC: 19 MMOL/L — LOW (ref 22–31)
CREAT SERPL-MCNC: 0.44 MG/DL — LOW (ref 0.5–1.3)
EGFR: 104 ML/MIN/1.73M2 — SIGNIFICANT CHANGE UP
EGFR: 104 ML/MIN/1.73M2 — SIGNIFICANT CHANGE UP
GLUCOSE SERPL-MCNC: 66 MG/DL — LOW (ref 70–99)
HCT VFR BLD CALC: 24.9 % — LOW (ref 34.5–45)
HGB BLD-MCNC: 7.8 G/DL — LOW (ref 11.5–15.5)
IMMATURE PLATELET FRACTION #: 0.8 K/UL — LOW (ref 4.7–11.1)
IMMATURE PLATELET FRACTION %: 1.5 % — LOW (ref 1.6–4.9)
MAGNESIUM SERPL-MCNC: 1.7 MG/DL — SIGNIFICANT CHANGE UP (ref 1.6–2.6)
MCHC RBC-ENTMCNC: 29.8 PG — SIGNIFICANT CHANGE UP (ref 27–34)
MCHC RBC-ENTMCNC: 31.3 G/DL — LOW (ref 32–36)
MCV RBC AUTO: 95 FL — SIGNIFICANT CHANGE UP (ref 80–100)
NRBC # BLD AUTO: 0 K/UL — SIGNIFICANT CHANGE UP (ref 0–0)
NRBC # FLD: 0 K/UL — SIGNIFICANT CHANGE UP (ref 0–0)
NRBC BLD AUTO-RTO: 0 /100 WBCS — SIGNIFICANT CHANGE UP (ref 0–0)
PHOSPHATE SERPL-MCNC: 2.4 MG/DL — LOW (ref 2.5–4.5)
PLATELET # BLD AUTO: 52 K/UL — LOW (ref 150–400)
PMV BLD: 10 FL — SIGNIFICANT CHANGE UP (ref 7–13)
POTASSIUM SERPL-MCNC: 4 MMOL/L — SIGNIFICANT CHANGE UP (ref 3.5–5.3)
POTASSIUM SERPL-SCNC: 4 MMOL/L — SIGNIFICANT CHANGE UP (ref 3.5–5.3)
RBC # BLD: 2.62 M/UL — LOW (ref 3.8–5.2)
RBC # FLD: 17.5 % — HIGH (ref 10.3–14.5)
SODIUM SERPL-SCNC: 139 MMOL/L — SIGNIFICANT CHANGE UP (ref 135–145)
WBC # BLD: 4.06 K/UL — SIGNIFICANT CHANGE UP (ref 3.8–10.5)
WBC # FLD AUTO: 4.06 K/UL — SIGNIFICANT CHANGE UP (ref 3.8–10.5)

## 2025-08-11 PROCEDURE — 73030 X-RAY EXAM OF SHOULDER: CPT | Mod: 26,LT

## 2025-08-11 PROCEDURE — 99223 1ST HOSP IP/OBS HIGH 75: CPT

## 2025-08-11 PROCEDURE — 70450 CT HEAD/BRAIN W/O DYE: CPT | Mod: 26

## 2025-08-11 PROCEDURE — 73030 X-RAY EXAM OF SHOULDER: CPT | Mod: 26,RT,77

## 2025-08-11 RX ORDER — ACETAMINOPHEN 500 MG/5ML
650 LIQUID (ML) ORAL ONCE
Refills: 0 | Status: COMPLETED | OUTPATIENT
Start: 2025-08-11 | End: 2025-08-11

## 2025-08-11 RX ORDER — LACTULOSE 10 G/15ML
10 SOLUTION ORAL THREE TIMES A DAY
Refills: 0 | Status: DISCONTINUED | OUTPATIENT
Start: 2025-08-11 | End: 2025-08-15

## 2025-08-11 RX ORDER — CARVEDILOL 3.12 MG/1
3.12 TABLET, FILM COATED ORAL EVERY 12 HOURS
Refills: 0 | Status: DISCONTINUED | OUTPATIENT
Start: 2025-08-11 | End: 2025-08-15

## 2025-08-11 RX ORDER — SERTRALINE 100 MG/1
50 TABLET, FILM COATED ORAL DAILY
Refills: 0 | Status: DISCONTINUED | OUTPATIENT
Start: 2025-08-11 | End: 2025-08-15

## 2025-08-11 RX ORDER — TRAZODONE HCL 100 MG
50 TABLET ORAL AT BEDTIME
Refills: 0 | Status: DISCONTINUED | OUTPATIENT
Start: 2025-08-11 | End: 2025-08-15

## 2025-08-11 RX ORDER — SOD PHOS DI, MONO/K PHOS MONO 250 MG
1 TABLET ORAL EVERY 4 HOURS
Refills: 0 | Status: COMPLETED | OUTPATIENT
Start: 2025-08-11 | End: 2025-08-11

## 2025-08-11 RX ADMIN — Medication 650 MILLIGRAM(S): at 22:47

## 2025-08-11 RX ADMIN — SERTRALINE 50 MILLIGRAM(S): 100 TABLET, FILM COATED ORAL at 14:53

## 2025-08-11 RX ADMIN — Medication 650 MILLIGRAM(S): at 09:20

## 2025-08-11 RX ADMIN — LACTULOSE 10 GRAM(S): 10 SOLUTION ORAL at 06:47

## 2025-08-11 RX ADMIN — CARVEDILOL 3.12 MILLIGRAM(S): 3.12 TABLET, FILM COATED ORAL at 18:25

## 2025-08-11 RX ADMIN — Medication 650 MILLIGRAM(S): at 08:56

## 2025-08-11 RX ADMIN — LACTULOSE 10 GRAM(S): 10 SOLUTION ORAL at 14:52

## 2025-08-11 RX ADMIN — Medication 650 MILLIGRAM(S): at 23:00

## 2025-08-11 RX ADMIN — LACTULOSE 10 GRAM(S): 10 SOLUTION ORAL at 22:34

## 2025-08-11 RX ADMIN — Medication 1 TABLET(S): at 08:56

## 2025-08-11 RX ADMIN — CARVEDILOL 3.12 MILLIGRAM(S): 3.12 TABLET, FILM COATED ORAL at 06:48

## 2025-08-11 RX ADMIN — Medication 50 MILLIGRAM(S): at 22:34

## 2025-08-11 RX ADMIN — Medication 1 TABLET(S): at 14:52

## 2025-08-12 LAB
ALBUMIN SERPL ELPH-MCNC: 2.7 G/DL — LOW (ref 3.3–5)
ALP SERPL-CCNC: 110 U/L — SIGNIFICANT CHANGE UP (ref 40–120)
ALT FLD-CCNC: 17 U/L — SIGNIFICANT CHANGE UP (ref 4–33)
ANION GAP SERPL CALC-SCNC: 11 MMOL/L — SIGNIFICANT CHANGE UP (ref 7–14)
AST SERPL-CCNC: 36 U/L — HIGH (ref 4–32)
BILIRUB SERPL-MCNC: 2.5 MG/DL — HIGH (ref 0.2–1.2)
BUN SERPL-MCNC: 12 MG/DL — SIGNIFICANT CHANGE UP (ref 7–23)
CALCIUM SERPL-MCNC: 7.9 MG/DL — LOW (ref 8.4–10.5)
CHLORIDE SERPL-SCNC: 111 MMOL/L — HIGH (ref 98–107)
CO2 SERPL-SCNC: 17 MMOL/L — LOW (ref 22–31)
CREAT SERPL-MCNC: 0.49 MG/DL — LOW (ref 0.5–1.3)
EGFR: 101 ML/MIN/1.73M2 — SIGNIFICANT CHANGE UP
EGFR: 101 ML/MIN/1.73M2 — SIGNIFICANT CHANGE UP
GLUCOSE SERPL-MCNC: 108 MG/DL — HIGH (ref 70–99)
HCT VFR BLD CALC: 26.3 % — LOW (ref 34.5–45)
HGB BLD-MCNC: 7.7 G/DL — LOW (ref 11.5–15.5)
IMMATURE PLATELET FRACTION #: 0.5 K/UL — LOW (ref 4.7–11.1)
IMMATURE PLATELET FRACTION %: 1.2 % — LOW (ref 1.6–4.9)
MAGNESIUM SERPL-MCNC: 2 MG/DL — SIGNIFICANT CHANGE UP (ref 1.6–2.6)
MCHC RBC-ENTMCNC: 29.3 G/DL — LOW (ref 32–36)
MCHC RBC-ENTMCNC: 29.3 PG — SIGNIFICANT CHANGE UP (ref 27–34)
MCV RBC AUTO: 100 FL — SIGNIFICANT CHANGE UP (ref 80–100)
NRBC # BLD AUTO: 0 K/UL — SIGNIFICANT CHANGE UP (ref 0–0)
NRBC # FLD: 0 K/UL — SIGNIFICANT CHANGE UP (ref 0–0)
NRBC BLD AUTO-RTO: 0 /100 WBCS — SIGNIFICANT CHANGE UP (ref 0–0)
PHOSPHATE SERPL-MCNC: 2.2 MG/DL — LOW (ref 2.5–4.5)
PLATELET # BLD AUTO: 42 K/UL — LOW (ref 150–400)
PMV BLD: 10.6 FL — SIGNIFICANT CHANGE UP (ref 7–13)
POTASSIUM SERPL-MCNC: 3.3 MMOL/L — LOW (ref 3.5–5.3)
POTASSIUM SERPL-SCNC: 3.3 MMOL/L — LOW (ref 3.5–5.3)
PROT SERPL-MCNC: 5.8 G/DL — LOW (ref 6–8.3)
RBC # BLD: 2.63 M/UL — LOW (ref 3.8–5.2)
RBC # FLD: 17.9 % — HIGH (ref 10.3–14.5)
SODIUM SERPL-SCNC: 139 MMOL/L — SIGNIFICANT CHANGE UP (ref 135–145)
WBC # BLD: 2.6 K/UL — LOW (ref 3.8–10.5)
WBC # FLD AUTO: 2.6 K/UL — LOW (ref 3.8–10.5)

## 2025-08-12 PROCEDURE — 70450 CT HEAD/BRAIN W/O DYE: CPT | Mod: 26

## 2025-08-12 RX ADMIN — LACTULOSE 10 GRAM(S): 10 SOLUTION ORAL at 14:44

## 2025-08-12 RX ADMIN — CARVEDILOL 3.12 MILLIGRAM(S): 3.12 TABLET, FILM COATED ORAL at 05:48

## 2025-08-12 RX ADMIN — Medication 50 MILLIGRAM(S): at 22:53

## 2025-08-12 RX ADMIN — SERTRALINE 50 MILLIGRAM(S): 100 TABLET, FILM COATED ORAL at 14:44

## 2025-08-12 RX ADMIN — Medication 40 MILLIEQUIVALENT(S): at 14:46

## 2025-08-12 RX ADMIN — LACTULOSE 10 GRAM(S): 10 SOLUTION ORAL at 05:48

## 2025-08-12 RX ADMIN — CARVEDILOL 3.12 MILLIGRAM(S): 3.12 TABLET, FILM COATED ORAL at 18:57

## 2025-08-13 LAB
AMMONIA BLD-MCNC: 115 UMOL/L — HIGH (ref 11–55)
ANION GAP SERPL CALC-SCNC: 8 MMOL/L — SIGNIFICANT CHANGE UP (ref 7–14)
BILIRUB SERPL-MCNC: 2.4 MG/DL — HIGH (ref 0.2–1.2)
BLD GP AB SCN SERPL QL: NEGATIVE — SIGNIFICANT CHANGE UP
BUN SERPL-MCNC: 10 MG/DL — SIGNIFICANT CHANGE UP (ref 7–23)
CALCIUM SERPL-MCNC: 7.9 MG/DL — LOW (ref 8.4–10.5)
CHLORIDE SERPL-SCNC: 109 MMOL/L — HIGH (ref 98–107)
CO2 SERPL-SCNC: 20 MMOL/L — LOW (ref 22–31)
CREAT SERPL-MCNC: 0.42 MG/DL — LOW (ref 0.5–1.3)
EGFR: 105 ML/MIN/1.73M2 — SIGNIFICANT CHANGE UP
EGFR: 105 ML/MIN/1.73M2 — SIGNIFICANT CHANGE UP
GLUCOSE SERPL-MCNC: 92 MG/DL — SIGNIFICANT CHANGE UP (ref 70–99)
HCT VFR BLD CALC: 18.2 % — CRITICAL LOW (ref 34.5–45)
HCT VFR BLD CALC: 25.1 % — LOW (ref 34.5–45)
HGB BLD-MCNC: 5.2 G/DL — CRITICAL LOW (ref 11.5–15.5)
HGB BLD-MCNC: 7.8 G/DL — LOW (ref 11.5–15.5)
IMMATURE PLATELET FRACTION #: 0.5 K/UL — LOW (ref 4.7–11.1)
IMMATURE PLATELET FRACTION #: 1.1 K/UL — LOW (ref 4.7–11.1)
IMMATURE PLATELET FRACTION %: 1.8 % — SIGNIFICANT CHANGE UP (ref 1.6–4.9)
IMMATURE PLATELET FRACTION %: 3.2 % — SIGNIFICANT CHANGE UP (ref 1.6–4.9)
INR BLD: 1.48 RATIO — HIGH (ref 0.85–1.16)
MAGNESIUM SERPL-MCNC: 1.8 MG/DL — SIGNIFICANT CHANGE UP (ref 1.6–2.6)
MCHC RBC-ENTMCNC: 28.6 G/DL — LOW (ref 32–36)
MCHC RBC-ENTMCNC: 29.4 PG — SIGNIFICANT CHANGE UP (ref 27–34)
MCHC RBC-ENTMCNC: 29.8 PG — SIGNIFICANT CHANGE UP (ref 27–34)
MCHC RBC-ENTMCNC: 31.1 G/DL — LOW (ref 32–36)
MCV RBC AUTO: 102.8 FL — HIGH (ref 80–100)
MCV RBC AUTO: 95.8 FL — SIGNIFICANT CHANGE UP (ref 80–100)
MELD SCORE WITH DIALYSIS: 27 POINTS — SIGNIFICANT CHANGE UP
MELD SCORE WITHOUT DIALYSIS: 14 POINTS — SIGNIFICANT CHANGE UP
NRBC # BLD AUTO: 0 K/UL — SIGNIFICANT CHANGE UP (ref 0–0)
NRBC # BLD AUTO: 0 K/UL — SIGNIFICANT CHANGE UP (ref 0–0)
NRBC # FLD: 0 K/UL — SIGNIFICANT CHANGE UP (ref 0–0)
NRBC # FLD: 0 K/UL — SIGNIFICANT CHANGE UP (ref 0–0)
NRBC BLD AUTO-RTO: 0 /100 WBCS — SIGNIFICANT CHANGE UP (ref 0–0)
NRBC BLD AUTO-RTO: 0 /100 WBCS — SIGNIFICANT CHANGE UP (ref 0–0)
PHOSPHATE SERPL-MCNC: 2 MG/DL — LOW (ref 2.5–4.5)
PLATELET # BLD AUTO: 26 K/UL — LOW (ref 150–400)
PLATELET # BLD AUTO: 35 K/UL — LOW (ref 150–400)
PMV BLD: 11.9 FL — SIGNIFICANT CHANGE UP (ref 7–13)
PMV BLD: 11.9 FL — SIGNIFICANT CHANGE UP (ref 7–13)
POTASSIUM SERPL-MCNC: 4.1 MMOL/L — SIGNIFICANT CHANGE UP (ref 3.5–5.3)
POTASSIUM SERPL-SCNC: 4.1 MMOL/L — SIGNIFICANT CHANGE UP (ref 3.5–5.3)
PROTHROM AB SERPL-ACNC: 17.6 SEC — HIGH (ref 9.9–13.4)
RBC # BLD: 1.77 M/UL — LOW (ref 3.8–5.2)
RBC # BLD: 2.62 M/UL — LOW (ref 3.8–5.2)
RBC # FLD: 17.8 % — HIGH (ref 10.3–14.5)
RBC # FLD: 18.1 % — HIGH (ref 10.3–14.5)
RH IG SCN BLD-IMP: POSITIVE — SIGNIFICANT CHANGE UP
SODIUM SERPL-SCNC: 137 MMOL/L — SIGNIFICANT CHANGE UP (ref 135–145)
WBC # BLD: 1.99 K/UL — LOW (ref 3.8–10.5)
WBC # BLD: 2.39 K/UL — LOW (ref 3.8–10.5)
WBC # FLD AUTO: 1.99 K/UL — LOW (ref 3.8–10.5)
WBC # FLD AUTO: 2.39 K/UL — LOW (ref 3.8–10.5)

## 2025-08-13 RX ADMIN — Medication 50 MILLIGRAM(S): at 21:32

## 2025-08-13 RX ADMIN — LACTULOSE 10 GRAM(S): 10 SOLUTION ORAL at 13:31

## 2025-08-13 RX ADMIN — SERTRALINE 50 MILLIGRAM(S): 100 TABLET, FILM COATED ORAL at 13:32

## 2025-08-13 RX ADMIN — CARVEDILOL 3.12 MILLIGRAM(S): 3.12 TABLET, FILM COATED ORAL at 18:24

## 2025-08-13 RX ADMIN — Medication 1 LOZENGE: at 13:32

## 2025-08-13 RX ADMIN — LACTULOSE 10 GRAM(S): 10 SOLUTION ORAL at 06:12

## 2025-08-13 RX ADMIN — CARVEDILOL 3.12 MILLIGRAM(S): 3.12 TABLET, FILM COATED ORAL at 06:12

## 2025-08-13 RX ADMIN — LACTULOSE 10 GRAM(S): 10 SOLUTION ORAL at 21:31

## 2025-08-14 LAB
ALBUMIN SERPL ELPH-MCNC: 2.9 G/DL — LOW (ref 3.3–5)
ALP SERPL-CCNC: 124 U/L — HIGH (ref 40–120)
ALT FLD-CCNC: 20 U/L — SIGNIFICANT CHANGE UP (ref 4–33)
ANION GAP SERPL CALC-SCNC: 7 MMOL/L — SIGNIFICANT CHANGE UP (ref 7–14)
AST SERPL-CCNC: 35 U/L — HIGH (ref 4–32)
BILIRUB DIRECT SERPL-MCNC: 0.8 MG/DL — HIGH (ref 0–0.3)
BILIRUB INDIRECT FLD-MCNC: 1.1 MG/DL — HIGH (ref 0–1)
BILIRUB SERPL-MCNC: 1.9 MG/DL — HIGH (ref 0.2–1.2)
BUN SERPL-MCNC: 12 MG/DL — SIGNIFICANT CHANGE UP (ref 7–23)
CALCIUM SERPL-MCNC: 8.1 MG/DL — LOW (ref 8.4–10.5)
CHLORIDE SERPL-SCNC: 111 MMOL/L — HIGH (ref 98–107)
CO2 SERPL-SCNC: 21 MMOL/L — LOW (ref 22–31)
CREAT SERPL-MCNC: 0.45 MG/DL — LOW (ref 0.5–1.3)
EGFR: 103 ML/MIN/1.73M2 — SIGNIFICANT CHANGE UP
EGFR: 103 ML/MIN/1.73M2 — SIGNIFICANT CHANGE UP
GLUCOSE SERPL-MCNC: 108 MG/DL — HIGH (ref 70–99)
HCT VFR BLD CALC: 25.2 % — LOW (ref 34.5–45)
HGB BLD-MCNC: 7.9 G/DL — LOW (ref 11.5–15.5)
IMMATURE PLATELET FRACTION #: 1.4 K/UL — LOW (ref 4.7–11.1)
IMMATURE PLATELET FRACTION %: 3.2 % — SIGNIFICANT CHANGE UP (ref 1.6–4.9)
MAGNESIUM SERPL-MCNC: 1.9 MG/DL — SIGNIFICANT CHANGE UP (ref 1.6–2.6)
MCHC RBC-ENTMCNC: 30.3 PG — SIGNIFICANT CHANGE UP (ref 27–34)
MCHC RBC-ENTMCNC: 31.3 G/DL — LOW (ref 32–36)
MCV RBC AUTO: 96.6 FL — SIGNIFICANT CHANGE UP (ref 80–100)
NRBC # BLD AUTO: 0 K/UL — SIGNIFICANT CHANGE UP (ref 0–0)
NRBC # FLD: 0 K/UL — SIGNIFICANT CHANGE UP (ref 0–0)
NRBC BLD AUTO-RTO: 0 /100 WBCS — SIGNIFICANT CHANGE UP (ref 0–0)
PHOSPHATE SERPL-MCNC: 2.1 MG/DL — LOW (ref 2.5–4.5)
PLATELET # BLD AUTO: 43 K/UL — LOW (ref 150–400)
PMV BLD: 11.9 FL — SIGNIFICANT CHANGE UP (ref 7–13)
POTASSIUM SERPL-MCNC: 4.2 MMOL/L — SIGNIFICANT CHANGE UP (ref 3.5–5.3)
POTASSIUM SERPL-SCNC: 4.2 MMOL/L — SIGNIFICANT CHANGE UP (ref 3.5–5.3)
PROT SERPL-MCNC: 5.7 G/DL — LOW (ref 6–8.3)
RBC # BLD: 2.61 M/UL — LOW (ref 3.8–5.2)
RBC # FLD: 18.3 % — HIGH (ref 10.3–14.5)
SODIUM SERPL-SCNC: 139 MMOL/L — SIGNIFICANT CHANGE UP (ref 135–145)
WBC # BLD: 3.48 K/UL — LOW (ref 3.8–10.5)
WBC # FLD AUTO: 3.48 K/UL — LOW (ref 3.8–10.5)

## 2025-08-14 RX ORDER — SOD PHOS DI, MONO/K PHOS MONO 250 MG
1 TABLET ORAL
Refills: 0 | Status: COMPLETED | OUTPATIENT
Start: 2025-08-14 | End: 2025-08-15

## 2025-08-14 RX ORDER — ACETAMINOPHEN 500 MG/5ML
650 LIQUID (ML) ORAL ONCE
Refills: 0 | Status: COMPLETED | OUTPATIENT
Start: 2025-08-14 | End: 2025-08-14

## 2025-08-14 RX ADMIN — LACTULOSE 10 GRAM(S): 10 SOLUTION ORAL at 21:43

## 2025-08-14 RX ADMIN — LACTULOSE 10 GRAM(S): 10 SOLUTION ORAL at 13:40

## 2025-08-14 RX ADMIN — CARVEDILOL 3.12 MILLIGRAM(S): 3.12 TABLET, FILM COATED ORAL at 05:36

## 2025-08-14 RX ADMIN — Medication 1 PACKET(S): at 23:11

## 2025-08-14 RX ADMIN — Medication 650 MILLIGRAM(S): at 03:02

## 2025-08-14 RX ADMIN — CARVEDILOL 3.12 MILLIGRAM(S): 3.12 TABLET, FILM COATED ORAL at 17:27

## 2025-08-14 RX ADMIN — Medication 50 MILLIGRAM(S): at 21:42

## 2025-08-14 RX ADMIN — SERTRALINE 50 MILLIGRAM(S): 100 TABLET, FILM COATED ORAL at 13:39

## 2025-08-14 RX ADMIN — Medication 1 PACKET(S): at 17:30

## 2025-08-14 RX ADMIN — Medication 650 MILLIGRAM(S): at 04:00

## 2025-08-14 RX ADMIN — LACTULOSE 10 GRAM(S): 10 SOLUTION ORAL at 05:36

## 2025-08-15 ENCOUNTER — TRANSCRIPTION ENCOUNTER (OUTPATIENT)
Age: 70
End: 2025-08-15

## 2025-08-15 VITALS
DIASTOLIC BLOOD PRESSURE: 70 MMHG | SYSTOLIC BLOOD PRESSURE: 115 MMHG | HEART RATE: 84 BPM | TEMPERATURE: 98 F | RESPIRATION RATE: 18 BRPM | OXYGEN SATURATION: 96 %

## 2025-08-15 PROCEDURE — 70551 MRI BRAIN STEM W/O DYE: CPT | Mod: 26

## 2025-08-15 RX ADMIN — CARVEDILOL 3.12 MILLIGRAM(S): 3.12 TABLET, FILM COATED ORAL at 05:56

## 2025-08-15 RX ADMIN — Medication 1 PACKET(S): at 05:55

## 2025-08-15 RX ADMIN — LACTULOSE 10 GRAM(S): 10 SOLUTION ORAL at 05:56

## 2025-08-15 RX ADMIN — SERTRALINE 50 MILLIGRAM(S): 100 TABLET, FILM COATED ORAL at 12:04

## 2025-08-15 RX ADMIN — Medication 1 PACKET(S): at 12:05
